# Patient Record
Sex: FEMALE | Race: WHITE | Employment: OTHER
[De-identification: names, ages, dates, MRNs, and addresses within clinical notes are randomized per-mention and may not be internally consistent; named-entity substitution may affect disease eponyms.]

---

## 2017-01-11 ENCOUNTER — CARE COORDINATION (OUTPATIENT)
Dept: CARE COORDINATION | Facility: CLINIC | Age: 77
End: 2017-01-11

## 2017-01-13 ENCOUNTER — CARE COORDINATION (OUTPATIENT)
Dept: CARE COORDINATION | Facility: CLINIC | Age: 77
End: 2017-01-13

## 2017-01-16 ENCOUNTER — CARE COORDINATION (OUTPATIENT)
Dept: CARE COORDINATION | Facility: CLINIC | Age: 77
End: 2017-01-16

## 2017-01-18 ENCOUNTER — CARE COORDINATION (OUTPATIENT)
Dept: CARE COORDINATION | Facility: CLINIC | Age: 77
End: 2017-01-18

## 2017-01-30 ENCOUNTER — CARE COORDINATION (OUTPATIENT)
Dept: CARE COORDINATION | Facility: CLINIC | Age: 77
End: 2017-01-30

## 2017-02-14 ENCOUNTER — CARE COORDINATION (OUTPATIENT)
Dept: CARE COORDINATION | Facility: CLINIC | Age: 77
End: 2017-02-14

## 2017-03-13 ENCOUNTER — APPOINTMENT (OUTPATIENT)
Dept: CT IMAGING | Age: 77
End: 2017-03-13
Payer: MEDICARE

## 2017-03-13 ENCOUNTER — HOSPITAL ENCOUNTER (EMERGENCY)
Age: 77
Discharge: HOME | End: 2017-03-13
Attending: EMERGENCY MEDICINE
Payer: MEDICARE

## 2017-03-13 VITALS
OXYGEN SATURATION: 98 % | HEART RATE: 73 BPM | SYSTOLIC BLOOD PRESSURE: 146 MMHG | BODY MASS INDEX: 34.66 KG/M2 | HEIGHT: 65 IN | RESPIRATION RATE: 17 BRPM | TEMPERATURE: 97.4 F | DIASTOLIC BLOOD PRESSURE: 80 MMHG | WEIGHT: 208 LBS

## 2017-03-13 DIAGNOSIS — H81.90 EPISODIC RECURRENT VERTIGO, UNSPECIFIED LATERALITY: ICD-10-CM

## 2017-03-13 DIAGNOSIS — H81.09 MENIERE DISORDER, UNSPECIFIED LATERALITY: Primary | ICD-10-CM

## 2017-03-13 LAB
% CKMB: 3.6 % (ref 0–3)
ABSOLUTE EOS #: 0.1 K/UL (ref 0–0.4)
ABSOLUTE LYMPH #: 1.5 K/UL (ref 1–4.8)
ABSOLUTE MONO #: 0.4 K/UL (ref 0.2–0.8)
ANION GAP SERPL CALCULATED.3IONS-SCNC: 14 MMOL/L (ref 9–17)
BASOPHILS # BLD: 1 % (ref 0–2)
BASOPHILS ABSOLUTE: 0.1 K/UL (ref 0–0.2)
BUN BLDV-MCNC: 15 MG/DL (ref 8–23)
BUN/CREAT BLD: 22 (ref 9–20)
CALCIUM SERPL-MCNC: 9.7 MG/DL (ref 8.6–10.4)
CHLORIDE BLD-SCNC: 102 MMOL/L (ref 98–107)
CK MB: 3.4 NG/ML
CKMB INTERPRETATION: ABNORMAL
CO2: 25 MMOL/L (ref 20–31)
CREAT SERPL-MCNC: 0.68 MG/DL (ref 0.5–0.9)
DIFFERENTIAL TYPE: ABNORMAL
EOSINOPHILS RELATIVE PERCENT: 1 % (ref 1–4)
GFR AFRICAN AMERICAN: >60 ML/MIN
GFR NON-AFRICAN AMERICAN: >60 ML/MIN
GFR SERPL CREATININE-BSD FRML MDRD: ABNORMAL ML/MIN/{1.73_M2}
GFR SERPL CREATININE-BSD FRML MDRD: ABNORMAL ML/MIN/{1.73_M2}
GLUCOSE BLD-MCNC: 126 MG/DL (ref 70–99)
HCT VFR BLD CALC: 41.9 % (ref 36–46)
HEMOGLOBIN: 13.8 G/DL (ref 12–16)
LYMPHOCYTES # BLD: 17 % (ref 24–44)
MAGNESIUM: 2 MG/DL (ref 1.6–2.6)
MCH RBC QN AUTO: 31.6 PG (ref 26–34)
MCHC RBC AUTO-ENTMCNC: 32.9 G/DL (ref 31–37)
MCV RBC AUTO: 96 FL (ref 80–100)
MONOCYTES # BLD: 4 % (ref 1–7)
MYOGLOBIN: 35 NG/ML (ref 25–58)
PDW BLD-RTO: 13.5 % (ref 11.5–14.5)
PLATELET # BLD: 254 K/UL (ref 130–400)
PLATELET ESTIMATE: ABNORMAL
PMV BLD AUTO: 8.4 FL (ref 6–12)
POTASSIUM SERPL-SCNC: 4 MMOL/L (ref 3.7–5.3)
RBC # BLD: 4.36 M/UL (ref 4–5.2)
RBC # BLD: ABNORMAL 10*6/UL
SEG NEUTROPHILS: 77 % (ref 36–66)
SEGMENTED NEUTROPHILS ABSOLUTE COUNT: 6.6 K/UL (ref 1.8–7.7)
SODIUM BLD-SCNC: 141 MMOL/L (ref 135–144)
TOTAL CK: 95 U/L (ref 26–192)
TROPONIN INTERP: NORMAL
TROPONIN T: <0.03 NG/ML
WBC # BLD: 8.6 K/UL (ref 3.5–11)
WBC # BLD: ABNORMAL 10*3/UL

## 2017-03-13 PROCEDURE — 2580000003 HC RX 258: Performed by: EMERGENCY MEDICINE

## 2017-03-13 PROCEDURE — 83874 ASSAY OF MYOGLOBIN: CPT

## 2017-03-13 PROCEDURE — 84484 ASSAY OF TROPONIN QUANT: CPT

## 2017-03-13 PROCEDURE — 82550 ASSAY OF CK (CPK): CPT

## 2017-03-13 PROCEDURE — 6370000000 HC RX 637 (ALT 250 FOR IP): Performed by: EMERGENCY MEDICINE

## 2017-03-13 PROCEDURE — 70450 CT HEAD/BRAIN W/O DYE: CPT

## 2017-03-13 PROCEDURE — 36415 COLL VENOUS BLD VENIPUNCTURE: CPT

## 2017-03-13 PROCEDURE — 82553 CREATINE MB FRACTION: CPT

## 2017-03-13 PROCEDURE — 6360000002 HC RX W HCPCS: Performed by: EMERGENCY MEDICINE

## 2017-03-13 PROCEDURE — 99285 EMERGENCY DEPT VISIT HI MDM: CPT

## 2017-03-13 PROCEDURE — 80048 BASIC METABOLIC PNL TOTAL CA: CPT

## 2017-03-13 PROCEDURE — 93005 ELECTROCARDIOGRAM TRACING: CPT

## 2017-03-13 PROCEDURE — 96361 HYDRATE IV INFUSION ADD-ON: CPT

## 2017-03-13 PROCEDURE — 85025 COMPLETE CBC W/AUTO DIFF WBC: CPT

## 2017-03-13 PROCEDURE — 83735 ASSAY OF MAGNESIUM: CPT

## 2017-03-13 PROCEDURE — 96375 TX/PRO/DX INJ NEW DRUG ADDON: CPT

## 2017-03-13 PROCEDURE — 96374 THER/PROPH/DIAG INJ IV PUSH: CPT

## 2017-03-13 RX ORDER — MECLIZINE HYDROCHLORIDE 25 MG/1
25 TABLET ORAL 3 TIMES DAILY PRN
Qty: 20 TABLET | Refills: 0 | Status: SHIPPED | OUTPATIENT
Start: 2017-03-13 | End: 2017-07-17 | Stop reason: ALTCHOICE

## 2017-03-13 RX ORDER — PROMETHAZINE HYDROCHLORIDE 25 MG/ML
12.5 INJECTION, SOLUTION INTRAMUSCULAR; INTRAVENOUS ONCE
Status: DISCONTINUED | OUTPATIENT
Start: 2017-03-13 | End: 2017-03-14 | Stop reason: HOSPADM

## 2017-03-13 RX ORDER — MECLIZINE HCL 12.5 MG/1
25 TABLET ORAL ONCE
Status: COMPLETED | OUTPATIENT
Start: 2017-03-13 | End: 2017-03-13

## 2017-03-13 RX ORDER — ONDANSETRON 4 MG/1
4 TABLET, ORALLY DISINTEGRATING ORAL EVERY 8 HOURS PRN
Qty: 20 TABLET | Refills: 0 | Status: SHIPPED | OUTPATIENT
Start: 2017-03-13 | End: 2017-05-11 | Stop reason: SDUPTHER

## 2017-03-13 RX ORDER — 0.9 % SODIUM CHLORIDE 0.9 %
1000 INTRAVENOUS SOLUTION INTRAVENOUS ONCE
Status: COMPLETED | OUTPATIENT
Start: 2017-03-13 | End: 2017-03-13

## 2017-03-13 RX ORDER — ONDANSETRON 2 MG/ML
8 INJECTION INTRAMUSCULAR; INTRAVENOUS ONCE
Status: COMPLETED | OUTPATIENT
Start: 2017-03-13 | End: 2017-03-13

## 2017-03-13 RX ORDER — LORAZEPAM 2 MG/ML
1 INJECTION INTRAMUSCULAR ONCE
Status: COMPLETED | OUTPATIENT
Start: 2017-03-13 | End: 2017-03-13

## 2017-03-13 RX ORDER — PROMETHAZINE HYDROCHLORIDE 25 MG/1
25 SUPPOSITORY RECTAL EVERY 8 HOURS PRN
Qty: 20 SUPPOSITORY | Refills: 0 | Status: SHIPPED | OUTPATIENT
Start: 2017-03-13 | End: 2017-03-20

## 2017-03-13 RX ORDER — LORAZEPAM 1 MG/1
1 TABLET ORAL EVERY 6 HOURS PRN
Qty: 10 TABLET | Refills: 0 | Status: SHIPPED | OUTPATIENT
Start: 2017-03-13 | End: 2017-11-21 | Stop reason: SDUPTHER

## 2017-03-13 RX ADMIN — SODIUM CHLORIDE 1000 ML: 9 INJECTION, SOLUTION INTRAVENOUS at 20:31

## 2017-03-13 RX ADMIN — ONDANSETRON 8 MG: 2 INJECTION INTRAMUSCULAR; INTRAVENOUS at 20:31

## 2017-03-13 RX ADMIN — LORAZEPAM 1 MG: 2 INJECTION INTRAMUSCULAR; INTRAVENOUS at 20:31

## 2017-03-13 RX ADMIN — MECLIZINE HCL 25 MG: 12.5 TABLET ORAL at 22:19

## 2017-03-13 ASSESSMENT — ENCOUNTER SYMPTOMS
VOMITING: 1
NAUSEA: 1
ABDOMINAL PAIN: 0
RESPIRATORY NEGATIVE: 1

## 2017-03-15 LAB
EKG ATRIAL RATE: 72 BPM
EKG P AXIS: 57 DEGREES
EKG P-R INTERVAL: 178 MS
EKG Q-T INTERVAL: 410 MS
EKG QRS DURATION: 84 MS
EKG QTC CALCULATION (BAZETT): 448 MS
EKG R AXIS: -21 DEGREES
EKG T AXIS: 41 DEGREES
EKG VENTRICULAR RATE: 72 BPM

## 2017-03-27 ENCOUNTER — OFFICE VISIT (OUTPATIENT)
Dept: PULMONOLOGY | Age: 77
End: 2017-03-27
Payer: MEDICARE

## 2017-03-27 ENCOUNTER — TELEPHONE (OUTPATIENT)
Dept: PULMONOLOGY | Age: 77
End: 2017-03-27

## 2017-03-27 VITALS
BODY MASS INDEX: 35.99 KG/M2 | TEMPERATURE: 97.2 F | RESPIRATION RATE: 16 BRPM | HEART RATE: 81 BPM | HEIGHT: 65 IN | DIASTOLIC BLOOD PRESSURE: 89 MMHG | OXYGEN SATURATION: 95 % | SYSTOLIC BLOOD PRESSURE: 137 MMHG | WEIGHT: 216 LBS

## 2017-03-27 VITALS — WEIGHT: 216 LBS | BODY MASS INDEX: 35.99 KG/M2 | HEART RATE: 81 BPM | HEIGHT: 65 IN | OXYGEN SATURATION: 98 %

## 2017-03-27 DIAGNOSIS — J30.0 VASOMOTOR RHINITIS: ICD-10-CM

## 2017-03-27 DIAGNOSIS — J01.00 SUBACUTE MAXILLARY SINUSITIS: Primary | ICD-10-CM

## 2017-03-27 DIAGNOSIS — R09.82 POST-NASAL DRIP: ICD-10-CM

## 2017-03-27 DIAGNOSIS — J34.89 SINUS DRAINAGE: Primary | ICD-10-CM

## 2017-03-27 DIAGNOSIS — J98.11 PATCHY ATELECTASIS: ICD-10-CM

## 2017-03-27 PROCEDURE — 4040F PNEUMOC VAC/ADMIN/RCVD: CPT | Performed by: INTERNAL MEDICINE

## 2017-03-27 PROCEDURE — 1036F TOBACCO NON-USER: CPT | Performed by: INTERNAL MEDICINE

## 2017-03-27 PROCEDURE — G8417 CALC BMI ABV UP PARAM F/U: HCPCS | Performed by: INTERNAL MEDICINE

## 2017-03-27 PROCEDURE — 1090F PRES/ABSN URINE INCON ASSESS: CPT | Performed by: INTERNAL MEDICINE

## 2017-03-27 PROCEDURE — 3017F COLORECTAL CA SCREEN DOC REV: CPT | Performed by: INTERNAL MEDICINE

## 2017-03-27 PROCEDURE — 94375 RESPIRATORY FLOW VOLUME LOOP: CPT | Performed by: INTERNAL MEDICINE

## 2017-03-27 PROCEDURE — 94729 DIFFUSING CAPACITY: CPT | Performed by: INTERNAL MEDICINE

## 2017-03-27 PROCEDURE — G8427 DOCREV CUR MEDS BY ELIG CLIN: HCPCS | Performed by: INTERNAL MEDICINE

## 2017-03-27 PROCEDURE — G8484 FLU IMMUNIZE NO ADMIN: HCPCS | Performed by: INTERNAL MEDICINE

## 2017-03-27 PROCEDURE — 99204 OFFICE O/P NEW MOD 45 MIN: CPT | Performed by: INTERNAL MEDICINE

## 2017-03-27 PROCEDURE — 94726 PLETHYSMOGRAPHY LUNG VOLUMES: CPT | Performed by: INTERNAL MEDICINE

## 2017-03-27 PROCEDURE — 3014F SCREEN MAMMO DOC REV: CPT | Performed by: INTERNAL MEDICINE

## 2017-03-27 PROCEDURE — 1123F ACP DISCUSS/DSCN MKR DOCD: CPT | Performed by: INTERNAL MEDICINE

## 2017-03-27 PROCEDURE — G8399 PT W/DXA RESULTS DOCUMENT: HCPCS | Performed by: INTERNAL MEDICINE

## 2017-03-27 RX ORDER — FLUTICASONE PROPIONATE 50 MCG
1 SPRAY, SUSPENSION (ML) NASAL DAILY
Qty: 1 BOTTLE | Refills: 1 | Status: SHIPPED | OUTPATIENT
Start: 2017-03-27 | End: 2017-09-18 | Stop reason: SDUPTHER

## 2017-04-04 ENCOUNTER — OFFICE VISIT (OUTPATIENT)
Dept: NEUROLOGY | Age: 77
End: 2017-04-04
Payer: MEDICARE

## 2017-04-04 VITALS
HEIGHT: 65 IN | HEART RATE: 89 BPM | BODY MASS INDEX: 36.15 KG/M2 | DIASTOLIC BLOOD PRESSURE: 81 MMHG | WEIGHT: 217 LBS | SYSTOLIC BLOOD PRESSURE: 139 MMHG

## 2017-04-04 DIAGNOSIS — R25.1 TREMOR OF RIGHT HAND: Primary | ICD-10-CM

## 2017-04-04 DIAGNOSIS — Z82.0 FAMILY HISTORY OF BENIGN ESSENTIAL TREMOR: ICD-10-CM

## 2017-04-04 PROCEDURE — 4040F PNEUMOC VAC/ADMIN/RCVD: CPT | Performed by: PSYCHIATRY & NEUROLOGY

## 2017-04-04 PROCEDURE — 1090F PRES/ABSN URINE INCON ASSESS: CPT | Performed by: PSYCHIATRY & NEUROLOGY

## 2017-04-04 PROCEDURE — G8417 CALC BMI ABV UP PARAM F/U: HCPCS | Performed by: PSYCHIATRY & NEUROLOGY

## 2017-04-04 PROCEDURE — 1123F ACP DISCUSS/DSCN MKR DOCD: CPT | Performed by: PSYCHIATRY & NEUROLOGY

## 2017-04-04 PROCEDURE — G8427 DOCREV CUR MEDS BY ELIG CLIN: HCPCS | Performed by: PSYCHIATRY & NEUROLOGY

## 2017-04-04 PROCEDURE — 3014F SCREEN MAMMO DOC REV: CPT | Performed by: PSYCHIATRY & NEUROLOGY

## 2017-04-04 PROCEDURE — 99204 OFFICE O/P NEW MOD 45 MIN: CPT | Performed by: PSYCHIATRY & NEUROLOGY

## 2017-04-04 PROCEDURE — 1036F TOBACCO NON-USER: CPT | Performed by: PSYCHIATRY & NEUROLOGY

## 2017-04-04 PROCEDURE — G8399 PT W/DXA RESULTS DOCUMENT: HCPCS | Performed by: PSYCHIATRY & NEUROLOGY

## 2017-04-04 PROCEDURE — 3017F COLORECTAL CA SCREEN DOC REV: CPT | Performed by: PSYCHIATRY & NEUROLOGY

## 2017-04-04 RX ORDER — PRIMIDONE 50 MG/1
TABLET ORAL
Qty: 60 TABLET | Refills: 2 | Status: SHIPPED | OUTPATIENT
Start: 2017-04-04 | End: 2017-07-17 | Stop reason: ALTCHOICE

## 2017-04-06 PROBLEM — R25.1 TREMOR OF RIGHT HAND: Status: ACTIVE | Noted: 2017-04-06

## 2017-04-06 PROBLEM — Z82.0 FAMILY HISTORY OF BENIGN ESSENTIAL TREMOR: Status: ACTIVE | Noted: 2017-04-06

## 2017-06-07 ENCOUNTER — HOSPITAL ENCOUNTER (OUTPATIENT)
Dept: CT IMAGING | Facility: CLINIC | Age: 77
Discharge: HOME | End: 2017-06-07
Payer: MEDICARE

## 2017-06-07 DIAGNOSIS — J34.2 DEVIATED NASAL SEPTUM: ICD-10-CM

## 2017-06-07 DIAGNOSIS — J31.2 CHRONIC SORE THROAT: ICD-10-CM

## 2017-06-07 DIAGNOSIS — J32.0 CHRONIC MAXILLARY SINUSITIS: ICD-10-CM

## 2017-06-07 DIAGNOSIS — J30.9 ALLERGIC RHINITIS, UNSPECIFIED ALLERGIC RHINITIS TRIGGER, UNSPECIFIED RHINITIS SEASONALITY: ICD-10-CM

## 2017-06-07 DIAGNOSIS — R09.81 NASAL CONGESTION: ICD-10-CM

## 2017-06-07 DIAGNOSIS — R09.82 POSTNASAL DRIP: ICD-10-CM

## 2017-06-07 DIAGNOSIS — R49.0 HOARSENESS: ICD-10-CM

## 2017-06-07 PROCEDURE — 70486 CT MAXILLOFACIAL W/O DYE: CPT

## 2017-07-06 ENCOUNTER — HOSPITAL ENCOUNTER (OUTPATIENT)
Dept: MAMMOGRAPHY | Age: 77
Discharge: HOME | End: 2017-07-06
Payer: MEDICARE

## 2017-07-06 DIAGNOSIS — Z12.31 VISIT FOR SCREENING MAMMOGRAM: ICD-10-CM

## 2017-07-06 PROCEDURE — 77063 BREAST TOMOSYNTHESIS BI: CPT

## 2017-07-06 PROCEDURE — G0202 SCR MAMMO BI INCL CAD: HCPCS

## 2017-07-12 PROBLEM — R73.09 ABNORMAL GLUCOSE: Status: ACTIVE | Noted: 2017-07-12

## 2017-07-17 ENCOUNTER — OFFICE VISIT (OUTPATIENT)
Dept: PULMONOLOGY | Age: 77
End: 2017-07-17
Payer: MEDICARE

## 2017-07-17 VITALS
DIASTOLIC BLOOD PRESSURE: 75 MMHG | HEIGHT: 65 IN | BODY MASS INDEX: 33.79 KG/M2 | WEIGHT: 202.8 LBS | HEART RATE: 75 BPM | OXYGEN SATURATION: 95 % | SYSTOLIC BLOOD PRESSURE: 121 MMHG

## 2017-07-17 DIAGNOSIS — R09.82 POST-NASAL DRIP: ICD-10-CM

## 2017-07-17 DIAGNOSIS — J98.11 PATCHY ATELECTASIS: ICD-10-CM

## 2017-07-17 DIAGNOSIS — J34.89 SINUS DRAINAGE: Primary | ICD-10-CM

## 2017-07-17 PROCEDURE — 1090F PRES/ABSN URINE INCON ASSESS: CPT | Performed by: INTERNAL MEDICINE

## 2017-07-17 PROCEDURE — 3017F COLORECTAL CA SCREEN DOC REV: CPT | Performed by: INTERNAL MEDICINE

## 2017-07-17 PROCEDURE — 1123F ACP DISCUSS/DSCN MKR DOCD: CPT | Performed by: INTERNAL MEDICINE

## 2017-07-17 PROCEDURE — G8427 DOCREV CUR MEDS BY ELIG CLIN: HCPCS | Performed by: INTERNAL MEDICINE

## 2017-07-17 PROCEDURE — 3014F SCREEN MAMMO DOC REV: CPT | Performed by: INTERNAL MEDICINE

## 2017-07-17 PROCEDURE — G8417 CALC BMI ABV UP PARAM F/U: HCPCS | Performed by: INTERNAL MEDICINE

## 2017-07-17 PROCEDURE — G8399 PT W/DXA RESULTS DOCUMENT: HCPCS | Performed by: INTERNAL MEDICINE

## 2017-07-17 PROCEDURE — 1036F TOBACCO NON-USER: CPT | Performed by: INTERNAL MEDICINE

## 2017-07-17 PROCEDURE — 99213 OFFICE O/P EST LOW 20 MIN: CPT | Performed by: INTERNAL MEDICINE

## 2017-07-17 PROCEDURE — 4040F PNEUMOC VAC/ADMIN/RCVD: CPT | Performed by: INTERNAL MEDICINE

## 2017-07-17 RX ORDER — EZETIMIBE 10 MG/1
10 TABLET ORAL DAILY
COMMUNITY

## 2017-08-10 ENCOUNTER — HOSPITAL ENCOUNTER (OUTPATIENT)
Dept: GENERAL RADIOLOGY | Facility: CLINIC | Age: 77
Discharge: HOME | End: 2017-08-10
Payer: MEDICARE

## 2017-08-10 DIAGNOSIS — M54.50 MIDLINE LOW BACK PAIN WITHOUT SCIATICA, UNSPECIFIED CHRONICITY: ICD-10-CM

## 2017-08-10 DIAGNOSIS — M54.2 NECK PAIN: ICD-10-CM

## 2017-08-10 DIAGNOSIS — M53.3 COCCYDYNIA: ICD-10-CM

## 2017-08-10 PROCEDURE — 72114 X-RAY EXAM L-S SPINE BENDING: CPT

## 2017-08-10 PROCEDURE — 72052 X-RAY EXAM NECK SPINE 6/>VWS: CPT

## 2017-08-10 PROCEDURE — 72220 X-RAY EXAM SACRUM TAILBONE: CPT

## 2017-09-11 ENCOUNTER — OFFICE VISIT (OUTPATIENT)
Dept: NEUROLOGY | Age: 77
End: 2017-09-11
Payer: MEDICARE

## 2017-09-11 ENCOUNTER — TELEPHONE (OUTPATIENT)
Dept: NEUROLOGY | Age: 77
End: 2017-09-11

## 2017-09-11 VITALS
HEIGHT: 65 IN | WEIGHT: 194 LBS | BODY MASS INDEX: 32.32 KG/M2 | DIASTOLIC BLOOD PRESSURE: 79 MMHG | HEART RATE: 74 BPM | SYSTOLIC BLOOD PRESSURE: 128 MMHG

## 2017-09-11 DIAGNOSIS — Z82.0 FAMILY HISTORY OF BENIGN ESSENTIAL TREMOR: ICD-10-CM

## 2017-09-11 DIAGNOSIS — R25.1 TREMOR OF RIGHT HAND: Primary | ICD-10-CM

## 2017-09-11 PROCEDURE — 4040F PNEUMOC VAC/ADMIN/RCVD: CPT | Performed by: PSYCHIATRY & NEUROLOGY

## 2017-09-11 PROCEDURE — G8427 DOCREV CUR MEDS BY ELIG CLIN: HCPCS | Performed by: PSYCHIATRY & NEUROLOGY

## 2017-09-11 PROCEDURE — G8399 PT W/DXA RESULTS DOCUMENT: HCPCS | Performed by: PSYCHIATRY & NEUROLOGY

## 2017-09-11 PROCEDURE — 1090F PRES/ABSN URINE INCON ASSESS: CPT | Performed by: PSYCHIATRY & NEUROLOGY

## 2017-09-11 PROCEDURE — 3014F SCREEN MAMMO DOC REV: CPT | Performed by: PSYCHIATRY & NEUROLOGY

## 2017-09-11 PROCEDURE — 1123F ACP DISCUSS/DSCN MKR DOCD: CPT | Performed by: PSYCHIATRY & NEUROLOGY

## 2017-09-11 PROCEDURE — 99213 OFFICE O/P EST LOW 20 MIN: CPT | Performed by: PSYCHIATRY & NEUROLOGY

## 2017-09-11 PROCEDURE — 3017F COLORECTAL CA SCREEN DOC REV: CPT | Performed by: PSYCHIATRY & NEUROLOGY

## 2017-09-11 PROCEDURE — 1036F TOBACCO NON-USER: CPT | Performed by: PSYCHIATRY & NEUROLOGY

## 2017-09-11 PROCEDURE — G8417 CALC BMI ABV UP PARAM F/U: HCPCS | Performed by: PSYCHIATRY & NEUROLOGY

## 2017-09-13 PROBLEM — M47.817 SPONDYLOSIS OF LUMBOSACRAL REGION WITHOUT MYELOPATHY OR RADICULOPATHY: Status: ACTIVE | Noted: 2017-09-13

## 2017-09-13 PROBLEM — L21.9 SEBORRHEIC DERMATITIS OF SCALP: Status: ACTIVE | Noted: 2017-09-13

## 2017-09-13 PROBLEM — M47.812 SPONDYLOSIS OF CERVICAL REGION WITHOUT MYELOPATHY OR RADICULOPATHY: Status: ACTIVE | Noted: 2017-09-13

## 2017-09-18 DIAGNOSIS — J30.0 VASOMOTOR RHINITIS: ICD-10-CM

## 2017-09-18 DIAGNOSIS — R09.82 POST-NASAL DRIP: ICD-10-CM

## 2017-09-18 DIAGNOSIS — J01.00 SUBACUTE MAXILLARY SINUSITIS: ICD-10-CM

## 2017-09-18 RX ORDER — FLUTICASONE PROPIONATE 50 MCG
SPRAY, SUSPENSION (ML) NASAL
Qty: 1 BOTTLE | Refills: 2 | Status: SHIPPED | OUTPATIENT
Start: 2017-09-18

## 2017-10-31 ENCOUNTER — HOSPITAL ENCOUNTER (OUTPATIENT)
Dept: PREADMISSION TESTING | Facility: CLINIC | Age: 77
Discharge: HOME | End: 2017-10-31
Payer: MEDICARE

## 2017-10-31 VITALS
HEIGHT: 65 IN | SYSTOLIC BLOOD PRESSURE: 141 MMHG | BODY MASS INDEX: 31.65 KG/M2 | OXYGEN SATURATION: 97 % | WEIGHT: 190 LBS | HEART RATE: 75 BPM | DIASTOLIC BLOOD PRESSURE: 76 MMHG | TEMPERATURE: 97.7 F | RESPIRATION RATE: 14 BRPM

## 2017-10-31 NOTE — PROGRESS NOTES
DAY OF SURGERY/PROCEDURE  GUIDELINES    As a patient at the [facility] you can expect quality medical and nursing care that is centered on your individual needs. It is our goal to make your surgical experience as comfortable and excellent as possible.  ________________________________________________________________________    The following instructions are general guidelines, if any information on this sheet is different from what your doctor has instructed you to do, please follow your doctor's instructions. · Please arrive on time or your procedure may be rescheduled  · Enter through front entrance of the building. Surgery Center will be located to your right. · Upon arrival you will be taken to the pre-operative area to get ready for surgery, your family will stay in the waiting room and visit with you once you are ready for surgery. Due to special limitations please limit visitation to 1-2 members of your family at a time. When it is time for surgery your family will return to the waiting room. · You will be given a specific time when you will NOT be able to eat, drink, smoke, suck or chew ANYTHING (no water, gum, mints, cigarettes, cigars, pipes, snuff, chewing tobacco, etc.) or your surgery may be canceled. This will occur during your PAT appointment or by phone 1-2 days before surgery  · Take a shower or bath on the morning of your surgery/procedure (Hibiclens if directed)  · Brush your teeth, but do not swallow any water  · IN CASE OF ILLNESS - If you have a cold or flu symptoms (high fever, runny nose, sore throat, cough, etc.) rash, nausea, vomiting, loose stools, and/or recent contact with someone who has a contagious disease (chick pox, measles, etc.) please call your doctor before coming to the surgery center  · Take a small sip of water with heart, blood pressure, and/or seizure medication the morning of surgery.  (DO NOT take blood pressure medications that contain a diuretic)  · If applicable bring your:  · Inhaler (s)  · Hearing aid(s)  · Eyeglasses and Case (If you wear contacts they have to be removed before surgery, bring case and solution)  · Any paperwork given to you by your doctor  · Any X-rays you were told to bring  · A copy of your Living Will or Durable Power of   · DO NOT take anticoagulants (blood thinners, aspiring or aspirin-containing products) two weeks prior to your surgery. You may start taking again 2 days post-operatively, unless otherwise directed by your doctor. · DO NOT take any diabetic pills or insulin. Please bring your sliding scale instructions and sick day plan with you. If you have a low blood sugar reaction after midnight, drink 4 ounces of apple juice or regular pop. · Wear loose, comfortable clothing that is easy to put on and take off. A locker will be provided to store your clothing during the procedure. The key can be given to a family member/friend or it will remain in post-op with the nurse. · You will be returning home the same day as your surgery, you will need to have a responsible adult (25years of age or older) present to drive you home. You will need someone stay with you at home for the first 24 hours following your surgery. This is due to the anesthesia and the medication given to you during surgery and recovery. · Your doctor may talk with your family immediately following your procedure. Depending on your needs, you will stay in the recovery room between 30 minutes to 2 hours. · While you are recovering, the surgery family waiting room  can answer many of your family's questions. All medically related questions will be answered by a medical professional. If you had outpatient surgery, you will meet your family for discharge instructions before leaving.

## 2017-11-08 ENCOUNTER — ANESTHESIA EVENT (OUTPATIENT)
Dept: OPERATING ROOM | Facility: CLINIC | Age: 77
End: 2017-11-08
Payer: MEDICARE

## 2017-11-08 ENCOUNTER — HOSPITAL ENCOUNTER (OUTPATIENT)
Facility: CLINIC | Age: 77
Setting detail: OUTPATIENT SURGERY
Discharge: HOME | End: 2017-11-08
Attending: PLASTIC SURGERY | Admitting: PLASTIC SURGERY
Payer: MEDICARE

## 2017-11-08 ENCOUNTER — ANESTHESIA (OUTPATIENT)
Dept: OPERATING ROOM | Facility: CLINIC | Age: 77
End: 2017-11-08
Payer: MEDICARE

## 2017-11-08 ENCOUNTER — HOSPITAL ENCOUNTER (OUTPATIENT)
Age: 77
Setting detail: SPECIMEN
Discharge: HOME | End: 2017-11-08
Payer: MEDICARE

## 2017-11-08 VITALS
BODY MASS INDEX: 31.65 KG/M2 | HEART RATE: 61 BPM | OXYGEN SATURATION: 95 % | DIASTOLIC BLOOD PRESSURE: 82 MMHG | RESPIRATION RATE: 26 BRPM | HEIGHT: 65 IN | WEIGHT: 190 LBS | TEMPERATURE: 97.9 F | SYSTOLIC BLOOD PRESSURE: 132 MMHG

## 2017-11-08 VITALS — DIASTOLIC BLOOD PRESSURE: 67 MMHG | OXYGEN SATURATION: 97 % | SYSTOLIC BLOOD PRESSURE: 129 MMHG

## 2017-11-08 PROCEDURE — 7100000000 HC PACU RECOVERY - FIRST 15 MIN: Performed by: PLASTIC SURGERY

## 2017-11-08 PROCEDURE — 3600000002 HC SURGERY LEVEL 2 BASE: Performed by: PLASTIC SURGERY

## 2017-11-08 PROCEDURE — 7100000010 HC PHASE II RECOVERY - FIRST 15 MIN: Performed by: PLASTIC SURGERY

## 2017-11-08 PROCEDURE — 2580000003 HC RX 258: Performed by: NURSE ANESTHETIST, CERTIFIED REGISTERED

## 2017-11-08 PROCEDURE — 3700000000 HC ANESTHESIA ATTENDED CARE: Performed by: PLASTIC SURGERY

## 2017-11-08 PROCEDURE — 2500000003 HC RX 250 WO HCPCS: Performed by: PLASTIC SURGERY

## 2017-11-08 PROCEDURE — 2580000003 HC RX 258: Performed by: ANESTHESIOLOGY

## 2017-11-08 PROCEDURE — 6360000002 HC RX W HCPCS

## 2017-11-08 PROCEDURE — 2500000003 HC RX 250 WO HCPCS: Performed by: NURSE ANESTHETIST, CERTIFIED REGISTERED

## 2017-11-08 PROCEDURE — 3700000001 HC ADD 15 MINUTES (ANESTHESIA): Performed by: PLASTIC SURGERY

## 2017-11-08 PROCEDURE — 3600000012 HC SURGERY LEVEL 2 ADDTL 15MIN: Performed by: PLASTIC SURGERY

## 2017-11-08 PROCEDURE — 7100000011 HC PHASE II RECOVERY - ADDTL 15 MIN: Performed by: PLASTIC SURGERY

## 2017-11-08 PROCEDURE — 6360000002 HC RX W HCPCS: Performed by: NURSE ANESTHETIST, CERTIFIED REGISTERED

## 2017-11-08 PROCEDURE — 7100000001 HC PACU RECOVERY - ADDTL 15 MIN: Performed by: PLASTIC SURGERY

## 2017-11-08 RX ORDER — BUPIVACAINE HYDROCHLORIDE AND EPINEPHRINE 5; 5 MG/ML; UG/ML
INJECTION, SOLUTION EPIDURAL; INTRACAUDAL; PERINEURAL PRN
Status: DISCONTINUED | OUTPATIENT
Start: 2017-11-08 | End: 2017-11-08 | Stop reason: HOSPADM

## 2017-11-08 RX ORDER — SODIUM CHLORIDE, SODIUM LACTATE, POTASSIUM CHLORIDE, CALCIUM CHLORIDE 600; 310; 30; 20 MG/100ML; MG/100ML; MG/100ML; MG/100ML
INJECTION, SOLUTION INTRAVENOUS CONTINUOUS
Status: DISCONTINUED | OUTPATIENT
Start: 2017-11-08 | End: 2017-11-08 | Stop reason: HOSPADM

## 2017-11-08 RX ORDER — FENTANYL CITRATE 50 UG/ML
25 INJECTION, SOLUTION INTRAMUSCULAR; INTRAVENOUS EVERY 5 MIN PRN
Status: DISCONTINUED | OUTPATIENT
Start: 2017-11-08 | End: 2017-11-08 | Stop reason: HOSPADM

## 2017-11-08 RX ORDER — CEPHALEXIN 500 MG/1
500 CAPSULE ORAL 3 TIMES DAILY
Qty: 15 CAPSULE | Refills: 0 | Status: SHIPPED | OUTPATIENT
Start: 2017-11-08 | End: 2017-11-13

## 2017-11-08 RX ORDER — LIDOCAINE HYDROCHLORIDE 10 MG/ML
INJECTION, SOLUTION EPIDURAL; INFILTRATION; INTRACAUDAL; PERINEURAL PRN
Status: DISCONTINUED | OUTPATIENT
Start: 2017-11-08 | End: 2017-11-08 | Stop reason: SDUPTHER

## 2017-11-08 RX ORDER — ONDANSETRON 2 MG/ML
INJECTION INTRAMUSCULAR; INTRAVENOUS PRN
Status: DISCONTINUED | OUTPATIENT
Start: 2017-11-08 | End: 2017-11-08 | Stop reason: SDUPTHER

## 2017-11-08 RX ORDER — SODIUM CHLORIDE, SODIUM LACTATE, POTASSIUM CHLORIDE, CALCIUM CHLORIDE 600; 310; 30; 20 MG/100ML; MG/100ML; MG/100ML; MG/100ML
INJECTION, SOLUTION INTRAVENOUS CONTINUOUS PRN
Status: DISCONTINUED | OUTPATIENT
Start: 2017-11-08 | End: 2017-11-08 | Stop reason: SDUPTHER

## 2017-11-08 RX ORDER — PROPOFOL 10 MG/ML
INJECTION, EMULSION INTRAVENOUS PRN
Status: DISCONTINUED | OUTPATIENT
Start: 2017-11-08 | End: 2017-11-08 | Stop reason: SDUPTHER

## 2017-11-08 RX ADMIN — PROPOFOL 50 MG: 10 INJECTION, EMULSION INTRAVENOUS at 07:22

## 2017-11-08 RX ADMIN — PROPOFOL 10 MG: 10 INJECTION, EMULSION INTRAVENOUS at 07:28

## 2017-11-08 RX ADMIN — SODIUM CHLORIDE, POTASSIUM CHLORIDE, SODIUM LACTATE AND CALCIUM CHLORIDE: 600; 310; 30; 20 INJECTION, SOLUTION INTRAVENOUS at 07:15

## 2017-11-08 RX ADMIN — PROPOFOL 20 MG: 10 INJECTION, EMULSION INTRAVENOUS at 07:35

## 2017-11-08 RX ADMIN — LIDOCAINE HYDROCHLORIDE 50 MG: 10 INJECTION, SOLUTION EPIDURAL; INFILTRATION; INTRACAUDAL; PERINEURAL at 07:22

## 2017-11-08 RX ADMIN — PROPOFOL 20 MG: 10 INJECTION, EMULSION INTRAVENOUS at 07:31

## 2017-11-08 RX ADMIN — PROPOFOL 20 MG: 10 INJECTION, EMULSION INTRAVENOUS at 07:29

## 2017-11-08 RX ADMIN — PROPOFOL 10 MG: 10 INJECTION, EMULSION INTRAVENOUS at 07:24

## 2017-11-08 RX ADMIN — PROPOFOL 10 MG: 10 INJECTION, EMULSION INTRAVENOUS at 07:23

## 2017-11-08 RX ADMIN — SODIUM CHLORIDE, POTASSIUM CHLORIDE, SODIUM LACTATE AND CALCIUM CHLORIDE: 600; 310; 30; 20 INJECTION, SOLUTION INTRAVENOUS at 07:20

## 2017-11-08 RX ADMIN — PROPOFOL 20 MG: 10 INJECTION, EMULSION INTRAVENOUS at 07:26

## 2017-11-08 RX ADMIN — ONDANSETRON 4 MG: 2 INJECTION INTRAMUSCULAR; INTRAVENOUS at 07:37

## 2017-11-08 ASSESSMENT — PAIN - FUNCTIONAL ASSESSMENT: PAIN_FUNCTIONAL_ASSESSMENT: 0-10

## 2017-11-08 ASSESSMENT — LIFESTYLE VARIABLES: SMOKING_STATUS: 0

## 2017-11-08 ASSESSMENT — PAIN SCALES - GENERAL: PAINLEVEL_OUTOF10: 0

## 2017-11-08 ASSESSMENT — ENCOUNTER SYMPTOMS: SHORTNESS OF BREATH: 0

## 2017-11-08 NOTE — H&P
Patient's Name/Date of Birth: Keyonna Ortiz / 1940 (15 y.o.)     Date: September 19, 2017      HPI: Pt is a 78 y.o. female who presents to the office today for a mass on her back. She reports it has been infected and treated with both I&D and antibiotics twice previously. It is currently not inflamed and there is no evidence of infection. She denies a personal or family history of skin cancer. She also has a mass on her left arm that she noticed a year ago as well.      Past Medical History        Past Medical History:   Diagnosis Date    Acid reflux      Arthritis       lower back    Diabetes mellitus ([facility] [address]       denies diabetes    Diverticular disease 6/30/2014    GERD (gastroesophageal reflux disease)      Hearing loss      Hypercholesteremia 6/30/2014    Hypothyroid 6/30/2014    Lung nodule      Neuropathy (HCC)      Osteopenia 6/30/2014    RAD (reactive airway disease) 6/30/2014    Sinusitis      Thyroid disease       underactive    Tremor       r hand    Vitamin D deficiency 6/30/2014            Past Surgical History         Past Surgical History:   Procedure Laterality Date    CHOLECYSTECTOMY        COLONOSCOPY        ENDOSCOPY, COLON, DIAGNOSTIC        ERCP   03/21/14    HYSTERECTOMY   01/03/2007            Current Facility-Administered Medications          Current Outpatient Prescriptions   Medication Sig Dispense Refill    fluticasone (FLONASE) 50 MCG/ACT nasal spray PLACE 1 SPRAY IN EACH NOSTRIL DAILY FOR 15 DAYS 1 Bottle 2    metFORMIN (GLUCOPHAGE) 500 MG tablet TAKE ONE TABLET BY MOUTH TWICE A  tablet 0    clobetasol (TEMOVATE) 0.05 % external solution Apply topically 2 times daily. prn to scalp 1 Bottle 0    ketoconazole (NIZORAL) 2 % shampoo APPLY  TO AFFECTED AREAS TOPICALLY DAILY THEN RINSE OFF AFTER 5 MINUTES 120 mL 2    levothyroxine (SYNTHROID) 75 MCG tablet TAKE ONE TABLET BY MOUTH DAILY 90 tablet 1    ezetimibe (ZETIA) 10 MG tablet Take 10 mg by mouth lower arm. non pulsatile         Plan:  1. At this time we discussed watchful waiting vs surgical excision. The patient has elected for surgical excision. 2. Self skin checks encouraged, patient advised to watch all existing lesions for changes and observe for any new lesions. Return to the office for evaluation of any concerning, changing or new skin lesions. 3.  Consistent use of sunscreen containing SPF 30 or higher and reapplication every 90 - 283 minutes while outside. 4.  The patient's was evaluated and after discussion surgical and non surgical options, the patient has decided to undergo surgical removal of the mass. All questions were answered to the patient's satisfaction. We will get her scheduled for excision under General anesthesia. The patient will call Xi to schedule the procedure. If there are any further questions or concerns, the patient was encouraged to call and follow up with one of the providers.

## 2017-11-08 NOTE — ANESTHESIA PRE PROCEDURE
Department of Anesthesiology  Preprocedure Note       Name:  Ariana Ron   Age:  78 y.o.  :  1940                                          MRN:  [id number]         Date:  2017      Surgeon: Laurie Pierson):  Veronica Leo MD    Procedure: Procedure(s):  EXCISION MASS LEFT POSTERIOR SHOULDER, LEFT ANTERIOR ARM    Medications prior to admission:   Prior to Admission medications    Medication Sig Start Date End Date Taking? Authorizing Provider   clobetasol (TEMOVATE) 0.05 % external solution APPLY TO AFFECTED AREA(S) ON SCALPP TWO TIMES A DAY AS NEEDED 10/22/17  Yes Priya Delgado MD   fluticasone Chandan Purdue) 50 MCG/ACT nasal spray PLACE 1 SPRAY IN EACH NOSTRIL DAILY FOR 15 DAYS 17  Yes Babita Augustine MD   metFORMIN (GLUCOPHAGE) 500 MG tablet TAKE ONE TABLET BY MOUTH TWICE A DAY 17  Yes Priya Delgado MD   ketoconazole (NIZORAL) 2 % shampoo APPLY  TO AFFECTED AREAS TOPICALLY DAILY THEN RINSE OFF AFTER 5 MINUTES 17  Yes Priya Delgado MD   levothyroxine (SYNTHROID) 75 MCG tablet TAKE ONE TABLET BY MOUTH DAILY 17  Yes Priya Delgado MD   ezetimibe (ZETIA) 10 MG tablet Take 10 mg by mouth daily   Yes Historical Provider, MD   MENTAX 1 % CREA APPLY TO AFFECTED AREA(S) TWO TIMES A DAY 17  Yes Priya Delgado MD   amitriptyline (ELAVIL) 10 MG tablet TAKE 1 TABLET BY MOUTH AS NEEDED FOR SLEEP FOR UP TO 30 DAYS FOR LEG PAIN 17  Yes Priya Delgado MD   ondansetron (ZOFRAN ODT) 4 MG disintegrating tablet Take 1 tablet by mouth every 8 hours as needed for Nausea 17  Yes Priya Delgado MD   LORazepam (ATIVAN) 1 MG tablet Take 1 tablet by mouth every 6 hours as needed (as needed for vertigo, nausea/vomiting) 3/13/17  Yes Jeannett Holstein, MD   esomeprazole Magnesium (NEXIUM) 40 MG PACK Take 40 mg by mouth daily.    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    Tremor of right hand R25.1    Family history of benign essential tremor Z82.0    Abnormal glucose R73.09    Spondylosis of cervical region without myelopathy or radiculopathy M47.812    Spondylosis of lumbosacral region without myelopathy or radiculopathy M47.817    Seborrheic dermatitis of scalp L21.9       Past Medical History:        Diagnosis Date    Acid reflux     Arthritis     lower back    Diabetes mellitus ([facility] [address]     denies diabetes    Diverticular disease 6/30/2014    GERD (gastroesophageal reflux disease)     Hearing loss     Hypercholesteremia 6/30/2014    Hypothyroid 6/30/2014    Lung nodule     Meniere disease     right ear    Neuropathy ([facility] [address]     Osteopenia 6/30/2014    RAD (reactive airway disease) 6/30/2014    Sinusitis     Thyroid disease     underactive    Tremor     r hand    Vertigo     Vitamin D deficiency 6/30/2014       Past Surgical History:        Procedure Laterality Date    CHOLECYSTECTOMY      COLONOSCOPY      ENDOSCOPY, COLON, DIAGNOSTIC      ERCP  03/21/14    HYSTERECTOMY  01/03/2007       Social History:    Social History   Substance Use Topics    Smoking status: Never Smoker    Smokeless tobacco: Never Used    Alcohol use No                                Counseling given: Not Answered      Vital Signs (Current):   Vitals:    11/08/17 0648   BP: 131/81   Pulse: 70   Resp: 16   Temp: 98.1 °F (36.7 °C)   TempSrc: Temporal   SpO2: 95%   Weight: 190 lb (86.2 kg)   Height: 5' 5\" (1.651 m)                                              BP Readings from Last 3 Encounters:   11/08/17 131/81   10/31/17 (!) 141/76   10/18/17 136/84       NPO Status: Time of last liquid consumption: 1802                        Time of last solid consumption: 1802                        Date of last liquid consumption: 11/07/17                        Date of last solid food consumption: 11/07/17    BMI:   Wt Readings from Last 3 Encounters:   11/08/17 190 controlled,      (-) hiatal hernia, PUD, hepatitis, liver disease and bowel prep       Endo/Other:    (+) Type II DM, , hypothyroidism::. (-) hyperthyroidism, blood dyscrasia, arthritis, no Type I DM               Abdominal:         (-) obese     Vascular:                                      Anesthesia Plan      MAC and general     ASA 2       Induction: intravenous. Anesthetic plan and risks discussed with patient. Plan discussed with CRNA.                   Melissa Rodriguez MD   11/8/2017

## 2017-11-08 NOTE — BRIEF OP NOTE
Brief Postoperative Note    Mak Rocha  YOB: 1940  [id number]    Pre-operative Diagnosis: left shoulder and left forearm mass    Post-operative Diagnosis: Same    Procedure: excision X 2    Anesthesia: MAC    Surgeons/Assistants: dennis    Estimated Blood Loss: less than 50     Complications: None    Specimens: Was Obtained: X 2    Findings:     Electronically signed by Baron Ramirez MD on 11/8/2017 at 7:42 AM

## 2017-11-08 NOTE — OP NOTE
[facility]                                 OPERATIVE REPORT    PATIENT NAME: Matty Diaz                  :        1940  MED REC NO:   [id number]                             ROOM:  ACCOUNT NO:   [de-identified]                           ADMIT DATE: 2017  PROVIDER:     Tatiana Dillard    DATE OF PROCEDURE:  2017    PREOPERATIVE DIAGNOSES:  1.  A 22-mm left posterior shoulder mass. 2.  An 18-mm left dorsal left forearm mass. POSTOPERATIVE DIAGNOSES:  1.  A 22-mm left posterior shoulder mass. 2.  An 18-mm left dorsal left forearm mass. OPERATION PERFORMED:  Excision of lesions of posterior shoulder and the  left dorsal forearm. ANESTHESIA:  MAC along with 0.5% Marcaine, 1:200,000 epinephrine local.    INDICATION:  This is a 72-year-old female with enlarging lesions as noted  above. The left shoulder mass had a history of draining in the past.  She  was scheduled for excision. FINDINDS:  There was a dysplastic-appearing nevus on the skin overlying the  mass on the left back, which was excised with this lesion. OPERATIVE PROCEDURE:  With the patient in the right lateral decubitus  position, intravenous sedation was administered. The area was anesthetized  with local anesthetic on both the shoulder and the forearm. After  appropriate prep and drape, elliptical excision on this lesion was carried  out, which was a subcutaneous cystic mass. Circumferential dissection,  wide undermining, and multilayer closure using 2-0 Monocryl. Steri-Strips  were applied. Incision over the dorsal forearm lesion was carried out with  circumferential dissection, removal, and multi-layer closure using 2-0  Monocryl. Steri-Strips were applied. Tolerated this well. Follow up in the office in 2 weeks' time. No complications.         Maxine Lynch    D: 2017 7:57:00       T: 2017 16:28:58     [email or web address]  Job#: [id number]     Doc#: [id number]

## 2017-11-10 LAB — DERMATOLOGY PATHOLOGY REPORT: NORMAL

## 2017-11-27 PROBLEM — D17.9: Status: ACTIVE | Noted: 2017-11-27

## 2017-12-30 ENCOUNTER — HOSPITAL ENCOUNTER (EMERGENCY)
Facility: CLINIC | Age: 77
Discharge: HOME | End: 2017-12-30
Attending: EMERGENCY MEDICINE
Payer: MEDICARE

## 2017-12-30 VITALS
HEIGHT: 65 IN | WEIGHT: 195 LBS | TEMPERATURE: 97.4 F | OXYGEN SATURATION: 97 % | HEART RATE: 82 BPM | DIASTOLIC BLOOD PRESSURE: 73 MMHG | RESPIRATION RATE: 14 BRPM | BODY MASS INDEX: 32.49 KG/M2 | SYSTOLIC BLOOD PRESSURE: 138 MMHG

## 2017-12-30 DIAGNOSIS — R30.0 DYSURIA: Primary | ICD-10-CM

## 2017-12-30 LAB
BILIRUBIN URINE: NEGATIVE
COLOR: YELLOW
COMMENT UA: NORMAL
GLUCOSE URINE: NEGATIVE
KETONES, URINE: NEGATIVE
LEUKOCYTE ESTERASE, URINE: NEGATIVE
NITRITE, URINE: NEGATIVE
PH UA: 5.5 (ref 5–8)
PROTEIN UA: NEGATIVE
SPECIFIC GRAVITY UA: 1.02 (ref 1–1.03)
TURBIDITY: CLEAR
URINE HGB: NEGATIVE
UROBILINOGEN, URINE: NORMAL

## 2017-12-30 PROCEDURE — 81003 URINALYSIS AUTO W/O SCOPE: CPT

## 2017-12-30 PROCEDURE — 87086 URINE CULTURE/COLONY COUNT: CPT

## 2017-12-30 PROCEDURE — 99283 EMERGENCY DEPT VISIT LOW MDM: CPT

## 2017-12-30 RX ORDER — CEPHALEXIN 500 MG/1
500 CAPSULE ORAL 3 TIMES DAILY
Qty: 21 CAPSULE | Refills: 0 | Status: SHIPPED | OUTPATIENT
Start: 2017-12-30 | End: 2018-01-06

## 2017-12-30 ASSESSMENT — ENCOUNTER SYMPTOMS
NAUSEA: 0
COUGH: 1
RHINORRHEA: 1
ABDOMINAL PAIN: 0
SHORTNESS OF BREATH: 0
CONSTIPATION: 0
BLOOD IN STOOL: 0
DIARRHEA: 0
VOMITING: 0
EYE PAIN: 0
BACK PAIN: 1

## 2017-12-30 NOTE — ED PROVIDER NOTES
[facility]  [address] [address]  Phone: [phone]        Pt Name: Geri Conklin  MRN: [id number]  [place] 1940  Date of evaluation: 12/30/17      CHIEF COMPLAINT       Chief Complaint   Patient presents with    Urinary Tract Infection     12/22 went to urgent care and was given Macrobid, still having some \"pinching\" w urination. now currently has a cold as well. HISTORY OF PRESENT ILLNESS    Geri Conklin is a 78 y.o. female who presents Chief complaint of dysuria, patient had urinary symptoms on the 22nd she went to an urgent care was given Macrobid she just completed Macrobid yesterday but still having some urinary symptoms no fevers chills or nausea or vomiting she's also developed cold symptoms with a slight cough runny nose and congestion. No chest pain or shortness of breath no nausea vomiting or diarrhea as stated      REVIEW OF SYSTEMS         Review of Systems   Constitutional: Negative for chills and fever. HENT: Positive for congestion and rhinorrhea. Negative for ear pain. Eyes: Negative for pain and visual disturbance. Respiratory: Positive for cough. Negative for shortness of breath. Cardiovascular: Negative for chest pain, palpitations and leg swelling. Gastrointestinal: Negative for abdominal pain, blood in stool, constipation, diarrhea, nausea and vomiting. Endocrine: Negative for polydipsia and polyuria. Genitourinary: Positive for dysuria and frequency. Negative for difficulty urinating, vaginal bleeding and vaginal discharge. Musculoskeletal: Positive for back pain. Negative for joint swelling, myalgias, neck pain and neck stiffness. Patient states she has chronic low back pain and this is unchanged   Skin: Negative for rash. Neurological: Negative for dizziness, weakness and headaches. Hematological: Negative for adenopathy. Does not bruise/bleed easily.    Psychiatric/Behavioral: Negative for confusion, self-injury tsp, daily    NONFORMULARY    Super b complex daily    ONDANSETRON (ZOFRAN ODT) 4 MG DISINTEGRATING TABLET    Take 1 tablet by mouth every 8 hours as needed for Nausea       ALLERGIES     is allergic to aspirin; atorvastatin; crestor [rosuvastatin]; livalo [pitavastatin]; seasonal; simvastatin; and welchol [colesevelam hcl]. FAMILY HISTORY     indicated that her mother is . She indicated that her father is . She indicated that one of her four brothers is . She indicated that the status of her maternal grandmother is unknown. She indicated that her paternal grandfather is . She indicated that the status of her maternal aunt is unknown. She indicated that the status of her maternal uncle is unknown. She indicated that the status of her paternal uncle is unknown.      family history includes Cancer in her brother and maternal uncle; Cancer (age of onset: 48) in her mother; Cancer (age of onset: 64) in her father; Diabetes in her brother; Heart Disease in her paternal grandfather; Heart Disease (age of onset: 58) in her brother; Heart Disease (age of onset: 79) in her brother; Heart Disease (age of onset: 67) in her paternal uncle; High Blood Pressure in her brother and brother; Other in her brother; Stroke in her maternal aunt and maternal grandmother. SOCIAL HISTORY      reports that she has never smoked. She has never used smokeless tobacco. She reports that she does not drink alcohol or use drugs. PHYSICAL EXAM     INITIAL VITALS:  height is 5' 5\" (1.651 m) and weight is 88.5 kg (195 lb). Her oral temperature is 97.4 °F (36.3 °C). Her blood pressure is 161/85 (abnormal) and her pulse is 82. Her respiration is 14 and oxygen saturation is 97%. Physical Exam   Constitutional: She is oriented to person, place, and time. She appears well-developed and well-nourished. No distress. HENT:   Head: Normocephalic and atraumatic.    Right Ear: External ear normal.   Left Ear: External ear normal.   Mouth/Throat: Oropharynx is clear and moist.   Eyes: Conjunctivae and EOM are normal. Pupils are equal, round, and reactive to light. Neck: Normal range of motion. Cardiovascular: Normal rate and regular rhythm. Pulmonary/Chest: Effort normal and breath sounds normal.   Abdominal: Soft. Bowel sounds are normal.   Musculoskeletal: She exhibits no edema or tenderness. Neurological: She is alert and oriented to person, place, and time. Skin: Skin is warm and dry. She is not diaphoretic. Psychiatric: She has a normal mood and affect.  Her behavior is normal.       DIFFERENTIAL DIAGNOSIS/ MDM:     Josephine Payne persistent tract infection will recheck a urine will send a swab for culture also patient has a URI    DIAGNOSTIC RESULTS     EKG: All EKG's are interpreted by the Emergency Department Physician who either signs or Co-signs this chart in the absence of a cardiologist.        RADIOLOGY:   Non-plain film images such as CT, Ultrasound and MRI are read by the radiologist. [facility] radiographic images are visualized and the radiologist interpretations are reviewed as follows:         LABS:  Results for orders placed or performed during the hospital encounter of 12/30/17   Urinalysis   Result Value Ref Range    Color, UA YELLOW YEL    Turbidity UA CLEAR CLEAR    Glucose, Ur NEGATIVE NEG    Bilirubin Urine NEGATIVE NEG    Ketones, Urine NEGATIVE NEG    Specific Gravity, UA 1.025 1.005 - 1.030    Urine Hgb NEGATIVE NEG    pH, UA 5.5 5.0 - 8.0    Protein, UA NEGATIVE NEG    Urobilinogen, Urine Normal NORM    Nitrite, Urine NEGATIVE NEG    Leukocyte Esterase, Urine NEGATIVE NEG    Urinalysis Comments       Microscopic exam not performed based on chemical results unless requested in       Urine rather unremarkable culture sent    EMERGENCY DEPARTMENT COURSE:   Vitals:    Vitals:    12/30/17 1251   BP: (!) 161/85   Pulse: 82   Resp: 14   Temp: 97.4 °F (36.3 °C)   TempSrc: Oral   SpO2: 97%   Weight: 88.5 kg (195 lb)   Height: 5' 5\" (1.651 m)     -------------------------  BP: (!) 161/85, Temp: 97.4 °F (36.3 °C), Pulse: 82, Resp: 14      With her symptoms of frequency urgency and dysuria I will place her on some Keflex have her follow-up with her primary care doctor and we did get a urine culture    CONSULTS:      PROCEDURES:  None    FINAL IMPRESSION      1. Dysuria          DISPOSITION/PLAN   Discharged in stable condition    PATIENT REFERRED TO:  Zoltan Gay MD  [address] [address]  [phone]    Schedule an appointment as soon as possible for a visit in 3 days        DISCHARGE MEDICATIONS:  New Prescriptions    CEPHALEXIN (KEFLEX) 500 MG CAPSULE    Take 1 capsule by mouth 3 times daily for 7 days       (Please note that portions of this note were completed with a voice recognition program.  Efforts were made to edit the dictations but occasionally words are mis-transcribed.)    Hdez MD, F.A.A.E.M.   Attending Emergency Medicine Physician        Warden Raad MD  12/30/17 1423

## 2017-12-31 LAB
CULTURE: NORMAL
CULTURE: NORMAL
Lab: NORMAL
SPECIMEN DESCRIPTION: NORMAL
SPECIMEN DESCRIPTION: NORMAL
STATUS: NORMAL

## 2018-01-03 ENCOUNTER — CARE COORDINATION (OUTPATIENT)
Dept: CARE COORDINATION | Age: 78
End: 2018-01-03

## 2018-01-03 NOTE — CARE COORDINATION
Ambulatory Care Coordination ED Follow up Call       Reason for ED Visit:  dysuria  Care Management Risk Score: CMRS 6  How are you feeling? :     improved  Patient Reports the following:  Patient is doing better and still taking cephalexin. She does not have any concerns and is aware of appt listed below             Contact [facility] regarding any worsening symptoms from above. Did you call your PCP prior to going to the ED? No          Post Discharge Status:  What health concerns since you left the Emergency Room?  none    Do you have wounds that you are caring for at home? No    Do you have a follow up appt scheduled? yes    Review of Instructions:                                 Do you have any questions regarding your discharge instructions?:  No  Medications:    What questions do you have about your medications? No  Are you taking your medications as directed? If not - why? Yes   Can you afford your medications? yes  ADLS:  Do you need assistance of any kind at home? N/A   What assistance is needed?  none      FU appts/Provider:    Future Appointments  Date Time Provider Tez Gonzalez   1/9/2018 2:15 PM MD Anand Turpin FP None   4/18/2018 12:40 PM Susan Clifton MD [facility] [address] Maintenance Due   Topic Date Due    DTaP/Tdap/Td vaccine (1 - Tdap) 06/02/2008     Patient advised to contact PCP office to have HM items/records faxed to PCP Office directly? N/A      As a reminder, writer explained the importance of first calling their PCP to get an appointment instead going the ER especially Monday- Friday during office hours for non-emergency problems. Writer advised the patient to speak to a nurse or MOA to assist them with the issue and maybe get them in as a same day/sick call before they decide to go to the ER.  Writer also stressed that they can call me for any help regarding appointments,medications and questions/concerns regarding ER visits, patient understood

## 2018-01-10 ENCOUNTER — HOSPITAL ENCOUNTER (OUTPATIENT)
Age: 78
Setting detail: SPECIMEN
Discharge: HOME | End: 2018-01-10
Payer: MEDICARE

## 2018-01-11 LAB
CULTURE: NORMAL
CULTURE: NORMAL
Lab: NORMAL
SPECIMEN DESCRIPTION: NORMAL
STATUS: NORMAL

## 2018-02-14 ENCOUNTER — HOSPITAL ENCOUNTER (OUTPATIENT)
Dept: GENERAL RADIOLOGY | Facility: CLINIC | Age: 78
Discharge: HOME | End: 2018-02-16
Payer: MEDICARE

## 2018-02-14 DIAGNOSIS — J40 BRONCHITIS: ICD-10-CM

## 2018-02-14 PROCEDURE — 71046 X-RAY EXAM CHEST 2 VIEWS: CPT

## 2018-03-14 ENCOUNTER — APPOINTMENT (OUTPATIENT)
Dept: CT IMAGING | Age: 78
End: 2018-03-14
Payer: MEDICARE

## 2018-03-14 ENCOUNTER — APPOINTMENT (OUTPATIENT)
Dept: GENERAL RADIOLOGY | Age: 78
End: 2018-03-14
Payer: MEDICARE

## 2018-03-14 ENCOUNTER — HOSPITAL ENCOUNTER (EMERGENCY)
Age: 78
Discharge: HOME | End: 2018-03-14
Attending: EMERGENCY MEDICINE
Payer: MEDICARE

## 2018-03-14 VITALS
SYSTOLIC BLOOD PRESSURE: 154 MMHG | DIASTOLIC BLOOD PRESSURE: 73 MMHG | BODY MASS INDEX: 32.99 KG/M2 | HEART RATE: 65 BPM | WEIGHT: 198 LBS | OXYGEN SATURATION: 97 % | HEIGHT: 65 IN | TEMPERATURE: 97.3 F

## 2018-03-14 DIAGNOSIS — R42 DIZZINESS: Primary | ICD-10-CM

## 2018-03-14 DIAGNOSIS — T50.905A ADVERSE EFFECT DUE TO CORRECT MEDICINAL SUBSTANCE, PROPERLY GIVEN, INITIAL ENCOUNTER: ICD-10-CM

## 2018-03-14 LAB
% CKMB: 4 % (ref 0–3)
ABSOLUTE EOS #: 0.1 K/UL (ref 0–0.4)
ABSOLUTE IMMATURE GRANULOCYTE: ABNORMAL K/UL (ref 0–0.3)
ABSOLUTE LYMPH #: 2.6 K/UL (ref 1–4.8)
ABSOLUTE MONO #: 0.6 K/UL (ref 0.2–0.8)
ANION GAP SERPL CALCULATED.3IONS-SCNC: 14 MMOL/L (ref 9–17)
BASOPHILS # BLD: 1 % (ref 0–2)
BASOPHILS ABSOLUTE: 0.1 K/UL (ref 0–0.2)
BUN BLDV-MCNC: 23 MG/DL (ref 8–23)
BUN/CREAT BLD: 32 (ref 9–20)
CALCIUM SERPL-MCNC: 9.6 MG/DL (ref 8.6–10.4)
CHLORIDE BLD-SCNC: 101 MMOL/L (ref 98–107)
CK MB: 2.4 NG/ML
CKMB INTERPRETATION: ABNORMAL
CO2: 25 MMOL/L (ref 20–31)
CREAT SERPL-MCNC: 0.71 MG/DL (ref 0.5–0.9)
DIFFERENTIAL TYPE: ABNORMAL
EKG ATRIAL RATE: 64 BPM
EKG P AXIS: 58 DEGREES
EKG P-R INTERVAL: 156 MS
EKG Q-T INTERVAL: 426 MS
EKG QRS DURATION: 82 MS
EKG QTC CALCULATION (BAZETT): 439 MS
EKG R AXIS: -25 DEGREES
EKG T AXIS: 29 DEGREES
EKG VENTRICULAR RATE: 64 BPM
EOSINOPHILS RELATIVE PERCENT: 2 % (ref 1–4)
GFR AFRICAN AMERICAN: >60 ML/MIN
GFR NON-AFRICAN AMERICAN: >60 ML/MIN
GFR SERPL CREATININE-BSD FRML MDRD: ABNORMAL ML/MIN/{1.73_M2}
GFR SERPL CREATININE-BSD FRML MDRD: ABNORMAL ML/MIN/{1.73_M2}
GLUCOSE BLD-MCNC: 113 MG/DL (ref 70–99)
HCT VFR BLD CALC: 41.6 % (ref 36–46)
HEMOGLOBIN: 13.6 G/DL (ref 12–16)
IMMATURE GRANULOCYTES: ABNORMAL %
LYMPHOCYTES # BLD: 36 % (ref 24–44)
MAGNESIUM: 2.1 MG/DL (ref 1.6–2.6)
MCH RBC QN AUTO: 31.3 PG (ref 26–34)
MCHC RBC AUTO-ENTMCNC: 32.6 G/DL (ref 31–37)
MCV RBC AUTO: 96 FL (ref 80–100)
MONOCYTES # BLD: 8 % (ref 1–7)
MYOGLOBIN: 26 NG/ML (ref 25–58)
NRBC AUTOMATED: ABNORMAL PER 100 WBC
PDW BLD-RTO: 13.2 % (ref 11.5–14.5)
PLATELET # BLD: 303 K/UL (ref 130–400)
PLATELET ESTIMATE: ABNORMAL
PMV BLD AUTO: 8 FL (ref 6–12)
POC TROPONIN I: 0 NG/ML (ref 0–0.1)
POC TROPONIN INTERP: NORMAL
POTASSIUM SERPL-SCNC: 4.1 MMOL/L (ref 3.7–5.3)
RBC # BLD: 4.34 M/UL (ref 4–5.2)
RBC # BLD: ABNORMAL 10*6/UL
SEG NEUTROPHILS: 53 % (ref 36–66)
SEGMENTED NEUTROPHILS ABSOLUTE COUNT: 3.7 K/UL (ref 1.8–7.7)
SODIUM BLD-SCNC: 140 MMOL/L (ref 135–144)
TOTAL CK: 60 U/L (ref 26–192)
TROPONIN INTERP: NORMAL
TROPONIN T: <0.03 NG/ML
WBC # BLD: 7.2 K/UL (ref 3.5–11)
WBC # BLD: ABNORMAL 10*3/UL

## 2018-03-14 PROCEDURE — 83735 ASSAY OF MAGNESIUM: CPT

## 2018-03-14 PROCEDURE — 96374 THER/PROPH/DIAG INJ IV PUSH: CPT

## 2018-03-14 PROCEDURE — 36415 COLL VENOUS BLD VENIPUNCTURE: CPT

## 2018-03-14 PROCEDURE — 82553 CREATINE MB FRACTION: CPT

## 2018-03-14 PROCEDURE — 93005 ELECTROCARDIOGRAM TRACING: CPT

## 2018-03-14 PROCEDURE — 96375 TX/PRO/DX INJ NEW DRUG ADDON: CPT

## 2018-03-14 PROCEDURE — 80048 BASIC METABOLIC PNL TOTAL CA: CPT

## 2018-03-14 PROCEDURE — 71045 X-RAY EXAM CHEST 1 VIEW: CPT

## 2018-03-14 PROCEDURE — 83874 ASSAY OF MYOGLOBIN: CPT

## 2018-03-14 PROCEDURE — 85025 COMPLETE CBC W/AUTO DIFF WBC: CPT

## 2018-03-14 PROCEDURE — 82550 ASSAY OF CK (CPK): CPT

## 2018-03-14 PROCEDURE — 84484 ASSAY OF TROPONIN QUANT: CPT

## 2018-03-14 PROCEDURE — 2580000003 HC RX 258: Performed by: EMERGENCY MEDICINE

## 2018-03-14 PROCEDURE — 99284 EMERGENCY DEPT VISIT MOD MDM: CPT

## 2018-03-14 PROCEDURE — 70450 CT HEAD/BRAIN W/O DYE: CPT

## 2018-03-14 PROCEDURE — 96361 HYDRATE IV INFUSION ADD-ON: CPT

## 2018-03-14 PROCEDURE — 6360000002 HC RX W HCPCS: Performed by: EMERGENCY MEDICINE

## 2018-03-14 RX ORDER — LORAZEPAM 2 MG/ML
1 INJECTION INTRAMUSCULAR ONCE
Status: COMPLETED | OUTPATIENT
Start: 2018-03-14 | End: 2018-03-14

## 2018-03-14 RX ORDER — ONDANSETRON 2 MG/ML
4 INJECTION INTRAMUSCULAR; INTRAVENOUS ONCE
Status: COMPLETED | OUTPATIENT
Start: 2018-03-14 | End: 2018-03-14

## 2018-03-14 RX ORDER — ONDANSETRON 4 MG/1
4 TABLET, ORALLY DISINTEGRATING ORAL EVERY 8 HOURS PRN
Qty: 20 TABLET | Refills: 0 | Status: SHIPPED | OUTPATIENT
Start: 2018-03-14 | End: 2018-04-05 | Stop reason: SDUPTHER

## 2018-03-14 RX ORDER — MECLIZINE HCL 12.5 MG/1
25 TABLET ORAL ONCE
Status: DISCONTINUED | OUTPATIENT
Start: 2018-03-14 | End: 2018-03-14

## 2018-03-14 RX ORDER — 0.9 % SODIUM CHLORIDE 0.9 %
1000 INTRAVENOUS SOLUTION INTRAVENOUS ONCE
Status: COMPLETED | OUTPATIENT
Start: 2018-03-14 | End: 2018-03-14

## 2018-03-14 RX ORDER — LORAZEPAM 1 MG/1
1 TABLET ORAL EVERY 6 HOURS PRN
Qty: 10 TABLET | Refills: 0 | Status: SHIPPED | OUTPATIENT
Start: 2018-03-14 | End: 2018-03-19

## 2018-03-14 RX ADMIN — ONDANSETRON 4 MG: 2 INJECTION INTRAMUSCULAR; INTRAVENOUS at 00:59

## 2018-03-14 RX ADMIN — SODIUM CHLORIDE 1000 ML: 9 INJECTION, SOLUTION INTRAVENOUS at 00:59

## 2018-03-14 RX ADMIN — LORAZEPAM 1 MG: 2 INJECTION INTRAMUSCULAR; INTRAVENOUS at 00:59

## 2018-03-14 ASSESSMENT — PAIN DESCRIPTION - LOCATION: LOCATION: HEAD

## 2018-03-14 ASSESSMENT — PAIN SCALES - GENERAL: PAINLEVEL_OUTOF10: 2

## 2018-03-14 ASSESSMENT — ENCOUNTER SYMPTOMS
GASTROINTESTINAL NEGATIVE: 1
RESPIRATORY NEGATIVE: 1

## 2018-03-14 ASSESSMENT — PAIN DESCRIPTION - PAIN TYPE: TYPE: ACUTE PAIN

## 2018-03-15 ENCOUNTER — CARE COORDINATION (OUTPATIENT)
Dept: CARE COORDINATION | Age: 78
End: 2018-03-15

## 2018-04-05 PROBLEM — I49.1 PAC (PREMATURE ATRIAL CONTRACTION): Status: ACTIVE | Noted: 2018-04-05

## 2018-04-17 ENCOUNTER — HOSPITAL ENCOUNTER (OUTPATIENT)
Age: 78
Setting detail: SPECIMEN
Discharge: HOME | End: 2018-04-17
Payer: MEDICARE

## 2018-04-20 LAB — SURGICAL PATHOLOGY REPORT: NORMAL

## 2018-07-24 ENCOUNTER — HOSPITAL ENCOUNTER (OUTPATIENT)
Dept: CT IMAGING | Facility: CLINIC | Age: 78
Discharge: HOME | End: 2018-07-26
Payer: MEDICARE

## 2018-07-24 DIAGNOSIS — J34.2 DEVIATED NASAL SEPTUM: ICD-10-CM

## 2018-07-24 DIAGNOSIS — R13.12 DYSPHAGIA, OROPHARYNGEAL PHASE: ICD-10-CM

## 2018-07-24 DIAGNOSIS — J34.3 HYPERTROPHY OF NASAL TURBINATES: ICD-10-CM

## 2018-07-24 DIAGNOSIS — E04.1 NONTOXIC SINGLE THYROID NODULE: ICD-10-CM

## 2018-07-24 DIAGNOSIS — J38.7: ICD-10-CM

## 2018-07-24 DIAGNOSIS — K21.9 GASTROESOPHAGEAL REFLUX DISEASE WITHOUT ESOPHAGITIS: ICD-10-CM

## 2018-07-24 PROCEDURE — 6360000004 HC RX CONTRAST MEDICATION: Performed by: OTOLARYNGOLOGY

## 2018-07-24 PROCEDURE — 2580000003 HC RX 258: Performed by: OTOLARYNGOLOGY

## 2018-07-24 PROCEDURE — 70491 CT SOFT TISSUE NECK W/DYE: CPT

## 2018-07-24 RX ORDER — 0.9 % SODIUM CHLORIDE 0.9 %
70 INTRAVENOUS SOLUTION INTRAVENOUS ONCE
Status: COMPLETED | OUTPATIENT
Start: 2018-07-24 | End: 2018-07-24

## 2018-07-24 RX ORDER — SODIUM CHLORIDE 0.9 % (FLUSH) 0.9 %
10 SYRINGE (ML) INJECTION PRN
Status: DISCONTINUED | OUTPATIENT
Start: 2018-07-24 | End: 2018-07-27 | Stop reason: HOSPADM

## 2018-07-24 RX ADMIN — IOPAMIDOL 75 ML: 755 INJECTION, SOLUTION INTRAVENOUS at 13:45

## 2018-07-24 RX ADMIN — SODIUM CHLORIDE 70 ML: 9 INJECTION, SOLUTION INTRAVENOUS at 13:46

## 2018-07-24 RX ADMIN — SODIUM CHLORIDE, PRESERVATIVE FREE 10 ML: 5 INJECTION INTRAVENOUS at 13:46

## 2018-07-31 ENCOUNTER — APPOINTMENT (OUTPATIENT)
Dept: MAMMOGRAPHY | Age: 78
End: 2018-07-31
Payer: MEDICARE

## 2018-08-20 ENCOUNTER — HOSPITAL ENCOUNTER (OUTPATIENT)
Dept: MAMMOGRAPHY | Age: 78
Discharge: HOME | End: 2018-08-22
Payer: MEDICARE

## 2018-08-20 DIAGNOSIS — Z12.39 SCREENING FOR BREAST CANCER: ICD-10-CM

## 2018-08-20 PROCEDURE — 77063 BREAST TOMOSYNTHESIS BI: CPT

## 2018-09-26 ENCOUNTER — HOSPITAL ENCOUNTER (OUTPATIENT)
Age: 78
Setting detail: SPECIMEN
Discharge: HOME | End: 2018-09-26
Payer: MEDICARE

## 2018-09-26 DIAGNOSIS — R30.0 DYSURIA: ICD-10-CM

## 2018-09-27 LAB
CULTURE: NORMAL
Lab: NORMAL
SPECIMEN DESCRIPTION: NORMAL
STATUS: NORMAL

## 2018-12-04 DIAGNOSIS — M25.551 ACUTE RIGHT HIP PAIN: ICD-10-CM

## 2018-12-05 ENCOUNTER — HOSPITAL ENCOUNTER (OUTPATIENT)
Dept: GENERAL RADIOLOGY | Facility: CLINIC | Age: 78
Discharge: HOME | End: 2018-12-07
Payer: MEDICARE

## 2018-12-05 ENCOUNTER — OFFICE VISIT (OUTPATIENT)
Dept: ORTHOPEDIC SURGERY | Age: 78
End: 2018-12-05
Payer: MEDICARE

## 2018-12-05 VITALS — BODY MASS INDEX: 34.82 KG/M2 | WEIGHT: 209 LBS | HEIGHT: 65 IN

## 2018-12-05 DIAGNOSIS — M25.552 LEFT HIP PAIN: ICD-10-CM

## 2018-12-05 DIAGNOSIS — M25.551 ACUTE RIGHT HIP PAIN: ICD-10-CM

## 2018-12-05 DIAGNOSIS — M70.61 GREATER TROCHANTERIC BURSITIS OF BOTH HIPS: Primary | ICD-10-CM

## 2018-12-05 DIAGNOSIS — M25.551 BILATERAL HIP PAIN: ICD-10-CM

## 2018-12-05 DIAGNOSIS — M25.552 BILATERAL HIP PAIN: ICD-10-CM

## 2018-12-05 DIAGNOSIS — M70.62 GREATER TROCHANTERIC BURSITIS OF BOTH HIPS: Primary | ICD-10-CM

## 2018-12-05 PROCEDURE — 99213 OFFICE O/P EST LOW 20 MIN: CPT | Performed by: PHYSICIAN ASSISTANT

## 2018-12-05 PROCEDURE — 20611 DRAIN/INJ JOINT/BURSA W/US: CPT | Performed by: PHYSICIAN ASSISTANT

## 2018-12-05 PROCEDURE — 73502 X-RAY EXAM HIP UNI 2-3 VIEWS: CPT

## 2018-12-05 RX ORDER — TRIAMCINOLONE ACETONIDE 40 MG/ML
80 INJECTION, SUSPENSION INTRA-ARTICULAR; INTRAMUSCULAR ONCE
Status: DISCONTINUED | OUTPATIENT
Start: 2018-12-05 | End: 2020-09-23

## 2018-12-05 RX ORDER — LIDOCAINE HYDROCHLORIDE 10 MG/ML
8 INJECTION, SOLUTION INFILTRATION; PERINEURAL ONCE
Status: DISCONTINUED | OUTPATIENT
Start: 2018-12-05 | End: 2020-09-23

## 2018-12-05 ASSESSMENT — ENCOUNTER SYMPTOMS
NAUSEA: 0
SHORTNESS OF BREATH: 0
APNEA: 0
RESPIRATORY NEGATIVE: 1
CHEST TIGHTNESS: 0
DIARRHEA: 0
COLOR CHANGE: 0
CONSTIPATION: 0
ABDOMINAL DISTENTION: 0
COUGH: 0
ABDOMINAL PAIN: 0
VOMITING: 0

## 2018-12-05 NOTE — PROGRESS NOTES
refill. ROM: Left: 90 degrees flexion, 30 degrees abduction, 20 degrees adduction, 20 degrees of internal rotation, 45 degrees of external rotation, (-) Straight leg raise  Right: 90 degrees flexion, 40 degrees abduction, 20 degrees adduction, 20 internal rotation, 40 external rotation. MUSC:  Strength is 5/5 flexion, abduction, internal rotation, external rotation. PALP: The patient is  tender to palpation over the greater trochanter. TEST:  Log roll is (-), No apparent leg length discrepancy, Negative Trendelenburg test, Negative Dalton's test, No labral clunks. No pain on compression. Assessment:       Diagnosis Orders   1. Greater trochanteric bursitis of both hips  lidocaine 1 % injection 8 mL    triamcinolone acetonide (KENALOG-40) injection 80 mg    IR ARTHR/ASP/INJ MAJOR JT/BURSA W US    IR ARTHR/ASP/INJ MAJOR JT/BURSA W US   2. Left hip pain  XR HIP LEFT (2-3 VIEWS)   3. Bilateral hip pain       PROCEDURE:     Patient has opted for a cortisone injection into the bilateral greater trochanteric bursa of the bilateral hip to help reduce inflammation and pain. The injection site should never get red, hot, or swollen and if it does the patient will contact our office right away. The patient may experience a increase in soreness the first 24-48 hours due to a cortisone flair and can take anti-inflammatories for a short period of time to reduce that soreness. The patient should not submerge the injection site in water for a minimum of 24 hours to avoid infection. This means no lakes, pools, ponds, or hot tubs for 24 hours. If the patient is diabetic the injection may increase their blood sugar for up to one week. The patient can do this cortisone injection once every 3 months as needed. If the injections stop working and do not give the patient relief the patient should consider surgical interventions to produce long term relief.        Plan:   Treatment: We discussed the natural etiologies and histories of Greater trochanteric Bursitis of the bilateral hip. We also discussed the various alternatives of medical treatment including physical therapy, injections, anti-inflammatory drugs and as a last resort surgery. During today's visit, I reviewed the x-rays today and I was able to deduce that the patient has some curvature of her spine and she has some arthritis that can be seen in the hip. She also has some spurring that can be seen on the films as well. I also am able to see that she has some tilt going on with her pelvis. The patient then informed me that she has a lift in her shoe and that may be the reason why she has a tilt in her pelvis on x-ray. From there, I examined the patient's hips and I noticed that she has some pain over the greater trochanter in both hips. I then informed her that I can inject the bursa in both hips because I feel she has bursitis in her hips that is irritable causing her to have pain. The patient also informed me that she would like to f/u with Dr. Katelyn Mueller because she normally sees him for all of her other issues she has going on and since we are in the same office, she would like to see him from now on. She also stated that all the other sisters see him as well and she wants to see him since they all see him. She then stated that me and Dr. Grace Bundy have been great to her but she does not want to see two guys who do the same thing and since she has been seeing him for years, she will just follow up with him instead. Patient should return in 3 months for a cortisone injection if needed. Dara Painter V, am scribing for and in the presence of Jessica Bashir PA-C.  12/5/2018  4:06 PM    I, Jessica Bashir PA-C, ATC,  have personally seen this patient, reviewed the chart including history, and imaging if done. I personally  performed the physical exam and obtained any needed additional history. I placed orders, performed or supervised procedures and developed the treatment plan.

## 2019-02-11 LAB
ALT SERPL-CCNC: 20 U/L (ref 5–40)
AST SERPL-CCNC: 17 U/L (ref 9–40)
CHOLESTEROL/HDL RATIO: 4.7
CHOLESTEROL: 227 MG/DL
HDLC SERPL-MCNC: 48.1 MG/DL
LDL CHOLESTEROL CALCULATED: 130 MG/DL
LDL/HDL RATIO: 2.7
TRIGL SERPL-MCNC: 244 MG/DL
VLDLC SERPL CALC-MCNC: 49 MG/DL

## 2019-02-15 ENCOUNTER — HOSPITAL ENCOUNTER (EMERGENCY)
Facility: CLINIC | Age: 79
Discharge: HOME | End: 2019-02-15
Attending: EMERGENCY MEDICINE
Payer: MEDICARE

## 2019-02-15 ENCOUNTER — APPOINTMENT (OUTPATIENT)
Dept: ULTRASOUND IMAGING | Facility: CLINIC | Age: 79
End: 2019-02-15
Payer: MEDICARE

## 2019-02-15 ENCOUNTER — APPOINTMENT (OUTPATIENT)
Dept: GENERAL RADIOLOGY | Facility: CLINIC | Age: 79
End: 2019-02-15
Payer: MEDICARE

## 2019-02-15 VITALS
TEMPERATURE: 97.9 F | DIASTOLIC BLOOD PRESSURE: 57 MMHG | RESPIRATION RATE: 15 BRPM | HEART RATE: 60 BPM | BODY MASS INDEX: 35.32 KG/M2 | OXYGEN SATURATION: 95 % | WEIGHT: 212 LBS | HEIGHT: 65 IN | SYSTOLIC BLOOD PRESSURE: 116 MMHG

## 2019-02-15 DIAGNOSIS — R06.00 DYSPNEA, UNSPECIFIED TYPE: ICD-10-CM

## 2019-02-15 DIAGNOSIS — M79.605 LEFT LEG PAIN: Primary | ICD-10-CM

## 2019-02-15 LAB
ABSOLUTE EOS #: 0.1 K/UL (ref 0–0.4)
ABSOLUTE IMMATURE GRANULOCYTE: NORMAL K/UL (ref 0–0.3)
ABSOLUTE LYMPH #: 2.4 K/UL (ref 1–4.8)
ABSOLUTE MONO #: 0.4 K/UL (ref 0.1–1.2)
ANION GAP SERPL CALCULATED.3IONS-SCNC: 11 MMOL/L (ref 9–17)
BASOPHILS # BLD: 1 % (ref 0–2)
BASOPHILS ABSOLUTE: 0.1 K/UL (ref 0–0.2)
BNP INTERPRETATION: NORMAL
BUN BLDV-MCNC: 18 MG/DL (ref 8–23)
BUN/CREAT BLD: ABNORMAL (ref 9–20)
CALCIUM SERPL-MCNC: 9.7 MG/DL (ref 8.6–10.4)
CHLORIDE BLD-SCNC: 108 MMOL/L (ref 98–107)
CO2: 24 MMOL/L (ref 20–31)
CREAT SERPL-MCNC: 0.8 MG/DL (ref 0.5–0.9)
DIFFERENTIAL TYPE: NORMAL
EOSINOPHILS RELATIVE PERCENT: 2 % (ref 1–4)
GFR AFRICAN AMERICAN: >60 ML/MIN
GFR NON-AFRICAN AMERICAN: >60 ML/MIN
GFR SERPL CREATININE-BSD FRML MDRD: ABNORMAL ML/MIN/{1.73_M2}
GFR SERPL CREATININE-BSD FRML MDRD: ABNORMAL ML/MIN/{1.73_M2}
GLUCOSE BLD-MCNC: 121 MG/DL (ref 70–99)
HCT VFR BLD CALC: 39.1 % (ref 36–46)
HEMOGLOBIN: 12.9 G/DL (ref 12–16)
IMMATURE GRANULOCYTES: NORMAL %
INR BLD: 1
LYMPHOCYTES # BLD: 38 % (ref 24–44)
MCH RBC QN AUTO: 32.2 PG (ref 26–34)
MCHC RBC AUTO-ENTMCNC: 33.1 G/DL (ref 31–37)
MCV RBC AUTO: 97.4 FL (ref 80–100)
MONOCYTES # BLD: 7 % (ref 2–11)
NRBC AUTOMATED: NORMAL PER 100 WBC
PARTIAL THROMBOPLASTIN TIME: 24.7 SEC (ref 21.3–31.3)
PDW BLD-RTO: 12.7 % (ref 12.5–15.4)
PLATELET # BLD: 256 K/UL (ref 140–450)
PLATELET ESTIMATE: NORMAL
PMV BLD AUTO: 8.6 FL (ref 6–12)
POTASSIUM SERPL-SCNC: 4.3 MMOL/L (ref 3.7–5.3)
PRO-BNP: 174 PG/ML
PROTHROMBIN TIME: 10.4 SEC (ref 9.4–12.6)
RBC # BLD: 4.01 M/UL (ref 4–5.2)
RBC # BLD: NORMAL 10*6/UL
SEG NEUTROPHILS: 52 % (ref 36–66)
SEGMENTED NEUTROPHILS ABSOLUTE COUNT: 3.3 K/UL (ref 1.8–7.7)
SODIUM BLD-SCNC: 143 MMOL/L (ref 135–144)
TROPONIN INTERP: NORMAL
TROPONIN T: NORMAL NG/ML
TROPONIN, HIGH SENSITIVITY: 9 NG/L (ref 0–14)
WBC # BLD: 6.4 K/UL (ref 3.5–11)
WBC # BLD: NORMAL 10*3/UL

## 2019-02-15 PROCEDURE — 85610 PROTHROMBIN TIME: CPT

## 2019-02-15 PROCEDURE — 93005 ELECTROCARDIOGRAM TRACING: CPT

## 2019-02-15 PROCEDURE — 93971 EXTREMITY STUDY: CPT

## 2019-02-15 PROCEDURE — 80048 BASIC METABOLIC PNL TOTAL CA: CPT

## 2019-02-15 PROCEDURE — 83880 ASSAY OF NATRIURETIC PEPTIDE: CPT

## 2019-02-15 PROCEDURE — 85730 THROMBOPLASTIN TIME PARTIAL: CPT

## 2019-02-15 PROCEDURE — 71045 X-RAY EXAM CHEST 1 VIEW: CPT

## 2019-02-15 PROCEDURE — 84484 ASSAY OF TROPONIN QUANT: CPT

## 2019-02-15 PROCEDURE — 99285 EMERGENCY DEPT VISIT HI MDM: CPT

## 2019-02-15 PROCEDURE — 85025 COMPLETE CBC W/AUTO DIFF WBC: CPT

## 2019-02-15 PROCEDURE — 36415 COLL VENOUS BLD VENIPUNCTURE: CPT

## 2019-02-15 ASSESSMENT — PAIN DESCRIPTION - ORIENTATION
ORIENTATION: LEFT
ORIENTATION: LEFT

## 2019-02-15 ASSESSMENT — PAIN SCALES - GENERAL
PAINLEVEL_OUTOF10: 6
PAINLEVEL_OUTOF10: 7

## 2019-02-15 ASSESSMENT — PAIN DESCRIPTION - LOCATION
LOCATION: LEG
LOCATION: LEG

## 2019-02-15 ASSESSMENT — PAIN DESCRIPTION - DESCRIPTORS: DESCRIPTORS: THROBBING

## 2019-02-15 ASSESSMENT — PAIN DESCRIPTION - PAIN TYPE
TYPE: ACUTE PAIN
TYPE: ACUTE PAIN

## 2019-02-15 ASSESSMENT — PAIN DESCRIPTION - FREQUENCY: FREQUENCY: CONTINUOUS

## 2019-02-16 LAB
EKG ATRIAL RATE: 64 BPM
EKG P AXIS: 46 DEGREES
EKG P-R INTERVAL: 144 MS
EKG Q-T INTERVAL: 394 MS
EKG QRS DURATION: 78 MS
EKG QTC CALCULATION (BAZETT): 406 MS
EKG R AXIS: -25 DEGREES
EKG T AXIS: 27 DEGREES
EKG VENTRICULAR RATE: 64 BPM

## 2019-04-03 ENCOUNTER — OFFICE VISIT (OUTPATIENT)
Dept: ORTHOPEDIC SURGERY | Age: 79
End: 2019-04-03
Payer: MEDICARE

## 2019-04-03 VITALS
SYSTOLIC BLOOD PRESSURE: 136 MMHG | HEIGHT: 65 IN | RESPIRATION RATE: 20 BRPM | WEIGHT: 215 LBS | HEART RATE: 85 BPM | DIASTOLIC BLOOD PRESSURE: 80 MMHG | BODY MASS INDEX: 35.82 KG/M2

## 2019-04-03 DIAGNOSIS — M70.62 GREATER TROCHANTERIC BURSITIS OF BOTH HIPS: Primary | ICD-10-CM

## 2019-04-03 DIAGNOSIS — M70.61 GREATER TROCHANTERIC BURSITIS OF BOTH HIPS: Primary | ICD-10-CM

## 2019-04-03 PROCEDURE — 99213 OFFICE O/P EST LOW 20 MIN: CPT | Performed by: PHYSICIAN ASSISTANT

## 2019-04-03 ASSESSMENT — ENCOUNTER SYMPTOMS
DIARRHEA: 0
ABDOMINAL PAIN: 0
COUGH: 0
NAUSEA: 0
APNEA: 0
CHEST TIGHTNESS: 0
COLOR CHANGE: 0
ABDOMINAL DISTENTION: 0
VOMITING: 0
CONSTIPATION: 0
SHORTNESS OF BREATH: 0

## 2019-04-03 NOTE — PROGRESS NOTES
[facility]  [facility]  [place] [address]  Dept: [phone]  Dept Fax: [phone]        Right Hip Follow Up      Subjective:     Chief Complaint   Patient presents with    Hip Pain     Right Hip Injection      HPI:    Patient presents today with c/o right hip pain. On the Saturday (11/24/18) after Thansgiving Day, the patient states that she woke up and she was unable to walk. She states the Wednesday after she f/u with a Chiropractor to get her back checked and she was informed it was sciatica. She also mentioned that she saw Dr. Molly Palacios and she was told that it may be some bursitis that is causing her pain in the hip also. The patient states that she is here to get a cortisone injection in the hip since Milvia Gonzales has done her injections in the past and she has gotten good pain relief. However, the patient states that she is not in much pain today and she has had good pain relief since her last cortisone injection into the greater trochanter on 12/5/2018. Review of Systems   Constitutional: Positive for activity change. Negative for appetite change, fatigue and fever. Respiratory: Negative for apnea, cough, chest tightness and shortness of breath. Cardiovascular: Negative for chest pain, palpitations and leg swelling. Gastrointestinal: Negative for abdominal distention, abdominal pain, constipation, diarrhea, nausea and vomiting. Genitourinary: Negative for difficulty urinating, dysuria and hematuria. Musculoskeletal: Positive for arthralgias. Negative for gait problem, joint swelling and myalgias. Skin: Negative for color change and rash. Neurological: Negative for dizziness, weakness, numbness and headaches. Psychiatric/Behavioral: Negative for sleep disturbance.      Past Medical History:    Past Medical History:   Diagnosis Date    Acid reflux     Arthritis     lower back    Diabetes mellitus ([facility] [address] denies diabetes    Diverticular disease 6/30/2014    GERD (gastroesophageal reflux disease)     Hearing loss     Hypercholesteremia 6/30/2014    Hypothyroid 6/30/2014    Lung nodule     Meniere disease     right ear    Neuropathy     Osteopenia 6/30/2014    RAD (reactive airway disease) 6/30/2014    Sinusitis     Thyroid disease     underactive    Tremor     r hand    Vertigo     Vitamin D deficiency 6/30/2014     Past Surgical History:    Past Surgical History:   Procedure Laterality Date    CHOLECYSTECTOMY      COLONOSCOPY      ENDOSCOPY, COLON, DIAGNOSTIC      ERCP  03/21/14    EXCISION / BIOPSY SKIN LESION OF TRUNK Left 11/8/2017    EXCISION MASS LEFT POSTERIOR SHOULDER, LEFT ANTERIOR ARM performed by Ambika Saleh MD at [address] [place] [address]  01/03/2007    SKIN BIOPSY Left 11/08/2017    Excision Mass Left Posterior Shoulder, Left Anterior Arm     Current Medications:   Current Outpatient Medications   Medication Sig Dispense Refill    metFORMIN (GLUCOPHAGE) 500 MG tablet TAKE ONE TABLET BY MOUTH TWICE A  tablet 0    NONFORMULARY Indications: Dietary fiber 1 tsp a day       ondansetron (ZOFRAN-ODT) 4 MG disintegrating tablet DISSOLVE ONE TABLET BY MOUTH EVERY 8 HOURS AS NEEDED FOR NAUSEA 20 tablet 0    levothyroxine (SYNTHROID) 75 MCG tablet TAKE ONE TABLET BY MOUTH DAILY 90 tablet 0    ECHINACEA EXTRACT PO Take 450 mg by mouth Indications: Take in winter       alclomethasone (ACLOVATE) 0.05 % cream Apply topically 2 times daily. (Patient taking differently: as needed Apply topically 2 times daily. ) 15 g 2    Cholecalciferol (VITAMIN D3) 2000 units CAPS Take 1 capsule by mouth 2 times daily 30 capsule 0    diltiazem (CARDIZEM CD) 120 MG extended release capsule Take 1 capsule by mouth every evening 30 capsule 3    ranitidine (ZANTAC) 150 MG tablet Take 2 tablets by mouth nightly (Patient taking differently: Take 300 mg by mouth nightly as needed ) 60 tablet 3    budesonide-formoterol (SYMBICORT) 160-4.5 MCG/ACT AERO Inhale 2 puffs into the lungs 2 times daily (Patient taking differently: Inhale 2 puffs into the lungs 2 times daily as needed ) 1 Inhaler 3    valACYclovir (VALTREX) 1 g tablet Take 2 tablets by mouth 2 times daily 4 tablet 2    albuterol sulfate HFA (PROAIR HFA) 108 (90 Base) MCG/ACT inhaler Inhale 2 puffs into the lungs every 6 hours as needed for Wheezing 1 Inhaler 3    metoprolol tartrate (LOPRESSOR) 25 MG tablet Take 0.5 tablets by mouth daily (Patient taking differently: Take 25 mg by mouth 2 times daily ) 60 tablet 1    fluticasone (FLONASE) 50 MCG/ACT nasal spray PLACE 1 SPRAY IN EACH NOSTRIL DAILY FOR 15 DAYS 1 Bottle 2    ketoconazole (NIZORAL) 2 % shampoo APPLY  TO AFFECTED AREAS TOPICALLY DAILY THEN RINSE OFF AFTER 5 MINUTES (Patient taking differently: APPLY  TO AFFECTED AREAS TOPICALLY DAILY THEN RINSE OFF AFTER 5 MINUTES WHEN NEEDED) 120 mL 2    ezetimibe (ZETIA) 10 MG tablet Take 10 mg by mouth daily      MENTAX 1 % CREA APPLY TO AFFECTED AREA(S) TWO TIMES A DAY 15 g 0    amitriptyline (ELAVIL) 10 MG tablet TAKE 1 TABLET BY MOUTH AS NEEDED FOR SLEEP FOR UP TO 30 DAYS FOR LEG PAIN 60 tablet 1    esomeprazole Magnesium (NEXIUM) 40 MG PACK Take 40 mg by mouth daily.  Multiple Vitamins-Minerals (THERAPEUTIC MULTIVITAMIN-MINERALS) tablet Take 1 tablet by mouth daily.  NONFORMULARY Super b complex daily      ascorbic acid (VITAMIN C) 500 MG tablet Take 500 mg by mouth daily.       chlorpheniramine (CHLOR-TRIMETON) 4 MG tablet Take 12 mg by mouth daily        Current Facility-Administered Medications   Medication Dose Route Frequency Provider Last Rate Last Dose    lidocaine 1 % injection 8 mL  8 mL Intra-articular Once Derludy Damian PA-C        triamcinolone acetonide [facility]) injection 80 mg  80 mg Intra-articular Once Derludy Damian PA-C         Facility-Administered Medications Ordered in Other Visits   Medication Dose Route Frequency Provider Last Rate Last Dose    0.9 % sodium chloride infusion   Intravenous Continuous Alfredo Horton  mL/hr at 02/19/14 0891       Allergies:    Aspirin; Atorvastatin; Crestor [rosuvastatin]; Livalo [pitavastatin];  Seasonal; Simvastatin; Statins; and Welchol [colesevelam hcl]    Social History:   Social History     Socioeconomic History    Marital status: Single     Spouse name: None    Number of children: None    Years of education: None    Highest education level: None   Occupational History    None   Social Needs    Financial resource strain: None    Food insecurity:     Worry: None     Inability: None    Transportation needs:     Medical: None     Non-medical: None   Tobacco Use    Smoking status: Never Smoker    Smokeless tobacco: Never Used   Substance and Sexual Activity    Alcohol use: Yes     Comment: rare    Drug use: No    Sexual activity: None   Lifestyle    Physical activity:     Days per week: None     Minutes per session: None    Stress: None   Relationships    Social connections:     Talks on phone: None     Gets together: None     Attends Taoist service: None     Active member of club or organization: None     Attends meetings of clubs or organizations: None     Relationship status: None    Intimate partner violence:     Fear of current or ex partner: None     Emotionally abused: None     Physically abused: None     Forced sexual activity: None   Other Topics Concern    None   Social History Narrative    None     Family History:  Family History   Problem Relation Age of Onset    Cancer Mother 48        pineal gland/throat    Cancer Father 64        colon    Cancer Brother         lung    Other Brother         pulmonary embolus    Heart Disease Paternal Grandfather         myocarditis    Stroke Maternal Aunt     Cancer Maternal Uncle     Stroke Maternal Grandmother     Diabetes Brother     Heart Disease Paternal Uncle Procedure: no    Radiology:   X-ray of the left hip from 12/05/2018 was reviewed. Plan:   Treatment : We discussed the etiologies and natural histories of Greater Trochanteric Bursitis of the right hip. We discussed the various treatment alternatives including anti-inflammatory medications, physical therapy, injections, further imaging studies and as a last result surgery. During today's visit, the patient stated that her hip feels great and she is not in any pain at this time. From there, I explained to the patient that since she is not in much pain today we do not have to do the injection because we normally do the injections only if the patient is in pain. So at this time, the patient has opted to give the office a call when her hip is hurting. Patient should return to the clinic as needed. The patient will call the office immediately with any problems. No orders of the defined types were placed in this encounter. No orders of the defined types were placed in this encounter. Verónica Painter V, am scribing for and in the presence of  Syd Bhat ATC. 4/7/2019  11:06 PM    IPerfecto PA-C, KAROL ,  have personally seen this patient, reviewed the chart including history, and imaging if done. I personally  performed the physical exam and obtained any needed additional history. I placed orders, performed or supervised procedures and developed the treatment plan.     Electronically signed by Jacob Mckeon PA-C, on 4/7/2019 at 11:06 PM

## 2019-04-08 ENCOUNTER — OFFICE VISIT (OUTPATIENT)
Dept: NEUROLOGY | Age: 79
End: 2019-04-08
Payer: MEDICARE

## 2019-04-08 VITALS
BODY MASS INDEX: 36.02 KG/M2 | WEIGHT: 216.2 LBS | HEART RATE: 63 BPM | HEIGHT: 65 IN | SYSTOLIC BLOOD PRESSURE: 113 MMHG | DIASTOLIC BLOOD PRESSURE: 71 MMHG

## 2019-04-08 DIAGNOSIS — R25.3 TWITCHING: ICD-10-CM

## 2019-04-08 DIAGNOSIS — Z82.0 FAMILY HISTORY OF BENIGN ESSENTIAL TREMOR: ICD-10-CM

## 2019-04-08 DIAGNOSIS — R25.1 TREMOR OF RIGHT HAND: Primary | ICD-10-CM

## 2019-04-08 LAB — TOTAL CK: 58 U/L (ref 24–170)

## 2019-04-08 PROCEDURE — G8427 DOCREV CUR MEDS BY ELIG CLIN: HCPCS | Performed by: PSYCHIATRY & NEUROLOGY

## 2019-04-08 PROCEDURE — 1090F PRES/ABSN URINE INCON ASSESS: CPT | Performed by: PSYCHIATRY & NEUROLOGY

## 2019-04-08 PROCEDURE — G8399 PT W/DXA RESULTS DOCUMENT: HCPCS | Performed by: PSYCHIATRY & NEUROLOGY

## 2019-04-08 PROCEDURE — 1123F ACP DISCUSS/DSCN MKR DOCD: CPT | Performed by: PSYCHIATRY & NEUROLOGY

## 2019-04-08 PROCEDURE — 1036F TOBACCO NON-USER: CPT | Performed by: PSYCHIATRY & NEUROLOGY

## 2019-04-08 PROCEDURE — 4040F PNEUMOC VAC/ADMIN/RCVD: CPT | Performed by: PSYCHIATRY & NEUROLOGY

## 2019-04-08 PROCEDURE — G8417 CALC BMI ABV UP PARAM F/U: HCPCS | Performed by: PSYCHIATRY & NEUROLOGY

## 2019-04-08 PROCEDURE — 99214 OFFICE O/P EST MOD 30 MIN: CPT | Performed by: PSYCHIATRY & NEUROLOGY

## 2019-04-08 NOTE — PROGRESS NOTES
NEUROLOGY FOLLOW-UP  Patient Name:  Johana Be  :   1940  Clinic Visit Date: 2019        REVIEW OF SYSTEMS  Constitutional Weight changes: absent, Fevers : absent, Fatigue: present; Any recent hospitalizations:  absent.    HEENT Ears: pain, ringing, diminished,  Eyes: glasses, Visual disturbance: absent   Reespiratory Shortness of breath: present, Cough: absent   Cardivascular Chest pain: absent, Leg swelling :absent   GI Constipation: absent, Diarrhea: absent, Swallowing change: absent    Urinary frequency: absent, Urinary urgency: absent, Urinary incontinence: absent   Musculoskeletal Neck pain: present, Back pain: present, Stiffness: present, Muscle pain: present, Joint pain: absent   Dermatological Hair loss: abesent, Skin changes: present   Neurological Memory loss: absent, Confusion: absent, Seizures: absent; Trouble walking or imbalance: absent, Dizziness: present, Weakness: present, Numbness absent, Tremor: present, Spasm: absent, Speech difficulty: absent, Headache: absent, Light sensitivity: absent   Psychiatric Anxiety: present, Depression  absent, Suicidal ideations absent   Hematologic Abnormal bleeding: absent, Anemia: absent, Clotting disorder: absent, Lymph gland changes: absent

## 2019-04-08 NOTE — PROGRESS NOTES
NEUROLOGY FOLLOW-UP  Patient Name:  Peggy Sanchez  :   1940  Clinic Visit Date: 2019    I saw Ms. Peggy Sanchez in follow-up in the office today in continuation of neurologic care. She is a 70 y.o.  right handed  female initially seen in neurologic consultation in 2017 for tremors of six year duration with slow progression. She has never returned to clinic since 2017. Today she comes to clinic after prolonged absence of 1.5 years. She comes to the clinic stating that she has not been taking primidone even though she filled the script. She never took Primidone for fear of possible adverse effects. But she also stated that she has been having new onset tremulousness and twitching all over the body from head to toe occurring intermittently and it is present predominantly when she is at rest and also has been having jerking activity upon awakening. Also c/o shakiness in her head in am.     She has had tremors for about 6 years since  with worsening severity for about 9 months. She stated that her younger brother has tremors and he does not want to be on medications. Patient wants to be evaluated as these are noticed more by her friends and family. These tremors occur intermittently with exertion. Denies resting tremors. She has tremors predominantly in right upper extremity and was noticed by others in left upper extremity on occasion. She also felt tremors in right lower extremity on a rare occasion. These tremors are much worse in the morning and they do not get worse with stress. But also stated that she has increasing trouble with handwriting as activity worsens her tremors. She denied balance difficulties and falls. She has not tried any medications. Denied history of head injury and stroke. Denied memory loss. Denied anxiety. She has not had any testing done for tremors. Also never tried any medications.     She had history of Ménière's disease and she had CT head. She has been going through vestibular rehab with a significant improvement. 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 Review of systems done by staff reviewed and pertinent positives include dizziness due to history of Ménière's disease; also generalized weakness, tremors, neck pain and back pain and muscle pain and gait difficulties. Current Outpatient Medications on File Prior to Visit   Medication Sig Dispense Refill    metFORMIN (GLUCOPHAGE) 500 MG tablet TAKE ONE TABLET BY MOUTH TWICE A  tablet 0    NONFORMULARY Indications: Dietary fiber 1 tsp a day       ondansetron (ZOFRAN-ODT) 4 MG disintegrating tablet DISSOLVE ONE TABLET BY MOUTH EVERY 8 HOURS AS NEEDED FOR NAUSEA 20 tablet 0    levothyroxine (SYNTHROID) 75 MCG tablet TAKE ONE TABLET BY MOUTH DAILY 90 tablet 0    ECHINACEA EXTRACT PO Take 450 mg by mouth Indications: Take in winter       alclomethasone (ACLOVATE) 0.05 % cream Apply topically 2 times daily. (Patient taking differently: as needed Apply topically 2 times daily. ) 15 g 2    Cholecalciferol (VITAMIN D3) 2000 units CAPS Take 1 capsule by mouth 2 times daily 30 capsule 0    diltiazem (CARDIZEM CD) 120 MG extended release capsule Take 1 capsule by mouth every evening 30 capsule 3    ranitidine (ZANTAC) 150 MG tablet Take 2 tablets by mouth nightly (Patient taking differently: Take 300 mg by mouth nightly as needed ) 60 tablet 3    budesonide-formoterol (SYMBICORT) 160-4.5 MCG/ACT AERO Inhale 2 puffs into the lungs 2 times daily (Patient taking differently: Inhale 2 puffs into the lungs 2 times daily as needed ) 1 Inhaler 3    valACYclovir (VALTREX) 1 g tablet Take 2 tablets by mouth 2 times daily 4 tablet 2    albuterol sulfate HFA (PROAIR HFA) 108 (90 Base) MCG/ACT inhaler Inhale 2 puffs into the lungs every 6 hours as needed for Wheezing 1 Inhaler 3    metoprolol tartrate (LOPRESSOR) 25 MG tablet Take 0.5 tablets by mouth daily (Patient taking differently: Take 25 mg by mouth 2 times daily ) 60 tablet 1    fluticasone (FLONASE) 50 MCG/ACT nasal spray PLACE 1 SPRAY IN EACH NOSTRIL DAILY FOR 15 DAYS 1 Bottle 2    ketoconazole (NIZORAL) 2 % shampoo APPLY  TO AFFECTED AREAS TOPICALLY DAILY THEN RINSE OFF AFTER 5 MINUTES (Patient taking differently: APPLY  TO AFFECTED AREAS TOPICALLY DAILY THEN RINSE OFF AFTER 5 MINUTES WHEN NEEDED) 120 mL 2    ezetimibe (ZETIA) 10 MG tablet Take 10 mg by mouth daily      MENTAX 1 % CREA APPLY TO AFFECTED AREA(S) TWO TIMES A DAY 15 g 0    amitriptyline (ELAVIL) 10 MG tablet TAKE 1 TABLET BY MOUTH AS NEEDED FOR SLEEP FOR UP TO 30 DAYS FOR LEG PAIN 60 tablet 1    esomeprazole Magnesium (NEXIUM) 40 MG PACK Take 40 mg by mouth daily.  Multiple Vitamins-Minerals (THERAPEUTIC MULTIVITAMIN-MINERALS) tablet Take 1 tablet by mouth daily.  NONFORMULARY Super b complex daily      ascorbic acid (VITAMIN C) 500 MG tablet Take 500 mg by mouth daily.  chlorpheniramine (CHLOR-TRIMETON) 4 MG tablet Take 12 mg by mouth daily        Current Facility-Administered Medications on File Prior to Visit   Medication Dose Route Frequency Provider Last Rate Last Dose    lidocaine 1 % injection 8 mL  8 mL Intra-articular Once Juan Carlos Muñoz PA-C        triamcinolone acetonide [facility]) injection 80 mg  80 mg Intra-articular Once Juan Carlos Muñoz PA-C        0.9 % sodium chloride infusion   Intravenous Continuous Luis Claudio  mL/hr at 02/19/14 0827       Allergies: Sehlley Penny is allergic to aspirin; atorvastatin; crestor [rosuvastatin]; livalo [pitavastatin]; seasonal; simvastatin; statins; and welchol [colesevelam hcl].     Past Medical History:   Diagnosis Date    Acid reflux     Arthritis     lower back    Diabetes mellitus ([facility] [address]     denies diabetes    Diverticular disease 6/30/2014    GERD (gastroesophageal reflux disease)  Hearing loss     Hypercholesteremia 6/30/2014    Hypothyroid 6/30/2014    Lung nodule     Meniere disease     right ear    Neuropathy     Osteopenia 6/30/2014    RAD (reactive airway disease) 6/30/2014    Sinusitis     Thyroid disease     underactive    Tremor     r hand    Vertigo     Vitamin D deficiency 6/30/2014       Past Surgical History:   Procedure Laterality Date    CHOLECYSTECTOMY      COLONOSCOPY      ENDOSCOPY, COLON, DIAGNOSTIC      ERCP  03/21/14    EXCISION / BIOPSY SKIN LESION OF TRUNK Left 11/8/2017    EXCISION MASS LEFT POSTERIOR SHOULDER, LEFT ANTERIOR ARM performed by Gunjan Gibson MD at [address] [place] [address]  01/03/2007    SKIN BIOPSY Left 11/08/2017    Excision Mass Left Posterior Shoulder, Left Anterior Arm     Social History: Ryan Perla  reports that she has never smoked. She has never used smokeless tobacco. She reports that she drinks alcohol. She reports that she does not use drugs. Family History   Problem Relation Age of Onset   [facility] Cancer Mother 48        pineal gland/throat    Cancer Father 64        colon   [facility] Cancer Brother         lung    Other Brother         pulmonary embolus    Heart Disease Paternal Grandfather         myocarditis    Stroke Maternal Aunt     Cancer Maternal Uncle     Stroke Maternal Grandmother     Diabetes Brother     Heart Disease Paternal Uncle 67        mi    Heart Disease Brother 58        mi    High Blood Pressure Brother     Heart Disease Brother 79        stents for cad    High Blood Pressure Brother        On exam: Blood pressure 113/71, pulse 63, height 5' 5\" (1.651 m), weight 216 lb 3.2 oz (98.1 kg).     GENERAL  Appears comfortable and in no distress   HEENT  NC/ AT   NECK  Supple and no bruits heard   MENTAL STATUS:  Alert, oriented, intact memory, no confusion, normal speech, normal language, no hallucination or delusion   CRANIAL NERVES: II     -      PERRLA, Fundi reveal intact venous pulsations; Visual fields intact to confrontation  III,IV,VI -  EOMs full, no afferent defect, no                      GREGORY, no ptosis  V     -     Normal facial sensation  VII    -     Normal facial symmetry  VIII   -     Intact hearing  IX,X -     Symmetrical palate  XI    -     Symmetrical shoulder shrug  XII   -     Midline tongue, no atrophy    MOTOR FUNCTION:  significant for good strength of grade 5/5 in bilateral proximal and distal muscle groups of both upper and lower extremities with normal bulk, normal tone   SENSORY FUNCTION:  Normal touch, normal pin, normal vibration, normal proprioception   CEREBELLAR FUNCTION:  reveals increased tremor on finger-nose-finger testing upon reaching the target. He also has mild tremulousness of outstretched upper extremities she has minimal cogwheeling, but no bradykinesia noted. Also has very subtle head tremor. Tremor is immediately apparent in the arms when they are held outstretched; it typically increases at the very end of goal-directed movements like finger-to-nose testing. ntact fine motor control over upper limbs   REFLEX FUNCTION:  Symmetric, no perverted reflex, no Babinski sign   STATION and GAIT  Normal station, normal gait          Diagnostic data reviewed with the patient:   CT Head (3/13/2017): No acute intracranial abnormalities. Stable calcified pineal lesion/cyst; unchanged from 2014. CT head 3/14/18: No acute intracranial abnormality. 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 Impression and Plan: Ms. Taiwo Meyers is a 70 y.o. female with   New onset generalized tremulousness at rest x 3 months  New onset jerking of the extremities without associated impaired consciousness x 3 months.    Intermittent fasciculations with the neurologic examination negative for any specific distribution of weakness x 3 months    Slowly progressive exertional tremor; features suggestive of familial tremor; family history of tremor (younger brother). ,  extensive and detailed discussion about various tremors and options of pharmacologic therapy for exertional tremors discussed. Will get CPK, Aldolase, EEG in further eval. Will also start her on Sinemet at smaller doses for resting tremor; medication counseling done. Ménière's disease/vertigo; improved with vestibular rehab  Meralgia paresthetica: Stable on amitriptyline. Medication Counseling: risks and benefits including possible adverse effects discussed with the patient and she verbalized understanding those instructions. Follow-up in 6 weeks. Please note that portions of this note were completed with a voice recognition program.  Although every effort was made to ensure the accuracy of this  automated transcription, some errors in transcription may have occurred, occasionally words are mis-transcribed.

## 2019-04-11 LAB — ALDOLASE: 5.6 U/L (ref 1.5–7.2)

## 2019-05-07 ENCOUNTER — HOSPITAL ENCOUNTER (OUTPATIENT)
Dept: NEUROLOGY | Age: 79
Discharge: HOME | End: 2019-05-07
Payer: MEDICARE

## 2019-05-07 DIAGNOSIS — R25.3 TWITCHING: ICD-10-CM

## 2019-05-07 PROCEDURE — 95816 EEG AWAKE AND DROWSY: CPT

## 2019-05-07 PROCEDURE — 95816 EEG AWAKE AND DROWSY: CPT | Performed by: PSYCHIATRY & NEUROLOGY

## 2019-05-08 NOTE — PROCEDURES
EEG REPORT    Patient Name: Mary Palumbo  Patient MRN: [id number]    Referring Physician: Alber Monge MD  Dictating Physician: Clarence Jean DO  Date: 5/7/19      CLINICAL HISTORY: This EEG was performed on a 70 y.o. female with The encounter diagnosis was Twitching. . It is being performed to evaluate for seizure activity. CURRENT ANTI-EPILEPTIC MEDICATIONS: ---    DESCRIPTION: Wakefulness and drowsiness were obtained. During wakefulness there was a posterior background of regular, reactive, symmetrical, low voltage 9 Hz activity with an anterior background of low voltage mixed frequencies. During drowsiness there was symmetrical slowing of the waking background. Photic stimulation evoked no significant posterior driving response. Hyperventilation did not elicit any abnormalities     EEG DIAGNOSIS: Normal    CLINICAL INTERPRETATION: No epileptiform activity or evidence of focal cerebral dysfunction was present. No electrographic seizures were recorded.      Clarence Jean, [address] [facility]

## 2019-05-09 ENCOUNTER — TELEPHONE (OUTPATIENT)
Dept: ORTHOPEDIC SURGERY | Age: 79
End: 2019-05-09

## 2019-05-15 ENCOUNTER — OFFICE VISIT (OUTPATIENT)
Dept: ORTHOPEDIC SURGERY | Age: 79
End: 2019-05-15
Payer: MEDICARE

## 2019-05-15 VITALS
DIASTOLIC BLOOD PRESSURE: 66 MMHG | SYSTOLIC BLOOD PRESSURE: 101 MMHG | HEIGHT: 65 IN | BODY MASS INDEX: 35.82 KG/M2 | HEART RATE: 55 BPM | WEIGHT: 215 LBS

## 2019-05-15 DIAGNOSIS — M70.61 GREATER TROCHANTERIC BURSITIS OF RIGHT HIP: Primary | ICD-10-CM

## 2019-05-15 PROCEDURE — 20611 DRAIN/INJ JOINT/BURSA W/US: CPT | Performed by: PHYSICIAN ASSISTANT

## 2019-05-15 PROCEDURE — 99024 POSTOP FOLLOW-UP VISIT: CPT | Performed by: PHYSICIAN ASSISTANT

## 2019-05-15 RX ORDER — METHYLPREDNISOLONE ACETATE 40 MG/ML
40 INJECTION, SUSPENSION INTRA-ARTICULAR; INTRALESIONAL; INTRAMUSCULAR; SOFT TISSUE ONCE
Status: COMPLETED | OUTPATIENT
Start: 2019-05-15 | End: 2019-05-15

## 2019-05-15 RX ORDER — LIDOCAINE HYDROCHLORIDE 10 MG/ML
4 INJECTION, SOLUTION INFILTRATION; PERINEURAL ONCE
Status: COMPLETED | OUTPATIENT
Start: 2019-05-15 | End: 2019-05-15

## 2019-05-15 RX ADMIN — METHYLPREDNISOLONE ACETATE 40 MG: 40 INJECTION, SUSPENSION INTRA-ARTICULAR; INTRALESIONAL; INTRAMUSCULAR; SOFT TISSUE at 15:20

## 2019-05-15 RX ADMIN — LIDOCAINE HYDROCHLORIDE 4 ML: 10 INJECTION, SOLUTION INFILTRATION; PERINEURAL at 15:20

## 2019-05-15 ASSESSMENT — ENCOUNTER SYMPTOMS
COLOR CHANGE: 0
VOMITING: 0
ABDOMINAL PAIN: 0
CONSTIPATION: 0
ABDOMINAL DISTENTION: 0
COUGH: 0
SHORTNESS OF BREATH: 0
NAUSEA: 0
CHEST TIGHTNESS: 0
DIARRHEA: 0
APNEA: 0

## 2019-05-15 NOTE — PROGRESS NOTES
[facility]  [facility] [address]  [facility] [address]  Dept: [phone]  Dept Fax: [phone]        Right Hip Follow Up    Subjective:     Chief Complaint   Patient presents with    Hip Pain     rt hip pain, requesting injection     HPI:    Johana Be presents with a long history of pain in the right hip. On the [address] Day, the patient states that she woke up and she was unable to walk due to the hip but the hip has been better since then but it still aches from time to time. The pain does not radiate into the thigh nor into the groin but the pain is over the lateral aspect of the hip. The pain is most troublesome during ambulatory activity and now limits the patient`s walking distance to shorter distances. Night pain, which disturbs the patient`s sleep is somewhat a problem. The patient has had to give up some activities such as long walks, which has produced a consequent deterioration in quality of life. She has tried rest and reduction of activity and reports little improvement. The patient has had a cortisone injection on 12/05/18 into the greater trochanteric bursa of both hips with good pain relief. The patient has not tried physical therapy. The patient has not had surgery. Knee pain is not noted. Review of Systems   Constitutional: Positive for activity change. Negative for appetite change, fatigue and fever. Respiratory: Negative for apnea, cough, chest tightness and shortness of breath. Cardiovascular: Negative for chest pain, palpitations and leg swelling. Gastrointestinal: Negative for abdominal distention, abdominal pain, constipation, diarrhea, nausea and vomiting. Genitourinary: Negative for difficulty urinating, dysuria and hematuria. Musculoskeletal: Positive for arthralgias. Negative for gait problem, joint swelling and myalgias.    Skin: Negative for color change and rash.   Neurological: Negative for dizziness, weakness, numbness and headaches. Psychiatric/Behavioral: Negative for sleep disturbance. Past Medical History:    Past Medical History:   Diagnosis Date    Acid reflux     Arthritis     lower back    Diabetes mellitus ([address]     denies diabetes    Diverticular disease 6/30/2014    GERD (gastroesophageal reflux disease)     Hearing loss     Hypercholesteremia 6/30/2014    Hypothyroid 6/30/2014    Lung nodule     Meniere disease     right ear    Neuropathy     Osteopenia 6/30/2014    RAD (reactive airway disease) 6/30/2014    Sinusitis     Thyroid disease     underactive    Tremor     r hand    Vertigo     Vitamin D deficiency 6/30/2014     Past Surgical History:    Past Surgical History:   Procedure Laterality Date    CHOLECYSTECTOMY      COLONOSCOPY      ENDOSCOPY, COLON, DIAGNOSTIC      ERCP  03/21/14    EXCISION / BIOPSY SKIN LESION OF TRUNK Left 11/8/2017    EXCISION MASS LEFT POSTERIOR SHOULDER, LEFT ANTERIOR ARM performed by Eliceo Jade MD at [address] [place] [address]  01/03/2007    SKIN BIOPSY Left 11/08/2017    Excision Mass Left Posterior Shoulder, Left Anterior Arm     Current Medications:   Current Outpatient Medications   Medication Sig Dispense Refill    levothyroxine (SYNTHROID) 75 MCG tablet TAKE ONE TABLET BY MOUTH DAILY 90 tablet 0    amitriptyline (ELAVIL) 10 MG tablet TAKE ONE TABLET BY MOUTH DAILY AS NEEDED FOR SLEEP AND LEG PAIN FOR UP TO 30 DAYS 30 tablet 0    carbidopa-levodopa (SINEMET)  MG per tablet TAKE HALF TAB TWICE DAILY 60 tablet 3    metFORMIN (GLUCOPHAGE) 500 MG tablet TAKE ONE TABLET BY MOUTH TWICE A  tablet 0    NONFORMULARY Indications: Dietary fiber 1 tsp a day       ondansetron (ZOFRAN-ODT) 4 MG disintegrating tablet DISSOLVE ONE TABLET BY MOUTH EVERY 8 HOURS AS NEEDED FOR NAUSEA 20 tablet 0    ECHINACEA EXTRACT PO Take 450 mg by mouth Indications:  Take in winter daily        Current Facility-Administered Medications   Medication Dose Route Frequency Provider Last Rate Last Dose    lidocaine 1 % injection 8 mL  8 mL Intra-articular Once Tj Woodall PA-C        triamcinolone acetonide [facility]) injection 80 mg  80 mg Intra-articular Once Luckyra Woodall PA-C         Facility-Administered Medications Ordered in Other Visits   Medication Dose Route Frequency Provider Last Rate Last Dose    0.9 % sodium chloride infusion   Intravenous Continuous Casandra Romero  mL/hr at 02/19/14 0827       Allergies:    Aspirin; Atorvastatin; Crestor [rosuvastatin]; Livalo [pitavastatin];  Seasonal; Simvastatin; Statins; and Welchol [colesevelam hcl]    Social History:   Social History     Socioeconomic History    Marital status: Single     Spouse name: None    Number of children: None    Years of education: None    Highest education level: None   Occupational History    None   Social Needs    Financial resource strain: None    Food insecurity:     Worry: None     Inability: None    Transportation needs:     Medical: None     Non-medical: None   Tobacco Use    Smoking status: Never Smoker    Smokeless tobacco: Never Used   Substance and Sexual Activity    Alcohol use: Yes     Comment: rare    Drug use: No    Sexual activity: None   Lifestyle    Physical activity:     Days per week: None     Minutes per session: None    Stress: None   Relationships    Social connections:     Talks on phone: None     Gets together: None     Attends [employer] service: None     Active member of club or organization: None     Attends meetings of clubs or organizations: None     Relationship status: None    Intimate partner violence:     Fear of current or ex partner: None     Emotionally abused: None     Physically abused: None     Forced sexual activity: None   Other Topics Concern    None   Social History Narrative    None     Family History:  Family History   Problem rotation. [facility]C: Strength is 5/5 flexion, abduction, internal rotation, external rotation. PALP: The patient is  tender to palpation over the greater trochanter. TEST: Log roll is (-), No apparent leg length discrepancy. Negative Trendelenburg test. Negative Dalton's test. No labral clunks. No pain on compression. Assessment:     1. Greater trochanteric bursitis of right hip        Procedures:    Procedure: yes    Greater Trochanteric Bursa Injection     Location: Right Hip  Procedure: Crispin Lozano agreed today for a Corticosteroid injection into the bilateral greater trochanteric bursa. The patient was placed in the lateral position with the affected side up. The point of the maximum tenderness was marked with the closed end of a click type pen. The skin was prepped with betadine in a sterile fashion. Utilizing sterile technique a 5 cc solution containing 4cc of 1% Lidocaine and 1 cc containing 40mg of Depo medrol was injected into the greater trochanteric bursa with a 20 gu. spinal needle. The wound was cleansed and a Band-Aid was placed. The patient tolerated the procedure without difficulty. Adverse reactions of the injection was discussed with the patient including signs of infection (increasing pain, redness, swelling) and the patient was instructed to call immediately with any of these symptoms    Radiology:   X-ray reviewed from 12/5/2018    Plan:   Treatment : We discussed the etiologies and natural histories of Greater Trochanteric Bursitis of the right hip. We discussed the various treatment alternatives including anti-inflammatory medications, physical therapy, injections, further imaging studies and as a last result surgery. During today's visit, the patient has opted for a cortisone injection into the greater trochanteric bursa of the right hip to help reduce inflammation and pain.  The injection site should never get red, hot, or swollen and if it does the patient will contact our office right away. The patient may experience a increase in soreness the first 24-48 hours due to a cortisone flair and can take anti-inflammatories for a short period of time to reduce that soreness. The patient should not submerge the injection site in water for a minimum of 24 hours to avoid infection. This means no lakes, pools, ponds, or hot tubs for 24 hours. If the patient is diabetic the injection may increase their blood sugar for up to one week. The patient can do this cortisone injection once every 3 months as needed. If the injections stop working and do not give the patient relief the patient should consider surgical interventions to produce long term relief. Patient should return to the clinic in 3 months to follow up with Nadja Posada PA-C, ATC for another cortisone injection if needed. The patient will call the office immediately with any problems. Orders Placed This Encounter   Medications    lidocaine 1 % injection 4 mL    methylPREDNISolone acetate (DEPO-MEDROL) injection 40 mg     Orders Placed This Encounter   Procedures    SC ARTHROCENTESIS ASPIR&/INJ MAJOR JT/BURSA W/US     ILonnie Day V, am scribing for and in the presence of Juan Kate ATC  5/19/2019  9:59 PM      I, Nadja Posada PA-C, ATC,  have personally seen this patient, reviewed the chart including history, and imaging if done. I personally  performed the physical exam and obtained any needed additional history. I placed orders, performed or supervised procedures and developed the treatment plan.     Electronically signed by Sukumar Alexis PA-C, on 5/19/2019 at 10:00 PM        Electronically signed by Sukumar Alexis PA-C, on 5/19/2019 at 9:59 PM

## 2019-05-29 ENCOUNTER — OFFICE VISIT (OUTPATIENT)
Dept: NEUROLOGY | Age: 79
End: 2019-05-29
Payer: MEDICARE

## 2019-05-29 VITALS
HEART RATE: 65 BPM | BODY MASS INDEX: 35.9 KG/M2 | HEIGHT: 65 IN | WEIGHT: 215.5 LBS | DIASTOLIC BLOOD PRESSURE: 72 MMHG | SYSTOLIC BLOOD PRESSURE: 112 MMHG

## 2019-05-29 DIAGNOSIS — Z82.0 FAMILY HISTORY OF BENIGN ESSENTIAL TREMOR: ICD-10-CM

## 2019-05-29 DIAGNOSIS — R25.1 TREMOR OF RIGHT HAND: Primary | ICD-10-CM

## 2019-05-29 DIAGNOSIS — R25.3 TWITCHING: ICD-10-CM

## 2019-05-29 PROCEDURE — G8427 DOCREV CUR MEDS BY ELIG CLIN: HCPCS | Performed by: PSYCHIATRY & NEUROLOGY

## 2019-05-29 PROCEDURE — 1123F ACP DISCUSS/DSCN MKR DOCD: CPT | Performed by: PSYCHIATRY & NEUROLOGY

## 2019-05-29 PROCEDURE — 99214 OFFICE O/P EST MOD 30 MIN: CPT | Performed by: PSYCHIATRY & NEUROLOGY

## 2019-05-29 PROCEDURE — 1090F PRES/ABSN URINE INCON ASSESS: CPT | Performed by: PSYCHIATRY & NEUROLOGY

## 2019-05-29 PROCEDURE — 4040F PNEUMOC VAC/ADMIN/RCVD: CPT | Performed by: PSYCHIATRY & NEUROLOGY

## 2019-05-29 PROCEDURE — G8399 PT W/DXA RESULTS DOCUMENT: HCPCS | Performed by: PSYCHIATRY & NEUROLOGY

## 2019-05-29 PROCEDURE — 1036F TOBACCO NON-USER: CPT | Performed by: PSYCHIATRY & NEUROLOGY

## 2019-05-29 PROCEDURE — G8417 CALC BMI ABV UP PARAM F/U: HCPCS | Performed by: PSYCHIATRY & NEUROLOGY

## 2019-05-29 NOTE — PROGRESS NOTES
NEUROLOGY FOLLOW-UP  Patient Name:  Peyton Landau  :   1940  Clinic Visit Date: 2019        REVIEW OF SYSTEMS  Constitutional Weight changes: absent, Fevers : absent, Fatigue: absent; Any recent hospitalizations:  absent.    HEENT Ears: normal, Eyes: no correction, Visual disturbance: absent   Reespiratory Shortness of breath: absent, Cough: absent   Cardivascular Chest pain: absent, Leg swelling :absent   GI Constipation: absent, Diarrhea: absent, Swallowing change: present    Urinary frequency: absent, Urinary urgency: absent, Urinary incontinence: absent   Musculoskeletal Neck pain: absent, Back pain: absent, Stiffness: absent, Muscle pain: absent, Joint pain: absent   Dermatological Hair loss: absent, Skin changes: absent   Neurological Memory loss: absent, Confusion: absent, Seizures: absent; Trouble walking or imbalance: absent, Dizziness: absent, Weakness: absent, Numbness absent, Tremor: absent, Spasm: absent, Speech difficulty: absent, Headache: absent, Light sensitivity: absent   Psychiatric Anxiety: absent, Depression  absent, Suicidal ideations absent   Hematologic Abnormal bleeding: absent, Anemia: absent, Clotting disorder: absent, Lymph gland changes: absent

## 2019-05-29 NOTE — PROGRESS NOTES
Inhaler 3    valACYclovir (VALTREX) 1 g tablet Take 2 tablets by mouth 2 times daily 4 tablet 2    albuterol sulfate HFA (PROAIR HFA) 108 (90 Base) MCG/ACT inhaler Inhale 2 puffs into the lungs every 6 hours as needed for Wheezing 1 Inhaler 3    metoprolol tartrate (LOPRESSOR) 25 MG tablet Take 0.5 tablets by mouth daily (Patient taking differently: Take 25 mg by mouth 2 times daily ) 60 tablet 1    fluticasone (FLONASE) 50 MCG/ACT nasal spray PLACE 1 SPRAY IN EACH NOSTRIL DAILY FOR 15 DAYS 1 Bottle 2    ketoconazole (NIZORAL) 2 % shampoo APPLY  TO AFFECTED AREAS TOPICALLY DAILY THEN RINSE OFF AFTER 5 MINUTES (Patient taking differently: APPLY  TO AFFECTED AREAS TOPICALLY DAILY THEN RINSE OFF AFTER 5 MINUTES WHEN NEEDED) 120 mL 2    ezetimibe (ZETIA) 10 MG tablet Take 10 mg by mouth daily      MENTAX 1 % CREA APPLY TO AFFECTED AREA(S) TWO TIMES A DAY 15 g 0    esomeprazole Magnesium (NEXIUM) 40 MG PACK Take 40 mg by mouth daily.  Multiple Vitamins-Minerals (THERAPEUTIC MULTIVITAMIN-MINERALS) tablet Take 1 tablet by mouth daily.  NONFORMULARY Super b complex daily      ascorbic acid (VITAMIN C) 500 MG tablet Take 500 mg by mouth daily.  chlorpheniramine (CHLOR-TRIMETON) 4 MG tablet Take 12 mg by mouth daily        Current Facility-Administered Medications on File Prior to Visit   Medication Dose Route Frequency Provider Last Rate Last Dose    lidocaine 1 % injection 8 mL  8 mL Intra-articular Once Amparo Neumann PA-C        triamcinolone acetonide [facility]) injection 80 mg  80 mg Intra-articular Once Amparo Neumann PA-C        0.9 % sodium chloride infusion   Intravenous Continuous Adrianne Schwarz  mL/hr at 02/19/14 0800       Allergies: Shima Zaragoza is allergic to aspirin; atorvastatin; crestor [rosuvastatin]; livalo [pitavastatin]; seasonal; simvastatin; statins; and welchol [colesevelam hcl].     Past Medical History:   Diagnosis Date    Acid reflux     Arthritis lower back    Diabetes mellitus ([facility] [address]     denies diabetes    Diverticular disease 6/30/2014    GERD (gastroesophageal reflux disease)     Hearing loss     Hypercholesteremia 6/30/2014    Hypothyroid 6/30/2014    Lung nodule     Meniere disease     right ear    Neuropathy     Osteopenia 6/30/2014    RAD (reactive airway disease) 6/30/2014    Sinusitis     Thyroid disease     underactive    Tremor     r hand    Vertigo     Vitamin D deficiency 6/30/2014       Past Surgical History:   Procedure Laterality Date    CHOLECYSTECTOMY      COLONOSCOPY      ENDOSCOPY, COLON, DIAGNOSTIC      ERCP  03/21/14    EXCISION / BIOPSY SKIN LESION OF TRUNK Left 11/8/2017    EXCISION MASS LEFT POSTERIOR SHOULDER, LEFT ANTERIOR ARM performed by Bernard Brooks MD at [address] [place] [address]  01/03/2007    SKIN BIOPSY Left 11/08/2017    Excision Mass Left Posterior Shoulder, Left Anterior Arm     Social History: Rudolph Vargas  reports that she has never smoked. She has never used smokeless tobacco. She reports that she drinks alcohol. She reports that she does not use drugs. Family History   Problem Relation Age of Onset   Eli Moors Cancer Mother 48        pineal gland/throat    Cancer Father 64        colon   Eli Moors Cancer Brother         lung    Other Brother         pulmonary embolus    Heart Disease Paternal Grandfather         myocarditis    Stroke Maternal Aunt     Cancer Maternal Uncle     Stroke Maternal Grandmother     Diabetes Brother     Heart Disease Paternal Uncle 67        mi    Heart Disease Brother 58        mi    High Blood Pressure Brother     Heart Disease Brother 79        stents for cad    High Blood Pressure Brother        On exam: Blood pressure 112/72, pulse 65, height 5' 5\" (1.651 m), weight 215 lb 8 oz (97.8 kg).     GENERAL  Appears comfortable and in no distress   HEENT  NC/ AT   NECK  Supple and no bruits heard   MENTAL STATUS:  Alert, oriented, intact memory, no confusion, normal speech, normal language, no hallucination or delusion; appropriate affect   CRANIAL NERVES: II     -      PERRLA, Fundi reveal intact venous pulsations; Visual fields intact to confrontation  III,IV,VI -  EOMs full, no afferent defect, no                      GREGORY, no ptosis  V     -     Normal facial sensation  VII    -     Normal facial symmetry  VIII   -     Intact hearing  IX,X -     Symmetrical palate  XI    -     Symmetrical shoulder shrug  XII   -     Midline tongue, no atrophy    MOTOR FUNCTION:  significant for good strength of grade 5/5 in bilateral proximal and distal muscle groups of both upper and lower extremities with normal bulk, normal tone   SENSORY FUNCTION:  Normal touch, normal pin, normal vibration, normal proprioception   CEREBELLAR FUNCTION:  reveals increased tremor on finger-nose-finger testing upon reaching the target. He also has mild tremulousness of outstretched upper extremities she has minimal cogwheeling, but no bradykinesia noted. Also has very subtle head tremor. Tremor is immediately apparent in the arms when they are held outstretched; it typically increases at the very end of goal-directed movements like finger-to-nose testing. intact fine motor control over upper limbs   REFLEX FUNCTION:  Symmetric, no perverted reflex, no Babinski sign   STATION and GAIT  Normal station, normal gait          Diagnostic data reviewed with the patient:   CT Head (3/13/2017): No acute intracranial abnormalities. Stable calcified pineal lesion/cyst; unchanged from 2014. CT head 3/14/18: No acute intracranial abnormality. 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 EEG (5/7/19): unremarkable.      CPK 4/8/19: 58 (24-1 70)  Aldolase 4/8/19 : 5.6 (1.5-7.2)            Impression and Plan: Ms. Maris Rayo is a 70 y.o. female with   New onset generalized tremulousness at rest x 4 months  New onset jerking of the extremities without associated impaired consciousness x 4 months. Intermittent fasciculations with neuro exam -ve for any specific distribution of weakness x 4 months  Mixed tremors; with features with predominance of resting tremors > exertional tremors  Family hx of tremor in younger brother    CPK, Aldolase, EEGs are unremarkable. Patient noticed \"tiny bit improvement in tremor with Sinemet half tab at 830 am + 5 pm\". Will continue dose escalation. She will take full tab tid at 8 am, 2 pm and 8 pm to help for for resting tremor; she will take Sinemet with cracker preceded by her meals; medication counseling including risks and benefits and possible adverse effects discussed. Of note; she was given script for Primidone in 2017; she did not want to try it because of possible adv efx. Ménière's disease/vertigo; improved with vestibular rehab  Meralgia paresthetica: Stable on amitriptyline. Medication Counseling: risks and benefits including possible adverse effects discussed with the patient and she verbalized understanding those instructions. Follow-up in 3 months. Please note that portions of this note were completed with a voice recognition program.  Although every effort was made to ensure the accuracy of this  automated transcription, some errors in transcription may have occurred, occasionally words are mis-transcribed.

## 2019-06-03 PROBLEM — L21.9 SEBORRHEIC DERMATITIS OF SCALP: Status: RESOLVED | Noted: 2017-09-13 | Resolved: 2019-06-03

## 2019-06-03 PROBLEM — R25.1 TREMOR OF RIGHT HAND: Status: RESOLVED | Noted: 2017-04-06 | Resolved: 2019-06-03

## 2019-06-03 PROBLEM — M47.812 SPONDYLOSIS OF CERVICAL REGION WITHOUT MYELOPATHY OR RADICULOPATHY: Status: RESOLVED | Noted: 2017-09-13 | Resolved: 2019-06-03

## 2019-06-03 PROBLEM — I49.1 PAC (PREMATURE ATRIAL CONTRACTION): Status: RESOLVED | Noted: 2018-04-05 | Resolved: 2019-06-03

## 2019-06-03 PROBLEM — D17.9: Status: RESOLVED | Noted: 2017-11-27 | Resolved: 2019-06-03

## 2019-06-03 PROBLEM — Z82.0 FAMILY HISTORY OF BENIGN ESSENTIAL TREMOR: Status: RESOLVED | Noted: 2017-04-06 | Resolved: 2019-06-03

## 2019-06-03 PROBLEM — M47.817 SPONDYLOSIS OF LUMBOSACRAL REGION WITHOUT MYELOPATHY OR RADICULOPATHY: Status: RESOLVED | Noted: 2017-09-13 | Resolved: 2019-06-03

## 2019-06-07 ENCOUNTER — TELEPHONE (OUTPATIENT)
Dept: NEUROLOGY | Age: 79
End: 2019-06-07

## 2019-06-07 NOTE — TELEPHONE ENCOUNTER
Pt increased dose of Carbidopa/levo. She is taking it now 1 tab tid. She feels very sleepy. I advised her she can go ahead and cut back to 1/2 tab tid and then we can see how she does and check with Dr. Cortney Morris about what he thinks. At the full tablet she was taking long naps twice a day.   She will see how she does at 1/2 tid and we will discuss with Dr. Cortney Morris

## 2019-06-10 NOTE — TELEPHONE ENCOUNTER
I called Laith Guthrie and gave her the message. She thinks she might have felt a bit better yesterday. She will cut back to 1/2 bid. she said she only takes the amitriptyline prn so she doesn't feel the Amitriptyline is causing her sleepiness. She will call us in 1 week or so to let us know how she is doing.

## 2019-08-27 ENCOUNTER — OFFICE VISIT (OUTPATIENT)
Dept: ORTHOPEDIC SURGERY | Age: 79
End: 2019-08-27
Payer: MEDICARE

## 2019-08-27 VITALS
BODY MASS INDEX: 35.82 KG/M2 | WEIGHT: 215 LBS | SYSTOLIC BLOOD PRESSURE: 116 MMHG | HEIGHT: 65 IN | HEART RATE: 56 BPM | DIASTOLIC BLOOD PRESSURE: 77 MMHG

## 2019-08-27 DIAGNOSIS — M70.61 GREATER TROCHANTERIC BURSITIS OF RIGHT HIP: Primary | ICD-10-CM

## 2019-08-27 PROCEDURE — 20610 DRAIN/INJ JOINT/BURSA W/O US: CPT | Performed by: PHYSICIAN ASSISTANT

## 2019-08-27 RX ORDER — METHYLPREDNISOLONE ACETATE 40 MG/ML
40 INJECTION, SUSPENSION INTRA-ARTICULAR; INTRALESIONAL; INTRAMUSCULAR; SOFT TISSUE ONCE
Status: COMPLETED | OUTPATIENT
Start: 2019-08-27 | End: 2019-08-27

## 2019-08-27 RX ORDER — LIDOCAINE HYDROCHLORIDE 10 MG/ML
4 INJECTION, SOLUTION INFILTRATION; PERINEURAL ONCE
Status: COMPLETED | OUTPATIENT
Start: 2019-08-27 | End: 2019-08-27

## 2019-08-27 RX ADMIN — METHYLPREDNISOLONE ACETATE 40 MG: 40 INJECTION, SUSPENSION INTRA-ARTICULAR; INTRALESIONAL; INTRAMUSCULAR; SOFT TISSUE at 12:15

## 2019-08-27 RX ADMIN — LIDOCAINE HYDROCHLORIDE 4 ML: 10 INJECTION, SOLUTION INFILTRATION; PERINEURAL at 12:14

## 2019-08-27 ASSESSMENT — ENCOUNTER SYMPTOMS
ABDOMINAL PAIN: 0
CHEST TIGHTNESS: 0
DIARRHEA: 0
NAUSEA: 0
COLOR CHANGE: 0
APNEA: 0
COUGH: 0
ABDOMINAL DISTENTION: 0
SHORTNESS OF BREATH: 0
VOMITING: 0
CONSTIPATION: 0

## 2019-08-27 NOTE — PROGRESS NOTES
[facility]  [address] [place] [id number]  Dept: [phone]  Dept Fax: [phone]        Right Hip Office Visit      Subjective:     Chief Complaint   Patient presents with    Follow-up     Rt hip pain; requesting cortisone injection. Last cortisone injection 5/15/19. HPI:    Ministerio Ross presents with a long history of pain in the right hip but she states that she had severe pain in the hip the day after thanksgiving day last year and she has been getting the pain treated since. The pain does not radiate into the thigh nor into the groin. The pain is most troublesome during ambulatory activity and now limits the patient`s walking distance to shorter distances. Night pain, which disturbs the patient`s sleep is somewhat a problem. The patient has had to give up some activities such as walking long walks, exercising and sleeping comfortably, which has produced a consequent deterioration in quality of life. She has tried rest and reduction of activity and reports no improvement. Back pain is not present and does not interfere with the patient`s life as does the hip. The patient has had a cortisone injection into the greater trochanteric bursa on 05/15/2019 with good pain relief for three months. The patient has not tried physical therapy. The patient has not had surgery. The opposite hip is okay. Knee pain is not noted. Patient also states that she has been seeing a Neurologist, Dr. Sarbjit Charles, for her frequent fasciculations/tremors that have been occurring in her body. Review of Systems   Constitutional: Positive for activity change. Negative for appetite change, fatigue and fever. Respiratory: Negative for apnea, cough, chest tightness and shortness of breath. Cardiovascular: Negative for chest pain, palpitations and leg swelling.    Gastrointestinal: Negative for abdominal distention, abdominal pain, SLEEP AND LEG PAIN FOR UP TO 30 DAYS 30 tablet 0    carbidopa-levodopa (SINEMET)  MG per tablet TAKE HALF TAB TWICE DAILY 60 tablet 3    NONFORMULARY Indications: Dietary fiber 1 tsp a day       ondansetron (ZOFRAN-ODT) 4 MG disintegrating tablet DISSOLVE ONE TABLET BY MOUTH EVERY 8 HOURS AS NEEDED FOR NAUSEA 20 tablet 0    ECHINACEA EXTRACT PO Take 450 mg by mouth Indications: Take in winter       Cholecalciferol (VITAMIN D3) 2000 units CAPS Take 1 capsule by mouth 2 times daily 30 capsule 0    diltiazem (CARDIZEM CD) 120 MG extended release capsule Take 1 capsule by mouth every evening 30 capsule 3    ranitidine (ZANTAC) 150 MG tablet Take 2 tablets by mouth nightly (Patient taking differently: Take 300 mg by mouth nightly as needed ) 60 tablet 3    budesonide-formoterol (SYMBICORT) 160-4.5 MCG/ACT AERO Inhale 2 puffs into the lungs 2 times daily (Patient taking differently: Inhale 2 puffs into the lungs 2 times daily as needed ) 1 Inhaler 3    valACYclovir (VALTREX) 1 g tablet Take 2 tablets by mouth 2 times daily 4 tablet 2    albuterol sulfate HFA (PROAIR HFA) 108 (90 Base) MCG/ACT inhaler Inhale 2 puffs into the lungs every 6 hours as needed for Wheezing 1 Inhaler 3    metoprolol tartrate (LOPRESSOR) 25 MG tablet Take 0.5 tablets by mouth daily (Patient taking differently: Take 25 mg by mouth 2 times daily ) 60 tablet 1    fluticasone (FLONASE) 50 MCG/ACT nasal spray PLACE 1 SPRAY IN EACH NOSTRIL DAILY FOR 15 DAYS 1 Bottle 2    ketoconazole (NIZORAL) 2 % shampoo APPLY  TO AFFECTED AREAS TOPICALLY DAILY THEN RINSE OFF AFTER 5 MINUTES (Patient taking differently: APPLY  TO AFFECTED AREAS TOPICALLY DAILY THEN RINSE OFF AFTER 5 MINUTES WHEN NEEDED) 120 mL 2    ezetimibe (ZETIA) 10 MG tablet Take 10 mg by mouth daily      MENTAX 1 % CREA APPLY TO AFFECTED AREA(S) TWO TIMES A DAY 15 g 0    esomeprazole Magnesium (NEXIUM) 40 MG PACK Take 40 mg by mouth daily.       Multiple Vitamins-Minerals Gets together: None     Attends Zoroastrian service: None     Active member of club or organization: None     Attends meetings of clubs or organizations: None     Relationship status: None    Intimate partner violence:     Fear of current or ex partner: None     Emotionally abused: None     Physically abused: None     Forced sexual activity: None   Other Topics Concern    None   Social History Narrative    None     Family History:  Family History   Problem Relation Age of Onset    Cancer Mother 48        pineal gland/throat    Cancer Father 64        colon    Cancer Brother         lung    Other Brother         pulmonary embolus    Heart Disease Paternal Grandfather         myocarditis    Stroke Maternal Aunt     Cancer Maternal Uncle     Stroke Maternal Grandmother     Diabetes Brother     Heart Disease Paternal Uncle 67        mi    Heart Disease Brother 58        mi    High Blood Pressure Brother     Heart Disease Brother 79        stents for cad    High Blood Pressure Brother      Vitals:   /77   Pulse 56   Ht 5' 5\" (1.651 m)   Wt 215 lb (97.5 kg)   BMI 35.78 kg/m²  Body mass index is 35.78 kg/m². Physical Examination:     Orthopedics:    GENERAL: Alert and oriented X3 in no acute distress. SKIN: Intact without lesions or ulcerations. NEURO: Musculoskeletal and axillary nerves intact to sensory and motor testing. VASC: Capillary refill is less than 3 seconds. Right Hip     GEN: Alert and oriented X 3, in no acute distress. GAIT: The patient's gait was observed while entering the exam room and was noted to be non antalgic. The extremity is in anatomic alignment. SKIN: Intact without rashes, lesions, or ulcerations. No obvious deformity or swelling. NEURO: The patient responds to light touch throughout right LE. Patellar and Achilles reflexes are 2/4. VASC: The right LE is neurovascularly intact with  2/4 DP and 2/4 PT pulses. Brisk capillary refill.   ROM: 90 degrees flexion, 30 degrees abduction, 20 degrees adduction, 20 degrees internal rotation, 45 degrees external rotation. No pain with FADIR or JG. MUSC: Strength is 4/5 flexion, abduction, internal rotation, external rotation. PALP: The patient is  tender to palpation over the greater trochanter. TEST: Log roll is (-), No apparent leg length discrepancy. Negative Trendelenburg test. Negative Dalton's test. No labral clunks. No pain on compression. Assessment:     1. Greater trochanteric bursitis of right hip      Procedures:    Procedure: yes    Greater Trochanteric Bursa Injection     Location: Right Hip  Procedure: Lisa Hawk agreed today for a Corticosteroid injection into the bilateral greater trochanteric bursa. The patient was placed in the lateral position with the affected side up. The point of the maximum tenderness was marked with the closed end of a click type pen. The skin was prepped with betadine in a sterile fashion. Utilizing sterile technique a 5 cc solution containing 4cc of 1% Lidocaine and 1 cc containing 40 mg of Depo medrol was injected into the greater trochanteric bursa with a 20 gu. spinal needle. The wound was cleansed and a Band-Aid was placed. The patient tolerated the procedure without difficulty. Adverse reactions of the injection was discussed with the patient including signs of infection (increasing pain, redness, swelling) and the patient was instructed to call immediately with any of these symptoms    Radiology:   X-ray reviewed from 12/5/2018. Plan:   Treatment : I reviewed the X-ray with the patient. We discussed the etiologies and natural histories of Greater Trochanteric Bursitis of the right hip. We discussed the various treatment alternatives including anti-inflammatory medications, physical therapy, injections, further imaging studies and as a last result surgery.  During today's visit, the patient has opted for a cortisone injection into the greater trochanteric bursa

## 2019-09-04 ENCOUNTER — HOSPITAL ENCOUNTER (OUTPATIENT)
Dept: MAMMOGRAPHY | Facility: CLINIC | Age: 79
Discharge: HOME | End: 2019-09-06
Payer: MEDICARE

## 2019-09-04 ENCOUNTER — HOSPITAL ENCOUNTER (OUTPATIENT)
Dept: ULTRASOUND IMAGING | Facility: CLINIC | Age: 79
Discharge: HOME | End: 2019-09-06
Payer: MEDICARE

## 2019-09-04 DIAGNOSIS — Z12.31 SCREENING MAMMOGRAM, ENCOUNTER FOR: ICD-10-CM

## 2019-09-04 DIAGNOSIS — E04.1 NONTOXIC SINGLE THYROID NODULE: ICD-10-CM

## 2019-09-04 DIAGNOSIS — M85.80 OSTEOPENIA, UNSPECIFIED LOCATION: ICD-10-CM

## 2019-09-04 DIAGNOSIS — Z78.0 POST-MENOPAUSAL: ICD-10-CM

## 2019-09-04 PROCEDURE — 77080 DXA BONE DENSITY AXIAL: CPT

## 2019-09-04 PROCEDURE — 76536 US EXAM OF HEAD AND NECK: CPT

## 2019-09-04 PROCEDURE — 77067 SCR MAMMO BI INCL CAD: CPT

## 2019-09-05 ENCOUNTER — OFFICE VISIT (OUTPATIENT)
Dept: NEUROLOGY | Age: 79
End: 2019-09-05
Payer: MEDICARE

## 2019-09-05 VITALS
WEIGHT: 213 LBS | DIASTOLIC BLOOD PRESSURE: 78 MMHG | SYSTOLIC BLOOD PRESSURE: 118 MMHG | HEART RATE: 56 BPM | HEIGHT: 65 IN | BODY MASS INDEX: 35.49 KG/M2

## 2019-09-05 DIAGNOSIS — G57.12 NEUROPATHY OF LEFT LATERAL FEMORAL CUTANEOUS NERVE: ICD-10-CM

## 2019-09-05 DIAGNOSIS — G20 PARKINSON DISEASE (HCC): ICD-10-CM

## 2019-09-05 DIAGNOSIS — R25.1 TREMOR OF RIGHT HAND: Primary | ICD-10-CM

## 2019-09-05 DIAGNOSIS — Z82.0 FAMILY HISTORY OF BENIGN ESSENTIAL TREMOR: ICD-10-CM

## 2019-09-05 PROCEDURE — 1090F PRES/ABSN URINE INCON ASSESS: CPT | Performed by: PSYCHIATRY & NEUROLOGY

## 2019-09-05 PROCEDURE — 99214 OFFICE O/P EST MOD 30 MIN: CPT | Performed by: PSYCHIATRY & NEUROLOGY

## 2019-09-05 PROCEDURE — 4040F PNEUMOC VAC/ADMIN/RCVD: CPT | Performed by: PSYCHIATRY & NEUROLOGY

## 2019-09-05 PROCEDURE — G8417 CALC BMI ABV UP PARAM F/U: HCPCS | Performed by: PSYCHIATRY & NEUROLOGY

## 2019-09-05 PROCEDURE — G8399 PT W/DXA RESULTS DOCUMENT: HCPCS | Performed by: PSYCHIATRY & NEUROLOGY

## 2019-09-05 PROCEDURE — 1123F ACP DISCUSS/DSCN MKR DOCD: CPT | Performed by: PSYCHIATRY & NEUROLOGY

## 2019-09-05 PROCEDURE — 1036F TOBACCO NON-USER: CPT | Performed by: PSYCHIATRY & NEUROLOGY

## 2019-09-05 PROCEDURE — G8427 DOCREV CUR MEDS BY ELIG CLIN: HCPCS | Performed by: PSYCHIATRY & NEUROLOGY

## 2019-09-05 NOTE — PROGRESS NOTES
NEUROLOGY FOLLOW-UP  Patient Name:  Elsy Villanueva  :   1940  Clinic Visit Date: 2019    I saw Ms. Elsy Villanueva in follow-up in the office today in continuation of neurologic care. She is a 70 y.o.  right handed  female initially seen in neurologic consultation in 2017 for tremors of six year duration with slow progression. She has prolonged absence of not returning to clinic until May 2019. Today is the second follow-up visit afterwards. She states \" shaking hasn't improved much with Sinemet\". Of note, she never started Primidone due to possible adverse effects. She also had new onset tremulousness and intermittent twitching/ myoclonic jerking all over the body from head to toe occurring intermittently and it is present predominantly when she is at rest and also has been having jerking activity upon awakening. Also c/o shakiness in her head in am.     She has had tremors for about 6 years since  with worsening severity for about 9 months. She stated that her younger brother has tremors and he does not want to be on medications. Patient wants to be evaluated as these are noticed more by her friends and family. These tremors occur intermittently with exertion. Denies resting tremors. She has tremors predominantly in right upper extremity and was noticed by others in left upper extremity on occasion. She also felt tremors in right lower extremity on a rare occasion. These tremors are much worse in the morning and they do not get worse with stress. But also stated that she has increasing trouble with handwriting as activity worsens her tremors. She denied balance difficulties and falls. She has not tried any medications. Denied history of head injury and stroke. Denied memory loss. Denied anxiety. She has not had any testing done for tremors. Also never tried any medications. She had history of Ménière's disease and she had CT head.   She has of tremor in younger brother    CPK, Aldolase, EEGs are unremarkable. Patient noticed some improvement with sinemet; but unable to tolerate faster upward titration; at her request; will continue slow upward titration. She is presently taking half tab sinemet at 8 am + 8 pm.   Will increase it to half tab sinemet at 8 am + 2 pm + 8 pm for next one week; then half tab at 8 am + half tab at 2 pm + full tab at 8 pm. She will call us in next 10 days to update us. She stated \"I am ok and I do not need any new scripts\". she will take Sinemet with cracker;  medication counseling including risks and benefits and possible adverse effects discussed. She will get Kirsten scan testing and afterwards; will refer her to [facility] program for PT for parkinson dz patients. Of note; she was given script for Primidone in 2017; she didn't want to try it because of possible adv efx. Ménière's disease/vertigo; improved with vestibular rehab  Meralgia paresthetica: Stable on amitriptyline. Medication Counseling: risks and benefits including possible adverse effects discussed with the patient and she verbalized understanding those instructions. Follow-up in 3 months. Please note that portions of this note were completed with a voice recognition program.  Although every effort was made to ensure the accuracy of this  automated transcription, some errors in transcription may have occurred, occasionally words are mis-transcribed.

## 2019-09-24 ENCOUNTER — HOSPITAL ENCOUNTER (OUTPATIENT)
Dept: NUCLEAR MEDICINE | Age: 79
Discharge: HOME | End: 2019-09-26
Payer: MEDICARE

## 2019-09-24 DIAGNOSIS — G20 PARKINSON DISEASE (HCC): ICD-10-CM

## 2019-09-24 PROCEDURE — A9584 IODINE I-123 IOFLUPANE: HCPCS | Performed by: FAMILY MEDICINE

## 2019-09-24 PROCEDURE — 78607 NM BRAIN SPECT: CPT

## 2019-09-24 PROCEDURE — 3430000000 HC RX DIAGNOSTIC RADIOPHARMACEUTICAL: Performed by: FAMILY MEDICINE

## 2019-09-24 RX ADMIN — IOFLUPANE I-123 5 MILLICURIE: 2 INJECTION, SOLUTION INTRAVENOUS at 09:13

## 2019-09-25 ENCOUNTER — TELEPHONE (OUTPATIENT)
Dept: NEUROLOGY | Age: 79
End: 2019-09-25

## 2019-09-26 ENCOUNTER — HOSPITAL ENCOUNTER (OUTPATIENT)
Age: 79
Setting detail: SPECIMEN
Discharge: HOME | End: 2019-09-26
Payer: MEDICARE

## 2019-09-26 LAB
ALT SERPL-CCNC: 25 U/L (ref 5–33)
AST SERPL-CCNC: 19 U/L
CHOLESTEROL/HDL RATIO: 4.4
CHOLESTEROL: 231 MG/DL
HDLC SERPL-MCNC: 53 MG/DL
LDL CHOLESTEROL: 144 MG/DL (ref 0–130)
TRIGL SERPL-MCNC: 170 MG/DL
VLDLC SERPL CALC-MCNC: ABNORMAL MG/DL (ref 1–30)

## 2019-10-21 ENCOUNTER — OFFICE VISIT (OUTPATIENT)
Dept: NEUROLOGY | Age: 79
End: 2019-10-21
Payer: MEDICARE

## 2019-10-21 VITALS
DIASTOLIC BLOOD PRESSURE: 73 MMHG | SYSTOLIC BLOOD PRESSURE: 135 MMHG | HEIGHT: 65 IN | HEART RATE: 63 BPM | WEIGHT: 219 LBS | BODY MASS INDEX: 36.49 KG/M2

## 2019-10-21 DIAGNOSIS — R25.1 TREMOR OF BOTH HANDS: Primary | ICD-10-CM

## 2019-10-21 DIAGNOSIS — G57.12 NEUROPATHY OF LEFT LATERAL FEMORAL CUTANEOUS NERVE: ICD-10-CM

## 2019-10-21 DIAGNOSIS — Z82.0 FAMILY HISTORY OF BENIGN ESSENTIAL TREMOR: ICD-10-CM

## 2019-10-21 DIAGNOSIS — R25.3 TWITCHING: ICD-10-CM

## 2019-10-21 PROCEDURE — 1036F TOBACCO NON-USER: CPT | Performed by: PSYCHIATRY & NEUROLOGY

## 2019-10-21 PROCEDURE — 1123F ACP DISCUSS/DSCN MKR DOCD: CPT | Performed by: PSYCHIATRY & NEUROLOGY

## 2019-10-21 PROCEDURE — 1090F PRES/ABSN URINE INCON ASSESS: CPT | Performed by: PSYCHIATRY & NEUROLOGY

## 2019-10-21 PROCEDURE — G8399 PT W/DXA RESULTS DOCUMENT: HCPCS | Performed by: PSYCHIATRY & NEUROLOGY

## 2019-10-21 PROCEDURE — G8427 DOCREV CUR MEDS BY ELIG CLIN: HCPCS | Performed by: PSYCHIATRY & NEUROLOGY

## 2019-10-21 PROCEDURE — 99214 OFFICE O/P EST MOD 30 MIN: CPT | Performed by: PSYCHIATRY & NEUROLOGY

## 2019-10-21 PROCEDURE — G8484 FLU IMMUNIZE NO ADMIN: HCPCS | Performed by: PSYCHIATRY & NEUROLOGY

## 2019-10-21 PROCEDURE — 4040F PNEUMOC VAC/ADMIN/RCVD: CPT | Performed by: PSYCHIATRY & NEUROLOGY

## 2019-10-21 PROCEDURE — G8417 CALC BMI ABV UP PARAM F/U: HCPCS | Performed by: PSYCHIATRY & NEUROLOGY

## 2019-10-21 RX ORDER — PRIMIDONE 50 MG/1
TABLET ORAL
Qty: 60 TABLET | Refills: 2 | Status: SHIPPED | OUTPATIENT
Start: 2019-10-21 | End: 2019-12-11 | Stop reason: ALTCHOICE

## 2019-11-20 ENCOUNTER — TELEPHONE (OUTPATIENT)
Dept: NEUROLOGY | Age: 79
End: 2019-11-20

## 2019-12-08 ENCOUNTER — APPOINTMENT (OUTPATIENT)
Dept: GENERAL RADIOLOGY | Facility: CLINIC | Age: 79
End: 2019-12-08
Payer: MEDICARE

## 2019-12-08 ENCOUNTER — HOSPITAL ENCOUNTER (EMERGENCY)
Facility: CLINIC | Age: 79
Discharge: HOME | End: 2019-12-08
Attending: EMERGENCY MEDICINE
Payer: MEDICARE

## 2019-12-08 VITALS
OXYGEN SATURATION: 96 % | RESPIRATION RATE: 16 BRPM | WEIGHT: 218 LBS | HEART RATE: 67 BPM | DIASTOLIC BLOOD PRESSURE: 93 MMHG | TEMPERATURE: 98.1 F | HEIGHT: 65 IN | BODY MASS INDEX: 36.32 KG/M2 | SYSTOLIC BLOOD PRESSURE: 148 MMHG

## 2019-12-08 DIAGNOSIS — J18.9 PNEUMONIA DUE TO ORGANISM: Primary | ICD-10-CM

## 2019-12-08 PROCEDURE — 99284 EMERGENCY DEPT VISIT MOD MDM: CPT

## 2019-12-08 PROCEDURE — 71046 X-RAY EXAM CHEST 2 VIEWS: CPT

## 2019-12-08 RX ORDER — ALBUTEROL SULFATE 90 UG/1
2 AEROSOL, METERED RESPIRATORY (INHALATION) EVERY 4 HOURS PRN
Qty: 1 INHALER | Refills: 0 | Status: SHIPPED | OUTPATIENT
Start: 2019-12-08 | End: 2020-02-27 | Stop reason: SDUPTHER

## 2019-12-08 RX ORDER — GUAIFENESIN 600 MG/1
600 TABLET, EXTENDED RELEASE ORAL 2 TIMES DAILY
Qty: 20 TABLET | Refills: 0 | Status: SHIPPED | OUTPATIENT
Start: 2019-12-08

## 2019-12-08 RX ORDER — BENZONATATE 100 MG/1
100 CAPSULE ORAL 3 TIMES DAILY PRN
Qty: 30 CAPSULE | Refills: 0 | Status: SHIPPED | OUTPATIENT
Start: 2019-12-08 | End: 2019-12-15

## 2019-12-08 ASSESSMENT — PAIN DESCRIPTION - PAIN TYPE: TYPE: ACUTE PAIN

## 2019-12-08 ASSESSMENT — PAIN SCALES - GENERAL: PAINLEVEL_OUTOF10: 5

## 2019-12-08 ASSESSMENT — PAIN DESCRIPTION - LOCATION: LOCATION: BACK

## 2019-12-08 ASSESSMENT — PAIN DESCRIPTION - FREQUENCY: FREQUENCY: INTERMITTENT

## 2019-12-08 ASSESSMENT — PAIN DESCRIPTION - ORIENTATION: ORIENTATION: RIGHT

## 2019-12-10 PROBLEM — K21.9 LARYNGOPHARYNGEAL REFLUX: Status: ACTIVE | Noted: 2019-12-10

## 2019-12-10 PROBLEM — H81.01 MENIERE'S DISEASE, RIGHT EAR: Status: ACTIVE | Noted: 2019-12-10

## 2019-12-10 PROBLEM — Z78.0 POSTMENOPAUSAL STATE: Status: ACTIVE | Noted: 2019-12-10

## 2019-12-10 PROBLEM — H93.13 BILATERAL TINNITUS: Status: ACTIVE | Noted: 2019-12-10

## 2019-12-10 PROBLEM — M76.899 ENTHESOPATHY OF HIP REGION: Status: ACTIVE | Noted: 2019-12-10

## 2019-12-27 ENCOUNTER — OFFICE VISIT (OUTPATIENT)
Dept: ORTHOPEDIC SURGERY | Age: 79
End: 2019-12-27
Payer: MEDICARE

## 2019-12-27 VITALS
HEIGHT: 65 IN | DIASTOLIC BLOOD PRESSURE: 82 MMHG | BODY MASS INDEX: 35.99 KG/M2 | HEART RATE: 71 BPM | SYSTOLIC BLOOD PRESSURE: 131 MMHG | WEIGHT: 216 LBS

## 2019-12-27 DIAGNOSIS — M70.61 GREATER TROCHANTERIC BURSITIS OF RIGHT HIP: Primary | ICD-10-CM

## 2019-12-27 PROCEDURE — 1090F PRES/ABSN URINE INCON ASSESS: CPT | Performed by: PHYSICIAN ASSISTANT

## 2019-12-27 PROCEDURE — G8417 CALC BMI ABV UP PARAM F/U: HCPCS | Performed by: PHYSICIAN ASSISTANT

## 2019-12-27 PROCEDURE — G8482 FLU IMMUNIZE ORDER/ADMIN: HCPCS | Performed by: PHYSICIAN ASSISTANT

## 2019-12-27 PROCEDURE — 99213 OFFICE O/P EST LOW 20 MIN: CPT | Performed by: PHYSICIAN ASSISTANT

## 2019-12-27 PROCEDURE — G8399 PT W/DXA RESULTS DOCUMENT: HCPCS | Performed by: PHYSICIAN ASSISTANT

## 2019-12-27 PROCEDURE — 4040F PNEUMOC VAC/ADMIN/RCVD: CPT | Performed by: PHYSICIAN ASSISTANT

## 2019-12-27 PROCEDURE — G8428 CUR MEDS NOT DOCUMENT: HCPCS | Performed by: PHYSICIAN ASSISTANT

## 2019-12-27 PROCEDURE — 1036F TOBACCO NON-USER: CPT | Performed by: PHYSICIAN ASSISTANT

## 2019-12-27 PROCEDURE — 1123F ACP DISCUSS/DSCN MKR DOCD: CPT | Performed by: PHYSICIAN ASSISTANT

## 2019-12-27 ASSESSMENT — ENCOUNTER SYMPTOMS
CHEST TIGHTNESS: 0
DIARRHEA: 0
SHORTNESS OF BREATH: 0
ABDOMINAL PAIN: 0
APNEA: 0
CONSTIPATION: 0
ABDOMINAL DISTENTION: 0
VOMITING: 0
COLOR CHANGE: 0
NAUSEA: 0
COUGH: 0

## 2020-01-02 ENCOUNTER — OFFICE VISIT (OUTPATIENT)
Dept: NEUROLOGY | Age: 80
End: 2020-01-02
Payer: MEDICARE

## 2020-01-02 VITALS
HEART RATE: 62 BPM | WEIGHT: 216.6 LBS | HEIGHT: 65 IN | BODY MASS INDEX: 36.09 KG/M2 | DIASTOLIC BLOOD PRESSURE: 77 MMHG | SYSTOLIC BLOOD PRESSURE: 134 MMHG

## 2020-01-02 PROCEDURE — G8427 DOCREV CUR MEDS BY ELIG CLIN: HCPCS | Performed by: PSYCHIATRY & NEUROLOGY

## 2020-01-02 PROCEDURE — G8399 PT W/DXA RESULTS DOCUMENT: HCPCS | Performed by: PSYCHIATRY & NEUROLOGY

## 2020-01-02 PROCEDURE — 1090F PRES/ABSN URINE INCON ASSESS: CPT | Performed by: PSYCHIATRY & NEUROLOGY

## 2020-01-02 PROCEDURE — G8417 CALC BMI ABV UP PARAM F/U: HCPCS | Performed by: PSYCHIATRY & NEUROLOGY

## 2020-01-02 PROCEDURE — G8482 FLU IMMUNIZE ORDER/ADMIN: HCPCS | Performed by: PSYCHIATRY & NEUROLOGY

## 2020-01-02 PROCEDURE — 1123F ACP DISCUSS/DSCN MKR DOCD: CPT | Performed by: PSYCHIATRY & NEUROLOGY

## 2020-01-02 PROCEDURE — 1036F TOBACCO NON-USER: CPT | Performed by: PSYCHIATRY & NEUROLOGY

## 2020-01-02 PROCEDURE — 99214 OFFICE O/P EST MOD 30 MIN: CPT | Performed by: PSYCHIATRY & NEUROLOGY

## 2020-01-02 PROCEDURE — 4040F PNEUMOC VAC/ADMIN/RCVD: CPT | Performed by: PSYCHIATRY & NEUROLOGY

## 2020-01-02 RX ORDER — PROPRANOLOL HYDROCHLORIDE 80 MG/1
80 CAPSULE, EXTENDED RELEASE ORAL DAILY
Qty: 30 CAPSULE | Refills: 3 | Status: SHIPPED | OUTPATIENT
Start: 2020-01-02 | End: 2020-03-02

## 2020-01-02 NOTE — PROGRESS NOTES
[facility]  Offices: [place] [address], [place], [address] [facility]  [address], [address], [place], [address] [facility]  [address] [place] [address] Vignesh Thomas, [address]  Phone: [phone]  Fax: [phone]    MD Lake Gunter, MD Isac Parry MD Quenten Barren, MD Knox Prow, CNP    1/2/2020      HISTORY OF PRESENT ILLNESS:       I had the pleasure of seeing Juan Antonio Gaytan, who returns for continuing neurologic care for tremor. This patient was previously following with Dr. Robert Mensah, I reviewed his prior notes in detail. Patient reports she started having a tremor in her right hand approximately 10 years ago. She notices a tremor most when she is holding something or writing, she is right-handed. She was started on a trial of primidone, but patient did not take it until recently because she was concerned about side effects. When she did take primidone, she reports it caused significant nausea and dizziness even with just half a pill, so she stopped taking it after a few days. Approximately a year ago, she started having episodes of tremoring and shaking affecting all 4 extremities that would only happen if she is laying down in bed, usually first thing in the morning. She had an EEG as well as a DaTscan earlier this year, both of which came back unremarkable. She was started on a therapeutic trial of Sinemet 1.5 tablets daily, which the patient reports did not help much with her tremor. Of note, patient reports she is not really that concerned about her right hand tremor, she is most concerned about the shaking movements she is having on her extremities in the morning. Her tremor is most aggravated by anxiety, patient reports she also has a brother who has a tremor, of unknown etiology. She takes Synthroid for hypothyroidism, recent TSH earlier this year within normal limits.   She is not on any medications palate                                                                  XI - symmetrical shoulder shrug                                                       XII - tongue midline without atrophy or fasciculation      Motor function  Normal muscle bulk and tone; strength 5/5 on all 4 extremities, no pronator drift      Sensory function Intact to light touch, pinprick, vibration, proprioception on all 4 extremities  + Low amplitude, high-frequency postural and kinetic tremor in both arms, slightly worse on the right  No vocal, chin or head tremor  No significant rigidity or bradykinesia    Cerebellar Intact fine motor movement. No ataxia or dysmetria on finger to nose or heel to shin testing      Reflex function DTR 2+ on bilateral UE and LE, symmetric. Negative Babinski      Gait                   normal base, decreased arm swing bilaterally              ASSESSMENT AND PLAN:       This is a 70 y.o. female who comes in for follow up for a 10-year history of right hand tremor that has recently also involve the left hand. She also reports episodes of tremoring/shaking affecting all 4 extremities that usually only occurs when she is laying down first thing in the morning. Patient laid down in the bed today and had an episode that I witnessed, which is more consistent with a functional complaint. Recent EEG and DaTscan were both negative, patient had a therapeutic trial of Sinemet with no improvement. On exam today, she has a mostly postural and kinetic tremor, with no significant parkinsonian findings. She was unable to tolerate primidone even at low doses, experience nausea and dizziness. After discussing treatment options, patient agreeable to switching metoprolol which she currently takes, to propranolol LA 80 mg daily. Side effects, risks and benefits discussed in detail with the patient. Her exam is more consistent with a benign essential tremor.   Also reassured patient that her tremoring movements

## 2020-01-03 ENCOUNTER — TELEPHONE (OUTPATIENT)
Dept: NEUROLOGY | Age: 80
End: 2020-01-03

## 2020-01-03 NOTE — TELEPHONE ENCOUNTER
Karthikeyan Quintanilla would like to know if you have any recommendations for a PCP in the [facility] area or by our [place] office?

## 2020-01-06 ENCOUNTER — HOSPITAL ENCOUNTER (OUTPATIENT)
Age: 80
Setting detail: SPECIMEN
Discharge: HOME | End: 2020-01-06
Payer: MEDICARE

## 2020-01-06 LAB
ALT SERPL-CCNC: 22 U/L (ref 5–33)
AST SERPL-CCNC: 18 U/L
CHOLESTEROL/HDL RATIO: 2.1
CHOLESTEROL: 108 MG/DL
HDLC SERPL-MCNC: 51 MG/DL
LDL CHOLESTEROL: 28 MG/DL (ref 0–130)
TRIGL SERPL-MCNC: 147 MG/DL
VLDLC SERPL CALC-MCNC: NORMAL MG/DL (ref 1–30)

## 2020-02-14 ENCOUNTER — OFFICE VISIT (OUTPATIENT)
Dept: PULMONOLOGY | Age: 80
End: 2020-02-14
Payer: MEDICARE

## 2020-02-14 ENCOUNTER — TELEPHONE (OUTPATIENT)
Dept: PULMONOLOGY | Age: 80
End: 2020-02-14

## 2020-02-14 VITALS
HEART RATE: 70 BPM | HEIGHT: 65 IN | DIASTOLIC BLOOD PRESSURE: 78 MMHG | SYSTOLIC BLOOD PRESSURE: 122 MMHG | WEIGHT: 214 LBS | BODY MASS INDEX: 35.65 KG/M2 | OXYGEN SATURATION: 97 %

## 2020-02-14 PROCEDURE — 94729 DIFFUSING CAPACITY: CPT | Performed by: INTERNAL MEDICINE

## 2020-02-14 PROCEDURE — G8399 PT W/DXA RESULTS DOCUMENT: HCPCS | Performed by: INTERNAL MEDICINE

## 2020-02-14 PROCEDURE — 1123F ACP DISCUSS/DSCN MKR DOCD: CPT | Performed by: INTERNAL MEDICINE

## 2020-02-14 PROCEDURE — 1090F PRES/ABSN URINE INCON ASSESS: CPT | Performed by: INTERNAL MEDICINE

## 2020-02-14 PROCEDURE — G8417 CALC BMI ABV UP PARAM F/U: HCPCS | Performed by: INTERNAL MEDICINE

## 2020-02-14 PROCEDURE — 94726 PLETHYSMOGRAPHY LUNG VOLUMES: CPT | Performed by: INTERNAL MEDICINE

## 2020-02-14 PROCEDURE — 94060 EVALUATION OF WHEEZING: CPT | Performed by: INTERNAL MEDICINE

## 2020-02-14 PROCEDURE — 1036F TOBACCO NON-USER: CPT | Performed by: INTERNAL MEDICINE

## 2020-02-14 PROCEDURE — G8428 CUR MEDS NOT DOCUMENT: HCPCS | Performed by: INTERNAL MEDICINE

## 2020-02-14 PROCEDURE — 99215 OFFICE O/P EST HI 40 MIN: CPT | Performed by: INTERNAL MEDICINE

## 2020-02-14 PROCEDURE — G8482 FLU IMMUNIZE ORDER/ADMIN: HCPCS | Performed by: INTERNAL MEDICINE

## 2020-02-14 PROCEDURE — 4040F PNEUMOC VAC/ADMIN/RCVD: CPT | Performed by: INTERNAL MEDICINE

## 2020-02-14 RX ORDER — BUDESONIDE AND FORMOTEROL FUMARATE DIHYDRATE 160; 4.5 UG/1; UG/1
2 AEROSOL RESPIRATORY (INHALATION) 2 TIMES DAILY
Qty: 1 INHALER | Refills: 3 | Status: SHIPPED | OUTPATIENT
Start: 2020-02-14 | End: 2020-03-06 | Stop reason: SDUPTHER

## 2020-02-14 NOTE — PATIENT INSTRUCTIONS
Call [phone] [facility] scheduling [phone] to schedule your CT scan. LS       Pt didn't have her schedule, she asked if she could call herself. - asked her to call office if she has any problems.

## 2020-02-15 ASSESSMENT — ENCOUNTER SYMPTOMS
GASTROINTESTINAL NEGATIVE: 1
SHORTNESS OF BREATH: 1
CHEST TIGHTNESS: 0
WHEEZING: 1
EYES NEGATIVE: 1
APNEA: 0

## 2020-02-15 NOTE — PROGRESS NOTES
REASON FOR FOLLOW-UP  Postnasal drip, sinusitis  HISTORY OF PRESENT ILLNESS:    Juliane Alegre is a 70y.o. year old female . Patient was last seen in 2017 and was advised to follow-up on as-needed basis  Patient returns because she has been feeling short of breath with exertion occasional wheezing and fatigue    Her present history dates back to Thanksgiving when she was around family and her niece was sick and then patient started having viral illness she went to the urgent care center where she was prescribed doxycycline and prednisone. She then went to the ER December 8 to confirm that she does not have a pneumonia. I reviewed the chest x-ray from December 8 and compared it to previous x-rays which showed there was no acute infiltrate in the right lung left lung had atelectasis which has been seen on previous x-ray and CT. Patient started to improve slowly then in January 2020 she was seen by neurology for tremors and was started on propanolol and her metoprolol 25 twice a day for PVC was stopped. Since patient started propanolol she started noticing that she would get fatigued easily, would have wheeze, and shortness of breath she denied any cough or hemoptysis she has been using her Symbicort 2 puffs once or twice a day with marginal improvement.     She denies any fever chills or weight loss    Her pulmonary function test in the office today reviewed and compared to 2017 shows an FEV1 of 93% predicted, FVC 96% predicted there was a 10% bronchodilator change in FVC and FEV1 at 5% bronchodilator change    FEV1 FVC ratio 77    Diffusion capacity is 102% predicted    Lung volume by body box are normal with total lung capacity 119% predicted and residual volume 132% predicted    Her pulmonary function test from 2017 March to February 2020 are stable              Previous visit  Patient's history dates back to January 3, when she started out with an upper respiratory infection, nasal congestion, sinus Atelectasis, left  CT CHEST WO CONTRAST   3. Side effect of drug propanolol          :                PLAN:      I have reviewed patient's pulmonary function test and chest x-ray. Pulmonary function test are stable chest x-ray does not show pneumonia shows left lower lobe atelectasis which I will follow-up with the repeat CT chest ,her last CT chest was in 2017. I am concerned that patient symptom are related to propanolol there may also be a component of post viral bronchiolitis/asthma. I have asked her to follow-up with her neurologist and her cardiologist so as to discontinue propanolol which is a nonselective beta-blocker and if needed to start a selective beta-blocker like metoprolol as this may improve her symptoms of wheezing cough and fatigue . in the meantime I will start her on Spiriva which works at muscarinic receptors m3 as an anticholinergic and will help with bronchial smooth muscle relaxation and bronchodilation. At this point hold off on Symbicort will consider restarting Symbicort at her next visit based on her response to Spiriva and discontinuation of propanolol. I have provided her a sample of Spiriva Respimat and showed her the correct technique to use it  Follow-up in clinic in 4 weeks  I have sent a message via epic to her neurologist Dr. Vita Hernadez and also to her cardiologist Dr. Jacques Charles   Requested Prescriptions     Signed Prescriptions Disp Refills    budesonide-formoterol (SYMBICORT) 160-4.5 MCG/ACT AERO 1 Inhaler 3     Sig: Inhale 2 puffs into the lungs 2 times daily       Medications Discontinued During This Encounter   Medication Reason    budesonide-formoterol (SYMBICORT) 160-4.5 MCG/ACT AERO REORDER       Alicia received counseling on the following healthy behaviors: nutrition, exercise and medication adherence    Patient given educational materials : see patient instruction           Discussed use, benefit, and side effects of prescribed medications.

## 2020-02-15 NOTE — PROGRESS NOTES
Pulmonary function test     2/14/2020    Spirometry  Flow volume loop is normal  FEV1 88% predicted with 5% bronchodilator change to 93% predicted  FVC 88% predicted with 10% bronchodilator change to 96% predicted    FEV1 FVC ratio 77    FEF 2575-82% predicted    Lung volumes by body box  % predicted  Total lung capacity 119% predicted    Airway resistance mildly increased    Diffusion capacity 102% predicted uncorrected    Corrected for alveolar volume hemoglobin 108% predicted    Transcutaneous hemoglobin 13.4    Impression  Normal pulmonary function test without a bronchodilator response that meet ATS criteria    Clinical correlation advised  Electronically signed by Cesar Fagan MD on 2/15/2020 at 12:53 PM

## 2020-02-17 ENCOUNTER — TELEPHONE (OUTPATIENT)
Dept: NEUROLOGY | Age: 80
End: 2020-02-17

## 2020-02-17 NOTE — TELEPHONE ENCOUNTER
Holden Chester called me back. She said she does want to discuss this with her cardiologist because she had previously been on metoprolol and she can't just come off of the Propranolol without something else to replace it. She said she has to call Dr Leonardo Hopkins and see what he wants her to try or if he wants her to go baack to metoprolol. The propranolol did not really seem to be helpful with the tremor yet.

## 2020-02-19 ENCOUNTER — HOSPITAL ENCOUNTER (OUTPATIENT)
Dept: CT IMAGING | Facility: CLINIC | Age: 80
Discharge: HOME | End: 2020-02-21
Payer: MEDICARE

## 2020-02-19 PROCEDURE — 71250 CT THORAX DX C-: CPT

## 2020-02-20 NOTE — TELEPHONE ENCOUNTER
I spoke with Mrs. Dillon Diallo and she is agreeable. She said Dr. Cooper Burkett started her back on Metoprolol. She has an appt here next week.

## 2020-02-27 ENCOUNTER — OFFICE VISIT (OUTPATIENT)
Dept: NEUROLOGY | Age: 80
End: 2020-02-27
Payer: MEDICARE

## 2020-02-27 VITALS
DIASTOLIC BLOOD PRESSURE: 70 MMHG | HEIGHT: 65 IN | WEIGHT: 216 LBS | HEART RATE: 59 BPM | BODY MASS INDEX: 35.99 KG/M2 | SYSTOLIC BLOOD PRESSURE: 124 MMHG

## 2020-02-27 PROCEDURE — 1036F TOBACCO NON-USER: CPT | Performed by: PSYCHIATRY & NEUROLOGY

## 2020-02-27 PROCEDURE — 99214 OFFICE O/P EST MOD 30 MIN: CPT | Performed by: PSYCHIATRY & NEUROLOGY

## 2020-02-27 PROCEDURE — 4040F PNEUMOC VAC/ADMIN/RCVD: CPT | Performed by: PSYCHIATRY & NEUROLOGY

## 2020-02-27 PROCEDURE — G8427 DOCREV CUR MEDS BY ELIG CLIN: HCPCS | Performed by: PSYCHIATRY & NEUROLOGY

## 2020-02-27 PROCEDURE — 1090F PRES/ABSN URINE INCON ASSESS: CPT | Performed by: PSYCHIATRY & NEUROLOGY

## 2020-02-27 PROCEDURE — G8482 FLU IMMUNIZE ORDER/ADMIN: HCPCS | Performed by: PSYCHIATRY & NEUROLOGY

## 2020-02-27 PROCEDURE — G8399 PT W/DXA RESULTS DOCUMENT: HCPCS | Performed by: PSYCHIATRY & NEUROLOGY

## 2020-02-27 PROCEDURE — G8417 CALC BMI ABV UP PARAM F/U: HCPCS | Performed by: PSYCHIATRY & NEUROLOGY

## 2020-02-27 PROCEDURE — 1123F ACP DISCUSS/DSCN MKR DOCD: CPT | Performed by: PSYCHIATRY & NEUROLOGY

## 2020-02-27 RX ORDER — GABAPENTIN 300 MG/1
300 CAPSULE ORAL NIGHTLY
Qty: 30 CAPSULE | Refills: 3 | Status: SHIPPED | OUTPATIENT
Start: 2020-02-27 | End: 2020-02-28 | Stop reason: SDUPTHER

## 2020-02-27 NOTE — PROGRESS NOTES
[facility]  Offices: [address], [place], [address] [facility]  [address] [facility], [address], [place], [address] [facility]  [address] [place] [address] Vignesh Thomas, [address]  Phone: [phone]  Fax: [phone]    E. Joylene Roulette, MD Audrea Ores, MD Ahmed B. Claudeen Hoh, MD Carlyon Nunnery, MD Rhetta Maya, MD Eladio Rain, CNP    2/27/2020      HISTORY OF PRESENT ILLNESS:       I had the pleasure of seeing Fiordaliza Castillo, who returns for continuing neurologic care for tremor and episodes of shaking, onset in 2010. On her last visit, we proceeded with a therapeutic trial of propranolol. However, patient shortly developed some shortness of breath. She reports she has always had issues with bronchitis, and is taking inhalers. She saw Dr. Quintero, who recommended stopping propranolol since this was most likely exacerbating her pulmonary symptoms. She is now back to taking metoprolol, reports mild improvement of her tremor while she was on propranolol briefly. Today, patient reports her most bothersome symptom are her episodes of jerking and shaking when she is laying down. On her initial visit, she had a significant functional component to his tremor, which occurred when I laid her down on the exam table. Today, patient reports her movements usually started as rapid jerking movements of an arm or leg, which makes her anxious which may then trigger shaking. Her tremor is aggravated by anxiety, denies any alleviating factors. Patient also reports a history of meralgia paresthetica and bursitis. She denies any new medications, no new symptoms.     Prior medication trials: Propranolol (shortness of breath), primidone (nausea and dizziness at low doses)    Prior testing reviewed:  ARISTEO scan 9/24/2019: Negative  EEG 5/7/2019: Negative        Lab Results   Component Value Date     TSH 1.11 08/29/2019           PAST MEDICAL HISTORY:         Diagnosis Date    Acid reflux  Diverticular disease 6/30/2014    GERD (gastroesophageal reflux disease)     Hypercholesteremia 6/30/2014    Hypothyroid 6/30/2014    Lung nodule     Meniere disease     right ear    Neuropathy     Osteopenia 6/30/2014    Prediabetes     RAD (reactive airway disease) 6/30/2014    Tremor     r hand    Vitamin D deficiency 6/30/2014        PAST SURGICAL HISTORY:         Procedure Laterality Date    CHOLECYSTECTOMY      COLONOSCOPY      ENDOSCOPY, COLON, DIAGNOSTIC      ERCP  03/21/14    EXCISION / BIOPSY SKIN LESION OF TRUNK Left 11/8/2017    EXCISION MASS LEFT POSTERIOR SHOULDER, LEFT ANTERIOR ARM performed by Karla Juarez MD at [address] [place] [address]  01/03/2007    SKIN BIOPSY Left 11/08/2017    Excision Mass Left Posterior Shoulder, Left Anterior Arm        SOCIAL HISTORY:     Social History     Socioeconomic History    Marital status: Single     Spouse name: Not on file    Number of children: Not on file    Years of education: Not on file    Highest education level: Not on file   Occupational History    Not on file   Social Needs    Financial resource strain: Not on file    Food insecurity:     Worry: Not on file     Inability: Not on file    Transportation needs:     Medical: Not on file     Non-medical: Not on file   Tobacco Use    Smoking status: Never Smoker    Smokeless tobacco: Never Used   Substance and Sexual Activity    Alcohol use: Yes     Comment: rare    Drug use: No    Sexual activity: Not on file   Lifestyle    Physical activity:     Days per week: Not on file     Minutes per session: Not on file    Stress: Not on file   Relationships    Social connections:     Talks on phone: Not on file     Gets together: Not on file     Attends Holiness service: Not on file     Active member of club or organization: Not on file     Attends meetings of clubs or organizations: Not on file     Relationship status: Not on file    Intimate partner violence: Fear of current or ex partner: Not on file     Emotionally abused: Not on file     Physically abused: Not on file     Forced sexual activity: Not on file   Other Topics Concern    Not on file   Social History Narrative    Not on file       CURRENT MEDICATIONS:     Current Outpatient Medications   Medication Sig Dispense Refill    metoprolol tartrate (LOPRESSOR) 25 MG tablet Take 25 mg by mouth 2 times daily      gabapentin (NEURONTIN) 300 MG capsule Take 1 capsule by mouth nightly for 180 days. Intended supply: 90 days 30 capsule 3    tiotropium (SPIRIVA RESPIMAT) 1.25 MCG/ACT AERS inhaler Inhale 2 puffs into the lungs daily [id number] April 2022 1 Inhaler 0    budesonide-formoterol (SYMBICORT) 160-4.5 MCG/ACT AERO Inhale 2 puffs into the lungs 2 times daily 1 Inhaler 3    levothyroxine (SYNTHROID) 75 MCG tablet TAKE ONE TABLET BY MOUTH DAILY 90 tablet 0    ondansetron (ZOFRAN-ODT) 4 MG disintegrating tablet DISSOLVE ONE TABLET BY MOUTH EVERY 8 HOURS AS NEEDED FOR NAUSEA 20 tablet 0    guaiFENesin (MUCINEX) 600 MG extended release tablet Take 1 tablet by mouth 2 times daily (Patient taking differently: Take 600 mg by mouth 2 times daily as needed ) 20 tablet 0    valACYclovir (VALTREX) 1 g tablet Take 2 tablets by mouth 2 times daily (Patient taking differently: Take 2,000 mg by mouth 2 times daily as needed ) 4 tablet 2    butenafine (MENTAX) 1 % CREA Apply 1 Tube topically 2 times daily (Patient taking differently: Apply 1 Tube topically 2 times daily as needed ) 15 g 0    metFORMIN (GLUCOPHAGE) 500 MG tablet Take 1 tablet by mouth daily (with breakfast) 90 tablet 1    alclomethasone (ACLOVATE) 0.05 % cream Apply topically 2 times daily Apply topically 2 times daily.  (Patient taking differently: Apply topically 2 times daily as needed Apply topically 2 times daily.) 45 g 1    amitriptyline (ELAVIL) 10 MG tablet TAKE ONE TABLET BY MOUTH DAILY AS NEEDED FOR SLEEP AND LEG PAIN FOR UP TO 30 DAYS 30

## 2020-02-28 ENCOUNTER — TELEPHONE (OUTPATIENT)
Dept: NEUROLOGY | Age: 80
End: 2020-02-28

## 2020-02-28 RX ORDER — GABAPENTIN 100 MG/1
100 CAPSULE ORAL NIGHTLY
Qty: 30 CAPSULE | Refills: 3 | Status: SHIPPED | OUTPATIENT
Start: 2020-02-28 | End: 2020-03-02

## 2020-02-28 NOTE — TELEPHONE ENCOUNTER
I spoke with Deonte Rosario. She said she is leary because this is the third medication Dr. Sae Gage has given her. She said even with it being a lower dose she is still leary to try it. She will go ahead and get testing done but will stay off of medication right now. She said she is quite sensitive to medications so she prefers to wait. She is grateful that tests have been ordered.

## 2020-03-02 ENCOUNTER — OFFICE VISIT (OUTPATIENT)
Dept: ORTHOPEDIC SURGERY | Age: 80
End: 2020-03-02
Payer: MEDICARE

## 2020-03-02 VITALS
SYSTOLIC BLOOD PRESSURE: 123 MMHG | WEIGHT: 216.05 LBS | HEIGHT: 65 IN | BODY MASS INDEX: 36 KG/M2 | HEART RATE: 69 BPM | DIASTOLIC BLOOD PRESSURE: 75 MMHG

## 2020-03-02 PROCEDURE — 4040F PNEUMOC VAC/ADMIN/RCVD: CPT | Performed by: FAMILY MEDICINE

## 2020-03-02 PROCEDURE — 20610 DRAIN/INJ JOINT/BURSA W/O US: CPT | Performed by: FAMILY MEDICINE

## 2020-03-02 PROCEDURE — 99203 OFFICE O/P NEW LOW 30 MIN: CPT | Performed by: FAMILY MEDICINE

## 2020-03-02 PROCEDURE — G8482 FLU IMMUNIZE ORDER/ADMIN: HCPCS | Performed by: FAMILY MEDICINE

## 2020-03-02 PROCEDURE — 1123F ACP DISCUSS/DSCN MKR DOCD: CPT | Performed by: FAMILY MEDICINE

## 2020-03-02 PROCEDURE — G8427 DOCREV CUR MEDS BY ELIG CLIN: HCPCS | Performed by: FAMILY MEDICINE

## 2020-03-02 PROCEDURE — G8417 CALC BMI ABV UP PARAM F/U: HCPCS | Performed by: FAMILY MEDICINE

## 2020-03-02 PROCEDURE — G8399 PT W/DXA RESULTS DOCUMENT: HCPCS | Performed by: FAMILY MEDICINE

## 2020-03-02 PROCEDURE — 1090F PRES/ABSN URINE INCON ASSESS: CPT | Performed by: FAMILY MEDICINE

## 2020-03-02 PROCEDURE — 1036F TOBACCO NON-USER: CPT | Performed by: FAMILY MEDICINE

## 2020-03-02 RX ORDER — BUPIVACAINE HYDROCHLORIDE 5 MG/ML
4 INJECTION, SOLUTION PERINEURAL ONCE
Status: COMPLETED | OUTPATIENT
Start: 2020-03-02 | End: 2020-03-03

## 2020-03-02 RX ORDER — TRIAMCINOLONE ACETONIDE 40 MG/ML
40 INJECTION, SUSPENSION INTRA-ARTICULAR; INTRAMUSCULAR ONCE
Status: COMPLETED | OUTPATIENT
Start: 2020-03-02 | End: 2020-03-03

## 2020-03-02 NOTE — PROGRESS NOTES
Sports Medicine Consultation     CHIEF COMPLAINT:  Hip Pain (Rt hip pain. previous greater trochanter injection 8/27/2019. increased pain in last 2-3 weeks)      HPI:  Andre Santos is a 70y.o. year old female who is a new patient being seen for regarding new problem right hip pain. The pain has been present for 2-3 week(s). The patient recalls a recent onset of increased tremulous activity injury. The patient has tried prev troch bursal injection with improvement. The pain is described as sharp to achy at times. There occ pain on weightbearing. There is is not painful popping and clicking. The hip does not catch or lock. It has not given out. It is  stiff upon arising from sitting. It would be  painful lying on the affected side. she has a past medical history of Acid reflux, Diverticular disease, GERD (gastroesophageal reflux disease), Hypercholesteremia, Hypothyroid, Lung nodule, Meniere disease, Neuropathy, Osteopenia, Prediabetes, RAD (reactive airway disease), Tremor, and Vitamin D deficiency. she has a past surgical history that includes Cholecystectomy; Endoscopy, colon, diagnostic; Colonoscopy; ERCP (03/21/14); Hysterectomy (01/03/2007); skin biopsy (Left, 11/08/2017); and EXCISION / BIOPSY SKIN LESION OF TRUNK (Left, 11/8/2017). family history includes Cancer in her brother and maternal uncle; Cancer (age of onset: 48) in her mother; Cancer (age of onset: 64) in her father; Diabetes in her brother; Heart Disease in her paternal grandfather; Heart Disease (age of onset: 58) in her brother; Heart Disease (age of onset: 79) in her brother; Heart Disease (age of onset: 67) in her paternal uncle; High Blood Pressure in her brother and brother; Other in her brother; Stroke in her maternal aunt and maternal grandmother.     Social History     Socioeconomic History    Marital status: Single     Spouse name: Not on file    Number of children: Not on file    Years of education: Not on file    Highest education level: Not on file   Occupational History    Not on file   Social Needs    Financial resource strain: Not on file    Food insecurity:     Worry: Not on file     Inability: Not on file    Transportation needs:     Medical: Not on file     Non-medical: Not on file   Tobacco Use    Smoking status: Never Smoker    Smokeless tobacco: Never Used   Substance and Sexual Activity    Alcohol use: Yes     Comment: rare    Drug use: No    Sexual activity: Not on file   Lifestyle    Physical activity:     Days per week: Not on file     Minutes per session: Not on file    Stress: Not on file   Relationships    Social connections:     Talks on phone: Not on file     Gets together: Not on file     Attends Caodaism service: Not on file     Active member of club or organization: Not on file     Attends meetings of clubs or organizations: Not on file     Relationship status: Not on file    Intimate partner violence:     Fear of current or ex partner: Not on file     Emotionally abused: Not on file     Physically abused: Not on file     Forced sexual activity: Not on file   Other Topics Concern    Not on file   Social History Narrative    Not on file       Current Outpatient Medications   Medication Sig Dispense Refill    gabapentin (NEURONTIN) 100 MG capsule Take 100 mg by mouth daily.       valACYclovir (VALTREX) 1 g tablet Take 2 tablets by mouth 2 times daily for 1 day 4 tablet 0    metoprolol tartrate (LOPRESSOR) 25 MG tablet Take 25 mg by mouth 2 times daily      tiotropium (SPIRIVA RESPIMAT) 1.25 MCG/ACT AERS inhaler Inhale 2 puffs into the lungs daily [id number]  April 2022 1 Inhaler 0    budesonide-formoterol (SYMBICORT) 160-4.5 MCG/ACT AERO Inhale 2 puffs into the lungs 2 times daily 1 Inhaler 3    levothyroxine (SYNTHROID) 75 MCG tablet TAKE ONE TABLET BY MOUTH DAILY 90 tablet 0    ondansetron (ZOFRAN-ODT) 4 MG disintegrating tablet DISSOLVE ONE TABLET BY MOUTH EVERY 8 HOURS AS NEEDED FOR NAUSEA 20 tablet 0    guaiFENesin (MUCINEX) 600 MG extended release tablet Take 1 tablet by mouth 2 times daily (Patient taking differently: Take 600 mg by mouth 2 times daily as needed ) 20 tablet 0    butenafine (MENTAX) 1 % CREA Apply 1 Tube topically 2 times daily (Patient taking differently: Apply 1 Tube topically 2 times daily as needed ) 15 g 0    metFORMIN (GLUCOPHAGE) 500 MG tablet Take 1 tablet by mouth daily (with breakfast) 90 tablet 1    alclomethasone (ACLOVATE) 0.05 % cream Apply topically 2 times daily Apply topically 2 times daily. (Patient taking differently: Apply topically 2 times daily as needed Apply topically 2 times daily.) 45 g 1    amitriptyline (ELAVIL) 10 MG tablet TAKE ONE TABLET BY MOUTH DAILY AS NEEDED FOR SLEEP AND LEG PAIN FOR UP TO 30 DAYS 30 tablet 0    NONFORMULARY Indications: Dietary fiber 1 tsp a day       ECHINACEA EXTRACT PO Take 450 mg by mouth Indications:  Take in winter       Cholecalciferol (VITAMIN D3) 2000 units CAPS Take 1 capsule by mouth 2 times daily 30 capsule 0    diltiazem (CARDIZEM CD) 120 MG extended release capsule Take 1 capsule by mouth every evening 30 capsule 3    ranitidine (ZANTAC) 150 MG tablet Take 2 tablets by mouth nightly (Patient taking differently: Take 300 mg by mouth 2 times daily 1 tab in am and 1 tab in pm) 60 tablet 3    albuterol sulfate HFA (PROAIR HFA) 108 (90 Base) MCG/ACT inhaler Inhale 2 puffs into the lungs every 6 hours as needed for Wheezing 1 Inhaler 3    fluticasone (FLONASE) 50 MCG/ACT nasal spray PLACE 1 SPRAY IN EACH NOSTRIL DAILY FOR 15 DAYS 1 Bottle 2    ketoconazole (NIZORAL) 2 % shampoo APPLY  TO AFFECTED AREAS TOPICALLY DAILY THEN RINSE OFF AFTER 5 MINUTES (Patient taking differently: APPLY  TO AFFECTED AREAS TOPICALLY DAILY THEN RINSE OFF AFTER 5 MINUTES WHEN NEEDED) 120 mL 2    ezetimibe (ZETIA) 10 MG tablet Take 10 mg by mouth daily      esomeprazole Magnesium (NEXIUM) 40 MG PACK Take 40 mg by mouth daily.  Multiple Vitamins-Minerals (THERAPEUTIC MULTIVITAMIN-MINERALS) tablet Take 1 tablet by mouth daily.  NONFORMULARY Super b complex 3 times per week      ascorbic acid (VITAMIN C) 500 MG tablet Take 500 mg by mouth daily.  chlorpheniramine (CHLOR-TRIMETON) 4 MG tablet Take 12 mg by mouth daily        Current Facility-Administered Medications   Medication Dose Route Frequency Provider Last Rate Last Dose    bupivacaine (MARCAINE) 0.5 % injection 20 mg  4 mL Intra-articular Once Verlena Ego, DO        triamcinolone acetonide (KENALOG-40) injection 40 mg  40 mg Intra-articular Once Verlena Ego, DO        lidocaine 1 % injection 8 mL  8 mL Intra-articular Once Abbey Harris PA-C        triamcinolone acetonide (KENALOG-40) injection 80 mg  80 mg Intra-articular Once Abbey Harris PA-C         Facility-Administered Medications Ordered in Other Visits   Medication Dose Route Frequency Provider Last Rate Last Dose    0.9 % sodium chloride infusion   Intravenous Continuous Anabel Pinto  mL/hr at 02/19/14 0873         Allergies:  sheis allergic to aspirin; atorvastatin; crestor [rosuvastatin]; livalo [pitavastatin]; seasonal; simvastatin; statins; and welchol [colesevelam hcl]. ROS:  CV:  Denies chest pain; palpitations; shortness of breath; swelling of feet, ankles; and loss of consciousness. CON: Denies fever and dizziness. ENT:  Denies hearing loss / ringing, ear infections hoarseness, and swallowing problems. RESP:  Denies chronic cough, spitting up blood, and asthma/wheezing. GI: Denies abdominal pain, change in bowel habits, nausea or vomiting, and blood in stools. :  Denies frequent urination, burning or painful urination, blood in the urine, and bladder incontinence. NEURO:  Denies headache, memory loss, sleep disturbance, and tremor or movement disorder.     PHYSICAL EXAM:   /75 (Site: Left Upper Arm)   Pulse 69   Ht 5' 5\" (1.651 m)   Wt 216 lb 0.8 oz (98 kg)   BMI 35.95 kg/m²   GENERAL: Nerissa Whyte is a 70 y.o. female who is alert and oriented and sitting comfortably in our office. SKIN:  Intact without rashes, lesions or ulcerations. NEURO: Sensation to the extremity is intact. VASC:  Capillary refill is less than 3 seconds. Distal pulses are palpable. There is no lymphadenopathy. Hip Exam  Musculoskeletal/Neurologic:  Palpation-Tenderness:lat troch  ROM-Left hip IR 45 degrees, ER 55 degrees  There is not hip rotational pain  Strength-WNL  Sensation-normal to light touch  JG: negative  FADIR:negative  Luan: positive  Bicycle testing positive  Hip labral stress testing negative  Sensation-normal to light touch    Gait: antalgic    PSYCH:  Good fund of knowledge and displays understanding of exam.    RADIOLOGY: No results found. IMPRESSION:     1. Greater trochanteric bursitis of right hip    2. Functional gait abnormality          PLAN:   We discussed some of the etiologies and natural histories of     ICD-10-CM    1. Greater trochanteric bursitis of right hip M70.61 [facility] [place]     HI ARTHROCENTESIS ASPIR&/INJ MAJOR JT/BURSA W/O US     bupivacaine (MARCAINE) 0.5 % injection 20 mg     triamcinolone acetonide (KENALOG-40) injection 40 mg   2. Functional gait abnormality R26.89 [facility]   . We discussed the various treatment alternatives including anti-inflammatory medications, physical therapy, injections, further imaging studies and as a last resort surgery.   At this point I discussed with the patient how the causative issue for her trochanteric bursitis is a likelihood her gait issues those gait issues seem to stem from her intention tremor which is led to inactivity and deconditioning I do think that though we can treat the pain with an injection we need to treat the functional gait abnormalities with physical therapy for which she voiced understanding and

## 2020-03-03 RX ADMIN — TRIAMCINOLONE ACETONIDE 40 MG: 40 INJECTION, SUSPENSION INTRA-ARTICULAR; INTRAMUSCULAR at 12:29

## 2020-03-03 RX ADMIN — BUPIVACAINE HYDROCHLORIDE 20 MG: 5 INJECTION, SOLUTION PERINEURAL at 12:28

## 2020-03-06 ENCOUNTER — OFFICE VISIT (OUTPATIENT)
Dept: PULMONOLOGY | Age: 80
End: 2020-03-06
Payer: MEDICARE

## 2020-03-06 VITALS
OXYGEN SATURATION: 96 % | SYSTOLIC BLOOD PRESSURE: 115 MMHG | HEIGHT: 65 IN | DIASTOLIC BLOOD PRESSURE: 83 MMHG | BODY MASS INDEX: 35.99 KG/M2 | RESPIRATION RATE: 16 BRPM | WEIGHT: 216 LBS | HEART RATE: 64 BPM

## 2020-03-06 PROCEDURE — 1036F TOBACCO NON-USER: CPT | Performed by: INTERNAL MEDICINE

## 2020-03-06 PROCEDURE — G8417 CALC BMI ABV UP PARAM F/U: HCPCS | Performed by: INTERNAL MEDICINE

## 2020-03-06 PROCEDURE — 99214 OFFICE O/P EST MOD 30 MIN: CPT | Performed by: INTERNAL MEDICINE

## 2020-03-06 PROCEDURE — G8482 FLU IMMUNIZE ORDER/ADMIN: HCPCS | Performed by: INTERNAL MEDICINE

## 2020-03-06 PROCEDURE — 4040F PNEUMOC VAC/ADMIN/RCVD: CPT | Performed by: INTERNAL MEDICINE

## 2020-03-06 PROCEDURE — 1123F ACP DISCUSS/DSCN MKR DOCD: CPT | Performed by: INTERNAL MEDICINE

## 2020-03-06 PROCEDURE — G8427 DOCREV CUR MEDS BY ELIG CLIN: HCPCS | Performed by: INTERNAL MEDICINE

## 2020-03-06 PROCEDURE — 1090F PRES/ABSN URINE INCON ASSESS: CPT | Performed by: INTERNAL MEDICINE

## 2020-03-06 PROCEDURE — G8399 PT W/DXA RESULTS DOCUMENT: HCPCS | Performed by: INTERNAL MEDICINE

## 2020-03-06 RX ORDER — BUDESONIDE AND FORMOTEROL FUMARATE DIHYDRATE 80; 4.5 UG/1; UG/1
2 AEROSOL RESPIRATORY (INHALATION) 2 TIMES DAILY
Qty: 1 INHALER | Refills: 6 | Status: SHIPPED | OUTPATIENT
Start: 2020-03-06 | End: 2022-03-25

## 2020-03-09 ENCOUNTER — HOSPITAL ENCOUNTER (OUTPATIENT)
Dept: MRI IMAGING | Facility: CLINIC | Age: 80
Discharge: HOME | End: 2020-03-11
Payer: MEDICARE

## 2020-03-09 PROCEDURE — 70551 MRI BRAIN STEM W/O DYE: CPT

## 2020-03-09 PROCEDURE — 72141 MRI NECK SPINE W/O DYE: CPT

## 2020-03-10 ENCOUNTER — HOSPITAL ENCOUNTER (OUTPATIENT)
Dept: PHYSICAL THERAPY | Facility: CLINIC | Age: 80
Setting detail: THERAPIES SERIES
Discharge: HOME | End: 2020-03-10
Payer: MEDICARE

## 2020-03-10 RX ORDER — BUDESONIDE AND FORMOTEROL FUMARATE DIHYDRATE 80; 4.5 UG/1; UG/1
2 AEROSOL RESPIRATORY (INHALATION) 2 TIMES DAILY
Qty: 1 INHALER | Refills: 0 | COMMUNITY
Start: 2020-03-10 | End: 2020-07-09 | Stop reason: SDUPTHER

## 2020-03-10 ASSESSMENT — ENCOUNTER SYMPTOMS
APNEA: 0
COUGH: 1
SHORTNESS OF BREATH: 0
GASTROINTESTINAL NEGATIVE: 1
CHEST TIGHTNESS: 0
WHEEZING: 0
EYES NEGATIVE: 1

## 2020-03-10 NOTE — PROGRESS NOTES
for cough. Negative for apnea, chest tightness, shortness of breath and wheezing. Cardiovascular: Negative. Gastrointestinal: Negative. Endocrine: Negative. Musculoskeletal: Negative. Vitals:  /83 (Site: Left Upper Arm, Position: Sitting, Cuff Size: Medium Adult)   Pulse 64   Resp 16   Ht 5' 5\" (1.651 m)   Wt 216 lb (98 kg)   SpO2 96%   BMI 35.94 kg/m²     PHYSICAL EXAM:  Head and neck atraumatic, normocephalic    Lymph nodes-no cervical, supraclavicular lymphadenopathy    Neck-no JVP elevation  Oral postnasal drip since stopping propanolol patient is feeling better  Lungs - Ventilating all lobes without any rales, rhonchi, wheezes or dullness. No bronchial breath sounds. Chest expansion equal bilaterally    CVS- S1, S2 regular. No S3 no S4, no murmurs    Abdomen-nontender, nondistended. Bowel sounds are present. No organomegaly    Lower extremity-no edema    Upper extremity-no edema    Neurological-grossly normal cranial nerves. No overt motor deficit   Lung function study shows FEV1 105% predicted, % predicted. Ratio of FEV1 to FVC is 73, FEF 25 7579% predicted. % predicted, mildly increased % predicted. Airway resistance mildly increased. Diffusion capacity normal.  I would essentially called as a normal pulmonary function test with mild small airway dysfunction        IMPRESSION:     Diagnosis Orders   1. Post viral asthma  budesonide-formoterol (SYMBICORT) 80-4.5 MCG/ACT AERO   2. Atelectasis, left     3. Postnasal drip     4. Chronic cough          :                PLAN:      Her wheezing resolved since she stopped propanolol. She noticed that Spiriva did not improve her cough and her breathing. She still feels fatigued now she is on metoprolol. She is also complaining of postnasal drip. I believe her cough is due to postnasal drip.   Have advised her to use nasal saline spray followed by Flonase    I will start her on Symbicort with spacer sample provided  Discontinue Spiriva  CT of the chest shows unchanged scarring in the lingula stable since 2017  Follow-up in 4 months      Requested Prescriptions     Pending Prescriptions Disp Refills    budesonide-formoterol (SYMBICORT) 80-4.5 MCG/ACT AERO 1 Inhaler 0     Sig: Inhale 2 puffs into the lungs 2 times daily [id number] exp 1/2021     Signed Prescriptions Disp Refills    budesonide-formoterol (SYMBICORT) 80-4.5 MCG/ACT AERO 1 Inhaler 6     Sig: Inhale 2 puffs into the lungs 2 times daily       Medications Discontinued During This Encounter   Medication Reason    albuterol sulfate HFA (PROAIR HFA) 108 (90 Base) MCG/ACT inhaler Therapy completed    tiotropium (SPIRIVA RESPIMAT) 1.25 MCG/ACT AERS inhaler Therapy completed    budesonide-formoterol (SYMBICORT) 160-4.5 MCG/ACT AERO REORDER       Alicia received counseling on the following healthy behaviors: nutrition, exercise and medication adherence    Patient given educational materials : see patient instruction           Discussed use, benefit, and side effects of prescribed medications. Barriers to medication compliance addressed. All patient questions answered. Pt voiced understanding. I hope this updates you on my evaluation and clinical thinking. Thank you for allowing me to participate in his care. Sincerely,      Electronically signed by Ruthie Baez MD on 3/10/2020 at 4:22 PM       Please note that this chart was generated using voice recognition Dragon dictation software. Although every effort was made to ensure the accuracy of this automated transcription, some errors in transcription may have occurred.

## 2020-03-10 NOTE — FLOWSHEET NOTE
[] [address]  [address]    [phone]  F: [phone]   [] [address]  [address] [facility] [address]   [address]  P: [phone]  F: [phone]  [] [address]  [address] [facility]  P: [phone]  F: [phone]  [x] [address]  [address] [address] [facility]   [address]   [place]: [phone]  F: [phone]   [] [place] [id number] [facility] [address]  [place]: [phone]   F: [phone]     Physical Therapy Cancel/No Show note    Date: 3/10/2020  Patient: Darleen Gamble  : 1940  MRN: [id number]    Cancels/No Shows to date: 1    For today's appointment patient:    [x]  Cancelled    [] Rescheduled appointment    [] No-show     Reason given by patient:    [x]  Patient ill    []  Conflicting appointment    [] No transportation      [] Conflict with work    [] No reason given    [] Weather related    [] Other:      Comments:        [] Next appointment was confirmed    Electronically signed by: Damien Vásquez

## 2020-06-15 ENCOUNTER — HOSPITAL ENCOUNTER (OUTPATIENT)
Age: 80
Setting detail: SPECIMEN
Discharge: HOME | End: 2020-06-15
Payer: MEDICARE

## 2020-06-15 LAB
ALBUMIN SERPL-MCNC: 4 G/DL (ref 3.5–5.2)
ALBUMIN/GLOBULIN RATIO: 1.4 (ref 1–2.5)
ALP BLD-CCNC: 75 U/L (ref 35–104)
ALT SERPL-CCNC: 21 U/L (ref 5–33)
ANION GAP SERPL CALCULATED.3IONS-SCNC: 12 MMOL/L (ref 9–17)
AST SERPL-CCNC: 19 U/L
BILIRUB SERPL-MCNC: 0.35 MG/DL (ref 0.3–1.2)
BUN BLDV-MCNC: 13 MG/DL (ref 8–23)
BUN/CREAT BLD: ABNORMAL (ref 9–20)
CALCIUM SERPL-MCNC: 9.7 MG/DL (ref 8.6–10.4)
CHLORIDE BLD-SCNC: 106 MMOL/L (ref 98–107)
CO2: 25 MMOL/L (ref 20–31)
CREAT SERPL-MCNC: 0.74 MG/DL (ref 0.5–0.9)
ESTIMATED AVERAGE GLUCOSE: 117 MG/DL
GFR AFRICAN AMERICAN: >60 ML/MIN
GFR NON-AFRICAN AMERICAN: >60 ML/MIN
GFR SERPL CREATININE-BSD FRML MDRD: ABNORMAL ML/MIN/{1.73_M2}
GFR SERPL CREATININE-BSD FRML MDRD: ABNORMAL ML/MIN/{1.73_M2}
GLUCOSE FASTING: 115 MG/DL (ref 70–99)
HBA1C MFR BLD: 5.7 % (ref 4–6)
POTASSIUM SERPL-SCNC: 5.1 MMOL/L (ref 3.7–5.3)
SODIUM BLD-SCNC: 143 MMOL/L (ref 135–144)
TOTAL PROTEIN: 6.9 G/DL (ref 6.4–8.3)
TSH SERPL DL<=0.05 MIU/L-ACNC: 2.35 MIU/L (ref 0.3–5)

## 2020-06-16 ENCOUNTER — OFFICE VISIT (OUTPATIENT)
Dept: ORTHOPEDIC SURGERY | Age: 80
End: 2020-06-16
Payer: MEDICARE

## 2020-06-16 VITALS — BODY MASS INDEX: 36 KG/M2 | HEIGHT: 65 IN | TEMPERATURE: 97.2 F | WEIGHT: 216.05 LBS

## 2020-06-16 PROCEDURE — 20611 DRAIN/INJ JOINT/BURSA W/US: CPT | Performed by: PHYSICIAN ASSISTANT

## 2020-06-16 RX ORDER — FAMOTIDINE 20 MG/1
TABLET, FILM COATED ORAL PRN
COMMUNITY
Start: 2020-05-15

## 2020-06-16 RX ORDER — EZETIMIBE 10 MG/1
TABLET ORAL
COMMUNITY
Start: 2020-03-31 | End: 2020-07-09 | Stop reason: SDUPTHER

## 2020-06-16 RX ADMIN — LIDOCAINE HYDROCHLORIDE 4 ML: 10 INJECTION, SOLUTION INFILTRATION; PERINEURAL at 14:10

## 2020-06-16 RX ADMIN — METHYLPREDNISOLONE ACETATE 40 MG: 40 INJECTION, SUSPENSION INTRA-ARTICULAR; INTRALESIONAL; INTRAMUSCULAR; SOFT TISSUE at 14:11

## 2020-06-17 RX ORDER — METHYLPREDNISOLONE ACETATE 40 MG/ML
40 INJECTION, SUSPENSION INTRA-ARTICULAR; INTRALESIONAL; INTRAMUSCULAR; SOFT TISSUE ONCE
Status: COMPLETED | OUTPATIENT
Start: 2020-06-17 | End: 2020-06-16

## 2020-06-17 RX ORDER — LIDOCAINE HYDROCHLORIDE 10 MG/ML
4 INJECTION, SOLUTION INFILTRATION; PERINEURAL ONCE
Status: COMPLETED | OUTPATIENT
Start: 2020-06-17 | End: 2020-06-16

## 2020-07-04 ENCOUNTER — APPOINTMENT (OUTPATIENT)
Dept: GENERAL RADIOLOGY | Age: 80
End: 2020-07-04
Payer: MEDICARE

## 2020-07-04 ENCOUNTER — APPOINTMENT (OUTPATIENT)
Dept: CT IMAGING | Age: 80
End: 2020-07-04
Payer: MEDICARE

## 2020-07-04 ENCOUNTER — HOSPITAL ENCOUNTER (EMERGENCY)
Age: 80
Discharge: HOME | End: 2020-07-04
Attending: EMERGENCY MEDICINE
Payer: MEDICARE

## 2020-07-04 VITALS
SYSTOLIC BLOOD PRESSURE: 155 MMHG | HEART RATE: 96 BPM | WEIGHT: 224.9 LBS | OXYGEN SATURATION: 95 % | TEMPERATURE: 97.5 F | HEIGHT: 65 IN | BODY MASS INDEX: 37.47 KG/M2 | DIASTOLIC BLOOD PRESSURE: 77 MMHG | RESPIRATION RATE: 16 BRPM

## 2020-07-04 PROCEDURE — 99284 EMERGENCY DEPT VISIT MOD MDM: CPT

## 2020-07-04 PROCEDURE — 90471 IMMUNIZATION ADMIN: CPT | Performed by: NURSE PRACTITIONER

## 2020-07-04 PROCEDURE — 90715 TDAP VACCINE 7 YRS/> IM: CPT | Performed by: NURSE PRACTITIONER

## 2020-07-04 PROCEDURE — 6360000002 HC RX W HCPCS: Performed by: NURSE PRACTITIONER

## 2020-07-04 PROCEDURE — 70486 CT MAXILLOFACIAL W/O DYE: CPT

## 2020-07-04 PROCEDURE — 73130 X-RAY EXAM OF HAND: CPT

## 2020-07-04 RX ORDER — ACETAMINOPHEN AND CODEINE PHOSPHATE 300; 30 MG/1; MG/1
1 TABLET ORAL EVERY 8 HOURS PRN
Qty: 12 TABLET | Refills: 0 | Status: SHIPPED | OUTPATIENT
Start: 2020-07-04 | End: 2020-07-08

## 2020-07-04 RX ADMIN — TETANUS TOXOID, REDUCED DIPHTHERIA TOXOID AND ACELLULAR PERTUSSIS VACCINE, ADSORBED 0.5 ML: 5; 2.5; 8; 8; 2.5 SUSPENSION INTRAMUSCULAR at 19:52

## 2020-07-04 ASSESSMENT — ENCOUNTER SYMPTOMS
COLOR CHANGE: 1
FACIAL SWELLING: 1
SHORTNESS OF BREATH: 0
BACK PAIN: 0

## 2020-07-04 ASSESSMENT — PAIN DESCRIPTION - DESCRIPTORS: DESCRIPTORS: BURNING;CONSTANT

## 2020-07-04 ASSESSMENT — VISUAL ACUITY: OU: 1

## 2020-07-04 ASSESSMENT — PAIN DESCRIPTION - FREQUENCY: FREQUENCY: CONTINUOUS

## 2020-07-04 ASSESSMENT — PAIN DESCRIPTION - ORIENTATION: ORIENTATION: LEFT

## 2020-07-04 ASSESSMENT — PAIN DESCRIPTION - LOCATION: LOCATION: FACE

## 2020-07-04 ASSESSMENT — PAIN SCALES - GENERAL: PAINLEVEL_OUTOF10: 5

## 2020-07-04 NOTE — ED PROVIDER NOTES
[facility] ED  EMERGENCY DEPARTMENT ENCOUNTER      Pt Name: Hasmukh Harvey  MRN: [id number]  [place] 1940  Date of evaluation: 7/4/2020  Provider: Ofelia Curling, APRN - CNP    CHIEF COMPLAINT       Chief Complaint   Patient presents with    Facial Injury     left fell into end table this evening         HISTORY OFPRESENT ILLNESS  (Location/Symptom, Timing/Onset, Context/Setting, Quality, Duration, Modifying Factors, Severity.)   Hasmukh Harvey is a 70 y.o. female who presents to the emergency department for evaluation of nasal injury and left periorbital injury after she slipped when she got off a barstool today falling forward striking her face on the edge of an end table. Denies loss of consciousness. No visual disturbance, neck pain or headache. Complains of pain to her right palm that occurred when she put her hand down to brace her fall. She rates her facial pain a 5 out of 10. Nursing Notes were reviewed.     PASTMEDICAL HISTORY     Past Medical History:   Diagnosis Date    Acid reflux     Diverticular disease 6/30/2014    GERD (gastroesophageal reflux disease)     Hypercholesteremia 6/30/2014    Hypothyroid 6/30/2014    Lung nodule     Meniere disease     right ear    Neuropathy     Osteopenia 6/30/2014    Prediabetes     RAD (reactive airway disease) 6/30/2014    Tremor     r hand    Vitamin D deficiency 6/30/2014         SURGICAL HISTORY       Past Surgical History:   Procedure Laterality Date    CHOLECYSTECTOMY      COLONOSCOPY      ENDOSCOPY, COLON, DIAGNOSTIC      ERCP  03/21/14    EXCISION / BIOPSY SKIN LESION OF TRUNK Left 11/8/2017    EXCISION MASS LEFT POSTERIOR SHOULDER, LEFT ANTERIOR ARM performed by Rosa Maria Summers MD at [address] [place] [address]  01/03/2007    SKIN BIOPSY Left 11/08/2017    Excision Mass Left Posterior Shoulder, Left Anterior Arm         CURRENT MEDICATIONS     Previous Medications    ALBUTEROL SULFATE HFA (VENTOLIN HFA) 108 MULTIVITAMIN-MINERALS) TABLET    Take 1 tablet by mouth daily. NONFORMULARY    Super b complex 3 times per week    NONFORMULARY    Indications: Dietary fiber 1 tsp a day     ONDANSETRON (ZOFRAN-ODT) 4 MG DISINTEGRATING TABLET    DISSOLVE ONE TABLET BY MOUTH EVERY 8 HOURS AS NEEDED FOR NAUSEA       ALLERGIES     Aspirin; Atorvastatin; Crestor [rosuvastatin]; Livalo [pitavastatin];  Seasonal; Simvastatin; Statins; and Welchol [colesevelam hcl]    FAMILY HISTORY       Family History   Problem Relation Age of Onset   [facility] Cancer Mother 48        pineal gland/throat    Cancer Father 64        colon   [facility] Cancer Brother         lung    Other Brother         pulmonary embolus    Heart Disease Paternal Grandfather         myocarditis    Stroke Maternal Aunt     Cancer Maternal Uncle     Stroke Maternal Grandmother     Diabetes Brother     Heart Disease Paternal Uncle 67        mi    Heart Disease Brother 58        mi    High Blood Pressure Brother     Heart Disease Brother 79        stents for cad    High Blood Pressure Brother           SOCIAL HISTORY       Social History     Socioeconomic History    Marital status: Single     Spouse name: None    Number of children: None    Years of education: None    Highest education level: None   Occupational History    None   Social Needs    Financial resource strain: None    Food insecurity     Worry: None     Inability: None    Transportation needs     Medical: None     Non-medical: None   Tobacco Use    Smoking status: Never Smoker    Smokeless tobacco: Never Used   Substance and Sexual Activity    Alcohol use: Yes     Comment: rare    Drug use: No    Sexual activity: None   Lifestyle    Physical activity     Days per week: None     Minutes per session: None    Stress: None   Relationships    Social connections     Talks on phone: None     Gets together: None     Attends Jewish service: None     Active member of club or organization: None     Attends meetings of clubs or organizations: None     Relationship status: None    Intimate partner violence     Fear of current or ex partner: None     Emotionally abused: None     Physically abused: None     Forced sexual activity: None   Other Topics Concern    None   Social History Narrative    None         REVIEW OF SYSTEMS    (2-9 systems for level 4, 10 or more for level 5)     Review of Systems   HENT: Positive for facial swelling. Eyes: Negative for visual disturbance. Respiratory: Negative for shortness of breath. Cardiovascular: Negative for chest pain. Musculoskeletal: Negative for back pain and neck pain. Skin: Positive for color change and wound. All other systems reviewed and are negative. Except as noted above the remainder of the review of systems was reviewed and negative. PHYSICAL EXAM    (up to 7 for level 4, 8 or more for level 5)     ED Triage Vitals [07/04/20 1917]   BP Temp Temp Source Pulse Resp SpO2 Height Weight   (!) 155/77 97.5 °F (36.4 °C) Oral 96 16 95 % 5' 5\" (1.651 m) 224 lb 14.4 oz (102 kg)       Physical Exam  Constitutional:       Appearance: Normal appearance. She is well-developed, well-groomed and normal weight. HENT:      Head: Normocephalic. Abrasion and contusion present. Comments: There are abrasions noted to the nose, left periorbital area, and left cheek. Mild swelling and ecchymosis noted to the left periorbital area. Right Ear: Hearing and external ear normal.      Left Ear: Hearing and external ear normal.      Nose: Nasal tenderness present. Right Nostril: No epistaxis. Left Nostril: No epistaxis. Mouth/Throat:      Mouth: Mucous membranes are moist.      Pharynx: Oropharynx is clear. Eyes:      General: Lids are normal. Vision grossly intact. Extraocular Movements: Extraocular movements intact. Conjunctiva/sclera: Conjunctivae normal.      Pupils: Pupils are equal, round, and reactive to light.    Neck: fracture or dislocation is demonstrated. The visualized joint spaces and articular surfaces are within normal limits. There is normal bone mineralization. The soft tissues are unremarkable. No acute osseous abnormality of the right hand, with attention to the thumb. Ct Facial Bones Wo Contrast    Result Date: 7/4/2020  EXAMINATION: CT OF THE FACE WITHOUT CONTRAST  7/4/2020 8:00 pm TECHNIQUE: CT of the face was performed without the administration of intravenous contrast. Multiplanar reformatted images are provided for review. Dose modulation, iterative reconstruction, and/or weight based adjustment of the mA/kV was utilized to reduce the radiation dose to as low as reasonably achievable. COMPARISON: 03/14/2018 HISTORY: ORDERING SYSTEM PROVIDED HISTORY: Left periorbital and nasal injury after fall today. Hit face on and table. TECHNOLOGIST PROVIDED HISTORY: Left periorbital and nasal injury after fall today. Hit face on and table. Reason for Exam: fall Acuity: Acute Type of Exam: Initial Mechanism of Injury: hit face on end table Relevant Medical/Surgical History: laceration to nose and cheek FINDINGS: FACIAL BONES:  The maxilla, pterygoid plates and zygomatic arches are intact. The mandible is intact. The mandibular condyles are normally situated. There are nondisplaced distal nasal bone fractures. ORBITS:  The globes appear intact. The extraocular muscles, optic nerve sheath complexes and lacrimal glands appear unremarkable. No retrobulbar hematoma or mass is seen. The orbital walls and rims are intact. SINUSES/MASTOIDS:  The paranasal sinuses and mastoid air cells are well aerated. No acute fracture is seen. Stable partially calcified pineal lesion is noted. SOFT TISSUES:  There is swelling and air of the nose secondary to the nasal bone fractures. Nondisplaced bilateral nasal bone fractures.      Interpretation per the Radiologist below, if available at the time of this note:    CT FACIAL BONES WO CONTRAST   Final Result   Nondisplaced bilateral nasal bone fractures. XR HAND RIGHT (MIN 3 VIEWS)   Final Result   No acute osseous abnormality of the right hand, with attention to the thumb. EDBEDSIDE ULTRASOUND:   Performed by Guru Garcia - none    LABS:  Labs Reviewed - No data to display    All other labs were within normal range or not returned as of this dictation. EMERGENCY DEPARTMENT COURSE andDIFFERENTIAL DIAGNOSIS/MDM:   The patient was evaluated in conjunction with attending physician. CT facial bones per radiology shows nondisplaced bilateral nasal bone fractures. Steri-Strip was placed over the abrasion of the nose by the nurse. 3 of her hand per radiologist shows no acute osseous abnormality. Tetanus updated. Prescriptions written for Tylenol with codeine for pain. Patient instructed to follow-up with her ENT physician, Dr. Mei Singh. Vitals:    Vitals:    07/04/20 1917   BP: (!) 155/77   Pulse: 96   Resp: 16   Temp: 97.5 °F (36.4 °C)   TempSrc: Oral   SpO2: 95%   Weight: 224 lb 14.4 oz (102 kg)   Height: 5' 5\" (1.651 m)         CONSULTS:  None    RES:  Procedures    FINAL IMPRESSION      1. Closed nondisplaced fracture of nasal bone, initial encounter    2. Contusion of right hand, initial encounter    3. Abrasion of face, initial encounter          DISPOSITION/PLAN   DISPOSITION Decision To Discharge 07/04/2020 08:55:42 PM      PATIENT REFERRED TO:   Zafar Medrano MD  [address]  [phone]    Schedule an appointment as soon as possible for a visit       [facility] ED  [address] [facility]  [phone]    As needed      DISCHARGE MEDICATIONS:     New Prescriptions    ACETAMINOPHEN-CODEINE (TYLENOL/CODEINE #3) 300-30 MG PER TABLET    Take 1 tablet by mouth every 8 hours as needed for Pain for up to 4 days.      Electronically signed by Fransisco Schirmer, APRN - CNPon 7/4/2020 at 9:06 PM            Fransisco Schirmer, VINNIE - CNP  07/04/20 2106

## 2020-07-05 ENCOUNTER — CARE COORDINATION (OUTPATIENT)
Dept: CARE COORDINATION | Age: 80
End: 2020-07-05

## 2020-07-05 NOTE — CARE COORDINATION
who verbalized understanding. Discussed exposure protocols and quarantine with CDC Guidelines What to do if you are sick with coronavirus disease 2019.  Patient was given an opportunity for questions and concerns. The patient agrees to contact the [facility] exposure line [phone], [facility] department [place] Department [facility] Health: ([phone]) and PCP office for questions related to their healthcare. CTN/ACM provided contact information for future needs. Reviewed and educated patient on any new and changed medications related to discharge diagnosis     Patient/family/caregiver given information for [employer] [facility] and agrees to enroll Yancyshannon Mercado declined  Patient's preferred e-mail: N/A   Patient's preferred phone number: N/A  Based on Loop alert triggers, patient will be contacted by nurse care manager for worsening symptoms. Plan for follow-up call in 5-7 days based on severity of symptoms and risk factors.

## 2020-07-05 NOTE — ED NOTES
Patient presents to ED for evaluation s/p fall this evening. Patient slipped off of a barstool today and hit her face on the edge of end table. Patient with small abrasion to bridge of her nose. Bleeding controlled. Patient reports c/o right hand/palm pain from attempting to catch her fall. Patient is AOx4 and in no acute distress. Respirations even and non-labored. Mild bruising to left check and around left eye.       Nicholas Rice RN  07/04/20 2012

## 2020-07-05 NOTE — ED PROVIDER NOTES
The patient was seen and examined by me in conjunction with the mid-level provider. I agree with his/her assessment and treatment plan. Nasal fracture identified and patient informed. She has an ear nose and throat doctor that she can follow-up with. Tetanus status is updated. Treatment diagnosis and follow-up were discussed with the patient.      Chepe Hernandez MD  07/04/20 2094

## 2020-07-06 ENCOUNTER — TELEPHONE (OUTPATIENT)
Dept: FAMILY MEDICINE CLINIC | Age: 80
End: 2020-07-06

## 2020-07-08 ENCOUNTER — HOSPITAL ENCOUNTER (OUTPATIENT)
Age: 80
Setting detail: SPECIMEN
Discharge: HOME | End: 2020-07-08
Payer: MEDICARE

## 2020-07-08 ENCOUNTER — TELEPHONE (OUTPATIENT)
Dept: FAMILY MEDICINE CLINIC | Age: 80
End: 2020-07-08

## 2020-07-08 LAB
ALT SERPL-CCNC: 22 U/L (ref 5–33)
AST SERPL-CCNC: 21 U/L
CHOLESTEROL/HDL RATIO: 4.2
CHOLESTEROL: 207 MG/DL
HDLC SERPL-MCNC: 49 MG/DL
LDL CHOLESTEROL: 128 MG/DL (ref 0–130)
TRIGL SERPL-MCNC: 150 MG/DL
VLDLC SERPL CALC-MCNC: ABNORMAL MG/DL (ref 1–30)

## 2020-07-09 ENCOUNTER — OFFICE VISIT (OUTPATIENT)
Dept: PULMONOLOGY | Age: 80
End: 2020-07-09
Payer: MEDICARE

## 2020-07-09 VITALS
DIASTOLIC BLOOD PRESSURE: 75 MMHG | RESPIRATION RATE: 12 BRPM | SYSTOLIC BLOOD PRESSURE: 116 MMHG | OXYGEN SATURATION: 95 % | WEIGHT: 220 LBS | HEART RATE: 68 BPM | HEIGHT: 65 IN | BODY MASS INDEX: 36.65 KG/M2 | TEMPERATURE: 97.9 F

## 2020-07-09 PROCEDURE — G8399 PT W/DXA RESULTS DOCUMENT: HCPCS | Performed by: INTERNAL MEDICINE

## 2020-07-09 PROCEDURE — 1123F ACP DISCUSS/DSCN MKR DOCD: CPT | Performed by: INTERNAL MEDICINE

## 2020-07-09 PROCEDURE — 99214 OFFICE O/P EST MOD 30 MIN: CPT | Performed by: INTERNAL MEDICINE

## 2020-07-09 PROCEDURE — 1090F PRES/ABSN URINE INCON ASSESS: CPT | Performed by: INTERNAL MEDICINE

## 2020-07-09 PROCEDURE — G8427 DOCREV CUR MEDS BY ELIG CLIN: HCPCS | Performed by: INTERNAL MEDICINE

## 2020-07-09 PROCEDURE — 1036F TOBACCO NON-USER: CPT | Performed by: INTERNAL MEDICINE

## 2020-07-09 PROCEDURE — G8417 CALC BMI ABV UP PARAM F/U: HCPCS | Performed by: INTERNAL MEDICINE

## 2020-07-09 PROCEDURE — 4040F PNEUMOC VAC/ADMIN/RCVD: CPT | Performed by: INTERNAL MEDICINE

## 2020-07-09 NOTE — PROGRESS NOTES
REASON FOR FOLLOW-UP  Postnasal drip, sinusitis  HISTORY OF PRESENT ILLNESS:    Jonatan Ferreira is a 70y.o. year old female . Doing well   Using symbicort only prn   pnd better   Afebrile   No wheeze     Last visit   Propranolol has been discontinued and she has been started on metoprolol   no more wheezing, breathing is better but fatigue still persist.  Spiriva helped her symptoms. She does have postnasal drainage and that is making her cough worse    Last visit  Patient was last seen in 2017 and was advised to follow-up on as-needed basis  Patient returns because she has been feeling short of breath with exertion occasional wheezing and fatigue    Her present history dates back to Thanksgiving when she was around family and her niece was sick and then patient started having viral illness she went to the urgent care center where she was prescribed doxycycline and prednisone. She then went to the ER December 8 to confirm that she does not have a pneumonia. I reviewed the chest x-ray from December 8 and compared it to previous x-rays which showed there was no acute infiltrate in the right lung left lung had atelectasis which has been seen on previous x-ray and CT. Patient started to improve slowly then in January 2020 she was seen by neurology for tremors and was started on propanolol and her metoprolol 25 twice a day for PVC was stopped. Since patient started propanolol she started noticing that she would get fatigued easily, would have wheeze, and shortness of breath she denied any cough or hemoptysis she has been using her Symbicort 2 puffs once or twice a day with marginal improvement.     She denies any fever chills or weight loss    Her pulmonary function test in the office today reviewed and compared to 2017 shows an FEV1 of 93% predicted, FVC 96% predicted there was a 10% bronchodilator change in FVC and FEV1 at 5% bronchodilator change    FEV1 FVC ratio 77    Diffusion capacity is 102% predicted    Lung volume by body box are normal with total lung capacity 119% predicted and residual volume 132% predicted    Her pulmonary function test from 2017 March to February 2020 are stable              Previous visit  Patient's history dates back to January 3, when she started out with an upper respiratory infection, nasal congestion, sinus pressure, eventually started wheezing and have postnasal drip. She was given multiple antibiotics, 3 courses and then ×2. Prednisone in the urgent care. She went to the ER. Also, because of family history of PE. She had a CT angiogram done which did not show a PE. The patient was also prescribed Flovent ProAir and Flonase. Symptoms improved after 4 weeks, but after 1 week of symptom improvement. She again had chills and received antibiotics and sinus pressure and congestion, was still there. This has happened consecutively over the last 4 years every winter she would get upper respiratory infection and then bronchitis. Now she has morning cough with sputum which is usually after she eats and drinks coffee. Otherwise, she feels better. There is postnasal drip. There is still nasal congestion and feels sinus fullness and maxillary pressure clears her throat frequently. There is no history of asthma as a child. She never smoked. She had no wheezing now.   She has no shortness of breath and she is not using any inhalers  He has history of vertigo or Ménière's disease and is undergoing physical therapy with [facility] for her vertigo        PAST MEDICAL HISTORY:       Diagnosis Date    Acid reflux     Diverticular disease 6/30/2014    GERD (gastroesophageal reflux disease)     Hypercholesteremia 6/30/2014    Hypothyroid 6/30/2014    Lung nodule     Meniere disease     right ear    Neuropathy     Osteopenia 6/30/2014    Prediabetes     RAD (reactive airway disease) 6/30/2014    Tremor     r hand    Vitamin D deficiency 6/30/2014 Family History:       Problem Relation Age of Onset   [facility] Cancer Mother 48        pineal gland/throat    Cancer Father 64        colon   Rosado Cancer Brother         lung    Other Brother         pulmonary embolus    Heart Disease Paternal Grandfather         myocarditis    Stroke Maternal Aunt     Cancer Maternal Uncle     Stroke Maternal Grandmother     Diabetes Brother     Heart Disease Paternal Uncle 67        mi    Heart Disease Brother 58        mi    High Blood Pressure Brother     Heart Disease Brother 79        stents for cad    High Blood Pressure Brother        SURGICAL HISTORY:   Past Surgical History:   Procedure Laterality Date    CHOLECYSTECTOMY      COLONOSCOPY      ENDOSCOPY, COLON, DIAGNOSTIC      ERCP  03/21/14    EXCISION / BIOPSY SKIN LESION OF TRUNK Left 11/8/2017    EXCISION MASS LEFT POSTERIOR SHOULDER, LEFT ANTERIOR ARM performed by Ash Izaguirre MD at [address]  01/03/2007    SKIN BIOPSY Left 11/08/2017    Excision Mass Left Posterior Shoulder, Left Anterior Arm           SOCIAL AND OCCUPATIONAL HEALTH:      There  no history of TB or TB exposure. There  no asbestos or silica dust exposure. The patient reports  no coal, foundry, quarry or [employer] exposure. Travel history reveals no. There  no history of recreational or IV drug use. There  no hot tube exposure. Pets  no  Allergic to cats and dogs   Occupational history theology and pastrol care . TOBACCO:   reports that she has never smoked. She has never used smokeless tobacco.  ETOH:   reports current alcohol use.   Immunization History   Administered Date(s) Administered    Influenza Virus Vaccine 10/31/2011, 11/05/2013    Influenza, High Dose (Fluzone 65 yrs and older) 11/03/2014, 08/25/2015, 08/11/2016, 10/18/2017, 11/10/2018, 11/03/2019    Pneumococcal Conjugate 13-valent ([id number]) 11/03/2014    Pneumococcal Polysaccharide ([id number]) 10/01/2008    Td, unspecified formulation 06/01/2008    Tdap (Boostrix, Adacel) 10/23/2018, 07/04/2020    Zoster Live (Zostavax) 03/01/2016    Zoster Recombinant (Shingrix) 01/14/2019, 05/25/2019        ALLERGIES:      Allergies   Allergen Reactions    Aspirin Swelling     Hives. anaphylaxis    Atorvastatin Other (See Comments)     myalgia    Crestor [Rosuvastatin]      Myalgia      Livalo [Pitavastatin] Other (See Comments)     Muscle pain    Seasonal     Simvastatin      Myalgia      Statins      Other reaction(s): Unknown    Welchol [Colesevelam Hcl] Other (See Comments)     Leg cramp         Home Meds:   Prior to Admission medications    Medication Sig Start Date End Date Taking?  Authorizing Provider   metFORMIN (GLUCOPHAGE) 500 MG tablet Take 1 tablet by mouth 2 times daily (with meals) 6/17/20  Yes Vale Christianson, DO   famotidine (PEPCID) 20 MG tablet as needed  5/15/20  Yes Historical Provider, MD   levothyroxine (SYNTHROID) 75 MCG tablet TAKE ONE TABLET BY MOUTH DAILY 5/6/20  Yes Marisol Tom, DO   albuterol sulfate HFA (VENTOLIN HFA) 108 (90 Base) MCG/ACT inhaler Inhale 2 puffs into the lungs 4 times daily as needed for Wheezing 3/28/20  Yes Margo Guerra MD   budesonide-formoterol [facility]) 80-4.5 MCG/ACT AERO Inhale 2 puffs into the lungs 2 times daily  Patient taking differently: Inhale 2 puffs into the lungs as needed  3/6/20 7/9/20 Yes Taras Page MD   metoprolol tartrate (LOPRESSOR) 25 MG tablet Take 25 mg by mouth 2 times daily   Yes Historical Provider, MD   ondansetron (ZOFRAN-ODT) 4 MG disintegrating tablet DISSOLVE ONE TABLET BY MOUTH EVERY 8 HOURS AS NEEDED FOR NAUSEA 12/13/19  Yes Marisol Tom, DO   guaiFENesin (MUCINEX) 600 MG extended release tablet Take 1 tablet by mouth 2 times daily  Patient taking differently: Take 600 mg by mouth 2 times daily as needed  12/8/19  Yes Nanda Rosales MD   butenafine (MENTAX) 1 % CREA Apply 1 Tube topically 2 times daily  Patient taking differently: Apply 1 Tube topically 2 times daily as needed  11/6/19  Yes Marisol Tom DO   alclomethasone (ACLOVATE) 0.05 % cream Apply topically 2 times daily Apply topically 2 times daily. Patient taking differently: Apply topically 2 times daily as needed Apply topically 2 times daily. 6/3/19  Yes Marisol Tom DO   amitriptyline (ELAVIL) 10 MG tablet TAKE ONE TABLET BY MOUTH DAILY AS NEEDED FOR SLEEP AND LEG PAIN FOR UP TO 30 DAYS 4/23/19  Yes Rafael Queen MD   NONFORMULARY Indications: Dietary fiber 1 tsp a day    Yes Historical Provider, MD   ECHINACEA EXTRACT PO Take 450 mg by mouth Indications: Take in winter [address] [place] [address]   Yes Historical Provider, MD   Cholecalciferol (VITAMIN D3) 2000 units CAPS Take 1 capsule by mouth 2 times daily 8/15/18  Yes Rafael Queen MD   diltiazem (CARDIZEM CD) 120 MG extended release capsule Take 1 capsule by mouth every evening 6/19/18  Yes Rafael Queen MD   fluticasone (FLONASE) 50 MCG/ACT nasal spray PLACE 1 SPRAY IN EACH NOSTRIL DAILY FOR 15 DAYS 9/18/17  Yes Alexander Allred MD   ketoconazole (NIZORAL) 2 % shampoo APPLY  TO AFFECTED AREAS TOPICALLY DAILY THEN RINSE OFF AFTER 5 MINUTES  Patient taking differently: APPLY  TO AFFECTED AREAS TOPICALLY DAILY THEN RINSE OFF AFTER 5 MINUTES WHEN NEEDED 9/5/17  Yes Rafael Queen MD   esomeprazole Magnesium (NEXIUM) 40 MG PACK Take 40 mg by mouth daily. Yes Historical Provider, MD   NONFORMULARY Super b complex 3 times per week   Yes Historical Provider, MD   ascorbic acid (VITAMIN C) 500 MG tablet Take 500 mg by mouth daily.    Yes Historical Provider, MD   chlorpheniramine (CHLOR-TRIMETON) 4 MG tablet Take 12 mg by mouth daily    Yes Historical Provider, MD   ezetimibe (ZETIA) 10 MG tablet Take 10 mg by mouth daily    Historical Provider, MD          Review of Systems -  General ROS: negative for - chills, fatigue, fever or weight loss  ENT ROS: negative for - headaches, oral lesions or sore throat  Cardiovascular ROS: no chest pain , orthopnea or pnd   Gastrointestinal ROS: no abdominal pain, change in bowel habits, or black or bloody stools  Skin - no rash   Neuro - no blurry vision , no loc . No focal weakness   msk - no jt tenderness or swelling    Vascular - no claudication , rest completed and negative   Lymphatic - complete and negative   Hematology - oncology - complete and negative   Allergy immunology - complete and negative    no burning or hematuria        Vitals:  /75 (Site: Left Upper Arm, Position: Sitting, Cuff Size: Large Adult)   Pulse 68   Temp 97.9 °F (36.6 °C) (Temporal)   Resp 12   Ht 5' 5\" (1.651 m)   Wt 220 lb (99.8 kg)   SpO2 95% Comment: ROOM AIR  BMI 36.61 kg/m²     PHYSICAL EXAM:  Head and neck atraumatic, normocephalic    Lymph nodes-no cervical, supraclavicular lymphadenopathy    Neck-no JVP elevation    Lungs - Ventilating all lobes without any rales, rhonchi, wheezes or dullness. No bronchial breath sounds. Chest expansion equal bilaterally    CVS- S1, S2 regular. No S3 no S4, no murmurs    Abdomen-nontender, nondistended. Bowel sounds are present. No organomegaly    Lower extremity-no edema    Upper extremity-no edema    Neurological-grossly normal cranial nerves. No overt motor deficit   Lung function study shows FEV1 105% predicted, % predicted. Ratio of FEV1 to FVC is 73, FEF 25 7579% predicted. % predicted, mildly increased % predicted. Airway resistance mildly increased. Diffusion capacity normal.  I would essentially called as a normal pulmonary function test with mild small airway dysfunction        IMPRESSION:     Diagnosis Orders   1. Seasonal asthma     2.  Postnasal drip          :                PLAN:      Seasonal asthma - spring and fall   Start symbicort - in September   Follow up October         Requested Prescriptions      No prescriptions requested or ordered in this encounter       Medications Discontinued During This Encounter Medication Reason    budesonide-formoterol (SYMBICORT) 80-4.5 MCG/ACT AERO DUPLICATE    ezetimibe (ZETIA) 10 MG tablet DUPLICATE    gabapentin (NEURONTIN) 100 MG capsule Therapy completed    Multiple Vitamins-Minerals (THERAPEUTIC MULTIVITAMIN-MINERALS) tablet Therapy completed       Fay Knight received counseling on the following healthy behaviors: nutrition, exercise and medication adherence    Patient given educational materials : see patient instruction           Discussed use, benefit, and side effects of prescribed medications. Barriers to medication compliance addressed. All patient questions answered. Pt voiced understanding. I hope this updates you on my evaluation and clinical thinking. Thank you for allowing me to participate in his care. Sincerely,      Electronically signed by Ludivina Stein MD on 7/11/2020 at 3:31 PM       Please note that this chart was generated using voice recognition Dragon dictation software. Although every effort was made to ensure the accuracy of this automated transcription, some errors in transcription may have occurred.

## 2020-07-10 ENCOUNTER — CARE COORDINATION (OUTPATIENT)
Dept: CARE COORDINATION | Age: 80
End: 2020-07-10

## 2020-07-10 NOTE — CARE COORDINATION
Raquel Edmonds returned call. She has seen ENT. She stated she is on her to healing. Declined any further calls. Patient contacted regarding COVID-19 risk and screening. Discussed COVID-19 related testing which was not done at this time. Test results were not done. Patient informed of results, if available? N/A. Care Transition Nurse/ Ambulatory Care Manager contacted the patient by telephone to perform follow-up assessment. Verified name and  with patient as identifiers. Patient has following risk factors of: no known risk factors. Symptoms reviewed with patient who verbalized the following symptoms: no new symptoms and no worsening symptoms. Due to no new or worsening symptoms encounter was not routed to provider for escalation. Education provided regarding infection prevention, and signs and symptoms of COVID-19 and when to seek medical attention with patient who verbalized understanding. Discussed exposure protocols and quarantine from [address] you at higher risk for severe illness  and given an opportunity for questions and concerns. The patient agrees to contact the COVID-19 hotline [phone] or PCP office for questions related to their healthcare. CTN/ACM provided contact information for future reference. From CDC: Are you at higher risk for severe illness?  Wash your hands often.  Avoid close contact (6 feet, which is about two arm lengths) with people who are sick.  Put distance between yourself and other people if COVID-19 is spreading in your community.  Clean and disinfect frequently touched surfaces.  Avoid all cruise travel and non-essential air travel.  Call your healthcare professional if you have concerns about COVID-19 and your underlying condition or if you are sick.     For more information on steps you can take to protect yourself, see CDC's How to Protect Yourself      declined further calls based on severity of symptoms and risk

## 2020-07-11 PROBLEM — J45.998 SEASONAL ASTHMA: Status: ACTIVE | Noted: 2020-07-11

## 2020-07-11 PROBLEM — R09.82 POSTNASAL DRIP: Status: ACTIVE | Noted: 2020-07-11

## 2020-07-23 ENCOUNTER — OFFICE VISIT (OUTPATIENT)
Dept: FAMILY MEDICINE CLINIC | Age: 80
End: 2020-07-23
Payer: MEDICARE

## 2020-07-23 VITALS
DIASTOLIC BLOOD PRESSURE: 70 MMHG | OXYGEN SATURATION: 96 % | HEART RATE: 94 BPM | WEIGHT: 224 LBS | BODY MASS INDEX: 37.28 KG/M2 | SYSTOLIC BLOOD PRESSURE: 121 MMHG | TEMPERATURE: 96.6 F

## 2020-07-23 PROBLEM — J41.1 BRONCHITIS, MUCOPURULENT RECURRENT (HCC): Status: ACTIVE | Noted: 2020-07-23

## 2020-07-23 PROBLEM — G20 PARKINSON DISEASE (HCC): Status: ACTIVE | Noted: 2020-07-23

## 2020-07-23 PROBLEM — G20.A1 PARKINSON DISEASE: Status: ACTIVE | Noted: 2020-07-23

## 2020-07-23 PROBLEM — E66.01 MORBIDLY OBESE (HCC): Status: ACTIVE | Noted: 2020-07-23

## 2020-07-23 PROCEDURE — 1123F ACP DISCUSS/DSCN MKR DOCD: CPT | Performed by: FAMILY MEDICINE

## 2020-07-23 PROCEDURE — 4040F PNEUMOC VAC/ADMIN/RCVD: CPT | Performed by: FAMILY MEDICINE

## 2020-07-23 PROCEDURE — 1036F TOBACCO NON-USER: CPT | Performed by: FAMILY MEDICINE

## 2020-07-23 PROCEDURE — 1090F PRES/ABSN URINE INCON ASSESS: CPT | Performed by: FAMILY MEDICINE

## 2020-07-23 PROCEDURE — G8399 PT W/DXA RESULTS DOCUMENT: HCPCS | Performed by: FAMILY MEDICINE

## 2020-07-23 PROCEDURE — 99204 OFFICE O/P NEW MOD 45 MIN: CPT | Performed by: FAMILY MEDICINE

## 2020-07-23 PROCEDURE — G8926 SPIRO NO PERF OR DOC: HCPCS | Performed by: FAMILY MEDICINE

## 2020-07-23 PROCEDURE — G8417 CALC BMI ABV UP PARAM F/U: HCPCS | Performed by: FAMILY MEDICINE

## 2020-07-23 PROCEDURE — 3023F SPIROM DOC REV: CPT | Performed by: FAMILY MEDICINE

## 2020-07-23 PROCEDURE — G8427 DOCREV CUR MEDS BY ELIG CLIN: HCPCS | Performed by: FAMILY MEDICINE

## 2020-07-23 ASSESSMENT — PATIENT HEALTH QUESTIONNAIRE - PHQ9
2. FEELING DOWN, DEPRESSED OR HOPELESS: 0
SUM OF ALL RESPONSES TO PHQ QUESTIONS 1-9: 0
1. LITTLE INTEREST OR PLEASURE IN DOING THINGS: 0
SUM OF ALL RESPONSES TO PHQ9 QUESTIONS 1 & 2: 0
SUM OF ALL RESPONSES TO PHQ QUESTIONS 1-9: 0

## 2020-07-23 NOTE — PROGRESS NOTES
2020     Carmen Ceja (:  1940) is a 70 y.o. female, here for evaluation of the following medical concerns:    HPI    69 yo female here to establish care. Patient has couple of issues. Balance issue - fell on  broke her nose -- lost her footing when getting off a bar stool at her niece's house. Then after wards, pt feels in the morning until noon she feels like she is loosing her balance. Then she tells me she feels like somewhat fainting. last week she went down to check her car and she got dizzy or fainting again and so she fell almost in her car seat. On Cardizem and metoprolol -- for cardia arrhythmia. Cardiologist Dr. Erika Newton.  hyperlipidemia -- Repatha -- flue demi sx. patient is back on the medication she has been taking the thing every 4 weeks now    Complains about the bruising especially at her inability to the right skin area just above above the right wrist.  She tells me that she does not bump into anything but that is always does risk area. Has been feeling fatigued. Fatigue is getting worse and she is not able to exercise because she has this bursitis at her right hip so she is not able to walk a lot. Anxiety on and off as getting older - has a script for Ativan 0.5 mg if needed. Patient feels she is most anxious now when getting older than before. Patient is also concerned about the thyroid nodule which was followed some years ago. She did have a follow-up from her ENT physician and she actually wanted to point out to a endocrine endocrinologist but she has not heard from them yet. She would like to know what to do. Mom 50th - cancer - . Dad 50th - colon cancer -    3 brothers - 1  of lung cancer, one brother 81 yo - very healthy. Brother 68 yo stents, 72 yo - [place] vet. - alcohol and drugs. Review of Systems   Constitutional: Negative for fever and unexpected weight change. Positive for fainting feeling.   Positive for gait instability. Positive for feeling somewhat dizzy. HENT: Negative for ear pain, congestion, sore throat and rhinorrhea. Eyes: Negative for itching and visual disturbance. Respiratory: Negative for cough and shortness of breath. Cardiovascular: Negative for chest pain and leg swelling. Gastrointestinal: Negative for diarrhea, constipation and blood in stool. Endocrine: Negative for polydipsia and polyuria. Genitourinary: Negative for dysuria and hematuria. Musculoskeletal: Negative for back pain and gait problem. Positive for right hip bursitis. Skin: Negative for color change and rash. Positive for skin bruise at skin above right wrist posterior. Neurological: Negative for dizziness and headaches. Psychiatric/Behavioral: Negative for confusion and agitation. Positive for anxiety. Prior to Visit Medications    Medication Sig Taking?  Authorizing Provider   Evolocumab 140 MG/ML SOSY Inject into the skin Yes Historical Provider, MD   metFORMIN (GLUCOPHAGE) 500 MG tablet Take 1 tablet by mouth 2 times daily (with meals)  Xochitl Vázquez DO   famotidine (PEPCID) 20 MG tablet as needed   Historical Provider, MD   levothyroxine (SYNTHROID) 75 MCG tablet TAKE ONE TABLET BY MOUTH DAILY  Marisol Tom DO   albuterol sulfate HFA (VENTOLIN HFA) 108 (90 Base) MCG/ACT inhaler Inhale 2 puffs into the lungs 4 times daily as needed for Wheezing  Pino Arango MD   budesonide-formoterol (SYMBICORT) 80-4.5 MCG/ACT AERO Inhale 2 puffs into the lungs 2 times daily  Patient taking differently: Inhale 2 puffs into the lungs as needed   Rochelle Aguilar MD   metoprolol tartrate (LOPRESSOR) 25 MG tablet Take 25 mg by mouth 2 times daily  Historical Provider, MD   ondansetron (ZOFRAN-ODT) 4 MG disintegrating tablet DISSOLVE ONE TABLET BY MOUTH EVERY 8 HOURS AS NEEDED FOR NAUSEA  Marisol Tom DO   guaiFENesin (MUCINEX) 600 MG extended release tablet Take 1 tablet by mouth 2 times daily  Patient taking differently: Take 600 mg by mouth 2 times daily as needed   Alexander Baez MD   butenafine (MENTAX) 1 % CREA Apply 1 Tube topically 2 times daily  Patient taking differently: Apply 1 Tube topically 2 times daily as needed   Marisol Tom DO   alclomethasone (ACLOVATE) 0.05 % cream Apply topically 2 times daily Apply topically 2 times daily. Patient taking differently: Apply topically 2 times daily as needed Apply topically 2 times daily. Marisol Tom DO   amitriptyline (ELAVIL) 10 MG tablet TAKE ONE TABLET BY MOUTH DAILY AS NEEDED FOR SLEEP AND LEG PAIN FOR UP TO 30 DAYS  Gil Jones MD   NONFORMULARY Indications: Dietary fiber 1 tsp a day   Historical Provider, MD   ECHINACEA EXTRACT PO Take 450 mg by mouth Indications: Take in winter [facility] [facility] MD Adam   Cholecalciferol (VITAMIN D3) 2000 units CAPS Take 1 capsule by mouth 2 times daily  Gil Jones MD   diltiazem (CARDIZEM CD) 120 MG extended release capsule Take 1 capsule by mouth every evening  Gil Jones MD   fluticasone (FLONASE) 50 MCG/ACT nasal spray PLACE 1 SPRAY IN EACH NOSTRIL DAILY FOR 15 DAYS  Randal Zuniga MD   ketoconazole (NIZORAL) 2 % shampoo APPLY  TO AFFECTED AREAS TOPICALLY DAILY THEN RINSE OFF AFTER 5 MINUTES  Patient taking differently: APPLY  TO AFFECTED AREAS TOPICALLY DAILY THEN RINSE OFF AFTER 5 MINUTES WHEN NEEDED  Gil Jones MD   ezetimibe (ZETIA) 10 MG tablet Take 10 mg by mouth daily  Historical Provider, MD   esomeprazole Magnesium (NEXIUM) 40 MG PACK Take 40 mg by mouth daily. Historical Provider, MD   NONFORMULARY Super b complex 3 times per week  Historical Provider, MD   ascorbic acid (VITAMIN C) 500 MG tablet Take 500 mg by mouth daily.   Historical Provider, MD   chlorpheniramine (CHLOR-TRIMETON) 4 MG tablet Take 12 mg by mouth daily   Historical Provider, MD        Social History     Tobacco Use    Smoking status: Never Smoker    Smokeless tobacco: Never Used   Substance Use Topics  Alcohol use: Yes     Comment: rare        Vitals:    07/23/20 1424   BP: 121/70   Pulse: 94   Temp: 96.6 °F (35.9 °C)   TempSrc: Temporal   SpO2: 96%   Weight: 224 lb (101.6 kg)     Estimated body mass index is 37.28 kg/m² as calculated from the following:    Height as of 7/9/20: 5' 5\" (1.651 m). Weight as of this encounter: 224 lb (101.6 kg). Physical Exam   HENT:   /70   Pulse 94   Temp 96.6 °F (35.9 °C) (Temporal)   Wt 224 lb (101.6 kg)   SpO2 96%   BMI 37.28 kg/m²   Constitutional: Alert and oriented. Well-nourished. Head: Normocephalic and atraumatic. Right Ear: External ear normal. TM: no bulging, erythema or fluid seen. Left Ear: External ear normal. TM: no bulging, erythema or fluid seen. Nose: Nose normal.   Mouth/Throat: Oropharynx is clear and moist. Teeth in wonderful repair. Eyes: Pupils are equal, round, and reactive to light. Right eye exhibits no discharge. Left eye exhibits no discharge. No scleral icterus. Glasses in place. Neck: Normal range of motion. Neck supple. No JVD present. No tracheal deviation present. No thyromegaly present. Lump palpable at her right anterior neck area/thyroid. Cardiovascular: Normal rate, regular rhythm, normal heart sounds. Pulmonary/Chest: Effort normal and breath sounds normal. No respiratory distress. She has no wheezes. She has no rales. Abdominal: Soft. Bowel sounds are normal.  She exhibits no distension and no mass. There is no tenderness. There is no rebound and no guarding. Musculoskeletal: Normal range of motion. She exhibits no edema or tenderness. Lymphadenopathy:    She has no cervical adenopathy. Neurological:  She is alert and oriented to person, place, and time. Cranial nerves grossly intact. No sensation problem noted. Muscle strength 5/5 throughout. Skin: Skin is warm and dry. No rash noted. No erythema. I did not appreciate any bruising. Psychiatric:  She has a normal mood and affect.  Behavior is normal.    Ivory Ramirez was seen today for establish care. Diagnoses and all orders for this visit:    Encounter to establish care with new doctor    Gait instability  -     [facility] [place]    Syncope, unspecified syncope type    Bruise  -     CBC Auto Differential; Future    Parkinson disease (HCC)    Bronchitis, mucopurulent recurrent (HCC)    Morbidly obese (HCC)    Chronic fatigue  -     Vitamin D 25 Hydroxy; Future  -     Vitamin B12; Future    Thyroid nodule  -     US HEAD NECK SOFT TISSUE THYROID; Future  -     Norris Mathis MD, Endocrinology, [place]    I discussed with patient she needs to follow-up with a good urologist to see if the dizziness fainting feeling his vision the morning has to do something with her medications. See physical therapy for gait instability. Multiple blood work ordered also ultrasound of her neck. Hopefully she can follow-up with the endocrinologist.  See the specialist as indicated. Call or return to clinic prn if these symptoms worsen or fail to improve as anticipated. I have reviewed the instructions with the patient, answering all questions to her satisfaction. Return in about 6 weeks (around 9/3/2020), or if symptoms worsen or fail to improve, for medicare visit. An electronic signature was used to authenticate this note.     --Dee Dior MD on 7/23/2020 at 6:41 PM     (Please note that portions of this note were completed with a voice recognition program. Efforts were made to edit the dictations but occasionally words are mis-transcribed.)

## 2020-07-30 ENCOUNTER — TELEPHONE (OUTPATIENT)
Dept: FAMILY MEDICINE CLINIC | Age: 80
End: 2020-07-30

## 2020-07-31 ENCOUNTER — HOSPITAL ENCOUNTER (OUTPATIENT)
Dept: PHYSICAL THERAPY | Facility: CLINIC | Age: 80
Setting detail: THERAPIES SERIES
Discharge: HOME | End: 2020-07-31
Payer: MEDICARE

## 2020-07-31 ENCOUNTER — APPOINTMENT (OUTPATIENT)
Dept: GENERAL RADIOLOGY | Facility: CLINIC | Age: 80
End: 2020-07-31
Payer: MEDICARE

## 2020-07-31 ENCOUNTER — TELEPHONE (OUTPATIENT)
Dept: FAMILY MEDICINE CLINIC | Age: 80
End: 2020-07-31

## 2020-07-31 ENCOUNTER — HOSPITAL ENCOUNTER (EMERGENCY)
Facility: CLINIC | Age: 80
Discharge: HOME | End: 2020-07-31
Attending: EMERGENCY MEDICINE
Payer: MEDICARE

## 2020-07-31 ENCOUNTER — APPOINTMENT (OUTPATIENT)
Dept: CT IMAGING | Facility: CLINIC | Age: 80
End: 2020-07-31
Payer: MEDICARE

## 2020-07-31 VITALS
BODY MASS INDEX: 36.32 KG/M2 | DIASTOLIC BLOOD PRESSURE: 87 MMHG | WEIGHT: 218 LBS | RESPIRATION RATE: 18 BRPM | OXYGEN SATURATION: 95 % | TEMPERATURE: 98 F | HEART RATE: 74 BPM | HEIGHT: 65 IN | SYSTOLIC BLOOD PRESSURE: 144 MMHG

## 2020-07-31 LAB
-: ABNORMAL
ABSOLUTE EOS #: 0.1 K/UL (ref 0–0.4)
ABSOLUTE IMMATURE GRANULOCYTE: ABNORMAL K/UL (ref 0–0.3)
ABSOLUTE LYMPH #: 1.7 K/UL (ref 1–4.8)
ABSOLUTE MONO #: 0.4 K/UL (ref 0.1–1.2)
ALBUMIN SERPL-MCNC: 4.3 G/DL (ref 3.5–5.2)
ALBUMIN/GLOBULIN RATIO: 1.5 (ref 1–2.5)
ALP BLD-CCNC: 70 U/L (ref 35–104)
ALT SERPL-CCNC: 24 U/L (ref 5–33)
AMORPHOUS: ABNORMAL
ANION GAP SERPL CALCULATED.3IONS-SCNC: 9 MMOL/L (ref 9–17)
AST SERPL-CCNC: 22 U/L
BACTERIA: ABNORMAL
BASOPHILS # BLD: 1 % (ref 0–2)
BASOPHILS ABSOLUTE: 0.1 K/UL (ref 0–0.2)
BILIRUB SERPL-MCNC: 0.3 MG/DL (ref 0.3–1.2)
BILIRUBIN URINE: ABNORMAL
BILIRUBIN URINE: NEGATIVE
BUN BLDV-MCNC: 17 MG/DL (ref 8–23)
BUN/CREAT BLD: ABNORMAL (ref 9–20)
CALCIUM SERPL-MCNC: 10.2 MG/DL (ref 8.6–10.4)
CASTS UA: ABNORMAL /LPF (ref 0–2)
CHLORIDE BLD-SCNC: 105 MMOL/L (ref 98–107)
CO2: 26 MMOL/L (ref 20–31)
COLOR: ABNORMAL
COLOR: YELLOW
COMMENT UA: ABNORMAL
COMMENT UA: NORMAL
CREAT SERPL-MCNC: 0.8 MG/DL (ref 0.5–0.9)
CRYSTALS, UA: ABNORMAL /HPF
DIFFERENTIAL TYPE: ABNORMAL
EOSINOPHILS RELATIVE PERCENT: 1 % (ref 1–4)
EPITHELIAL CELLS UA: ABNORMAL /HPF
GFR AFRICAN AMERICAN: >60 ML/MIN
GFR NON-AFRICAN AMERICAN: >60 ML/MIN
GFR SERPL CREATININE-BSD FRML MDRD: ABNORMAL ML/MIN/{1.73_M2}
GFR SERPL CREATININE-BSD FRML MDRD: ABNORMAL ML/MIN/{1.73_M2}
GLUCOSE BLD-MCNC: 113 MG/DL (ref 70–99)
GLUCOSE URINE: ABNORMAL
GLUCOSE URINE: NEGATIVE
HCT VFR BLD CALC: 45 % (ref 36–46)
HEMOGLOBIN: 14.6 G/DL (ref 12–16)
IMMATURE GRANULOCYTES: ABNORMAL %
INR BLD: 1
KETONES, URINE: ABNORMAL
KETONES, URINE: NEGATIVE
LEUKOCYTE ESTERASE, URINE: ABNORMAL
LEUKOCYTE ESTERASE, URINE: NEGATIVE
LYMPHOCYTES # BLD: 25 % (ref 24–44)
MCH RBC QN AUTO: 31.6 PG (ref 26–34)
MCHC RBC AUTO-ENTMCNC: 32.5 G/DL (ref 31–37)
MCV RBC AUTO: 97.2 FL (ref 80–100)
MONOCYTES # BLD: 6 % (ref 2–11)
MUCUS: ABNORMAL
NITRITE, URINE: ABNORMAL
NITRITE, URINE: NEGATIVE
NRBC AUTOMATED: ABNORMAL PER 100 WBC
OTHER OBSERVATIONS UA: ABNORMAL
OTHER OBSERVATIONS UA: ABNORMAL
PDW BLD-RTO: 12.3 % (ref 12.5–15.4)
PH UA: 7 (ref 5–8)
PH UA: ABNORMAL (ref 5–8)
PLATELET # BLD: 256 K/UL (ref 140–450)
PLATELET ESTIMATE: ABNORMAL
PMV BLD AUTO: 8.8 FL (ref 6–12)
POTASSIUM SERPL-SCNC: 4.4 MMOL/L (ref 3.7–5.3)
PROTEIN UA: ABNORMAL
PROTEIN UA: NEGATIVE
PROTHROMBIN TIME: 10.6 SEC (ref 9.4–12.6)
RBC # BLD: 4.63 M/UL (ref 4–5.2)
RBC # BLD: ABNORMAL 10*6/UL
RBC UA: ABNORMAL /HPF (ref 0–2)
RENAL EPITHELIAL, UA: ABNORMAL /HPF
SEG NEUTROPHILS: 67 % (ref 36–66)
SEGMENTED NEUTROPHILS ABSOLUTE COUNT: 4.7 K/UL (ref 1.8–7.7)
SODIUM BLD-SCNC: 140 MMOL/L (ref 135–144)
SPECIFIC GRAVITY UA: 1.01 (ref 1–1.03)
SPECIFIC GRAVITY UA: ABNORMAL (ref 1–1.03)
TOTAL PROTEIN: 7.2 G/DL (ref 6.4–8.3)
TRICHOMONAS: ABNORMAL
TROPONIN INTERP: NORMAL
TROPONIN T: NORMAL NG/ML
TROPONIN, HIGH SENSITIVITY: 8 NG/L (ref 0–14)
TURBIDITY: ABNORMAL
TURBIDITY: CLEAR
URINE HGB: ABNORMAL
URINE HGB: NEGATIVE
UROBILINOGEN, URINE: ABNORMAL
UROBILINOGEN, URINE: NORMAL
WBC # BLD: 7 K/UL (ref 3.5–11)
WBC # BLD: ABNORMAL 10*3/UL
WBC UA: ABNORMAL /HPF (ref 0–5)
YEAST: ABNORMAL

## 2020-07-31 PROCEDURE — 36415 COLL VENOUS BLD VENIPUNCTURE: CPT

## 2020-07-31 PROCEDURE — 99284 EMERGENCY DEPT VISIT MOD MDM: CPT

## 2020-07-31 PROCEDURE — 80053 COMPREHEN METABOLIC PANEL: CPT

## 2020-07-31 PROCEDURE — 2580000003 HC RX 258: Performed by: EMERGENCY MEDICINE

## 2020-07-31 PROCEDURE — 81001 URINALYSIS AUTO W/SCOPE: CPT

## 2020-07-31 PROCEDURE — 81003 URINALYSIS AUTO W/O SCOPE: CPT

## 2020-07-31 PROCEDURE — 84484 ASSAY OF TROPONIN QUANT: CPT

## 2020-07-31 PROCEDURE — 71045 X-RAY EXAM CHEST 1 VIEW: CPT

## 2020-07-31 PROCEDURE — 96374 THER/PROPH/DIAG INJ IV PUSH: CPT

## 2020-07-31 PROCEDURE — 70450 CT HEAD/BRAIN W/O DYE: CPT

## 2020-07-31 PROCEDURE — 85025 COMPLETE CBC W/AUTO DIFF WBC: CPT

## 2020-07-31 PROCEDURE — 93005 ELECTROCARDIOGRAM TRACING: CPT | Performed by: EMERGENCY MEDICINE

## 2020-07-31 PROCEDURE — 85610 PROTHROMBIN TIME: CPT

## 2020-07-31 PROCEDURE — 6360000002 HC RX W HCPCS: Performed by: EMERGENCY MEDICINE

## 2020-07-31 RX ORDER — VALACYCLOVIR HYDROCHLORIDE 1 G/1
1000 TABLET, FILM COATED ORAL 3 TIMES DAILY
Qty: 21 TABLET | Refills: 0 | Status: SHIPPED | OUTPATIENT
Start: 2020-07-31 | End: 2020-08-07

## 2020-07-31 RX ORDER — ONDANSETRON 2 MG/ML
4 INJECTION INTRAMUSCULAR; INTRAVENOUS ONCE
Status: COMPLETED | OUTPATIENT
Start: 2020-07-31 | End: 2020-07-31

## 2020-07-31 RX ORDER — LORAZEPAM 0.5 MG/1
0.5 TABLET ORAL EVERY 6 HOURS PRN
Qty: 10 TABLET | Refills: 0 | Status: SHIPPED | OUTPATIENT
Start: 2020-07-31 | End: 2020-08-30

## 2020-07-31 RX ORDER — 0.9 % SODIUM CHLORIDE 0.9 %
1000 INTRAVENOUS SOLUTION INTRAVENOUS ONCE
Status: COMPLETED | OUTPATIENT
Start: 2020-07-31 | End: 2020-07-31

## 2020-07-31 RX ADMIN — ONDANSETRON 4 MG: 2 INJECTION INTRAMUSCULAR; INTRAVENOUS at 11:32

## 2020-07-31 RX ADMIN — SODIUM CHLORIDE 1000 ML: 9 INJECTION, SOLUTION INTRAVENOUS at 11:32

## 2020-07-31 ASSESSMENT — ENCOUNTER SYMPTOMS
COUGH: 1
EYE PAIN: 0
VOMITING: 0
DIARRHEA: 0
BACK PAIN: 0
ABDOMINAL PAIN: 0
BLOOD IN STOOL: 0
SHORTNESS OF BREATH: 0
NAUSEA: 1
CONSTIPATION: 0

## 2020-07-31 NOTE — FLOWSHEET NOTE
[] [address]  [address]    [phone]  F: [phone]   [] [address] [facility] [address]   [address]  P: [phone]  F: [phone]  [] [address]  [address] [facility]  P: [phone]  F: [phone]  [x] [address]  [address] [facility]   [address]   [place]: [phone]  F: [phone]   [] [address]  [place]: [phone]   F: [phone]     Physical Therapy Cancel/No Show note    Date: 2020  Patient: Calin German  : 1940  MRN: [id number]    Cancels/No Shows to date: 1    For today's appointment patient:    [x]  Cancelled    [] Rescheduled appointment    [] No-show     Reason given by patient:    [x]  Patient ill    []  Conflicting appointment    [] No transportation      [] Conflict with work    [] No reason given    [] Weather related    [] [id number]    [] Other:      Comments:        [] Next appointment was confirmed    Electronically signed by: Jessi Bhakta, PT

## 2020-07-31 NOTE — ED PROVIDER NOTES
[facility]  [address] [facility]  Phone: [phone]        Pt Name: Jeane Trevino  MRN: [id number]  [place] 1940  Date of evaluation: 7/31/20      CHIEF COMPLAINT       Chief Complaint   Patient presents with    Dizziness         HISTORY OF PRESENT ILLNESS    Jeane Trevino is a 70 y.o. female who presents with a chief complaint of dizziness that she says is been going on for several weeks been getting somewhat worse is both lightheaded and a spinning sensation is worse when she changes position or rolls over in bed or tries to get up and is associate with nausea. She did fall July 4 and broke her nose she says her nose feels better but she been having some left-sided forehead pain and count of a pressure sensation. There is been no visual changes she does have a history of Ménière's and vertigo and she says part of it feels like that. There is been no ear pain or hearing change and no current tinnitus  There is been no chest pain she has a chronic cough which is unchanged no abdominal pain no extremity swelling no focal weakness  No fevers chills,  vomiting or diarrhea      REVIEW OF SYSTEMS         Review of Systems   Constitutional: Negative for chills and fever. HENT: Negative for congestion and ear pain. She does have a cold sore on the left side of her mouth   Eyes: Negative for pain and visual disturbance. Respiratory: Positive for cough. Negative for shortness of breath. Cardiovascular: Negative for chest pain, palpitations and leg swelling. Gastrointestinal: Positive for nausea. Negative for abdominal pain, blood in stool, constipation, diarrhea and vomiting. Endocrine: Negative for polydipsia and polyuria. Genitourinary: Negative for difficulty urinating, dysuria and frequency. Musculoskeletal: Negative for back pain, joint swelling, myalgias, neck pain and neck stiffness. Skin: Negative for rash.    Neurological: Positive for dizziness, light-headedness and headaches. Negative for weakness. Hematological: Negative for adenopathy. Does not bruise/bleed easily. Psychiatric/Behavioral: Negative for confusion, self-injury and suicidal ideas. PAST MEDICAL HISTORY    has a past medical history of Acid reflux, Diverticular disease, GERD (gastroesophageal reflux disease), Hypercholesteremia, Hypothyroid, Lung nodule, Meniere disease, Neuropathy, Osteopenia, Prediabetes, RAD (reactive airway disease), Tremor, and Vitamin D deficiency. SURGICAL HISTORY      has a past surgical history that includes Cholecystectomy; Endoscopy, colon, diagnostic; Colonoscopy; ERCP (03/21/14); Hysterectomy (01/03/2007); skin biopsy (Left, 11/08/2017); and EXCISION / BIOPSY SKIN LESION OF TRUNK (Left, 11/8/2017). CURRENT MEDICATIONS       Previous Medications    ALBUTEROL SULFATE HFA (VENTOLIN HFA) 108 (90 BASE) MCG/ACT INHALER    Inhale 2 puffs into the lungs 4 times daily as needed for Wheezing    ALCLOMETHASONE (ACLOVATE) 0.05 % CREAM    Apply topically 2 times daily Apply topically 2 times daily. AMITRIPTYLINE (ELAVIL) 10 MG TABLET    TAKE ONE TABLET BY MOUTH DAILY AS NEEDED FOR SLEEP AND LEG PAIN FOR UP TO 30 DAYS    ASCORBIC ACID (VITAMIN C) 500 MG TABLET    Take 500 mg by mouth daily. BUDESONIDE-FORMOTEROL (SYMBICORT) 80-4.5 MCG/ACT AERO    Inhale 2 puffs into the lungs 2 times daily    BUTENAFINE (MENTAX) 1 % CREA    Apply 1 Tube topically 2 times daily    CHLORPHENIRAMINE (CHLOR-TRIMETON) 4 MG TABLET    Take 12 mg by mouth daily     CHOLECALCIFEROL (VITAMIN D3) 2000 UNITS CAPS    Take 1 capsule by mouth 2 times daily    DILTIAZEM (CARDIZEM CD) 120 MG EXTENDED RELEASE CAPSULE    Take 1 capsule by mouth every evening    ECHINACEA EXTRACT PO    Take 450 mg by mouth Indications: Take in winter WINTER ONLY    ESOMEPRAZOLE MAGNESIUM (NEXIUM) 40 MG PACK    Take 40 mg by mouth daily.     EVOLOCUMAB 140 MG/ML SOSY    Inject into the skin    EZETIMIBE (ZETIA) 10 MG TABLET    Take 10 mg by mouth daily    FAMOTIDINE (PEPCID) 20 MG TABLET    as needed     FLUTICASONE (FLONASE) 50 MCG/ACT NASAL SPRAY    PLACE 1 SPRAY IN EACH NOSTRIL DAILY FOR 15 DAYS    GUAIFENESIN (MUCINEX) 600 MG EXTENDED RELEASE TABLET    Take 1 tablet by mouth 2 times daily    KETOCONAZOLE (NIZORAL) 2 % SHAMPOO    APPLY  TO AFFECTED AREAS TOPICALLY DAILY THEN RINSE OFF AFTER 5 MINUTES    LEVOTHYROXINE (SYNTHROID) 75 MCG TABLET    TAKE ONE TABLET BY MOUTH DAILY    METFORMIN (GLUCOPHAGE) 500 MG TABLET    Take 1 tablet by mouth 2 times daily (with meals)    METOPROLOL TARTRATE (LOPRESSOR) 25 MG TABLET    Take 25 mg by mouth 2 times daily    NONFORMULARY    Super b complex 3 times per week    NONFORMULARY    Indications: Dietary fiber 1 tsp a day     ONDANSETRON (ZOFRAN-ODT) 4 MG DISINTEGRATING TABLET    DISSOLVE ONE TABLET BY MOUTH EVERY 8 HOURS AS NEEDED FOR NAUSEA       ALLERGIES     is allergic to aspirin; atorvastatin; crestor [rosuvastatin]; livalo [pitavastatin]; seasonal; simvastatin; statins; and welchol [colesevelam hcl]. FAMILY HISTORY     She indicated that her mother is . She indicated that her father is . She indicated that one of her four brothers is . She indicated that the status of her maternal grandmother is unknown. She indicated that her paternal grandfather is . She indicated that the status of her maternal aunt is unknown. She indicated that the status of her maternal uncle is unknown. She indicated that the status of her paternal uncle is unknown.     family history includes Cancer in her brother and maternal uncle;  Cancer (age of onset: 48) in her mother; Cancer (age of onset: 64) in her father; Diabetes in her brother; Heart Disease in her paternal grandfather; Heart Disease (age of onset: 58) in her brother; Heart Disease (age of onset: 79) in her brother; Heart Disease (age of onset: 67) in her paternal uncle; High Blood Pressure in her brother and brother; Other in her brother; Stroke in her maternal aunt and maternal grandmother. SOCIAL HISTORY      reports that she has never smoked. She has never used smokeless tobacco. She reports current alcohol use. She reports that she does not use drugs. PHYSICAL EXAM     INITIAL VITALS:  height is 5' 5\" (1.651 m) and weight is 98.9 kg (218 lb). Her oral temperature is 98 °F (36.7 °C). Her blood pressure is 144/87 (abnormal) and her pulse is 74. Her respiration is 18 and oxygen saturation is 95%. Physical Exam  Constitutional:       General: She is not in acute distress. Appearance: Normal appearance. She is well-developed. She is not ill-appearing, toxic-appearing or diaphoretic. HENT:      Head: Normocephalic and atraumatic. Right Ear: Tympanic membrane and external ear normal.      Left Ear: Tympanic membrane and external ear normal.   Eyes:      Conjunctiva/sclera: Conjunctivae normal.      Pupils: Pupils are equal, round, and reactive to light. Neck:      Musculoskeletal: Normal range of motion. Cardiovascular:      Rate and Rhythm: Normal rate and regular rhythm. Pulmonary:      Effort: Pulmonary effort is normal.      Breath sounds: Normal breath sounds. Abdominal:      General: Bowel sounds are normal.      Palpations: Abdomen is soft. Musculoskeletal: Normal range of motion. General: No tenderness. Skin:     General: Skin is warm and dry. Neurological:      General: No focal deficit present. Mental Status: She is alert and oriented to person, place, and time.       Comments: Gait is steady finger-to-nose is smoothly performed bilaterally there is no obvious deficits   Psychiatric:         Behavior: Behavior normal.           DIFFERENTIAL DIAGNOSIS/ MDM:     Dizziness will do a work-up including cardiac and neuro especially with the history of trauma a month ago    DIAGNOSTIC RESULTS     EKG: All EKG's are interpreted by the Emergency Department Physician who either signs or Co-signs this chart in the absence of a cardiologist.    Normal sinus rhythm at 64 bpm, MI interval is 156 ms QRS duration 78 ms, QT corrected 400 ms axis is -24 there is no acute ST or T wave changes seen    RADIOLOGY:   Non-plain film images such as CT, Ultrasound and MRI are read by the radiologist. Plain radiographic images are visualized and the radiologist interpretations are reviewed as follows:        EXAMINATION:    CT OF THE HEAD WITHOUT CONTRAST  7/31/2020 11:48 am         TECHNIQUE:    CT of the head was performed without the administration of intravenous    contrast. Dose modulation, iterative reconstruction, and/or weight based    adjustment of the mA/kV was utilized to reduce the radiation dose to as low    as reasonably achievable.         COMPARISON:    03/14/2018         HISTORY:    ORDERING SYSTEM PROVIDED HISTORY: Dizziness    TECHNOLOGIST PROVIDED HISTORY:         Dizziness    Reason for Exam: Dizziness    Acuity: Acute    Type of Exam: Initial         FINDINGS:    BRAIN/VENTRICLES: There is prominence of the ventricles and cortical sulci    consistent with cortical volume loss. No midline shift. Basal cisterns are    normally outlined. 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  There    is no evidence for acute infarct.  No acute intra or extra-axial hemorrhage.         ORBITS: The visualized portion of the orbits demonstrate no acute abnormality.         SINUSES: The visualized paranasal sinuses and mastoid air cells demonstrate    no acute abnormality.         SOFT TISSUES/SKULL:  No acute abnormality of the visualized skull or soft    tissues.              Impression    No acute intracranial abnormality.              EXAMINATION:    ONE XRAY VIEW OF THE CHEST         7/31/2020 11:49 am         COMPARISON:    12/08/2019, 02/15/2019         HISTORY:    ORDERING SYSTEM PROVIDED HISTORY: dizzy    TECHNOLOGIST PROVIDED HISTORY:    dizzy    Reason for Exam: Dizziness, no chest complaints    Acuity: Acute    Type of Exam: Initial         FINDINGS:    The cardiomediastinal silhouette is unchanged in appearance.  Chronic linear    opacities in the lung bases.  There is no consolidation, pneumothorax, or    evidence of edema. No effusion is appreciated.  The osseous structures are    unchanged in appearance.              Impression    Unchanged appearance of the chest without acute airspace disease identified.               LABS:  Results for orders placed or performed during the hospital encounter of 07/31/20   CBC Auto Differential   Result Value Ref Range    WBC 7.0 3.5 - 11.0 k/uL    RBC 4.63 4.0 - 5.2 m/uL    Hemoglobin 14.6 12.0 - 16.0 g/dL    Hematocrit 45.0 36 - 46 %    MCV 97.2 80 - 100 fL    MCH 31.6 26 - 34 pg    MCHC 32.5 31 - 37 g/dL    RDW 12.3 (L) 12.5 - 15.4 %    Platelets 895 299 - 918 k/uL    MPV 8.8 6.0 - 12.0 fL    NRBC Automated NOT REPORTED per 100 WBC    Differential Type NOT REPORTED     Seg Neutrophils 67 (H) 36 - 66 %    Lymphocytes 25 24 - 44 %    Monocytes 6 2 - 11 %    Eosinophils % 1 1 - 4 %    Basophils 1 0 - 2 %    Immature Granulocytes NOT REPORTED 0 %    Segs Absolute 4.70 1.8 - 7.7 k/uL    Absolute Lymph # 1.70 1.0 - 4.8 k/uL    Absolute Mono # 0.40 0.1 - 1.2 k/uL    Absolute Eos # 0.10 0.0 - 0.4 k/uL    Basophils Absolute 0.10 0.0 - 0.2 k/uL    Absolute Immature Granulocyte NOT REPORTED 0.00 - 0.30 k/uL    WBC Morphology NOT REPORTED     RBC Morphology NOT REPORTED     Platelet Estimate NOT REPORTED    Comprehensive Metabolic Panel   Result Value Ref Range    Glucose 113 (H) 70 - 99 mg/dL    BUN 17 8 - 23 mg/dL    CREATININE 0.80 0.50 - 0.90 mg/dL    Bun/Cre Ratio NOT REPORTED 9 - 20    Calcium 10.2 8.6 - 10.4 mg/dL    Sodium 140 135 - 144 mmol/L    Potassium 4.4 3.7 - 5.3 mmol/L    Chloride 105 98 - 107 mmol/L    CO2 26 20 - 31 mmol/L    Anion Gap 9 9 - 17 mmol/L    Alkaline Phosphatase 70 35 - 104 U/L    ALT 24 5 - 33 U/L    AST 22 <32 U/L    Total Bilirubin 0.30 0.3 - 1.2 mg/dL    Total Protein 7.2 6.4 - 8.3 g/dL    Alb 4.3 3.5 - 5.2 g/dL    Albumin/Globulin Ratio 1.5 1.0 - 2.5    GFR Non-African American >60 >60 mL/min    GFR African American >60 >60 mL/min    GFR Comment          GFR Staging NOT REPORTED    Urinalysis   Result Value Ref Range    Color, UA DISREGARD RESULTS. CLERICAL ERROR. (A) YELLOW    Turbidity UA DISREGARD RESULTS. CLERICAL ERROR. (A) CLEAR    Glucose, Ur DISREGARD RESULTS. CLERICAL ERROR. (A) NEGATIVE    Bilirubin Urine DISREGARD RESULTS. CLERICAL ERROR. (A) NEGATIVE    Ketones, Urine DISREGARD RESULTS. CLERICAL ERROR. (A) NEGATIVE    Specific [place], UA DISREGARD RESULTS. CLERICAL ERROR. 1.005 - 1.030    Urine Hgb DISREGARD RESULTS. CLERICAL ERROR. (A) NEGATIVE    pH, UA DISREGARD RESULTS. CLERICAL ERROR. 5.0 - 8.0    Protein, UA DISREGARD RESULTS. CLERICAL ERROR. (A) NEGATIVE    Urobilinogen, Urine DISREGARD RESULTS. CLERICAL ERROR. (A) Normal    Nitrite, Urine DISREGARD RESULTS. CLERICAL ERROR. (A) NEGATIVE    Leukocyte Esterase, Urine DISREGARD RESULTS. CLERICAL ERROR. (A) NEGATIVE    Urinalysis Comments DISREGARD RESULTS. CLERICAL ERROR. Protime-INR   Result Value Ref Range    Protime 10.6 9.4 - 12.6 sec    INR 1.0    Troponin   Result Value Ref Range    Troponin, High Sensitivity 8 0 - 14 ng/L    Troponin T NOT REPORTED <0.03 ng/mL    Troponin Interp NOT REPORTED    Microscopic Urinalysis   Result Value Ref Range    - DISREGARD RESULTS. CLERICAL ERROR. WBC, UA DISREGARD RESULTS. CLERICAL ERROR. 0 - 5 /HPF    RBC, UA DISREGARD RESULTS. CLERICAL ERROR. 0 - 2 /HPF    Casts UA DISREGARD RESULTS. CLERICAL ERROR. 0 - 2 /LPF    Crystals, UA DISREGARD RESULTS. CLERICAL ERROR. (A) None /HPF    Epithelial Cells UA DISREGARD RESULTS. CLERICAL ERROR. /HPF    Renal Epithelial, UA DISREGARD RESULTS. CLERICAL ERROR. 0 /HPF    Bacteria, UA DISREGARD RESULTS.   CLERICAL ERROR. (A) None    Mucus, UA DISREGARD PATIENT REFERRED TO:  Tom Kurtz MD  [address] [place] [address], [facility] [address] [place] [address] Rd  [phone]    In 3 days        DISCHARGE MEDICATIONS:  New Prescriptions    LORAZEPAM (ATIVAN) 0.5 MG TABLET    Take 1 tablet by mouth every 6 hours as needed for Anxiety for up to 30 days. VALACYCLOVIR (VALTREX) 1 G TABLET    Take 1 tablet by mouth 3 times daily for 7 days       (Please note that portions of this note were completed with a voice recognition program.  Efforts were made to edit the dictations but occasionally words are mis-transcribed.)    Fernando MD, F.A.A.E.M.   Attending Emergency Medicine Physician      Susannah Arrington MD  07/31/20 [id number]

## 2020-08-01 LAB
EKG ATRIAL RATE: 64 BPM
EKG P AXIS: 92 DEGREES
EKG P-R INTERVAL: 156 MS
EKG Q-T INTERVAL: 388 MS
EKG QRS DURATION: 78 MS
EKG QTC CALCULATION (BAZETT): 400 MS
EKG R AXIS: -24 DEGREES
EKG T AXIS: 23 DEGREES
EKG VENTRICULAR RATE: 64 BPM

## 2020-08-07 ENCOUNTER — HOSPITAL ENCOUNTER (OUTPATIENT)
Age: 80
Setting detail: SPECIMEN
Discharge: HOME | End: 2020-08-07
Payer: MEDICARE

## 2020-08-07 LAB
VITAMIN B-12: 826 PG/ML (ref 232–1245)
VITAMIN D 25-HYDROXY: 73.5 NG/ML (ref 30–100)

## 2020-08-07 NOTE — TELEPHONE ENCOUNTER
[facility] is calling to request a refill on the following medication(s):    Last Visit Date (If Applicable):  0/69/2262    Next Visit Date:    9/21/2020    Medication Request:  Requested Prescriptions     Pending Prescriptions Disp Refills    levothyroxine (SYNTHROID) 75 MCG tablet 90 tablet 0

## 2020-08-10 RX ORDER — LEVOTHYROXINE SODIUM 0.07 MG/1
75 TABLET ORAL DAILY
Qty: 90 TABLET | Refills: 0 | Status: SHIPPED | OUTPATIENT
Start: 2020-08-10 | End: 2021-01-20 | Stop reason: SDUPTHER

## 2020-08-13 RX ORDER — ONDANSETRON 4 MG/1
TABLET, ORALLY DISINTEGRATING ORAL
Qty: 20 TABLET | Refills: 0 | Status: SHIPPED | OUTPATIENT
Start: 2020-08-13 | End: 2020-08-28

## 2020-08-17 ENCOUNTER — HOSPITAL ENCOUNTER (OUTPATIENT)
Dept: ULTRASOUND IMAGING | Facility: CLINIC | Age: 80
Discharge: HOME | End: 2020-08-19
Payer: MEDICARE

## 2020-08-17 PROCEDURE — 76536 US EXAM OF HEAD AND NECK: CPT

## 2020-08-18 NOTE — RESULT ENCOUNTER NOTE
Per radiologist no changes from the previous size of the thyroid nodule at the  center of thyroid. I am trying to find a previous biopsy report. Please ask the patient where she had it done and then request the paperwork so we will make sure we do not have to follow that up. Per radiologist to follow-up yearly recommended. Thank you.

## 2020-08-21 ENCOUNTER — TELEPHONE (OUTPATIENT)
Dept: FAMILY MEDICINE CLINIC | Age: 80
End: 2020-08-21

## 2020-08-21 NOTE — TELEPHONE ENCOUNTER
Pt needs a renewed script for handicap placard for the duration of 5 years for right hip bursitis and neuropathy.  Please complete and notify pt she will pick it up

## 2020-08-24 ENCOUNTER — PATIENT MESSAGE (OUTPATIENT)
Dept: FAMILY MEDICINE CLINIC | Age: 80
End: 2020-08-24

## 2020-08-24 NOTE — TELEPHONE ENCOUNTER
From: Prudence Gone  To: Dior Curran MD  Sent: 8/24/2020 11:00 AM EDT  Subject: Prescription Question    This is to let you know that I received a Pneumonia shot Radha Session) on Friday August 21, 2020 at the [facility] [facility]. [address] The pharmacist told me I was now due and eligible for this shot and a Hep C shot. Does Dr. Janine Bustillos concur that I'm due for a Hep C shot as well? If so I'll receive that in about a month. Is it okay to be receiving regular shots during this Covid-19 time? This morning I noticed that my phlegm is a little yellow. I'm assuming that's from the Pneumonia shot. Please advise.   Many thanks,  Michelle Coley ([phone])

## 2020-08-25 ENCOUNTER — OFFICE VISIT (OUTPATIENT)
Dept: NEUROLOGY | Age: 80
End: 2020-08-25
Payer: MEDICARE

## 2020-08-25 VITALS
HEIGHT: 65 IN | DIASTOLIC BLOOD PRESSURE: 76 MMHG | SYSTOLIC BLOOD PRESSURE: 125 MMHG | TEMPERATURE: 97.2 F | HEART RATE: 58 BPM | BODY MASS INDEX: 37.15 KG/M2 | WEIGHT: 223 LBS

## 2020-08-25 PROCEDURE — 1123F ACP DISCUSS/DSCN MKR DOCD: CPT | Performed by: PSYCHIATRY & NEUROLOGY

## 2020-08-25 PROCEDURE — 99214 OFFICE O/P EST MOD 30 MIN: CPT | Performed by: PSYCHIATRY & NEUROLOGY

## 2020-08-25 PROCEDURE — G8399 PT W/DXA RESULTS DOCUMENT: HCPCS | Performed by: PSYCHIATRY & NEUROLOGY

## 2020-08-25 PROCEDURE — G8417 CALC BMI ABV UP PARAM F/U: HCPCS | Performed by: PSYCHIATRY & NEUROLOGY

## 2020-08-25 PROCEDURE — 4040F PNEUMOC VAC/ADMIN/RCVD: CPT | Performed by: PSYCHIATRY & NEUROLOGY

## 2020-08-25 PROCEDURE — G8427 DOCREV CUR MEDS BY ELIG CLIN: HCPCS | Performed by: PSYCHIATRY & NEUROLOGY

## 2020-08-25 PROCEDURE — 1090F PRES/ABSN URINE INCON ASSESS: CPT | Performed by: PSYCHIATRY & NEUROLOGY

## 2020-08-25 PROCEDURE — 1036F TOBACCO NON-USER: CPT | Performed by: PSYCHIATRY & NEUROLOGY

## 2020-08-25 NOTE — TELEPHONE ENCOUNTER
From: Edna Taylor  To: Shagufta Pope MD  Sent: 8/24/2020 4:38 PM EDT  Subject: Prescription Question    Dr. Salma Mahoney and sherita. I meant to say that the pharmacist said I was eligible (from an insurance point of view) to get a Hepatitis B shot. Do you recommend that? I recall getting pneumonia shots, but I don't recall getting Hepatitis shots. Possibly I did, but I don't recall if I did or didn't. Many thanks,  Ambika Crystal      ----- Message -----   Krish Cheema MD   Sent:8/24/2020 12:29 PM EDT   To:Alicia Keller   Subject:RE: Prescription Question    Thank you for letting me know. I updated the vaccinations. What do you mean with hep C shot? Thank you.      ----- Message -----   From:Alicia Cordero   Sent:8/24/2020 11:00 AM EDT   Shonda Gonzalez MD   Subject:Prescription Question    This is to let you know that I received a Pneumonia shot Floyd Day) on Friday August 21, 2020 at the [facility] [employer] [facility]. [address] The pharmacist told me I was now due and eligible for this shot and a Hep C shot. Does Dr. Christal Huog concur that I'm due for a Hep C shot as well? If so I'll receive that in about a month. Is it okay to be receiving regular shots during this Covid-19 time? This morning I noticed that my phlegm is a little yellow. I'm assuming that's from the Pneumonia shot. Please advise.   Many thanks,  Ambika Crystal ([phone])

## 2020-08-25 NOTE — PROGRESS NOTES
[facility]  Offices: [address], [address], [place], [address] [facility]  [address] [facility], [address], [place], [address] [facility]  [address] [place] [address] [place], [facility], [facility] [address]  Phone: [phone]  Fax: [phone]    OKSANA Flores, MD Alfredo Vance, MD Adriana Villafuerte, MD Atiya Nur, MD Kerri Mendoza, CNP    8/25/2020      HISTORY OF PRESENT ILLNESS:       I had the pleasure of seeing Beena Murdock, who returns for continuing neurologic care for essential tremor and episodes of shaking (functional in etiology), onset in 2010. On her last visit, I proceeded with an MRI of the brain and cervical spine since her jerking and shaking movements were suspicious of possible myoclonus, based on her description. Imaging did not show any significant abnormalities, except for a known right-sided thyroid nodule. Images and findings discussed in detail with the patient. I also started her on gabapentin on her last visit for her tremor. However, patient reports she did not take the medication because she is already taking too much, and the tremor is mild and does not really affect her function on a daily basis. She notices it mostly in the morning, and when she is weightbearing. She will notice that her hand will shake if she is cooking or eating, but denies spilling any food or drink. Her tremor remains mostly in the right hand, she is right-handed. It is worsened by anxiety and fatigue with no alleviating factors. With regards to her jerking/shaking movements, patient reports they have gotten somewhat better. They still typically occur in the morning when she first wakes up and can involve her trunk or all 4 extremities with no clear triggers. On her initial exam, she had a significant functional component, her tremor change in amplitude and frequency when I laid her down on the exam table.   Patient denies any balance problems, no new symptoms. Prior medication trials: Propranolol (shortness of breath), primidone (nausea and dizziness at low doses)     Prior testing reviewed:  MRI brain without contrast 3/9/2020:  No acute intracranial abnormality.         Minimal parenchymal volume loss.         Minimal chronic microvascular disease. MRI cervical spine without contrast 3/9/2020:  Degenerative disc disease as described above, exacerbating congenitally    narrow cervical spinal canal, grossly stable.         Spinal canal narrowing, mild at C5-6 and C6-7.         Foraminal narrowing, moderate at bilateral C5-6, mild to moderate at    bilateral C6-7.         2.3 cm right thyroid nodule.  This was previously evaluated by thyroid    ultrasound September 4, 2019, which recommended tissue sampling.     ARISTEO scan 9/24/2019: Negative  EEG 5/7/2019: Negative        PAST MEDICAL HISTORY:         Diagnosis Date    Acid reflux     Diverticular disease 6/30/2014    GERD (gastroesophageal reflux disease)     Hypercholesteremia 6/30/2014    Hypothyroid 6/30/2014    Lung nodule     Meniere disease     right ear    Neuropathy     Osteopenia 6/30/2014    Prediabetes     RAD (reactive airway disease) 6/30/2014    Tremor     r hand    Vitamin D deficiency 6/30/2014        PAST SURGICAL HISTORY:         Procedure Laterality Date    CHOLECYSTECTOMY      COLONOSCOPY      ENDOSCOPY, COLON, DIAGNOSTIC      ERCP  03/21/14    EXCISION / BIOPSY SKIN LESION OF TRUNK Left 11/8/2017    EXCISION MASS LEFT POSTERIOR SHOULDER, LEFT ANTERIOR ARM performed by Micha Jade MD at [address] [place] [address]  01/03/2007    SKIN BIOPSY Left 11/08/2017    Excision Mass Left Posterior Shoulder, Left Anterior Arm        SOCIAL HISTORY:     Social History     Socioeconomic History    Marital status: Single     Spouse name: Not on file    Number of children: Not on file    Years of education: Not on file    Highest education level: Not on file into the lungs 4 times daily as needed for Wheezing 3 Inhaler 1    budesonide-formoterol (SYMBICORT) 80-4.5 MCG/ACT AERO Inhale 2 puffs into the lungs 2 times daily (Patient taking differently: Inhale 2 puffs into the lungs as needed ) 1 Inhaler 6    metoprolol tartrate (LOPRESSOR) 25 MG tablet Take 25 mg by mouth 2 times daily      guaiFENesin (MUCINEX) 600 MG extended release tablet Take 1 tablet by mouth 2 times daily (Patient taking differently: Take 600 mg by mouth 2 times daily as needed ) 20 tablet 0    butenafine (MENTAX) 1 % CREA Apply 1 Tube topically 2 times daily (Patient taking differently: Apply 1 Tube topically 2 times daily as needed ) 15 g 0    alclomethasone (ACLOVATE) 0.05 % cream Apply topically 2 times daily Apply topically 2 times daily. (Patient taking differently: Apply topically 2 times daily as needed Apply topically 2 times daily.) 45 g 1    amitriptyline (ELAVIL) 10 MG tablet TAKE ONE TABLET BY MOUTH DAILY AS NEEDED FOR SLEEP AND LEG PAIN FOR UP TO 30 DAYS 30 tablet 0    NONFORMULARY Indications: Dietary fiber 1 tsp a day       ECHINACEA EXTRACT PO Take 450 mg by mouth Indications: Take in winter WINTER ONLY      Cholecalciferol (VITAMIN D3) 2000 units CAPS Take 1 capsule by mouth 2 times daily 30 capsule 0    diltiazem (CARDIZEM CD) 120 MG extended release capsule Take 1 capsule by mouth every evening 30 capsule 3    fluticasone (FLONASE) 50 MCG/ACT nasal spray PLACE 1 SPRAY IN EACH NOSTRIL DAILY FOR 15 DAYS 1 Bottle 2    ketoconazole (NIZORAL) 2 % shampoo APPLY  TO AFFECTED AREAS TOPICALLY DAILY THEN RINSE OFF AFTER 5 MINUTES (Patient taking differently: APPLY  TO AFFECTED AREAS TOPICALLY DAILY THEN RINSE OFF AFTER 5 MINUTES WHEN NEEDED) 120 mL 2    ezetimibe (ZETIA) 10 MG tablet Take 10 mg by mouth daily      esomeprazole Magnesium (NEXIUM) 40 MG PACK Take 40 mg by mouth daily.       NONFORMULARY Super b complex 3 times per week      ascorbic acid (VITAMIN C) 500 MG tablet Take 500 mg by mouth daily.  chlorpheniramine (CHLOR-TRIMETON) 4 MG tablet Take 12 mg by mouth daily        Current Facility-Administered Medications   Medication Dose Route Frequency Provider Last Rate Last Dose    lidocaine 1 % injection 8 mL  8 mL Intra-articular Once Gay Snell PA-C        triamcinolone acetonide [facility]IA [facility]) injection 80 mg  80 mg Intra-articular Once Gay Snell PA-C         Facility-Administered Medications Ordered in Other Visits   Medication Dose Route Frequency Provider Last Rate Last Dose    0.9 % sodium chloride infusion   Intravenous Continuous Soo Cyr  mL/hr at 02/19/14 0825          ALLERGIES:     Allergies   Allergen Reactions    Aspirin Swelling     Hives. anaphylaxis    Atorvastatin Other (See Comments)     myalgia    Crestor [Rosuvastatin]      Myalgia      Livalo [Pitavastatin] Other (See Comments)     Muscle pain    Seasonal     Simvastatin      Myalgia      Statins      Other reaction(s): Unknown    Welchol [Colesevelam Hcl] Other (See Comments)     Leg cramp                             REVIEW OF SYSTEMS     All items selected indicate a positive finding. Those items not selected are negative.   Constitutional [] Weight loss/gain   [] Fatigue  [] Fever/Chills   HEENT [] Hearing Loss  [] Visual Disturbance  [] Tinnitus  [] Eye pain   Respiratory [] Shortness of Breath  [] Cough  [] Snoring   Cardiovascular [] Chest Pain  [] Palpitations  [] Lightheaded   GI [] Constipation  [] Diarrhea  [] Swallowing change    [] Urinary Frequency  [] Urinary Urgency   Musculoskeletal [] Neck pain  [] Back pain  [] Muscle pain  [] Restless legs   Dermatologic [] Skin changes   Neurologic [] Memory loss/confusion  [] Seizures  [] Trouble walking or imbalance  [] Dizziness  [] Weakness  [] Numbness  [] Tremors  [] Speech Difficulty  [] Headaches  [] Light Sensitivity  [] Sound Sensitivity   Endocrinology []Excessive thirst  []Excessive hunger   Psychiatric [] Anxiety/Depression  [] Hallucination   Allergy/immunology []Hives/environmental allergies   Hematologic/lymph [] Abnormal bleeding  [] Abnormal bruising         PHYSICAL EXAMINATION:       Vitals:    08/25/20 1108   BP: 125/76   Pulse: 58   Temp: 97.2 °F (36.2 °C)                                              .                                                                                                     General Appearance:  Alert, cooperative, no signs of distress, appears stated age   Head:  Normocephalic, no signs of trauma   Eyes:  Conjunctiva/corneas clear;  eyelids intact   Ears:  Normal external ear and canals   Nose: Nares normal, mucosa normal, no drainage    Throat: Lips and tongue normal; teeth normal;  gums normal   Neck: Supple, intact flexion, extension and rotation;   trachea midline;  no adenopathy;   thyroid: not enlarged;   no carotid pulse abnormality   Back:   Symmetric, no curvature, ROM adequate   Lungs:   Respirations unlabored   Heart:  Regular rate and rhythm           Extremities: Extremities normal, no cyanosis, no edema   Pulses: Symmetric over head and neck   Skin: Skin color, texture normal, no rashes, no lesions                                     NEUROLOGIC EXAMINATION      Mental status    Alert and oriented x 3; intact memory with no confusion, speech or language problems; no hallucinations or delusions  Fund of information appropriate for level of education    Cranial nerves    II - visual fields intact to confrontation , fundoscopy showed no  papiledema                                                III, IV, VI - extra-ocular muscles full: no pupillary defect; no GREGORY, no nystagmus, no ptosis   V - normal facial sensation                                                               VII - normal facial symmetry                                                             VIII - intact hearing software. Although every effort was made to ensure the accuracy of this automated transcription, some errors in transcription may have occurred.

## 2020-08-26 ENCOUNTER — PATIENT MESSAGE (OUTPATIENT)
Dept: FAMILY MEDICINE CLINIC | Age: 80
End: 2020-08-26

## 2020-08-26 NOTE — TELEPHONE ENCOUNTER
From: Saad Jose  To: Fahad Peralta MD  Sent: 8/26/2020 1:25 PM EDT  Subject: Prescription Question    Dr. Simran Rios,  Many thanks for your e-mail reply. I will wait to discuss this with you in person, when I come for my Wellness Check in mid-September. Loretta Fernandez      ----- Message -----   Jordan English MD   Sent:8/25/2020 1:35 PM EDT   To:Alicia Delgadillo    Subject:RE: Prescription Question    I can order titers to see if you are still immune to hep B before getting the 3 step series. You probably had this vacation when you were younger. Thank you.      ----- Message -----   From:Alicia Munoz   Sent:8/24/2020 4:38 PM EDT   Mandie Blackburn MD   Subject:Prescription Question    Dr. Vincenzo Honeycutt. I meant to say that the pharmacist said I was eligible (from an insurance point of view) to get a Hepatitis B shot. Do you recommend that? I recall getting pneumonia shots, but I don't recall getting Hepatitis shots. Possibly I did, but I don't recall if I did or didn't. Many thanks,  Lela Galo      ----- Message -----   Jordan English MD   Sent:8/24/2020 12:29 PM EDT   To:Alicia Delgadillo   Subject:RE: Prescription Question    Thank you for letting me know. I updated the vaccinations. What do you mean with hep C shot? Thank you.      ----- Message -----   From:Alicia Munoz   Sent:8/24/2020 11:00 AM EDT   Mandie Blackburn MD   Subject:Prescription Question    This is to let you know that I received a Pneumonia shot Joseline Kessler) on Friday August 21, 2020 at the [address] [place] [address] at [facility]. [address] The pharmacist told me I was now due and eligible for this shot and a Hep C shot. Does Dr. Simran Rios concur that I'm due for a Hep C shot as well? If so I'll receive that in about a month. Is it okay to be receiving regular shots during this Covid-19 time? This morning I noticed that my phlegm is a little yellow.  I'm assuming that's from the Pneumonia shot. Please advise.   Many thanks,  Leslie Hoffman ([phone])

## 2020-08-28 RX ORDER — ONDANSETRON 4 MG/1
TABLET, ORALLY DISINTEGRATING ORAL
Qty: 20 TABLET | Refills: 0 | Status: SHIPPED | OUTPATIENT
Start: 2020-08-28 | End: 2021-01-12 | Stop reason: SDUPTHER

## 2020-08-28 NOTE — TELEPHONE ENCOUNTER
Sumanth Dominique is calling to request a refill on the following medication(s):    Last Visit Date (If Applicable):  5/95/5019    Next Visit Date:    9/24/2020    Medication Request:  Requested Prescriptions     Pending Prescriptions Disp Refills    ondansetron (ZOFRAN-ODT) 4 MG disintegrating tablet [Pharmacy Med Name: ONDANSETRON ODT 4 MG TABLET] 20 tablet 0     Sig: DISSOLVE ONE TABLET BY MOUTH EVERY 8 HOURS AS NEEDED FOR NAUSEA    metFORMIN (GLUCOPHAGE) 500 MG tablet 90 tablet      Sig: Take 1 tablet by mouth daily

## 2020-09-17 ASSESSMENT — PATIENT HEALTH QUESTIONNAIRE - PHQ9
2. FEELING DOWN, DEPRESSED OR HOPELESS: 0
SUM OF ALL RESPONSES TO PHQ QUESTIONS 1-9: 0
1. LITTLE INTEREST OR PLEASURE IN DOING THINGS: 0
SUM OF ALL RESPONSES TO PHQ QUESTIONS 1-9: 0
SUM OF ALL RESPONSES TO PHQ9 QUESTIONS 1 & 2: 0

## 2020-09-17 ASSESSMENT — LIFESTYLE VARIABLES
AUDIT-C TOTAL SCORE: INCOMPLETE
HOW OFTEN DO YOU HAVE A DRINK CONTAINING ALCOHOL: 0
AUDIT TOTAL SCORE: INCOMPLETE
HOW OFTEN DO YOU HAVE A DRINK CONTAINING ALCOHOL: NEVER

## 2020-09-23 ENCOUNTER — HOSPITAL ENCOUNTER (OUTPATIENT)
Age: 80
Setting detail: SPECIMEN
Discharge: HOME | End: 2020-09-23
Payer: MEDICARE

## 2020-09-23 ENCOUNTER — OFFICE VISIT (OUTPATIENT)
Dept: ORTHOPEDIC SURGERY | Age: 80
End: 2020-09-23
Payer: MEDICARE

## 2020-09-23 VITALS
DIASTOLIC BLOOD PRESSURE: 78 MMHG | WEIGHT: 223 LBS | SYSTOLIC BLOOD PRESSURE: 132 MMHG | BODY MASS INDEX: 37.15 KG/M2 | HEART RATE: 59 BPM | HEIGHT: 65 IN

## 2020-09-23 LAB
ALT SERPL-CCNC: 18 U/L (ref 5–33)
AST SERPL-CCNC: 20 U/L
CHOLESTEROL/HDL RATIO: 2.4
CHOLESTEROL: 117 MG/DL
HDLC SERPL-MCNC: 49 MG/DL
LDL CHOLESTEROL: 41 MG/DL (ref 0–130)
TRIGL SERPL-MCNC: 133 MG/DL
VLDLC SERPL CALC-MCNC: NORMAL MG/DL (ref 1–30)

## 2020-09-23 PROCEDURE — 20611 DRAIN/INJ JOINT/BURSA W/US: CPT | Performed by: PHYSICIAN ASSISTANT

## 2020-09-23 RX ORDER — LIDOCAINE HYDROCHLORIDE 10 MG/ML
4 INJECTION, SOLUTION INFILTRATION; PERINEURAL ONCE
Status: COMPLETED | OUTPATIENT
Start: 2020-09-23 | End: 2020-09-23

## 2020-09-23 RX ORDER — METHYLPREDNISOLONE ACETATE 40 MG/ML
40 INJECTION, SUSPENSION INTRA-ARTICULAR; INTRALESIONAL; INTRAMUSCULAR; SOFT TISSUE ONCE
Status: COMPLETED | OUTPATIENT
Start: 2020-09-23 | End: 2020-09-23

## 2020-09-23 RX ADMIN — METHYLPREDNISOLONE ACETATE 40 MG: 40 INJECTION, SUSPENSION INTRA-ARTICULAR; INTRALESIONAL; INTRAMUSCULAR; SOFT TISSUE at 13:49

## 2020-09-23 RX ADMIN — LIDOCAINE HYDROCHLORIDE 4 ML: 10 INJECTION, SOLUTION INFILTRATION; PERINEURAL at 13:48

## 2020-09-23 NOTE — PROGRESS NOTES
[address] [facility]  [address] [place] [id number]  Dept: [phone]  Dept Fax: [phone]          Right Hip Visit - Follow up    Subjective:     Chief Complaint   Patient presents with    Hip Pain     Right hip pain, cortisone- 6/16/20     HPI:      Femi Stevenson presents today for Right hip pain. Patient is here today for a cortisone injection into the greater trochanteric right hip. Her last cortisone injection was on 06/16/2020 with good pain relief. I have reviewed the CC, and if not present in this note, I have reviewed in the patient's chart. I agree with the documentation provided by other staff and have reviewed their documentation prior to providing my signature indicating agreement. Vitals:   /78   Pulse 59   Ht 5' 5\" (1.651 m)   Wt 223 lb (101.2 kg)   BMI 37.11 kg/m²  Body mass index is 37.11 kg/m². Assessment:     1. Greater trochanteric bursitis of right hip      Procedures:    Procedure: Yes    Greater Trochanteric Bursa Injection    Location: Right Hip  Procedure: Femi Stevenson agreed today for a Corticosteroid injection into the right greater trochanteric bursa. The patient was placed in the lateral position with the affected side up. The point of the maximum tenderness was marked with the closed end of a click type pen. The skin was prepped with betadine in a sterile fashion. Utilizing sterile technique a 5 cc solution containing 4cc of 1% Lidocaine and 1 cc containing 40mg of depo-medrol was injected into the greater trochanteric bursa with a 20 gu. spinal needle. The wound was cleansed and a Band-Aid was placed. The patient tolerated the procedure without difficulty. Adverse reactions of the injection was discussed with the patient including signs of infection (increasing pain, redness, swelling) and the patient was instructed to call immediately with any of these symptoms.       Plan: Patient should return to the clinic in 3 months to follow up with Avni Duron PA-C. The patient will call the office immediately with any problems. Orders Placed This Encounter   Medications    lidocaine 1 % injection 4 mL    methylPREDNISolone acetate (DEPO-MEDROL) injection 40 mg     No orders of the defined types were placed in this encounter. IPriscella Hobby Day V, am scribing for and in the presence of Avni Duron PA-C. 9/23/2020  1:50 PM    I, Avni Duron PA-C, have personally seen this patient, reviewed the chart including history, and imaging if done. I personally  performed the physical exam and obtained any needed additional history. I placed orders, performed or supervised procedures and developed the treatment plan.     Electronically signed by Leila Minaya PA-C, on 9/24/2020 at 4:57 PM      Electronically signed by Bita Christina, on 9/23/2020 at 1:50 PM

## 2020-09-24 ENCOUNTER — OFFICE VISIT (OUTPATIENT)
Dept: FAMILY MEDICINE CLINIC | Age: 80
End: 2020-09-24
Payer: MEDICARE

## 2020-09-24 VITALS
SYSTOLIC BLOOD PRESSURE: 133 MMHG | HEART RATE: 85 BPM | HEIGHT: 65 IN | OXYGEN SATURATION: 96 % | DIASTOLIC BLOOD PRESSURE: 81 MMHG | BODY MASS INDEX: 36.82 KG/M2 | WEIGHT: 221 LBS | TEMPERATURE: 96.6 F

## 2020-09-24 PROCEDURE — 4040F PNEUMOC VAC/ADMIN/RCVD: CPT | Performed by: FAMILY MEDICINE

## 2020-09-24 PROCEDURE — 1123F ACP DISCUSS/DSCN MKR DOCD: CPT | Performed by: FAMILY MEDICINE

## 2020-09-24 PROCEDURE — G0439 PPPS, SUBSEQ VISIT: HCPCS | Performed by: FAMILY MEDICINE

## 2020-09-24 NOTE — PATIENT INSTRUCTIONS
Personalized Preventive Plan for Hasmukh Cardinal - 9/24/2020  Medicare offers a range of preventive health benefits. Some of the tests and screenings are paid in full while other may be subject to a deductible, co-insurance, and/or copay. Some of these benefits include a comprehensive review of your medical history including lifestyle, illnesses that may run in your family, and various assessments and screenings as appropriate. After reviewing your medical record and screening and assessments performed today your provider may have ordered immunizations, labs, imaging, and/or referrals for you. A list of these orders (if applicable) as well as your Preventive Care list are included within your After Visit Summary for your review. Other Preventive Recommendations:    · A preventive eye exam performed by an eye specialist is recommended every 1-2 years to screen for glaucoma; cataracts, macular degeneration, and other eye disorders. · A preventive dental visit is recommended every 6 months. · Try to get at least 150 minutes of exercise per week or 10,000 steps per day on a pedometer . · Order or download the FREE \"Exercise & Physical Activity: Your Everyday Guide\" from [employer]. Call 3-924.273.1117 or search [employer] online. · You need 1787-3081 mg of calcium and 9683-9910 IU of vitamin D per day. It is possible to meet your calcium requirement with diet alone, but a vitamin D supplement is usually necessary to meet this goal.  · When exposed to the sun, use a sunscreen that protects against both UVA and UVB radiation with an SPF of 30 or greater. Reapply every 2 to 3 hours or after sweating, drying off with a towel, or swimming. · Always wear a seat belt when traveling in a car. Always wear a helmet when riding a bicycle or motorcycle. Heart-Healthy Diet   Sodium, Fat, and Cholesterol Controlled Diet       What Is a Heart Healthy Diet?    A heart-healthy diet is one that limits sodium , certain types of fat , and cholesterol . This type of diet is recommended for:   People with any form of cardiovascular disease (eg, coronary heart disease , peripheral vascular disease , previous heart attack , previous stroke )   People with risk factors for cardiovascular disease, such as high blood pressure , high cholesterol , or diabetes   Anyone who wants to lower their risk of developing cardiovascular disease   Sodium    Sodium is a mineral found in many foods. In general, most people consume much more sodium than they need. Diets high in sodium can increase blood pressure and lead to edema (water retention). On a heart-healthy diet, you should consume no more than 2,300 mg (milligrams) of sodium per dayabout the amount in one teaspoon of table salt. The foods highest in sodium include table salt (about 50% sodium), processed foods, convenience foods, and preserved foods. Cholesterol    Cholesterol is a fat-like, waxy substance in your blood. Our bodies make some cholesterol. It is also found in animal products, with the highest amounts in fatty meat, egg yolks, whole milk, cheese, shellfish, and organ meats. On a heart-healthy diet, you should limit your cholesterol intake to less than 200 mg per day. It is normal and important to have some cholesterol in your bloodstream. But too much cholesterol can cause plaque to build up within your arteries, which can eventually lead to a heart attack or stroke. The two types of cholesterol that are most commonly referred to are:   Low-density lipoprotein (LDL) cholesterol  Also known as bad cholesterol, this is the cholesterol that tends to build up along your arteries. Bad cholesterol levels are increased by eating fats that are saturated or hydrogenated. Optimal level of this cholesterol is less than 100. Over 130 starts to get risky for heart disease.    High-density lipoprotein (HDL) cholesterol  Also known as example, this would mean 60 grams of fat or less per day. Saturated fat and trans fat in your diet raises your blood cholesterol the most, much more than dietary cholesterol does. For this reason, on a heart-healthy diet, less than 7% of your calories should come from saturated fat and ideally 0% from trans fat. On an 1800-calorie diet, this translates into less than 14 grams of saturated fat per day, leaving 46 grams of fat to come from mono- and polyunsaturated fats.    Food Choices on a Heart Healthy Diet   Food Category   Foods Recommended   Foods to Avoid   Grains   Breads and rolls without salted tops Most dry and cooked cereals Unsalted crackers and breadsticks Low-sodium or homemade breadcrumbs or stuffing All rice and pastas   Breads, rolls, and crackers with salted tops High-fat baked goods (eg, muffins, donuts, pastries) Quick breads, self-rising flour, and biscuit mixes Regular bread crumbs Instant hot cereals Commercially prepared rice, pasta, or stuffing mixes   Vegetables   Most fresh, frozen, and low-sodium canned vegetables Low-sodium and salt-free vegetable juices Canned vegetables if unsalted or rinsed   Regular canned vegetables and juices, including sauerkraut and pickled vegetables Frozen vegetables with sauces Commercially prepared potato and vegetable mixes   Fruits   Most fresh, frozen, and canned fruits All fruit juices   Fruits processed with salt or sodium   Milk   Nonfat or low-fat (1%) milk Nonfat or low-fat yogurt Cottage cheese, low-fat ricotta, cheeses labeled as low-fat and low-sodium   Whole milk Reduced-fat (2%) milk Malted and chocolate milk Full fat yogurt Most cheeses (unless low-fat and low salt) Buttermilk (no more than 1 cup per week)   Meats and Beans   Lean cuts of fresh or frozen beef, veal, lamb, or pork (look for the word loin) Fresh or frozen poultry without the skin Fresh or frozen fish and some shellfish Egg whites and egg substitutes (Limit whole eggs to three per week) Tofu Nuts or seeds (unsalted, dry-roasted), low-sodium peanut butter Dried peas, beans, and lentils   Any smoked, cured, salted, or canned meat, fish, or poultry (including bernstein, chipped beef, cold cuts, hot dogs, sausages, sardines, and anchovies) Poultry skins Breaded and/or fried fish or meats Canned peas, beans, and lentils Salted nuts   Fats and Oils   Olive oil and canola oil Low-sodium, low-fat salad dressings and mayonnaise   Butter, margarine, coconut and palm oils, bernstein fat   Snacks, Sweets, and Condiments   Low-sodium or unsalted versions of broths, soups, soy sauce, and condiments Pepper, herbs, and spices; vinegar, lemon, or lime juice Low-fat frozen desserts (yogurt, sherbet, fruit bars) Sugar, cocoa powder, honey, syrup, jam, and preserves Low-fat, trans-fat free cookies, cakes, and pies Pete and animal crackers, fig bars, nilda snaps   High-fat desserts Broth, soups, gravies, and sauces, made from instant mixes or other high-sodium ingredients Salted snack foods Canned olives Meat tenderizers, seasoning salt, and most flavored vinegars   Beverages   Low-sodium carbonated beverages Tea and coffee in moderation Soy milk   Commercially softened water   Suggestions   Make whole grains, fruits, and vegetables the base of your diet. Choose heart-healthy fats such as canola, olive, and flaxseed oil, and foods high in heart-healthy fats, such as nuts, seeds, soybeans, tofu, and fish. Eat fish at least twice per week; the fish highest in omega-3 fatty acids and lowest in mercury include salmon, herring, mackerel, sardines, and canned chunk light tuna. If you eat fish less than twice per week or have high triglycerides, talk to your doctor about taking fish oil supplements. Read food labels.    For products low in fat and cholesterol, look for fat free, low-fat, cholesterol free, saturated fat free, and trans fat freeAlso scan the Nutrition Facts Label, which lists saturated fat, trans fat, and cholesterol amounts. For products low in sodium, look for sodium free, very low sodium, low sodium, no added salt, and unsalted   Skip the salt when cooking or at the table; if food needs more flavor, get creative and try out different herbs and spices. Garlic and onion also add substantial flavor to foods. Trim any visible fat off meat and poultry before cooking, and drain the fat off after zhao. Use cooking methods that require little or no added fat, such as grilling, boiling, baking, poaching, broiling, roasting, steaming, stir-frying, and sauting. Avoid fast food and convenience food. They tend to be high in saturated and trans fat and have a lot of added salt. Talk to a registered dietitian for individualized diet advice. Last Reviewed: March 2011 Radha Hazel MS, MPH, RD   Updated: 3/29/2011   ·     Keep Your Memory Nerissa Cramer       Many factors can affect your ability to remembera hectic lifestyle, aging, stress, chronic disease, and certain medicines. But, there are steps you can take to sharpen your mind and help preserve your memory. Challenge Your Brain   Regularly challenging your mind may help keeps it in top shape. Good mental exercises include:   Crossword puzzlesUse a dictionary if you need it; you will learn more that way. Brainteasers Try some! Crafts, such as wood working and sewing   Hobbies, such as gardening and building model airplanes   SocializingVisit old friends or join groups to meet new ones. Reading   Learning a new language   Taking a class, whether it be art history or dada chi   TravelingExperience the food, history, and culture of your destination   Learning to use a computer   Going to museums, the theater, or thought-provoking movies   Changing things in your daily life, such as reversing your pattern in the grocery store or brushing your teeth using your nondominant hand   Use Memory Aids   There is no need to remember every detail on your own.  These relaxation. Choose activities that calm you down, and make it routine. Manage Chronic Conditions    Side effects of high blood pressure , diabetes, and heart disease can interfere with mental function. Many of the lifestyle steps discussed here can help manage these conditions. Strive to eat a healthy diet, exercise regularly, get stress under control, and follow your doctor's advice for your condition. Minimize Medications    Talk to your doctor about the medicines that you take. Some may be unnecessary. Also, healthy lifestyle habits may lower the need for certain drugs. Last Reviewed: April 2010 Missy Wiley MD   Updated: 4/13/2010   ·     [address]       As we get older, changes in balance, gait, strength, vision, hearing, and cognition make even the most youthful senior more prone to accidents. Falls are one of the leading health risks for older people. This increased risk of falling is related to:   Aging process (eg, decreased muscle strength, slowed reflexes)   Higher incidence of chronic health problems (eg, arthritis, diabetes) that may limit mobility, agility or sensory awareness   Side effects of medicine (eg, dizziness, blurred vision)especially medicines like prescription pain medicines and drugs used to treat mental health conditions   Depending on the brittleness of your bones, the consequences of a fall can be serious and long lasting. Home Life   Research by the Association of Aging [facility]) shows that some home accidents among older adults can be prevented by making simple lifestyle changes and basic modifications and repairs to the home environment. Here are some lifestyle changes that experts recommend:   Have your hearing and vision checked regularly. Be sure to wear prescription glasses that are right for you. Speak to your doctor or pharmacist about the possible side effects of your medicines. A number of medicines can cause dizziness.    If you have problems with sleep, talk to your doctor. Limit your intake of alcohol. If necessary, use a cane or walker to help maintain your balance. Wear supportive, rubber-soled shoes, even at home. If you live in a region that gets wintry weather, you may want to put special cleats on your shoes to prevent you from slipping on the snow and ice. Exercise regularly to help maintain muscle tone, agility, and balance. Always hold the banister when going up or down stairs. Also, use  bars when getting in or out of the bath or shower, or using the toilet. To avoid dizziness, get up slowly from a lying down position. Sit up first, dangling your legs for a minute or two before rising to a standing position. Overall Home Safety Check   According to the [employer]'s \"Older Consumer Home Safety Checklist,\" it is important to check for potential hazards in each room. And remember, proper lighting is an essential factor in home safety. If you cannot see clearly, you are more likely to fall. Important questions to ask yourself include:   Are lamp, electric, extension, and telephone cords placed out of the flow of traffic and maintained in good condition? Have frayed cords been replaced? Are all small rugs and runners slip resistant? If not, you can secure them to the floor with a special double-sided carpet tape. Are smoke detectors properly locatedone on every floor of your home and one outside of every sleeping area? Are they in good working order? Are batteries replaced at least once a year? Do you have a well-maintained carbon monoxide detector outside every sleeping are in your home? Does your furniture layout leave plenty of space to maneuver between and around chairs, tables, beds, and sofas? Are hallways, stairs and passages between rooms well lit? Can you reach a lamp without getting out of bed? Are floor surfaces well maintained?  Shag rugs, high-pile carpeting, tile floors, and polished wood floors can be particularly slippery. Stairs should always have handrails and be carpeted or fitted with a non-skid tread. Is your telephone easily reachable. Is the cord safely tucked away? Room by Room   According to the Association of Aging, bathrooms and hoang are the two most potentially hazardous rooms in your home. In the Kitchen    Be sure your stove is in proper working order and always make sure burners and the oven are off before you go out or go to sleep. Keep pots on the back burners, turn handles away from the front of the stove, and keep stove clean and free of grease build-up. Kitchen ventilation systems and range exhausts should be working properly. Keep flammable objects such as towels and pot holders away from the cooking area except when in use. Make sure kitchen curtains are tied back. Move cords and appliances away from the sink and hot surfaces. If extension cords are needed, install wiring guides so they do not hang over the sink, range, or working areas. Look for coffee pots, kettles and toaster ovens with automatic shut-offs. Keep a mop handy in the kitchen so you can wipe up spills instantly. You should also have a small fire extinguisher. Arrange your kitchen with frequently used items on lower shelves to avoid the need to stand on a stepstool to reach them. Make sure countertops are well-lit to avoid injuries while cutting and preparing food. In the Bathroom    Use a non-slip mat or decals in the tub and shower, since wet, soapy tile or porcelain surfaces are extremely slippery. Make sure bathroom rugs are non-skid or tape them firmly to the floor. Bathtubs should have at least one, preferably two, grab bars, firmly attached to structural supports in the wall. (Do not use built-in soap holders or glass shower doors as grab bars.)    Tub seats fitted with non-slip material on the legs allow you to wash sitting down.  For people with limited mobility, bathtub transfer benches allow you to slide safely into the tub. Raised toilet seats and toilet safety rails are helpful for those with knee or hip problems. In the [facility]    Make sure you use a nightlight and that the area around your bed is clear of potential obstacles. Be careful with electric blankets and never go to sleep with a heating pad, which can cause serious burns even if on a low setting. Use fire-resistant mattress covers and pillows, and NEVER smoke in bed. Keep a phone next to the bed that is programmed to dial 911 at the push of a button. If you have a chronic condition, you may want to sign on with an automatic call-in service. Typically the system includes a small pendant that connects directly to an emergency medical voice-response system. You should also make arrangements to stay in contact with someonefriend, neighbor, family memberon a regular schedule. Fire Prevention   According to the [employer]. (Smoke Alarms for Every) [address] [place] [address], senior citizens are one of the two highest risk groups for death and serious injuries due to residential fires. When cooking, wear short-sleeved items, never a bulky long-sleeved robe. The [facility]'s Safety Checklist for Older Consumers emphasizes the importance of checking basements, garages, workshops and storage areas for fire hazards, such as volatile liquids, piles of old rags or clothing and overloaded circuits. Never smoke in bed or when lying down on a couch or recliner chair. Small portable electric or kerosene heaters are responsible for many home fires and should be used cautiously if at all. If you do use one, be sure to keep them away from flammable materials. In case of fire, make sure you have a pre-established emergency exit plan. Have a professional check your fireplace and other fuel-burning appliances yearly.     Helping Hands   Baby boomers entering the horowitz years will continue to see the development of new products to help older adults live safely and independently in spite of age-related changes. Making Life More Livable  , by Cynthia Blackburn, lists over 1,000 products for \"living well in the mature years,\" such as bathing and mobility aids, household security devices, ergonomically designed knives and peelers, and faucet valves and knobs for temperature control. Medical supply stores and organizations are good sources of information about products that improve your quality of life and insure your safety.      Last Reviewed: November 2009 Lucila Call MD   Updated: 3/7/2011     ·

## 2020-09-24 NOTE — PROGRESS NOTES
Medicare Annual Wellness Visit  Name: Denise Henderson Date: 2020   MRN: [id number] Sex: Female   Age: 70 y.o. Ethnicity: Non-/Non    : 1940 Race: Aleyda Glover is here for Medicare AWV    Screenings for behavioral, psychosocial and functional/safety risks, and cognitive dysfunction are all negative except as indicated below. These results, as well as other patient data from the [address] form, are documented in Flowsheets linked to this Encounter. Allergies   Allergen Reactions    Aspirin Swelling     Hives. anaphylaxis    Atorvastatin Other (See Comments)     myalgia    Crestor [Rosuvastatin]      Myalgia      Livalo [Pitavastatin] Other (See Comments)     Muscle pain    Seasonal     Simvastatin      Myalgia      Statins      Other reaction(s): Unknown    Welchol [Colesevelam Hcl] Other (See Comments)     Leg cramp         Prior to Visit Medications    Medication Sig Taking?  Authorizing Provider   ondansetron (ZOFRAN-ODT) 4 MG disintegrating tablet DISSOLVE ONE TABLET BY MOUTH EVERY 8 HOURS AS NEEDED FOR NAUSEA Yes Nehal Ding MD   metFORMIN (GLUCOPHAGE) 500 MG tablet Take 1 tablet by mouth daily Yes Nehal Ding MD   levothyroxine (SYNTHROID) 75 MCG tablet Take 1 tablet by mouth Daily Yes Nehal Ding MD   Evolocumab 140 MG/ML SOSY Inject into the skin Yes Historical Provider, MD   famotidine (PEPCID) 20 MG tablet as needed  Yes Historical Provider, MD   albuterol sulfate HFA (VENTOLIN HFA) 108 (90 Base) MCG/ACT inhaler Inhale 2 puffs into the lungs 4 times daily as needed for Wheezing Yes Chris Car MD   metoprolol tartrate (LOPRESSOR) 25 MG tablet Take 25 mg by mouth 2 times daily Yes Historical Provider, MD   guaiFENesin (MUCINEX) 600 MG extended release tablet Take 1 tablet by mouth 2 times daily  Patient taking differently: Take 600 mg by mouth 2 times daily as needed  [facility] Past Medical History:   Diagnosis Date    Acid reflux     Diverticular disease 6/30/2014    GERD (gastroesophageal reflux disease)     Hypercholesteremia 6/30/2014    Hypothyroid 6/30/2014    Lung nodule     Meniere disease     right ear    Neuropathy     Osteopenia 6/30/2014    Prediabetes     RAD (reactive airway disease) 6/30/2014    Tremor     r hand    Vitamin D deficiency 6/30/2014       Past Surgical History:   Procedure Laterality Date    CHOLECYSTECTOMY      COLONOSCOPY      ENDOSCOPY, COLON, DIAGNOSTIC      ERCP  03/21/14    EXCISION / BIOPSY SKIN LESION OF TRUNK Left 11/8/2017    EXCISION MASS LEFT POSTERIOR SHOULDER, LEFT ANTERIOR ARM performed by Sapna Arana MD at [address] [place] [address]  01/03/2007    SKIN BIOPSY Left 11/08/2017    Excision Mass Left Posterior Shoulder, Left Anterior Arm         Family History   Problem Relation Age of Onset    Cancer Mother 48        pineal gland/throat    Cancer Father 64        colon   [facility] Cancer Brother         lung    Other Brother         pulmonary embolus    Heart Disease Paternal Grandfather         myocarditis    Stroke Maternal Aunt     Cancer Maternal Uncle     Stroke Maternal Grandmother     Diabetes Brother     Heart Disease Paternal Uncle 67        mi    Heart Disease Brother 58        mi    High Blood Pressure Brother     Heart Disease Brother 79        stents for cad    High Blood Pressure Brother        CareTeam (Including outside providers/suppliers regularly involved in providing care):   Patient Care Team:  Luann Soto MD as PCP - General (Family Medicine)  Luann Soto MD as PCP - [facility] Provider  Charley Mcdonald MD as Consulting Physician (Gastroenterology)  Christopher Chakraborty MD as Consulting Physician (Gastroenterology)  Rosa Elena Ponce MD as Consulting Physician (Otolaryngology)  Lazarus Bramble, DO as Consulting Physician (Orthopedic Surgery)  Johana Bacon MD as Consulting Physician (Pulmonology)  Ashley Pierce MD as Consulting Physician (Cardiology)    Wt Readings from Last 3 Encounters:   09/24/20 221 lb (100.2 kg)   09/23/20 223 lb (101.2 kg)   08/25/20 223 lb (101.2 kg)     Vitals:    09/24/20 1103   BP: 133/81   Pulse: 85   Temp: 96.6 °F (35.9 °C)   SpO2: 96%   Weight: 221 lb (100.2 kg)   Height: 5' 5\" (1.651 m)     Body mass index is 36.78 kg/m². 68-year-old female coming today for Medicare physical exam.  Patient states overall she has been doing good. Has been seeing the physical therapy for her BPPV. Has felt that the physical therapy has helped her. Has followed up with her cardiologist and also the pulmonologist.    Based upon direct observation of the patient, evaluation of cognition reveals recent and remote memory intact. HENT:   /81   Pulse 85   Temp 96.6 °F (35.9 °C)   Ht 5' 5\" (1.651 m)   Wt 221 lb (100.2 kg)   SpO2 96%   BMI 36.78 kg/m²   Constitutional: Alert and oriented. Well-nourished. Head: Normocephalic and atraumatic. Right Ear: External ear normal. TM: no bulging, erythema or fluid seen. Left Ear: External ear normal. TM: no bulging, erythema or fluid seen. Nose: Nose normal.   Mouth/Throat: Oropharynx is clear and moist. Teeth in good repair. Eyes: Pupils are equal, round, and reactive to light. Right eye exhibits no discharge. Left eye exhibits no discharge. No scleral icterus. Glasses in place. Neck: Normal range of motion. Neck supple. No JVD present. No tracheal deviation present. No thyromegaly present. Cardiovascular: Normal rate, regular rhythm, normal heart sounds. Pulmonary/Chest: Effort normal and breath sounds normal. No respiratory distress. She has no wheezes. She has no rales. Abdominal: Soft. Bowel sounds are normal.  She exhibits no distension and no mass. There is no tenderness. There is no rebound and no guarding. Musculoskeletal: Normal range of motion.   She exhibits no edema or tenderness. Lymphadenopathy:    She has no cervical adenopathy. Neurological:  She is alert and oriented to person, place, and time. Cranial nerves grossly intact. No sensation problem noted. Muscle strength 5/5 throughout. Skin: Skin is warm and dry. No rash noted. No erythema. Psychiatric:  She has a normal mood and affect. Behavior is normal.      Patient's complete Health Risk Assessment and screening values have been reviewed and are found in Flowsheets. The following problems were reviewed today and where indicated follow up appointments were made and/or referrals ordered. Positive Risk Factor Screenings with Interventions:     Fall Risk:  2 or more falls in past year?: no  Fall with injury in past year?: (!) yes  Fall Risk Interventions:    · Home safety tips provided  · tripped on her shoe    Health Habits/Nutrition:  Health Habits/Nutrition  Do you exercise for at least 20 minutes 2-3 times per week?: (!) No  Have you lost any weight without trying in the past 3 months?: No  Do you eat fewer than 2 meals per day?: No  Have you seen a dentist within the past year?: Yes  Body mass index is 36.78 kg/m².   Health Habits/Nutrition Interventions:  · Inadequate physical activity:  educational materials provided to promote increased physical activity    Hearing/Vision:  No exam data present  Hearing/Vision  Do you or your family notice any trouble with your hearing?: (!) Yes  Do you have difficulty driving, watching TV, or doing any of your daily activities because of your eyesight?: No  Have you had an eye exam within the past year?: Yes  Hearing/Vision Interventions:  · Hearing concerns:  Patient does not have her hearing aids in today but she has been following up with a hearing specialist    Personalized Preventive Plan   Current Health Maintenance Status  Immunization History   Administered Date(s) Administered    Influenza Virus Vaccine 10/31/2011, 11/05/2013    Influenza, High Dose (Fluzone 65 yrs and older) 11/03/2014, 08/25/2015, 08/11/2016, 10/18/2017, 11/10/2018, 11/03/2019    Pneumococcal Conjugate 13-valent ([id number]) 11/03/2014, 08/21/2020    Pneumococcal Polysaccharide ([id number]) 10/01/2008    Td, unspecified formulation 06/01/2008    Tdap (Boostrix, Adacel) 10/23/2018, 07/04/2020    Zoster Live (Zostavax) 03/01/2016    Zoster Recombinant (Shingrix) 01/14/2019, 05/25/2019        Health Maintenance   Topic Date Due    Annual Wellness Visit (AWV)  05/29/2019    Flu vaccine (1) 09/01/2020    TSH testing  06/15/2021    DTaP/Tdap/Td vaccine (3 - Td) 07/04/2030    DEXA (modify frequency per FRAX score)  Completed    Shingles Vaccine  Completed    Pneumococcal 65+ years Vaccine  Completed    Hepatitis A vaccine  Aged Out    Hepatitis B vaccine  Aged Out    Hib vaccine  Aged Out    Meningococcal (ACWY) vaccine  Aged Out     Recommendations for [employer] Due: see orders and patient instructions/AVS.  . Recommended screening schedule for the next 5-10 years is provided to the patient in written form: see Patient Iban Fuentes was seen today for medicare awv. Diagnoses and all orders for this visit:    Routine general medical examination at a health care facility    Encounter for screening mammogram for breast cancer  -     [facility]AMAR DIGITAL SCREEN W OR WO CAD BILATERAL; Future    Acquired hypothyroidism  -     Cha Thacker MD, Endocrinology, [place]    Thyroid nodule  -     Cha Thacker MD, Endocrinology, [place]        The patient is doing well overall. No concerns. She will get her flu vaccination October November this year. We will send him to endocrinologist as she wants to have a specialist check on her thyroid. Call or return to clinic prn if these symptoms worsen or fail to improve as anticipated. I have reviewed the instructions with the patient, answering all questions to her satisfaction.     (Please note that portions of this note were completed with a voice recognition program. Efforts were made to edit the dictations but occasionally words are mis-transcribed.)

## 2020-10-21 ENCOUNTER — PATIENT MESSAGE (OUTPATIENT)
Dept: FAMILY MEDICINE CLINIC | Age: 80
End: 2020-10-21

## 2020-10-21 RX ORDER — LORAZEPAM 0.5 MG/1
0.5 TABLET ORAL NIGHTLY
Qty: 30 TABLET | Refills: 0 | Status: SHIPPED | OUTPATIENT
Start: 2020-10-21 | End: 2022-05-10

## 2020-10-21 RX ORDER — AMITRIPTYLINE HYDROCHLORIDE 10 MG/1
TABLET, FILM COATED ORAL
Qty: 30 TABLET | Refills: 0 | Status: SHIPPED | OUTPATIENT
Start: 2020-10-21 | End: 2021-04-07

## 2020-10-21 NOTE — TELEPHONE ENCOUNTER
From: Nanette Sender  To: Machelle Bolivar MD  Sent: 10/21/2020 9:42 AM EDT  Subject: Prescription Question    Can I please have 2 prescriptions refilled please? 1) Lorazepam o.5 mg tablet for anxiety and or vertigo    2) Amitriptyline hcl 10 mg tab for nerve pain caused by meralgia paresthetica in my left upper thigh. I only take the above meds as needed. My pharmacy is [facility] on [address]  and [facility].    Many thanks, Kurtis

## 2020-12-08 ENCOUNTER — HOSPITAL ENCOUNTER (OUTPATIENT)
Age: 80
Setting detail: SPECIMEN
Discharge: HOME | End: 2020-12-08
Payer: MEDICARE

## 2020-12-08 LAB
ALT SERPL-CCNC: 20 U/L (ref 5–33)
AST SERPL-CCNC: 26 U/L
CHOLESTEROL/HDL RATIO: 2.3
CHOLESTEROL: 111 MG/DL
ESTIMATED AVERAGE GLUCOSE: 123 MG/DL
HBA1C MFR BLD: 5.9 % (ref 4–6)
HDLC SERPL-MCNC: 49 MG/DL
LDL CHOLESTEROL: 33 MG/DL (ref 0–130)
T3 FREE: 3.12 PG/ML (ref 2.02–4.43)
THYROXINE, FREE: 1.22 NG/DL (ref 0.93–1.7)
TRIGL SERPL-MCNC: 147 MG/DL
TSH SERPL DL<=0.05 MIU/L-ACNC: 1.98 MIU/L (ref 0.3–5)
VLDLC SERPL CALC-MCNC: NORMAL MG/DL (ref 1–30)

## 2020-12-09 LAB
THYROGLOBULIN AB: 21.7 IU/ML (ref 0–40)
THYROID PEROXIDASE (TPO) AB: 644 IU/ML (ref 0–35)

## 2020-12-10 LAB — THYROID STIMULATING IMMUNOGLOB: <0.1 IU/L

## 2021-01-05 ENCOUNTER — PROCEDURE VISIT (OUTPATIENT)
Dept: ORTHOPEDIC SURGERY | Age: 81
End: 2021-01-05
Payer: MEDICARE

## 2021-01-05 VITALS
BODY MASS INDEX: 36.82 KG/M2 | HEIGHT: 65 IN | RESPIRATION RATE: 12 BRPM | HEART RATE: 70 BPM | TEMPERATURE: 96.6 F | WEIGHT: 221 LBS

## 2021-01-05 DIAGNOSIS — M25.551 RIGHT HIP PAIN: Primary | ICD-10-CM

## 2021-01-05 DIAGNOSIS — M70.61 GREATER TROCHANTERIC BURSITIS OF RIGHT HIP: Primary | ICD-10-CM

## 2021-01-05 PROCEDURE — 20611 DRAIN/INJ JOINT/BURSA W/US: CPT | Performed by: PHYSICIAN ASSISTANT

## 2021-01-05 PROCEDURE — 1090F PRES/ABSN URINE INCON ASSESS: CPT | Performed by: PHYSICIAN ASSISTANT

## 2021-01-05 PROCEDURE — G8428 CUR MEDS NOT DOCUMENT: HCPCS | Performed by: PHYSICIAN ASSISTANT

## 2021-01-05 PROCEDURE — G8484 FLU IMMUNIZE NO ADMIN: HCPCS | Performed by: PHYSICIAN ASSISTANT

## 2021-01-05 PROCEDURE — 1036F TOBACCO NON-USER: CPT | Performed by: PHYSICIAN ASSISTANT

## 2021-01-05 PROCEDURE — 1123F ACP DISCUSS/DSCN MKR DOCD: CPT | Performed by: PHYSICIAN ASSISTANT

## 2021-01-05 PROCEDURE — 4040F PNEUMOC VAC/ADMIN/RCVD: CPT | Performed by: PHYSICIAN ASSISTANT

## 2021-01-05 PROCEDURE — G8417 CALC BMI ABV UP PARAM F/U: HCPCS | Performed by: PHYSICIAN ASSISTANT

## 2021-01-05 PROCEDURE — G8399 PT W/DXA RESULTS DOCUMENT: HCPCS | Performed by: PHYSICIAN ASSISTANT

## 2021-01-05 PROCEDURE — 99212 OFFICE O/P EST SF 10 MIN: CPT | Performed by: PHYSICIAN ASSISTANT

## 2021-01-05 RX ORDER — LIDOCAINE HYDROCHLORIDE 10 MG/ML
4 INJECTION, SOLUTION INFILTRATION; PERINEURAL ONCE
Status: COMPLETED | OUTPATIENT
Start: 2021-01-05 | End: 2021-01-05

## 2021-01-05 RX ORDER — METHYLPREDNISOLONE ACETATE 40 MG/ML
40 INJECTION, SUSPENSION INTRA-ARTICULAR; INTRALESIONAL; INTRAMUSCULAR; SOFT TISSUE ONCE
Status: COMPLETED | OUTPATIENT
Start: 2021-01-05 | End: 2021-01-05

## 2021-01-05 RX ADMIN — METHYLPREDNISOLONE ACETATE 40 MG: 40 INJECTION, SUSPENSION INTRA-ARTICULAR; INTRALESIONAL; INTRAMUSCULAR; SOFT TISSUE at 16:12

## 2021-01-05 RX ADMIN — LIDOCAINE HYDROCHLORIDE 4 ML: 10 INJECTION, SOLUTION INFILTRATION; PERINEURAL at 16:12

## 2021-01-05 ASSESSMENT — ENCOUNTER SYMPTOMS
RESPIRATORY NEGATIVE: 1
COUGH: 0
ABDOMINAL DISTENTION: 0
CONSTIPATION: 0
VOMITING: 0
APNEA: 0
CHEST TIGHTNESS: 0
SHORTNESS OF BREATH: 0
COLOR CHANGE: 0
NAUSEA: 0
DIARRHEA: 0
ABDOMINAL PAIN: 0

## 2021-01-05 NOTE — PROGRESS NOTES
[facility]  [address] [place] [id number]  Dept: [phone]  Dept Fax: [phone]        Right Hip Office Visit    Subjective:     Chief Complaint   Patient presents with    Hip Pain     Right Hip     HPI:     Lisa Martinez presents with a 5 year history of pain in the right hip but she states that her pain was the worse after Thanksgiving last year. The pain does not radiate into the thigh nor into the groin. The pain is most troublesome during ambulatory activity and now limits the patient`s walking distance to shorter distances. Night pain, which disturbs the patient`s sleep is somewhat a problem. The patient has had to give up some activities such as walking long walks, exercising and sleeping comfortably, which has produced a consequent deterioration in quality of life. She has tried Tylenol and reduction of activity and reports no improvement. Back pain is not present and does not interfere with the patient`s life as does the hip. The patient has had a cortisone injection into the greater trochanteric bursa on 09/23/2020 with good pain relief for 3.5 months. The patient has not tried physical therapy. The patient has not had surgery. The opposite hip is okay. Knee pain is not noted. She was much more sore than usual after her last shot. ROS:     Review of Systems   Constitutional: Positive for activity change. Negative for appetite change, fatigue and fever. Respiratory: Negative. Negative for apnea, cough, chest tightness and shortness of breath. Cardiovascular: Negative. Negative for chest pain, palpitations and leg swelling. Gastrointestinal: Negative for abdominal distention, abdominal pain, constipation, diarrhea, nausea and vomiting. Genitourinary: Negative for difficulty urinating, dysuria and hematuria. Musculoskeletal: Positive for arthralgias and gait problem.  Negative for joint swelling and myalgias. Skin: Negative for color change and rash. Neurological: Negative for dizziness, weakness, numbness and headaches. Psychiatric/Behavioral: Positive for sleep disturbance.        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    Lung nodule     Meniere disease     right ear    Meniere's disease, right ear 12/10/2019    Morbidly obese ([address] 7/23/2020    Neuropathy     Osteopenia 6/30/2014    Parkinson disease ([address] 7/23/2020    Postmenopausal state 12/10/2019    Postnasal drip 7/11/2020    Prediabetes     RAD (reactive airway disease) 6/30/2014    Seasonal asthma 7/11/2020    Thyroid nodule 3/1/2016    Tremor     r hand    Vitamin D deficiency 6/30/2014       Past Surgical History:    Past Surgical History:   Procedure Laterality Date    CHOLECYSTECTOMY      COLONOSCOPY      ENDOSCOPY, COLON, DIAGNOSTIC      ERCP  03/21/14    EXCISION / BIOPSY SKIN LESION OF TRUNK Left 11/8/2017    EXCISION MASS LEFT POSTERIOR SHOULDER, LEFT ANTERIOR ARM performed by Janee Alpers, MD at [address] [place] [address]  01/03/2007    SKIN BIOPSY Left 11/08/2017    Excision Mass Left Posterior Shoulder, Left Anterior Arm       CurrentMedications:   Current Outpatient Medications   Medication Sig Dispense Refill    amitriptyline (ELAVIL) 10 MG tablet TAKE ONE TABLET BY MOUTH DAILY AS NEEDED FOR SLEEP AND LEG PAIN FOR UP TO 30 DAYS 30 tablet 0    ondansetron (ZOFRAN-ODT) 4 MG disintegrating tablet DISSOLVE ONE TABLET BY MOUTH EVERY 8 HOURS AS NEEDED FOR NAUSEA 20 tablet 0    metFORMIN (GLUCOPHAGE) 500 MG tablet Take 1 tablet by mouth daily 60 tablet 3    levothyroxine (SYNTHROID) 75 MCG tablet Take 1 tablet by mouth Daily 90 tablet 0    Evolocumab 140 MG/ML SOSY Inject into the skin      famotidine (PEPCID) 20 MG tablet as needed       albuterol sulfate HFA (VENTOLIN HFA) 108 (90 Base) MCG/ACT inhaler Inhale 2 puffs into the lungs 4 times daily as needed for Wheezing 3 Inhaler 1    budesonide-formoterol (SYMBICORT) 80-4.5 MCG/ACT AERO Inhale 2 puffs into the lungs 2 times daily (Patient taking differently: Inhale 2 puffs into the lungs as needed ) 1 Inhaler 6    metoprolol tartrate (LOPRESSOR) 25 MG tablet Take 25 mg by mouth 2 times daily      guaiFENesin (MUCINEX) 600 MG extended release tablet Take 1 tablet by mouth 2 times daily (Patient taking differently: Take 600 mg by mouth 2 times daily as needed ) 20 tablet 0    butenafine (MENTAX) 1 % CREA Apply 1 Tube topically 2 times daily (Patient taking differently: Apply 1 Tube topically 2 times daily as needed ) 15 g 0    alclomethasone (ACLOVATE) 0.05 % cream Apply topically 2 times daily Apply topically 2 times daily. (Patient taking differently: Apply topically 2 times daily as needed Apply topically 2 times daily.) 45 g 1    NONFORMULARY Indications: Dietary fiber 1 tsp a day       ECHINACEA EXTRACT PO Take 450 mg by mouth Indications:  Take in winter WINTER ONLY      Cholecalciferol (VITAMIN D3) 2000 units CAPS Take 1 capsule by mouth 2 times daily 30 capsule 0    diltiazem (CARDIZEM CD) 120 MG extended release capsule Take 1 capsule by mouth every evening 30 capsule 3    fluticasone (FLONASE) 50 MCG/ACT nasal spray PLACE 1 SPRAY IN EACH NOSTRIL DAILY FOR 15 DAYS 1 Bottle 2    ketoconazole (NIZORAL) 2 % shampoo APPLY  TO AFFECTED AREAS TOPICALLY DAILY THEN RINSE OFF AFTER 5 MINUTES (Patient taking differently: APPLY  TO AFFECTED AREAS TOPICALLY DAILY THEN RINSE OFF AFTER 5 MINUTES WHEN NEEDED) 120 mL 2    ezetimibe (ZETIA) 10 MG tablet Take 10 mg by mouth daily      esomeprazole Magnesium (NEXIUM) 40 MG PACK Take 40 mg by mouth daily.  NONFORMULARY Super b complex 3 times per week      ascorbic acid (VITAMIN C) 500 MG tablet Take 500 mg by mouth daily.  chlorpheniramine (CHLOR-TRIMETON) 4 MG tablet Take 12 mg by mouth daily        No current facility-administered medications for this visit.       Facility-Administered Medications Ordered in Other Visits   Medication Dose Route Frequency Provider Last Rate Last Admin    0.9 % sodium chloride infusion   Intravenous Continuous Rafia Mays  mL/hr at 02/19/14 0827 New Bag at 02/19/14 0827       Allergies:    Aspirin, Atorvastatin, Crestor [rosuvastatin], Livalo [pitavastatin], Seasonal, Simvastatin, Statins, and Welchol [colesevelam hcl]    Social History:   Social History     Socioeconomic History    Marital status: Single     Spouse name: None    Number of children: None    Years of education: None    Highest education level: None   Occupational History    None   Social Needs    Financial resource strain: None    Food insecurity     Worry: None     Inability: None    Transportation needs     Medical: None     Non-medical: None   Tobacco Use    Smoking status: Never Smoker    Smokeless tobacco: Never Used   Substance and Sexual Activity    Alcohol use: Yes     Comment: rare    Drug use: No    Sexual activity: None   Lifestyle    Physical activity     Days per week: None     Minutes per session: None    Stress: None   Relationships    Social connections     Talks on phone: None     Gets together: None     Attends [employer] service: None     Active member of club or organization: None     Attends meetings of clubs or organizations: None     Relationship status: None    Intimate partner violence     Fear of current or ex partner: None     Emotionally abused: None     Physically abused: None     Forced sexual activity: None Other Topics Concern    None   Social History Narrative    None       Family History:  Family History   Problem Relation Age of Onset    Cancer Mother 48        pineal gland/throat    Cancer Father 64        colon   [facility] Cancer Brother         lung    Other Brother         pulmonary embolus    Heart Disease Paternal Grandfather         myocarditis    Stroke Maternal Aunt     Cancer Maternal Uncle     Stroke Maternal Grandmother     Diabetes Brother     Heart Disease Paternal Uncle 67        mi    Heart Disease Brother 58        mi    High Blood Pressure Brother     Heart Disease Brother 79        stents for cad    High Blood Pressure Brother        Vitals:   Pulse 70   Temp 96.6 °F (35.9 °C)   Resp 12   Ht 5' 5\" (1.651 m)   Wt 221 lb (100.2 kg)   BMI 36.78 kg/m²  Body mass index is 36.78 kg/m². Physical Examination:     Orthopedics:    GENERAL: Alert and oriented X3 in no acute distress. SKIN: Intact without lesions or ulcerations. NEURO: Intact to sensory and motor testing. VASC: Capillary refill is less than 3 seconds. Right Hip     GEN: Alert and oriented X 3, in no acute distress. GAIT: The patient's gait was observed while entering the exam room and was noted to be non antalgic. The extremity is in anatomic alignment. SKIN: Intact without rashes, lesions, or ulcerations. No obvious deformity or swelling. NEURO: The patient responds to light touch throughout bilateral LE. Patellar and Achilles reflexes are 2/4. VASC: The bilateral LE is neurovascularly intact with 2/4 DP and 2/4 PT pulses. Brisk capillary refill. ROM: 0 degree flexion contracture, 90 degrees flexion, 30 degrees abduction, 20 degrees adduction, 20 degrees of internal rotation, 45 degrees of external rotation. MUSC: Strength is 5/5 flexion, abduction, internal rotation, external rotation. PALP: The patient is  tender to palpation over the greater trochanter. TEST: Log roll is negative.  No apparent leg length including anti-inflammatory medications, physical therapy, injections, further imaging studies and as a last result surgery. During today's visit, we discussed that the injections work well and that her strength is still very good. The cortisone injection seemed to work really well for her and she would like to continue doing them to help decrease pain and improve quality of life. The patient has opted for a cortisone injection into the right greater trochanteric bursa to help reduce inflammation and pain. The injection site should never get red, hot, or swollen and if it does the patient will contact our office right away. The patient may experience a increase in soreness the first 24-48 hours due to a cortisone flair and can take anti-inflammatories for a short period of time to reduce that soreness. The patient should not submerge the injection site in water for a minimum of 24 hours to avoid infection. This means no lakes, pools, ponds, or hot tubs for 24 hours. If the patient is diabetic the injection may increase their blood sugar for up to one week. The patient can do this cortisone injection once every 3 months as needed. If the injections stop working and do not give the patient relief the patient should consider surgical interventions to produce long term relief. She will continue to do her stretches and exercises that she learned in physical therapy. Patient did state at the end of her appointment that she is starting to have some bilateral knee pain. I included some knee exercises for her to try and the NSAID protocol. If she is not better in a few weeks she will have make an appointment to follow-up with Ilya Yin DO for her knees. Patient should return to the clinic in 3 months or as needed to follow up with  Gay Rogers PA-C. The patient will call the office immediately with any problems.       Orders Placed This Encounter   Medications    methylPREDNISolone acetate (DEPO-MEDROL) injection 40 mg    lidocaine 1 % injection 4 mL       No orders of the defined types were placed in this encounter. Heather Sargent PA-C, have personally seen this patient, reviewed the chart including history, and imaging if done. I personally  performed the physical exam and obtained any needed additional history. I placed orders, performed or supervised procedures and developed the treatment plan.     Electronically signed by Shikha Eubanks PA-C, on 1/6/2021 at 9:17 AM      Electronically signed by Sihkha Ebuanks PA-C, on 1/6/2021 at 9:17 AM

## 2021-01-05 NOTE — PATIENT INSTRUCTIONS
CORTISONE INJECTION CARE    The injection site should never get red, hot, or swollen and if it does the patient will contact our office right away. The patient may experience a increase in soreness the first 24-48 hours due to a cortisone flair and can take anti-inflammatories for a short period of time to reduce that soreness. The patient should not submerge the injection site in water for a minimum of 24 hours to avoid infection. This means no lakes, pools, ponds, or hot tubs for 24 hours. If the patient is diabetic the injection may increase their blood sugar for up to one week. The patient can do this cortisone injection once every 3 months as needed. Patient Education        Knee: Exercises  Introduction  Here are some examples of exercises for you to try. The exercises may be suggested for a condition or for rehabilitation. Start each exercise slowly. Ease off the exercises if you start to have pain. You will be told when to start these exercises and which ones will work best for you. How to do the exercises  Quad sets   1. Sit with your leg straight and supported on the floor or a firm bed. (If you feel discomfort in the front or back of your knee, place a small towel roll under your knee.)  2. Tighten the muscles on top of your thigh by pressing the back of your knee flat down to the floor. (If you feel discomfort under your kneecap, place a small towel roll under your knee.)  3. Hold for about 6 seconds, then rest for up to 10 seconds. 4. Do 8 to 12 repetitions several times a day. Straight-leg raises to the front   1. Lie on your back with your good knee bent so that your foot rests flat on the floor. Your injured leg should be straight. Make sure that your low back has a normal curve. You should be able to slip your flat hand in between the floor and the small of your back, with your palm touching the floor and your back touching the back of your hand.   2. Tighten the thigh muscles in the injured leg by pressing the back of your knee flat down to the floor. Hold your knee straight. 3. Keeping the thigh muscles tight, lift your injured leg up so that your heel is about 12 inches off the floor. Hold for about 6 seconds and then lower slowly. 4. Do 8 to 12 repetitions, 3 times a day. Straight-leg raises to the outside   1. Lie on your side, with your injured leg on top. 2. Tighten the front thigh muscles of your injured leg to keep your knee straight. 3. Keep your hip and your leg straight in line with the rest of your body, and keep your knee pointing forward. Do not drop your hip back. 4. Lift your injured leg straight up toward the ceiling, about 12 inches off the floor. Hold for about 6 seconds, then slowly lower your leg. 5. Do 8 to 12 repetitions. Straight-leg raises to the back   1. Lie on your stomach, and lift your leg straight up behind you (toward the ceiling). 2. Lift your toes about 6 inches off the floor, hold for about 6 seconds, then lower slowly. 3. Do 8 to 12 repetitions. Straight-leg raises to the inside   1. Lie on the side of your body with the injured leg. 2. You can either prop your other (good) leg up on a chair, or you can bend your good knee and put that foot in front of your injured knee. Do not drop your hip back. 3. Tighten the muscles on the front of your thigh to straighten your injured knee. 4. Keep your kneecap pointing forward, and lift your whole leg up toward the ceiling about 6 inches. Hold for about 6 seconds, then lower slowly. 5. Do 8 to 12 repetitions. Heel dig bridging   1. Lie on your back with both knees bent and your ankles bent so that only your heels are digging into the floor. Your knees should be bent about 90 degrees. 2. Then push your heels into the floor, squeeze your buttocks, and lift your hips off the floor until your shoulders, hips, and knees are all in a straight line.   3. Hold for about 6 seconds as you continue to breathe normally, and then slowly lower your hips back down to the floor and rest for up to 10 seconds. 4. Do 8 to 12 repetitions. Hamstring curls   1. Lie on your stomach with your knees straight. If your kneecap is uncomfortable, roll up a washcloth and put it under your leg just above your kneecap. 2. Lift the foot of your injured leg by bending the knee so that you bring the foot up toward your buttock. If this motion hurts, try it without bending your knee quite as far. This may help you avoid any painful motion. 3. Slowly lower your leg back to the floor. 4. Do 8 to 12 repetitions. 5. With permission from your doctor or physical therapist, you may also want to add a cuff weight to your ankle (not more than 5 pounds). With weight, you do not have to lift your leg more than 12 inches to get a hamstring workout. Shallow standing knee bends   Do this exercise only if you have very little pain; if you have no clicking, locking, or giving way if you have an injured knee; and if it does not hurt while you are doing 8 to 12 repetitions. 1. Stand with your hands lightly resting on a counter or chair in front of you. Put your feet shoulder-width apart. 2. Slowly bend your knees so that you squat down like you are going to sit in a chair. Make sure your knees do not go in front of your toes. 3. Lower yourself about 6 inches. Your heels should remain on the floor at all times. 4. Rise slowly to a standing position. Heel raises   1. Stand with your feet a few inches apart, with your hands lightly resting on a counter or chair in front of you. 2. Slowly raise your heels off the floor while keeping your knees straight. 3. Hold for about 6 seconds, then slowly lower your heels to the floor. 4. Do 8 to 12 repetitions several times during the day. Follow-up care is a key part of your treatment and safety. Be sure to make and go to all appointments, and call your doctor if you are having problems.  It's also a good idea to know your test results and keep a list of the medicines you take. Where can you learn more? Go to [email or web address] and sign in to your [employer] account. Enter I421 in the iOmando box to learn more about \"Knee: Exercises. \"     If you do not have an account, please click on the \"Sign Up Now\" link. Current as of: March 2, 2020               Content Version: 12.6  © 7209-7447 [employer]. Care instructions adapted under license by [facility] [place] ([facility]). If you have questions about a medical condition or this instruction, always ask your healthcare professional. [address] any warranty or liability for your use of this information.

## 2021-01-11 ENCOUNTER — PATIENT MESSAGE (OUTPATIENT)
Dept: FAMILY MEDICINE CLINIC | Age: 81
End: 2021-01-11

## 2021-01-12 RX ORDER — ONDANSETRON 4 MG/1
TABLET, ORALLY DISINTEGRATING ORAL
Qty: 20 TABLET | Refills: 3 | Status: SHIPPED | OUTPATIENT
Start: 2021-01-12 | End: 2021-01-20 | Stop reason: SDUPTHER

## 2021-01-12 NOTE — TELEPHONE ENCOUNTER
From: Nasir Pozo  To: Farzana Galvan MD  Sent: 1/11/2021 2:05 PM EST  Subject: Prescription Question    Dr. Cash Lentz,  Could you please renew a presciption for me? Thank you. It's   Ondansetron ODT 4 mg. I take it for vertigo and gastroparesis issues. Right now the gastroparesis is acting up and I saw Dr. Norman Kevin last Tuesday, 1/5/21. I'm scheduled for an Endoscopy on 1/21. In the meantime, I'm having difficulty at night with food backing up into my throat. Zofran helps this immensely. Dr. Martha Michelle knows I take Zofran. I didn't ask Dr. Martha Michelle to prescribe Zofran for me, as I didn't want two docs prescribing the same med. You last prescribed this med for me on Aug. 28, when I was experiencing a lot of dizziness and nausea for vertigo. Dr. Martha Michelle doesn't have the "[employer]" system. Many thanks.   Eric Hendricks

## 2021-01-20 RX ORDER — LEVOTHYROXINE SODIUM 0.07 MG/1
75 TABLET ORAL DAILY
Qty: 90 TABLET | Refills: 0 | Status: SHIPPED | OUTPATIENT
Start: 2021-01-20 | End: 2021-07-29 | Stop reason: SDUPTHER

## 2021-01-20 RX ORDER — ONDANSETRON 4 MG/1
TABLET, ORALLY DISINTEGRATING ORAL
Qty: 20 TABLET | Refills: 3 | Status: SHIPPED | OUTPATIENT
Start: 2021-01-20 | End: 2022-10-07 | Stop reason: SDUPTHER

## 2021-02-22 ENCOUNTER — OFFICE VISIT (OUTPATIENT)
Dept: ORTHOPEDIC SURGERY | Age: 81
End: 2021-02-22
Payer: MEDICARE

## 2021-02-22 VITALS
SYSTOLIC BLOOD PRESSURE: 121 MMHG | WEIGHT: 221 LBS | HEIGHT: 65 IN | DIASTOLIC BLOOD PRESSURE: 76 MMHG | BODY MASS INDEX: 36.82 KG/M2 | HEART RATE: 63 BPM

## 2021-02-22 DIAGNOSIS — E65 FAT PAD SYNDROME: ICD-10-CM

## 2021-02-22 DIAGNOSIS — M17.12 PRIMARY OSTEOARTHRITIS OF LEFT KNEE: Primary | ICD-10-CM

## 2021-02-22 DIAGNOSIS — M25.561 PAIN IN BOTH KNEES, UNSPECIFIED CHRONICITY: Primary | ICD-10-CM

## 2021-02-22 DIAGNOSIS — M25.562 PAIN IN BOTH KNEES, UNSPECIFIED CHRONICITY: Primary | ICD-10-CM

## 2021-02-22 PROCEDURE — G8399 PT W/DXA RESULTS DOCUMENT: HCPCS | Performed by: ORTHOPAEDIC SURGERY

## 2021-02-22 PROCEDURE — 4040F PNEUMOC VAC/ADMIN/RCVD: CPT | Performed by: ORTHOPAEDIC SURGERY

## 2021-02-22 PROCEDURE — 1090F PRES/ABSN URINE INCON ASSESS: CPT | Performed by: ORTHOPAEDIC SURGERY

## 2021-02-22 PROCEDURE — G8484 FLU IMMUNIZE NO ADMIN: HCPCS | Performed by: ORTHOPAEDIC SURGERY

## 2021-02-22 PROCEDURE — 99213 OFFICE O/P EST LOW 20 MIN: CPT | Performed by: ORTHOPAEDIC SURGERY

## 2021-02-22 PROCEDURE — G8427 DOCREV CUR MEDS BY ELIG CLIN: HCPCS | Performed by: ORTHOPAEDIC SURGERY

## 2021-02-22 PROCEDURE — G8417 CALC BMI ABV UP PARAM F/U: HCPCS | Performed by: ORTHOPAEDIC SURGERY

## 2021-02-22 PROCEDURE — 1036F TOBACCO NON-USER: CPT | Performed by: ORTHOPAEDIC SURGERY

## 2021-02-22 PROCEDURE — 1123F ACP DISCUSS/DSCN MKR DOCD: CPT | Performed by: ORTHOPAEDIC SURGERY

## 2021-02-22 NOTE — PROGRESS NOTES
[facility] [address] [facility]  [address] [place] [id number]  Dept: [phone]  Dept Fax: [phone]          Bilateral Knee - Follow Up     Subjective:     Chief Complaint   Patient presents with    Knee Pain     bilateral knee pain     HPI:     Mackenzie Keene presents today for Bilateral knee pain where she states left is worse than right. The pain has been present for 2 months. The patient recalls no specific injury where the knees began to hurt. The pain is now described as sharp pain to her right knee particularly when she kneels. She describes that the left knee catches, gives out, and feels weak. She cannot take ibuprofen or aleve due to allergies. She has not had injections in either knees. Pain is worsened by activity. She has not done any recent formal physical therapy. ROS:   Review of Systems  Constitutional: Negative for fever and chills. HENT: Negative for congestion. Eyes: Negative for blurred vision and double vision. Respiratory: Negative for cough, shortness of breath and wheezing. Cardiovascular: Negative for chest pain and palpitations. Gastrointestinal: Negative for nausea. Negative for vomiting. Musculoskeletal: Positive for bilateral knee pain. Skin: Negative for itching and rash. Neurological: Negative for dizziness, sensory change and headaches.      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    Lung nodule     Meniere disease     right ear    Meniere's disease, right ear 12/10/2019    Morbidly obese ([facility] [address] 7/23/2020    Neuropathy     Osteopenia 6/30/2014    Parkinson disease ([facility] [address] 7/23/2020    Postmenopausal state 12/10/2019    Postnasal drip 7/11/2020    Prediabetes     RAD (reactive airway disease) 6/30/2014    Seasonal asthma 7/11/2020    Thyroid nodule 3/1/2016    Tremor     r hand    Vitamin D deficiency 6/30/2014       Past Surgical History:    Past Surgical History:   Procedure Laterality Date    CHOLECYSTECTOMY      COLONOSCOPY      ENDOSCOPY, COLON, DIAGNOSTIC      ERCP  03/21/14    EXCISION / BIOPSY SKIN LESION OF TRUNK Left 11/8/2017    EXCISION MASS LEFT POSTERIOR SHOULDER, LEFT ANTERIOR ARM performed by Carmela Cook MD at [address] [place] [address]  01/03/2007    SKIN BIOPSY Left 11/08/2017    Excision Mass Left Posterior Shoulder, Left Anterior Arm       CurrentMedications:   Current Outpatient Medications   Medication Sig Dispense Refill    levothyroxine (SYNTHROID) 75 MCG tablet Take 1 tablet by mouth Daily 90 tablet 0    ondansetron (ZOFRAN-ODT) 4 MG disintegrating tablet Take 1 tablet by mouth every 8 hours as needed.  20 tablet 3    amitriptyline (ELAVIL) 10 MG tablet TAKE ONE TABLET BY MOUTH DAILY AS NEEDED FOR SLEEP AND LEG PAIN FOR UP TO 30 DAYS 30 tablet 0    Evolocumab 140 MG/ML SOSY Inject into the skin      famotidine (PEPCID) 20 MG tablet as needed       albuterol sulfate HFA (VENTOLIN HFA) 108 (90 Base) MCG/ACT inhaler Inhale 2 puffs into the lungs 4 times daily as needed for Wheezing 3 Inhaler 1    metoprolol tartrate (LOPRESSOR) 25 MG tablet Take 25 mg by mouth 2 times daily      guaiFENesin (MUCINEX) 600 MG extended release tablet Take 1 tablet by mouth 2 times daily (Patient taking differently: Take 600 mg by mouth 2 times daily as needed ) 20 tablet 0    butenafine (MENTAX) 1 % CREA Apply 1 Tube topically 2 times daily (Patient taking differently: Apply 1 Tube topically 2 times daily as [pitavastatin], Seasonal, Simvastatin, Statins, and Welchol [colesevelam hcl]    Social History:   Social History     Socioeconomic History    Marital status: Single     Spouse name: None    Number of children: None    Years of education: None    Highest education level: None   Occupational History    None   Social Needs    Financial resource strain: None    Food insecurity     Worry: None     Inability: None    Transportation needs     Medical: None     Non-medical: None   Tobacco Use    Smoking status: Never Smoker    Smokeless tobacco: Never Used   Substance and Sexual Activity    Alcohol use: Yes     Comment: rare    Drug use: No    Sexual activity: None   Lifestyle    Physical activity     Days per week: None     Minutes per session: None    Stress: None   Relationships    Social connections     Talks on phone: None     Gets together: None     Attends Alevism service: None     Active member of club or organization: None     Attends meetings of clubs or organizations: None     Relationship status: None    Intimate partner violence     Fear of current or ex partner: None     Emotionally abused: None     Physically abused: None     Forced sexual activity: None   Other Topics Concern    None   Social History Narrative    None       Family History:  Family History   Problem Relation Age of Onset    Cancer Mother 48        pineal gland/throat    Cancer Father 64        colon    Cancer Brother         lung    Other Brother         pulmonary embolus    Heart Disease Paternal Grandfather         myocarditis    Stroke Maternal Aunt     Cancer Maternal Uncle     Stroke Maternal Grandmother     Diabetes Brother     Heart Disease Paternal Uncle 67        mi    Heart Disease Brother 58        mi    High Blood Pressure Brother     Heart Disease Brother 79        stents for cad    High Blood Pressure Brother        Vitals:   /76   Pulse 63   Ht 5' 5\" (1.651 m)   Wt 221 lb (100.2 kg) bodies. No dionisio erosions or periosteal reactions. Impression: Mild osteoarthritis changes in bilateral knees  Plan:   Treatment : I reviewed the X-ray with the patient and I informed them that the imaging shows signs of grade II osteoarthritis in her knees. We discussed the etiologies and natural histories of bilateral knee osteoarthritis as well as fat pad pain to her right knee and possible degenerative meniscal tear to her left knee. We discussed the various treatment alternatives including anti-inflammatory medications, physical therapy, injections, further imaging studies and as a last result surgery. During today's visit, we advised the patient to avoid activities that require her to knee on her right knee. We also discussed that since she cannot take NSAIDs we would recommend she trial tylenol for symptomatic relief. We discussed at length the potential benefits of a corticosteroid injection for her pain. The patient has opted for holding off on an injection at this point and will consider them in the future as needed. A physical therapy prescription was given. Patient should return to the clinic in 6 weeks to follow up with Brenda Lopez D.O. . The patient will call the office immediately with any problems. No orders of the defined types were placed in this encounter. Orders Placed This Encounter   Procedures    External Referral To Physical Therapy     Referral Priority:   Routine     Referral Type:   Eval and Treat     Referral Reason:   Specialty Services Required     Requested Specialty:   Physical Therapy     Number of Visits Requested:   Mitchel Carcamo [address],   Orthopedic Surgery Resident, PGY-2  [address]    Electronically signed by Holland Akbar DO, on 2/26/2021 at 8:12 PM       I, Brenda Lopez DO, have personally seen this patient and I have reviewed the CC, PMH, FHX and Social History as provided by other clinical staff.  I reassessed the HPI and ROS

## 2021-03-10 ENCOUNTER — TELEPHONE (OUTPATIENT)
Dept: FAMILY MEDICINE CLINIC | Age: 81
End: 2021-03-10

## 2021-03-10 DIAGNOSIS — R42 VERTIGO: Primary | ICD-10-CM

## 2021-03-10 NOTE — TELEPHONE ENCOUNTER
Pt requests an order for PT due to Vertigo. Pt states Vertigo is ongoing. Pt currently doing PT for pinched nerve. Pt requests PT from Gatito Rivas at [address] [phone] (P) [phone] (F)

## 2021-03-29 ENCOUNTER — TELEPHONE (OUTPATIENT)
Dept: FAMILY MEDICINE CLINIC | Age: 81
End: 2021-03-29

## 2021-03-31 DIAGNOSIS — R20.2 TINGLING OF RIGHT UPPER EXTREMITY: ICD-10-CM

## 2021-03-31 DIAGNOSIS — R52 TINGLING PAIN: Primary | ICD-10-CM

## 2021-04-07 ENCOUNTER — PATIENT MESSAGE (OUTPATIENT)
Dept: FAMILY MEDICINE CLINIC | Age: 81
End: 2021-04-07

## 2021-04-07 NOTE — TELEPHONE ENCOUNTER
From: Julia Sandoval  To: Demi Ortega MD  Sent: 4/7/2021 9:49 AM EDT  Subject: Non-Urgent Medical Question    Could you please recommend a neurologist ? I was seeing Dr. Karlos Arce, but she is moving. The letter telling me about Dr. Campbell Pérez gives me Dr. Gm Cates, Dr. Stepan Rubi, Dr. Fidelina Aragon and Melissa Armenta CNP as choices. I would like to stay in the [facility] system, but someone independent would be okay too. I have seen Dr. Fidelina Aragon in he past, and prefer someone else. Many thanks!   Antonia Galvin

## 2021-04-14 ENCOUNTER — OFFICE VISIT (OUTPATIENT)
Dept: ORTHOPEDIC SURGERY | Age: 81
End: 2021-04-14
Payer: MEDICARE

## 2021-04-14 VITALS
HEART RATE: 72 BPM | BODY MASS INDEX: 37.65 KG/M2 | HEIGHT: 65 IN | DIASTOLIC BLOOD PRESSURE: 65 MMHG | WEIGHT: 226 LBS | TEMPERATURE: 97.9 F | SYSTOLIC BLOOD PRESSURE: 109 MMHG

## 2021-04-14 DIAGNOSIS — M70.61 GREATER TROCHANTERIC BURSITIS OF RIGHT HIP: Primary | ICD-10-CM

## 2021-04-14 DIAGNOSIS — M17.12 OSTEOARTHRITIS OF LEFT KNEE, UNSPECIFIED OSTEOARTHRITIS TYPE: ICD-10-CM

## 2021-04-14 PROCEDURE — 4040F PNEUMOC VAC/ADMIN/RCVD: CPT | Performed by: PHYSICIAN ASSISTANT

## 2021-04-14 PROCEDURE — 20611 DRAIN/INJ JOINT/BURSA W/US: CPT | Performed by: PHYSICIAN ASSISTANT

## 2021-04-14 PROCEDURE — 1036F TOBACCO NON-USER: CPT | Performed by: PHYSICIAN ASSISTANT

## 2021-04-14 PROCEDURE — 1090F PRES/ABSN URINE INCON ASSESS: CPT | Performed by: PHYSICIAN ASSISTANT

## 2021-04-14 PROCEDURE — G8417 CALC BMI ABV UP PARAM F/U: HCPCS | Performed by: PHYSICIAN ASSISTANT

## 2021-04-14 PROCEDURE — G8427 DOCREV CUR MEDS BY ELIG CLIN: HCPCS | Performed by: PHYSICIAN ASSISTANT

## 2021-04-14 PROCEDURE — G8399 PT W/DXA RESULTS DOCUMENT: HCPCS | Performed by: PHYSICIAN ASSISTANT

## 2021-04-14 PROCEDURE — 1123F ACP DISCUSS/DSCN MKR DOCD: CPT | Performed by: PHYSICIAN ASSISTANT

## 2021-04-14 RX ORDER — METHYLPREDNISOLONE ACETATE 40 MG/ML
40 INJECTION, SUSPENSION INTRA-ARTICULAR; INTRALESIONAL; INTRAMUSCULAR; SOFT TISSUE ONCE
Status: COMPLETED | OUTPATIENT
Start: 2021-04-14 | End: 2021-04-14

## 2021-04-14 RX ORDER — LIDOCAINE HYDROCHLORIDE 10 MG/ML
4 INJECTION, SOLUTION INFILTRATION; PERINEURAL ONCE
Status: COMPLETED | OUTPATIENT
Start: 2021-04-14 | End: 2021-04-14

## 2021-04-14 RX ADMIN — LIDOCAINE HYDROCHLORIDE 4 ML: 10 INJECTION, SOLUTION INFILTRATION; PERINEURAL at 16:14

## 2021-04-14 RX ADMIN — METHYLPREDNISOLONE ACETATE 40 MG: 40 INJECTION, SUSPENSION INTRA-ARTICULAR; INTRALESIONAL; INTRAMUSCULAR; SOFT TISSUE at 16:15

## 2021-04-14 RX ADMIN — LIDOCAINE HYDROCHLORIDE 4 ML: 10 INJECTION, SOLUTION INFILTRATION; PERINEURAL at 16:15

## 2021-04-14 ASSESSMENT — ENCOUNTER SYMPTOMS
COUGH: 0
SHORTNESS OF BREATH: 0
VOMITING: 0
DIARRHEA: 0
ABDOMINAL DISTENTION: 0
CHEST TIGHTNESS: 0
NAUSEA: 0
CONSTIPATION: 0
COLOR CHANGE: 0
ABDOMINAL PAIN: 0
APNEA: 0

## 2021-04-14 NOTE — PROGRESS NOTES
patient has not had surgery. Patient states that she would like to have her knee injected today because physical therapy did nothing for her pain. ROS:     Review of Systems   Constitutional: Positive for activity change. Negative for appetite change, fatigue and fever. Respiratory: Negative for apnea, cough, chest tightness and shortness of breath. Cardiovascular: Negative for chest pain, palpitations and leg swelling. Gastrointestinal: Negative for abdominal distention, abdominal pain, constipation, diarrhea, nausea and vomiting. Genitourinary: Negative for difficulty urinating, dysuria and hematuria. Musculoskeletal: Positive for arthralgias. Negative for gait problem, joint swelling and myalgias. Skin: Negative for color change and rash. Neurological: Negative for dizziness, weakness, numbness and headaches. Psychiatric/Behavioral: Negative for sleep disturbance.      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    Lung nodule     Meniere disease     right ear    Meniere's disease, right ear 12/10/2019    Morbidly obese ([address] 7/23/2020    Neuropathy     Osteopenia 6/30/2014    Parkinson disease ([address] 7/23/2020    Postmenopausal state 12/10/2019    Postnasal drip 7/11/2020    Prediabetes     RAD (reactive airway disease) 6/30/2014    Seasonal asthma 7/11/2020    Thyroid nodule 3/1/2016    Tremor     r hand    Vitamin D deficiency 6/30/2014     Past Surgical History:    Past Surgical History:   Procedure Laterality Date    CHOLECYSTECTOMY      COLONOSCOPY      ENDOSCOPY, COLON, DIAGNOSTIC      ERCP  03/21/14    EXCISION / BIOPSY SKIN LESION OF TRUNK Left 11/8/2017    EXCISION MASS LEFT POSTERIOR SHOULDER, LEFT ANTERIOR ARM performed by Corinne Caster, MD at [address] [place] [address]  01/03/2007    SKIN BIOPSY Left 11/08/2017    Excision Mass Left Posterior Shoulder, Left Anterior Arm     Current Medications:   Current Outpatient Medications   Medication Sig Dispense Refill    amitriptyline (ELAVIL) 10 MG tablet TAKE ONE TABLET BY MOUTH DAILY AS NEEDED FOR SLEEP AND LEG PAIN FOR UP TO 30 DAYS 30 tablet 3    metFORMIN (GLUCOPHAGE) 500 MG tablet TAKE ONE TABLET BY MOUTH DAILY 60 tablet 2    levothyroxine (SYNTHROID) 75 MCG tablet Take 1 tablet by mouth Daily 90 tablet 0    ondansetron (ZOFRAN-ODT) 4 MG disintegrating tablet Take 1 tablet by mouth every 8 hours as needed. 20 tablet 3    Evolocumab 140 MG/ML SOSY Inject into the skin      famotidine (PEPCID) 20 MG tablet as needed       albuterol sulfate HFA (VENTOLIN HFA) 108 (90 Base) MCG/ACT inhaler Inhale 2 puffs into the lungs 4 times daily as needed for Wheezing 3 Inhaler 1    metoprolol tartrate (LOPRESSOR) 25 MG tablet Take 25 mg by mouth 2 times daily      guaiFENesin (MUCINEX) 600 MG extended release tablet Take 1 tablet by mouth 2 times daily (Patient taking differently: Take 600 mg by mouth 2 times daily as needed ) 20 tablet 0    butenafine (MENTAX) 1 % CREA Apply 1 Tube topically 2 times daily (Patient taking differently: Apply 1 Tube topically 2 times daily as needed ) 15 g 0    alclomethasone (ACLOVATE) 0.05 % cream Apply topically 2 times daily Apply topically 2 times daily. (Patient taking differently: Apply topically 2 times daily as needed Apply topically 2 times daily.) 45 g 1    NONFORMULARY Indications: Dietary fiber 1 tsp a day       ECHINACEA EXTRACT PO Take 450 mg by mouth Indications:  Take in winter WINTER ONLY      Cholecalciferol (VITAMIN D3) 2000 units CAPS Take 1 capsule by mouth 2 times daily 30 capsule 0    diltiazem (CARDIZEM CD) 120 MG extended release capsule Take 1 capsule by mouth every evening 30 capsule 3    fluticasone (FLONASE) 50 MCG/ACT nasal spray PLACE 1 SPRAY IN EACH NOSTRIL DAILY FOR 15 DAYS 1 Bottle 2    ketoconazole (NIZORAL) 2 % shampoo APPLY  TO AFFECTED AREAS TOPICALLY DAILY THEN RINSE OFF AFTER 5 MINUTES (Patient taking differently: APPLY  TO AFFECTED AREAS TOPICALLY DAILY THEN RINSE OFF AFTER 5 MINUTES WHEN NEEDED) 120 mL 2    ezetimibe (ZETIA) 10 MG tablet Take 10 mg by mouth daily      esomeprazole Magnesium (NEXIUM) 40 MG PACK Take 40 mg by mouth daily.  NONFORMULARY Super b complex 3 times per week      ascorbic acid (VITAMIN C) 500 MG tablet Take 500 mg by mouth daily.  chlorpheniramine (CHLOR-TRIMETON) 4 MG tablet Take 12 mg by mouth daily       budesonide-formoterol (SYMBICORT) 80-4.5 MCG/ACT AERO Inhale 2 puffs into the lungs 2 times daily (Patient taking differently: Inhale 2 puffs into the lungs as needed ) 1 Inhaler 6     No current facility-administered medications for this visit.       Facility-Administered Medications Ordered in Other Visits   Medication Dose Route Frequency Provider Last Rate Last Admin    0.9 % sodium chloride infusion   Intravenous Continuous Richie Lawrence  mL/hr at 02/19/14 0827 New Bag at 02/19/14 0827       Allergies:    Aspirin, Atorvastatin, Crestor [rosuvastatin], Livalo [pitavastatin], Seasonal, Simvastatin, Statins, and Welchol [colesevelam hcl]    Social History:   Social History     Socioeconomic History    Marital status: Single     Spouse name: Not on file    Number of children: Not on file    Years of education: Not on file    Highest education level: Not on file   Occupational History    Not on file   Social Needs    Financial resource strain: Not on file    Food insecurity     Worry: Not on file     Inability: Not on file   [employer] needs Medical: Not on file     Non-medical: Not on file   Tobacco Use    Smoking status: Never Smoker    Smokeless tobacco: Never Used   Substance and Sexual Activity    Alcohol use: Yes     Comment: rare    Drug use: No    Sexual activity: Not on file   Lifestyle    Physical activity     Days per week: Not on file     Minutes per session: Not on file    Stress: Not on file   Relationships    Social connections     Talks on phone: Not on file     Gets together: Not on file     Attends Sabianism service: Not on file     Active member of club or organization: Not on file     Attends meetings of clubs or organizations: Not on file     Relationship status: Not on file    Intimate partner violence     Fear of current or ex partner: Not on file     Emotionally abused: Not on file     Physically abused: Not on file     Forced sexual activity: Not on file   Other Topics Concern    Not on file   Social History Narrative    Not on file       Family History:  Family History   Problem Relation Age of Onset    Cancer Mother 48        pineal gland/throat    Cancer Father 64        colon    Cancer Brother         lung    Other Brother         pulmonary embolus    Heart Disease Paternal Grandfather         myocarditis    Stroke Maternal Aunt     Cancer Maternal Uncle     Stroke Maternal Grandmother     Diabetes Brother     Heart Disease Paternal Uncle 67        mi    Heart Disease Brother 58        mi    High Blood Pressure Brother     Heart Disease Brother 79        stents for cad    High Blood Pressure Brother        Vitals:   /65   Pulse 72   Temp 97.9 °F (36.6 °C)   Ht 5' 5\" (1.651 m)   Wt 226 lb (102.5 kg)   BMI 37.61 kg/m²  Body mass index is 37.61 kg/m². Physical Examination:     Orthopedics:    GENERAL: Alert and oriented X3 in no acute distress. SKIN: Intact without lesions or ulcerations. NEURO: Intact to sensory and motor testing. VASC: Capillary refill is less than 3 seconds.     Right Hip     GEN: Alert and oriented X 3, in no acute distress. GAIT: The patient's gait was observed while entering the exam room and was noted to be non antalgic. The extremity is in anatomic alignment. SKIN: Intact without rashes, lesions, or ulcerations. No obvious deformity or swelling. NEURO: The patient responds to light touch throughout right LE. Patellar and Achilles reflexes are 2/4. VASC: The right LE is neurovascularly intact with 2/4 DP and 2/4 PT pulses. Brisk capillary refill. ROM: 90 degrees flexion, 30 degrees abduction, 20 degrees adduction, 20 degrees of internal rotation, 40 degrees of external rotation. MUSC: Strength is 5/5 flexion, abduction, internal rotation, external rotation. PALP: The patient is tender to palpation over the greater trochanter. TEST: Log roll is (-). No apparent leg length discrepancy. Negative Trendelenburg test. Negative Dalton's test. No labral clunks. No pain on compression. Knee Exam    LOCATION:  Left Knee  GEN: Alert and oriented X 3, in no acute distress. GAIT: The patient's gait was observed while entering the exam room and was noted to be non antalgic. The extremity is in anatomic alignment. SKIN: Intact without rashes, lesions, or ulcerations. No obvious deformity or swelling. NEURO: The patient responds to light touch throughout left LE. Patellar and Achilles reflexes are 2/4. VASC: The left LE is neurovascularly intact with 2/4 DP and 2/4 PT pulses. Brisk capillary refill. ROM: 0/122 degrees. There is no effusion. MUSC: good quad tone  LIGAMENT: Lachman's test is Negative with Good endpoint. Anterior drawer Negative. Posterior drawer Negative. There is  No varus instability at 0 degrees and No varus instability at 30 degrees. There is No valgus instability at 0 degrees and No valgus instability at 30 degrees. SPECIAL: Sarah test is negative with no clunks and no pain but there is crepitation. PALP: There is medial joint line pain. Assessment:     1. Greater trochanteric bursitis of right hip    2. Osteoarthritis of left knee, unspecified osteoarthritis type      Procedures:    Procedure: yes    Greater Trochanteric Bursa Injection     Location: Right Hip  Procedure: Samuel Jhaveri agreed today for a Corticosteroid injection into the right greater trochanteric bursa. The patient was placed in the lateral position with the affected side up. The point of the maximum tenderness was marked with the closed end of a click type pen. The skin was prepped with betadine in a sterile fashion. Utilizing a [employer] ultrasound unit with a variable frequency linear transducer and sterile technique a 5 cc solution containing 4cc of 1% Lidocaine and 1 cc containing 40mg of depo-medrol was injected into the greater trochanteric bursa with a 22 gauge spinal needle. The wound was cleansed and a Band-Aid was placed. The patient tolerated the procedure without difficulty. Adverse reactions of the injection was discussed with the patient including signs of infection (increasing pain, redness, swelling) and the patient was instructed to call immediately with any of these symptoms. The images are stored on SD card in the machine until downloaded to the patient's chart. Regular Knee Injection    Location: Left Knee  Procedure: I discussed in detail the risks, benefits and complications of the corticosteroid injection which included but are not limited to: infection, skin reactions, hot swollen, and anaphylaxis with the patient. Samuel Jhaveri verbalized understanding and they have agreed to have the corticosteroid injection into the left knee. The patient was placed in the Supine position on the exam table. The superior lateral portal was identified and marked with a ball point pen. The skin was prepped with betadine in a sterile fashion.  Utilizing a [employer] ultrasound unit with a variable frequency linear transducer and clean technique with sterile gloves, a 5 cc solution containing 4 cc of 1.0% Lidocaine with 1 cc containing 40 mg of Depo-medrol  was injected. There was no resistance to the injection. The wound was cleansed and a band-aid was placed. the patient tolerated the procedure without difficulty. Adverse reactions to the injection were discussed with the patient including signs of infection (increasing pain, redness, swelling) and the patient was instructed to call immediately if experiencing any of these symptoms. Images of the injection site were recorded throughout the procedure and are saved on the SD card which is stored in the [employer] ultrasound unit. All images were downloaded and stored in patient's chart. Radiology:   X-ray was reviewed from 02/22/2021. Plan:   Treatment : I reviewed the X-ray with the patient. We discussed the etiologies and natural histories of Greater trochanteric pain syndrome of the right hip and primary osteoarthritis of the right knee. We discussed the various treatment alternatives including anti-inflammatory medications, physical therapy, injections, further imaging studies and as a last result surgery. During today's visit, I explained to the patient that I can inject her hip again so she may have good pain relief like she had at her last visit. Patient then stated that she understands but she also states that the last time she saw Dr. Omid Sharpe for her left knee, he told her that she can have it injected if she does not have any pain relief at physical therapy and she states that she did not have any pain relief. I then informed her that I have no problem doing that. The patient then asked me if she is okay to do two injections and I told her that she can. So at this time, the patient has opted for a cortisone injection into the greater trochanteric bursa of the Right hip and the joint capsule of the left knee to help reduce inflammation and pain.  The injection site should never get red, hot, or swollen and if it does the patient will contact our office right away. The patient may experience a increase in soreness the first 24-48 hours due to a cortisone flair and can take anti-inflammatories for a short period of time to reduce that soreness. The patient should not submerge the injection site in water for a minimum of 24 hours to avoid infection. This means no lakes, pools, ponds, or hot tubs for 24 hours. If the patient is diabetic the injection may increase their blood sugar for up to one week. The patient can do this cortisone injection once every 3 months as needed. If the injections stop working and do not give the patient relief the patient should consider surgical interventions to produce long term relief. Patient should return to the clinic in 3 months to follow up with Aide Presume D.O. . The patient will call the office immediately with any problems. Orders Placed This Encounter   Medications    methylPREDNISolone acetate (DEPO-MEDROL) injection 40 mg    lidocaine 1 % injection 4 mL    methylPREDNISolone acetate (DEPO-MEDROL) injection 40 mg    lidocaine 1 % injection 4 mL     No orders of the defined types were placed in this encounter. Neftali TERRAZAS am scribing for and in the presence of Sandra Stephens PA-C. 4/14/2021  5:24 PM    I, Sandra Stephens PA-C, have personally seen this patient, reviewed the chart including history, and imaging if done. I personally  performed the physical exam and obtained any needed additional history. I placed orders, performed or supervised procedures and developed the treatment plan.     Electronically signed by Marc Askew PA-C, on 4/14/2021 at 5:24 PM      Electronically signed by Marc Askew PA-C, on 4/14/2021 at 5:24 PM

## 2021-04-26 ENCOUNTER — HOSPITAL ENCOUNTER (OUTPATIENT)
Dept: NEUROLOGY | Age: 81
Discharge: HOME | End: 2021-04-26
Payer: MEDICARE

## 2021-04-26 DIAGNOSIS — R52 TINGLING PAIN: ICD-10-CM

## 2021-04-26 DIAGNOSIS — R20.2 TINGLING OF RIGHT UPPER EXTREMITY: ICD-10-CM

## 2021-04-26 PROCEDURE — 95909 NRV CNDJ TST 5-6 STUDIES: CPT | Performed by: PHYSICAL MEDICINE & REHABILITATION

## 2021-04-26 PROCEDURE — 95886 MUSC TEST DONE W/N TEST COMP: CPT | Performed by: PHYSICAL MEDICINE & REHABILITATION

## 2021-04-30 NOTE — RESULT ENCOUNTER NOTE
Blood work was with normal limits. One result per EMG there is a pinched nerve at C5-C6 right. Please order an x-ray of her cervical spine. Call if the patient is any questions. She has an appointment coming up as well where we can discuss further. Thank you.

## 2021-05-03 DIAGNOSIS — R52 TINGLING PAIN: Primary | ICD-10-CM

## 2021-05-03 DIAGNOSIS — G58.9 PINCHED NERVE: ICD-10-CM

## 2021-05-04 ENCOUNTER — HOSPITAL ENCOUNTER (OUTPATIENT)
Dept: GENERAL RADIOLOGY | Facility: CLINIC | Age: 81
Discharge: HOME | End: 2021-05-06
Payer: MEDICARE

## 2021-05-04 ENCOUNTER — HOSPITAL ENCOUNTER (OUTPATIENT)
Age: 81
Setting detail: SPECIMEN
Discharge: HOME | End: 2021-05-04
Payer: MEDICARE

## 2021-05-04 DIAGNOSIS — R52 TINGLING PAIN: ICD-10-CM

## 2021-05-04 DIAGNOSIS — G58.9 PINCHED NERVE: ICD-10-CM

## 2021-05-04 LAB
ABSOLUTE EOS #: 0.13 K/UL (ref 0–0.44)
ABSOLUTE IMMATURE GRANULOCYTE: <0.03 K/UL (ref 0–0.3)
ABSOLUTE LYMPH #: 2.12 K/UL (ref 1.1–3.7)
ABSOLUTE MONO #: 0.36 K/UL (ref 0.1–1.2)
ALBUMIN SERPL-MCNC: 4.2 G/DL (ref 3.5–5.2)
ALBUMIN/GLOBULIN RATIO: 1.4 (ref 1–2.5)
ALP BLD-CCNC: 82 U/L (ref 35–104)
ALT SERPL-CCNC: 23 U/L (ref 5–33)
ANION GAP SERPL CALCULATED.3IONS-SCNC: 11 MMOL/L (ref 9–17)
AST SERPL-CCNC: 19 U/L
BASOPHILS # BLD: 1 % (ref 0–2)
BASOPHILS ABSOLUTE: 0.07 K/UL (ref 0–0.2)
BILIRUB SERPL-MCNC: 0.51 MG/DL (ref 0.3–1.2)
BUN BLDV-MCNC: 15 MG/DL (ref 8–23)
BUN/CREAT BLD: ABNORMAL (ref 9–20)
CALCIUM SERPL-MCNC: 9.8 MG/DL (ref 8.6–10.4)
CHLORIDE BLD-SCNC: 109 MMOL/L (ref 98–107)
CHOLESTEROL/HDL RATIO: 2.1
CHOLESTEROL: 124 MG/DL
CO2: 24 MMOL/L (ref 20–31)
CREAT SERPL-MCNC: 0.76 MG/DL (ref 0.5–0.9)
DIFFERENTIAL TYPE: NORMAL
EOSINOPHILS RELATIVE PERCENT: 2 % (ref 1–4)
GFR AFRICAN AMERICAN: >60 ML/MIN
GFR NON-AFRICAN AMERICAN: >60 ML/MIN
GFR SERPL CREATININE-BSD FRML MDRD: ABNORMAL ML/MIN/{1.73_M2}
GFR SERPL CREATININE-BSD FRML MDRD: ABNORMAL ML/MIN/{1.73_M2}
GLUCOSE BLD-MCNC: 126 MG/DL (ref 70–99)
HCT VFR BLD CALC: 44.1 % (ref 36.3–47.1)
HDLC SERPL-MCNC: 60 MG/DL
HEMOGLOBIN: 14.1 G/DL (ref 11.9–15.1)
IMMATURE GRANULOCYTES: 0 %
LDL CHOLESTEROL: 44 MG/DL (ref 0–130)
LYMPHOCYTES # BLD: 33 % (ref 24–43)
MCH RBC QN AUTO: 31.4 PG (ref 25.2–33.5)
MCHC RBC AUTO-ENTMCNC: 32 G/DL (ref 28.4–34.8)
MCV RBC AUTO: 98.2 FL (ref 82.6–102.9)
MONOCYTES # BLD: 6 % (ref 3–12)
NRBC AUTOMATED: 0 PER 100 WBC
PDW BLD-RTO: 12.2 % (ref 11.8–14.4)
PLATELET # BLD: 251 K/UL (ref 138–453)
PLATELET ESTIMATE: NORMAL
PMV BLD AUTO: 10.7 FL (ref 8.1–13.5)
POTASSIUM SERPL-SCNC: 4.3 MMOL/L (ref 3.7–5.3)
RBC # BLD: 4.49 M/UL (ref 3.95–5.11)
RBC # BLD: NORMAL 10*6/UL
SEG NEUTROPHILS: 58 % (ref 36–65)
SEGMENTED NEUTROPHILS ABSOLUTE COUNT: 3.78 K/UL (ref 1.5–8.1)
SODIUM BLD-SCNC: 144 MMOL/L (ref 135–144)
THYROXINE, FREE: 1.19 NG/DL (ref 0.93–1.7)
TOTAL PROTEIN: 7.2 G/DL (ref 6.4–8.3)
TRIGL SERPL-MCNC: 101 MG/DL
TSH SERPL DL<=0.05 MIU/L-ACNC: 2.81 MIU/L (ref 0.3–5)
VLDLC SERPL CALC-MCNC: NORMAL MG/DL (ref 1–30)
WBC # BLD: 6.5 K/UL (ref 3.5–11.3)
WBC # BLD: NORMAL 10*3/UL

## 2021-05-04 PROCEDURE — 72040 X-RAY EXAM NECK SPINE 2-3 VW: CPT

## 2021-05-05 LAB — THYROID PEROXIDASE (TPO) AB: 349 IU/ML (ref 0–25)

## 2021-05-10 ENCOUNTER — OFFICE VISIT (OUTPATIENT)
Dept: FAMILY MEDICINE CLINIC | Age: 81
End: 2021-05-10
Payer: MEDICARE

## 2021-05-10 ENCOUNTER — HOSPITAL ENCOUNTER (OUTPATIENT)
Age: 81
Setting detail: SPECIMEN
Discharge: HOME | End: 2021-05-10
Payer: MEDICARE

## 2021-05-10 VITALS
BODY MASS INDEX: 37.31 KG/M2 | SYSTOLIC BLOOD PRESSURE: 120 MMHG | TEMPERATURE: 97 F | DIASTOLIC BLOOD PRESSURE: 79 MMHG | HEART RATE: 60 BPM | WEIGHT: 224.2 LBS | OXYGEN SATURATION: 95 %

## 2021-05-10 DIAGNOSIS — Z11.59 NEED FOR HEPATITIS C SCREENING TEST: ICD-10-CM

## 2021-05-10 DIAGNOSIS — G20 PARKINSON DISEASE (HCC): ICD-10-CM

## 2021-05-10 DIAGNOSIS — M50.122 CERVICAL DISC DISORDER AT C5-C6 LEVEL WITH RADICULOPATHY: Primary | ICD-10-CM

## 2021-05-10 DIAGNOSIS — J41.1 BRONCHITIS, MUCOPURULENT RECURRENT (HCC): ICD-10-CM

## 2021-05-10 DIAGNOSIS — E66.01 MORBIDLY OBESE (HCC): ICD-10-CM

## 2021-05-10 LAB — HEPATITIS C ANTIBODY: NONREACTIVE

## 2021-05-10 PROCEDURE — 1090F PRES/ABSN URINE INCON ASSESS: CPT | Performed by: FAMILY MEDICINE

## 2021-05-10 PROCEDURE — 99213 OFFICE O/P EST LOW 20 MIN: CPT | Performed by: FAMILY MEDICINE

## 2021-05-10 PROCEDURE — G8427 DOCREV CUR MEDS BY ELIG CLIN: HCPCS | Performed by: FAMILY MEDICINE

## 2021-05-10 PROCEDURE — 4040F PNEUMOC VAC/ADMIN/RCVD: CPT | Performed by: FAMILY MEDICINE

## 2021-05-10 PROCEDURE — G8417 CALC BMI ABV UP PARAM F/U: HCPCS | Performed by: FAMILY MEDICINE

## 2021-05-10 PROCEDURE — G8926 SPIRO NO PERF OR DOC: HCPCS | Performed by: FAMILY MEDICINE

## 2021-05-10 PROCEDURE — 3023F SPIROM DOC REV: CPT | Performed by: FAMILY MEDICINE

## 2021-05-10 PROCEDURE — G8399 PT W/DXA RESULTS DOCUMENT: HCPCS | Performed by: FAMILY MEDICINE

## 2021-05-10 PROCEDURE — 1036F TOBACCO NON-USER: CPT | Performed by: FAMILY MEDICINE

## 2021-05-10 PROCEDURE — 1123F ACP DISCUSS/DSCN MKR DOCD: CPT | Performed by: FAMILY MEDICINE

## 2021-05-10 SDOH — ECONOMIC STABILITY: INCOME INSECURITY: HOW HARD IS IT FOR YOU TO PAY FOR THE VERY BASICS LIKE FOOD, HOUSING, MEDICAL CARE, AND HEATING?: NOT HARD AT ALL

## 2021-05-10 ASSESSMENT — PATIENT HEALTH QUESTIONNAIRE - PHQ9
1. LITTLE INTEREST OR PLEASURE IN DOING THINGS: 0
SUM OF ALL RESPONSES TO PHQ9 QUESTIONS 1 & 2: 0
SUM OF ALL RESPONSES TO PHQ QUESTIONS 1-9: 0
SUM OF ALL RESPONSES TO PHQ QUESTIONS 1-9: 0

## 2021-05-10 NOTE — PROGRESS NOTES
General FM note    Jamie Shafer is a 68 y.o. female who presents today for follow up on her  medical conditions as noted below.   Jamie Shafer is c/o of   Chief Complaint   Patient presents with    6 Month Follow-Up       Patient Active Problem List:     Hypothyroid     Hypercholesteremia     Anxiety     Vitamin D deficiency     RAD (reactive airway disease)     Diverticular disease     Tremor     Gastroparesis     Lung nodule < 6cm on CT     Lateral femoral cutaneous neuropathy     Thyroid nodule     Prediabetes     Bilateral tinnitus     Enthesopathy of hip region     Laryngopharyngeal reflux     Meniere's disease, right ear     Postmenopausal state     Seasonal asthma     Postnasal drip     Parkinson disease ([address]     Bronchitis, mucopurulent recurrent ([address]     Morbidly obese ([address]     Past Medical History:   Diagnosis Date    Acid reflux     Anxiety 6/30/2014    ALICIA-7   09/09/19 : 4    Bilateral tinnitus 12/10/2019    Bronchitis, mucopurulent recurrent ([address] 7/23/2020    Diverticular disease 6/30/2014    Enthesopathy of hip region 12/10/2019    GERD (gastroesophageal reflux disease)     Hypercholesteremia 6/30/2014    Hypothyroid 6/30/2014    Laryngopharyngeal reflux 12/10/2019    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    Lung nodule     Meniere disease     right ear    Meniere's disease, right ear 12/10/2019    Morbidly obese ([address] 7/23/2020    Neuropathy     Osteopenia 6/30/2014    Parkinson disease ([address] 7/23/2020    Postmenopausal state 12/10/2019    Postnasal drip 7/11/2020    Prediabetes     RAD (reactive airway disease) 6/30/2014    Seasonal asthma 7/11/2020    Thyroid nodule 3/1/2016    Tremor     r hand    Vitamin D deficiency 6/30/2014      Past Surgical History:   Procedure Laterality Date    CHOLECYSTECTOMY      COLONOSCOPY      ENDOSCOPY, COLON, DIAGNOSTIC      ERCP  03/21/14    EXCISION / BIOPSY SKIN LESION OF TRUNK Left 11/8/2017    EXCISION MASS LEFT POSTERIOR SHOULDER, LEFT ANTERIOR ARM performed by Wale Mcclure MD at [address]  01/03/2007    SKIN BIOPSY Left 11/08/2017    Excision Mass Left Posterior Shoulder, Left Anterior Arm     Family History   Problem Relation Age of Onset    Cancer Mother 48        pineal gland/throat    Cancer Father 64        colon   [facility] Cancer Brother         lung    Other Brother         pulmonary embolus    Heart Disease Paternal Grandfather         myocarditis    Stroke Maternal Aunt     Cancer Maternal Uncle     Stroke Maternal Grandmother     Diabetes Brother     Heart Disease Paternal Uncle 67        mi    Heart Disease Brother 58        mi    High Blood Pressure Brother     Heart Disease Brother 79        stents for cad    High Blood Pressure Brother      Current Outpatient Medications   Medication Sig Dispense Refill    amitriptyline (ELAVIL) 10 MG tablet TAKE ONE TABLET BY MOUTH DAILY AS NEEDED FOR SLEEP AND LEG PAIN FOR UP TO 30 DAYS 30 tablet 3    metFORMIN (GLUCOPHAGE) 500 MG tablet TAKE ONE TABLET BY MOUTH DAILY 60 tablet 2    levothyroxine (SYNTHROID) 75 MCG tablet Take 1 tablet by mouth Daily 90 tablet 0    ondansetron (ZOFRAN-ODT) 4 MG disintegrating tablet Take 1 tablet by mouth every 8 hours as needed.  20 tablet 3    Evolocumab 140 MG/ML SOSY Inject into the skin      famotidine (PEPCID) 20 MG tablet as needed       albuterol sulfate HFA (VENTOLIN HFA) 108 (90 Base) MCG/ACT inhaler Inhale 2 puffs into the lungs 4 times daily as needed for Wheezing 3 Inhaler 1    metoprolol tartrate (LOPRESSOR) 25 MG tablet Take 25 mg by mouth 2 times daily      guaiFENesin (MUCINEX) 600 MG extended release tablet Take 1 tablet by mouth 2 times daily (Patient taking differently: Take 600 mg by mouth 2 times daily as needed ) 20 tablet 0    butenafine (MENTAX) 1 % CREA Apply 1 Tube topically 2 times daily (Patient taking differently: Apply 1 Tube topically 2 times daily as Welchol [Colesevelam Hcl] Other (See Comments)     Leg cramp       Social History     Tobacco Use    Smoking status: Never Smoker    Smokeless tobacco: Never Used   Substance Use Topics    Alcohol use: Yes     Comment: rare      Body mass index is 37.31 kg/m². /79   Pulse 60   Temp 97 °F (36.1 °C)   Wt 224 lb 3.2 oz (101.7 kg)   SpO2 95%   BMI 37.31 kg/m²     Subjective:      HPI    69 yo female coming today for follow-up. And also to review her recent blood work and x-ray report. Got established with Dr. Kain Lawrence. She feels it was a good choice to get this neurologist.  She was found to have an abnormal x-ray of her cervical spine. She still has been having discomfort pain in her right elbow and shooting pain from her right shoulder from her right neck. She would like to have an MRI done. She has been following up with an orthopedic specialist.  Cortisone shot every 3 months - and in R knee - NP Edis Cantu. Did do PT for her knee but then she had vertigo. Review of Systems   Constitutional: Negative for fever and unexpected weight change. Pertinent items are noted in HPI. Objective:   Physical Exam  Constitutional: VS (see above). General appearance: normal development, habitus and attention, no deformities. No distress. Eyes: normal conjunctiva and lids. CAV: RRR, no RMG. No edema lower extremities. Pulmo: CTA bilateral, no CWR. Skin: no rashes, lesions or ulcers. Musculoskeletal: normal gait. Nails: no clubbing or cyanosis. Psychiatric: alert and oriented to place, time and person. Normal mood and affect. Assessment:       Diagnosis Orders   1. Cervical disc disorder at C5-C6 level with radiculopathy  MRI CERVICAL SPINE WO CONTRAST   2. Parkinson disease ([address]     3. Bronchitis, mucopurulent recurrent ([address]     4. Morbidly obese ([address]     5. Need for hepatitis C screening test  Hepatitis C Antibody       Plan:   Await results of the MRI.   If needed patient see specialist. Continue to follow-up with the specialist as indicated she will have an appointment with pulmonologist coming up. Discussed with her exercise and diet and patient knows that she needs to lose weight. Return in about 5 months (around 10/10/2021), or if symptoms worsen or fail to improve, for medicare visit. Orders Placed This Encounter   Procedures    MRI CERVICAL SPINE WO CONTRAST     Standing Status:   Future     Standing Expiration Date:   5/10/2022    Hepatitis C Antibody     Standing Status:   Future     Number of Occurrences:   1     Standing Expiration Date:   5/10/2022     No orders of the defined types were placed in this encounter. Call or return to clinic prn if these symptoms worsen or fail to improve as anticipated. I have reviewed the instructions with the patient, answering all questions to her satisfaction. Jose Forbes received counseling on the following healthy behaviors: nutrition, exercise and medication adherence  Reviewed prior labs and health maintenance. Continue current medications, diet and exercise. Discussed use, benefit, and side effects of prescribed medications. Barriers to medication compliance addressed. Patient given educational materials - see patient instructions. All patient questions answered. Patient voiced understanding.       Electronically signed by Javier Mclean MD on 5/10/2021 at 5:26 PM       (Please note that portions of this note were completed with a voice recognition program. Efforts were made to edit the dictations but occasionally words are mis-transcribed.)

## 2021-05-11 ENCOUNTER — TELEPHONE (OUTPATIENT)
Dept: FAMILY MEDICINE CLINIC | Age: 81
End: 2021-05-11

## 2021-05-11 ENCOUNTER — HOSPITAL ENCOUNTER (OUTPATIENT)
Age: 81
Setting detail: SPECIMEN
Discharge: HOME | End: 2021-05-11
Payer: MEDICARE

## 2021-05-11 DIAGNOSIS — R73.03 PRE-DIABETES: ICD-10-CM

## 2021-05-11 DIAGNOSIS — R73.03 PRE-DIABETES: Primary | ICD-10-CM

## 2021-05-11 LAB
ESTIMATED AVERAGE GLUCOSE: 123 MG/DL
HBA1C MFR BLD: 5.9 % (ref 4–6)

## 2021-05-11 NOTE — TELEPHONE ENCOUNTER
Yes please order an A1c. Diagnosis prediabetes. Last A1c in December was 5.8. Thank you. No complaints No complaints No complaints

## 2021-05-12 NOTE — RESULT ENCOUNTER NOTE
A1C 5.9 as it was in 12 /2020. Prediabetes. Healthy diet + loosing weight as discussed with the pt will help to control the glucose level. Thank you.

## 2021-05-27 ENCOUNTER — TELEPHONE (OUTPATIENT)
Dept: ORTHOPEDIC SURGERY | Age: 81
End: 2021-05-27

## 2021-05-28 ENCOUNTER — HOSPITAL ENCOUNTER (OUTPATIENT)
Dept: MRI IMAGING | Facility: CLINIC | Age: 81
Discharge: HOME | End: 2021-05-30
Payer: MEDICARE

## 2021-05-28 DIAGNOSIS — M50.122 CERVICAL DISC DISORDER AT C5-C6 LEVEL WITH RADICULOPATHY: ICD-10-CM

## 2021-05-28 DIAGNOSIS — M48.00 STENOSIS OF FORAMEN MAGNUM: Primary | ICD-10-CM

## 2021-05-28 PROCEDURE — 72141 MRI NECK SPINE W/O DYE: CPT

## 2021-06-16 DIAGNOSIS — M70.61 GREATER TROCHANTERIC BURSITIS OF RIGHT HIP: Primary | ICD-10-CM

## 2021-06-17 ENCOUNTER — OFFICE VISIT (OUTPATIENT)
Dept: ORTHOPEDIC SURGERY | Age: 81
End: 2021-06-17
Payer: MEDICARE

## 2021-06-17 VITALS
HEIGHT: 65 IN | SYSTOLIC BLOOD PRESSURE: 118 MMHG | DIASTOLIC BLOOD PRESSURE: 71 MMHG | BODY MASS INDEX: 36.65 KG/M2 | HEART RATE: 56 BPM | WEIGHT: 220 LBS

## 2021-06-17 DIAGNOSIS — M17.12 PRIMARY OSTEOARTHRITIS OF LEFT KNEE: Primary | ICD-10-CM

## 2021-06-17 PROCEDURE — 4040F PNEUMOC VAC/ADMIN/RCVD: CPT | Performed by: ORTHOPAEDIC SURGERY

## 2021-06-17 PROCEDURE — 1123F ACP DISCUSS/DSCN MKR DOCD: CPT | Performed by: ORTHOPAEDIC SURGERY

## 2021-06-17 PROCEDURE — 1036F TOBACCO NON-USER: CPT | Performed by: ORTHOPAEDIC SURGERY

## 2021-06-17 PROCEDURE — 99213 OFFICE O/P EST LOW 20 MIN: CPT | Performed by: ORTHOPAEDIC SURGERY

## 2021-06-17 PROCEDURE — G8427 DOCREV CUR MEDS BY ELIG CLIN: HCPCS | Performed by: ORTHOPAEDIC SURGERY

## 2021-06-17 PROCEDURE — 1090F PRES/ABSN URINE INCON ASSESS: CPT | Performed by: ORTHOPAEDIC SURGERY

## 2021-06-17 PROCEDURE — G8417 CALC BMI ABV UP PARAM F/U: HCPCS | Performed by: ORTHOPAEDIC SURGERY

## 2021-06-17 PROCEDURE — G8399 PT W/DXA RESULTS DOCUMENT: HCPCS | Performed by: ORTHOPAEDIC SURGERY

## 2021-06-17 ASSESSMENT — ENCOUNTER SYMPTOMS
COUGH: 0
CONSTIPATION: 0
ABDOMINAL PAIN: 0
APNEA: 0
NAUSEA: 0
ABDOMINAL DISTENTION: 0
VOMITING: 0
CHEST TIGHTNESS: 0
DIARRHEA: 0
COLOR CHANGE: 0
SHORTNESS OF BREATH: 0

## 2021-06-17 NOTE — PROGRESS NOTES
urinating, dysuria and hematuria. Musculoskeletal: Positive for arthralgias. Negative for gait problem, joint swelling and myalgias. Skin: Negative for color change and rash. Neurological: Negative for dizziness, weakness, numbness and headaches. Psychiatric/Behavioral: Negative for sleep disturbance.        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    Lung nodule     Meniere disease     right ear    Meniere's disease, right ear 12/10/2019    Morbidly obese ([address] 7/23/2020    Neuropathy     Osteopenia 6/30/2014    Parkinson disease ([address] 7/23/2020    Postmenopausal state 12/10/2019    Postnasal drip 7/11/2020    Prediabetes     RAD (reactive airway disease) 6/30/2014    Seasonal asthma 7/11/2020    Thyroid nodule 3/1/2016    Tremor     r hand    Vitamin D deficiency 6/30/2014       Past Surgical History:    Past Surgical History:   Procedure Laterality Date    CHOLECYSTECTOMY      COLONOSCOPY      ENDOSCOPY, COLON, DIAGNOSTIC      ERCP  03/21/14    EXCISION / BIOPSY SKIN LESION OF TRUNK Left 11/8/2017    EXCISION MASS LEFT POSTERIOR SHOULDER, LEFT ANTERIOR ARM performed by Jesus Galvin MD at [address] [place] [address]  01/03/2007    SKIN BIOPSY Left 11/08/2017    Excision Mass Left Posterior Shoulder, Left Anterior Arm       CurrentMedications:   Current Outpatient Medications   Medication Sig Dispense Refill    amitriptyline (ELAVIL) 10 MG tablet TAKE ONE TABLET BY MOUTH DAILY AS NEEDED FOR SLEEP AND LEG PAIN FOR UP TO 30 DAYS 30 chlorpheniramine (CHLOR-TRIMETON) 4 MG tablet Take 12 mg by mouth daily       budesonide-formoterol (SYMBICORT) 80-4.5 MCG/ACT AERO Inhale 2 puffs into the lungs 2 times daily (Patient taking differently: Inhale 2 puffs into the lungs as needed ) 1 Inhaler 6     No current facility-administered medications for this visit. Facility-Administered Medications Ordered in Other Visits   Medication Dose Route Frequency Provider Last Rate Last Admin    0.9 % sodium chloride infusion   Intravenous Continuous Keyanna Gongora  mL/hr at 02/19/14 0827 New Bag at 02/19/14 0827       Allergies:    Aspirin, Atorvastatin, Crestor [rosuvastatin], Livalo [pitavastatin], Seasonal, Simvastatin, Statins, and Welchol [colesevelam hcl]    Social History:   Social History     Socioeconomic History    Marital status: Single     Spouse name: None    Number of children: None    Years of education: None    Highest education level: None   Occupational History    None   Tobacco Use    Smoking status: Never Smoker    Smokeless tobacco: Never Used   Vaping Use    Vaping Use: Never used   Substance and Sexual Activity    Alcohol use: Yes     Comment: rare    Drug use: No    Sexual activity: None   Other Topics Concern    None   Social History Narrative    None     Social Determinants of Health     Financial Resource Strain: Low Risk     Difficulty of Paying Living Expenses: Not hard at all   Food Insecurity: No Food Insecurity    Worried About Running Out of Food in the Last Year: Never true    Robe of Food in the Last Year: Never true   Transportation Needs:     Lack of Transportation (Medical):      Lack of Transportation (Non-Medical):    Physical Activity:     Days of Exercise per Week:     Minutes of Exercise per Session:    Stress:     Feeling of Stress :    Social Connections:     Frequency of Communication with Friends and Family:     Frequency of Social Gatherings with Friends and Family:     Attends Taoism Services:     Active Member of Clubs or Organizations:     Attends Club or Organization Meetings:     Marital Status:    Intimate Partner Violence:     Fear of Current or Ex-Partner:     Emotionally Abused:     Physically Abused:     Sexually Abused:        Family History:  Family History   Problem Relation Age of Onset    Cancer Mother 48        pineal gland/throat    Cancer Father 64        colon    Cancer Brother         lung    Other Brother         pulmonary embolus    Heart Disease Paternal Grandfather         myocarditis    Stroke Maternal Aunt     Cancer Maternal Uncle     Stroke Maternal Grandmother     Diabetes Brother     Heart Disease Paternal Uncle 67        mi    Heart Disease Brother 58        mi    High Blood Pressure Brother     Heart Disease Brother 79        stents for cad    High Blood Pressure Brother        Vitals:   /71   Pulse 56   Ht 5' 5\" (1.651 m)   Wt 220 lb (99.8 kg)   BMI 36.61 kg/m²  Body mass index is 36.61 kg/m². Physical Examination:     Orthopedics:    GENERAL: Alert and oriented X3 in no acute distress. SKIN: Intact without lesions or ulcerations. NEURO: Intact to sensory and motor testing. VASC: Capillary refill is less than 3 seconds. KNEE EXAM    LOCATION: Left Knee  GEN: Alert and oriented X 3, in no acute distress. GAIT: The patient's gait was observed while entering the exam room and was noted to be non antalgic. The extremity is in anatomic alignment. SKIN: Intact without rashes, lesions, or ulcerations. No obvious deformity or swelling. NEURO: The patient responds to light touch throughout left LE. Patellar and Achilles reflexes are 2/4. VASC: The left LE is neurovascularly intact with 2/4 DP and 2/4 PT pulses. Brisk capillary refill. ROM: 0/121 degrees. There is mild effusion. MUSC: slightly decreased quad tone  LIGAMENT: Lachman's test is Negative with Good endpoint. Anterior drawer Negative.  Posterior drawer Negative. There is No varus instability at 0 degrees and No varus instability at 30 degrees. There is No valgus instability at 0 degrees and No valgus instability at 30 degrees. SPECIAL: Sarah test is negative with no clunks, no crepitation, and no pain. There is mild PF compartment crepitation. PALP: There is no joint line pain. There is medial fat pad pain  Assessment:     1. Primary osteoarthritis of left knee      Procedures:    Procedure: no  Radiology:   No results found. Plan:   Treatment : I reviewed the X-ray with the patient. We discussed the etiologies and natural histories of fat pad impingement, primary osteoarthritis of the left knee. We discussed the various treatment alternatives including anti-inflammatory medications, physical therapy, injections, further imaging studies and as a last result surgery. During today's visit, I think it is great that she does not have any knee pain at this time. I did explain to her that her episode of the catching that she had before February was probably her fat pad in the front of her knee. I explained to her that unless she continues to have recurrent episodes of catching that she is not doing any damage to her knee. If the catching becomes more frequent we may consider an MRI of her left knee. I did tell her that [facility] my PA can inject her knee every 3 months if needed but if she is not having any pain I would hold off on that. She does have some slight decrease in quad tone today I do think she should continue at least doing some exercises to keep strength in her quad as any weakness could pre dispose her to possibly falling and we don't want that to happen. The patient has opted for continuing to do exercises on her own working on strengthening her quads. She may get a cortisone injection every 3 months if needed and she has pain.  She is planned to follow up with [facility] in 1 month for repeat injection into her right hip that is causing her the most pain but does get good relief from those. A physical therapy prescription was not given. Patient should return to the clinic in 1 months to follow up with  Angelia Flores PA-C. The patient will call the office immediately with any problems. No orders of the defined types were placed in this encounter. No orders of the defined types were placed in this encounter. Gabby JONHSTON MS, AT, ATC, am scribing for and in the presence of Beto Carwford D.O.. 6/20/2021  7:29 PM    Electronically signed by Rebeca Hardin DO, on 6/20/2021 at 7:29 PM     I, Beto Crawford DO, have personally seen this patient and I have reviewed the CC, PMH, FHX and Social History as provided by other clinical staff. I reassessed the HPI and ROS as scribed by Gabby Quintero MS AT [facility] in my presence and it is both accurate and complete. Thereafter, I personally performed the PE, reviewed the imaging and established the DX and POC. I agree with the documentation provided by the . I have reviewed all documentation in its entirety prior to providing my signature indicating agreement. Any areas of disagreement are noted on the chart.     Electronically signed by Rebeca Hardin DO on 6/20/2021 at 7:29 PM

## 2021-07-13 NOTE — PROGRESS NOTES
[address] [facility]  [address] [place] [id number]  Dept: [phone]  Dept Fax: [phone]          Right hip Visit - Follow up    Subjective:     Chief Complaint   Patient presents with    Hip Pain     Right Hip - last injection 4/14/21     HPI:     Jered Rodriguez presents today for Right hip pain. Patient is here today for cortisone injections into Right hip. Her last cortisone injection was on 04/14/2021. I have reviewed the CC, and if not present in this note, I have reviewed in the patient's chart. I agree with the documentation provided by other staff and have reviewed their documentation prior to providing my signature indicating agreement. Vitals:   /66   Pulse 53   Resp 12   Ht 5' 5\" (1.651 m)   Wt 213 lb 4.8 oz (96.8 kg)   BMI 35.49 kg/m²  Body mass index is 35.49 kg/m². Assessment:     1. Greater trochanteric bursitis of right hip        Procedure:   Procedure: Yes    Greater trochanteric Bursa Injection     Location: Right Hip  Procedure: Jered Rodriguez agreed today for a Corticosteroid injection into the right greater trochanteric bursa. The patient was placed in the lateral position with the affected side up. The point of the maximum tenderness was marked with the closed end of a click type pen. The skin was prepped with betadine in a sterile fashion. Utilizing sterile technique a 5 cc solution containing 4cc of 1% Lidocaine and 1 cc containing 40mg of depo-medrol was injected into the greater trochanteric bursa with a 20 gu. spinal needle. The wound was cleansed and a Band-Aid was placed. The patient tolerated the procedure without difficulty. Adverse reactions of the injection was discussed with the patient including signs of infection (increasing pain, redness, swelling) and the patient was instructed to call immediately with any of these symptoms.     Plan:   Patient should return to the clinic in 3 months to follow up with Josh Magallon PA-C. The patient will call the office immediately with any problems. Orders Placed This Encounter   Medications    lidocaine 1 % injection 4 mL    methylPREDNISolone acetate (DEPO-MEDROL) injection 40 mg       No orders of the defined types were placed in this encounter. [facility] have personally seen this patient, reviewed the chart including history, and imaging if done. I personally  performed the physical exam and obtained any needed additional history. I placed orders, performed or supervised procedures and developed the treatment plan.     Electronically signed by Kellen Gonzalez PA-C, on 7/14/2021 at 11:34 AM      Electronically signed by Kellen Gonzalez PA-C, on 7/14/2021 at 11:34 AM

## 2021-07-14 ENCOUNTER — OFFICE VISIT (OUTPATIENT)
Dept: ORTHOPEDIC SURGERY | Age: 81
End: 2021-07-14
Payer: MEDICARE

## 2021-07-14 VITALS
SYSTOLIC BLOOD PRESSURE: 113 MMHG | HEART RATE: 53 BPM | WEIGHT: 213.3 LBS | RESPIRATION RATE: 12 BRPM | HEIGHT: 65 IN | DIASTOLIC BLOOD PRESSURE: 66 MMHG | BODY MASS INDEX: 35.54 KG/M2

## 2021-07-14 DIAGNOSIS — M70.61 GREATER TROCHANTERIC BURSITIS OF RIGHT HIP: Primary | ICD-10-CM

## 2021-07-14 PROCEDURE — 20611 DRAIN/INJ JOINT/BURSA W/US: CPT | Performed by: PHYSICIAN ASSISTANT

## 2021-07-14 RX ORDER — LIDOCAINE HYDROCHLORIDE 10 MG/ML
4 INJECTION, SOLUTION INFILTRATION; PERINEURAL ONCE
Status: COMPLETED | OUTPATIENT
Start: 2021-07-14 | End: 2021-07-14

## 2021-07-14 RX ORDER — METHYLPREDNISOLONE ACETATE 40 MG/ML
40 INJECTION, SUSPENSION INTRA-ARTICULAR; INTRALESIONAL; INTRAMUSCULAR; SOFT TISSUE ONCE
Status: COMPLETED | OUTPATIENT
Start: 2021-07-14 | End: 2021-07-14

## 2021-07-14 RX ADMIN — LIDOCAINE HYDROCHLORIDE 4 ML: 10 INJECTION, SOLUTION INFILTRATION; PERINEURAL at 12:13

## 2021-07-14 RX ADMIN — METHYLPREDNISOLONE ACETATE 40 MG: 40 INJECTION, SUSPENSION INTRA-ARTICULAR; INTRALESIONAL; INTRAMUSCULAR; SOFT TISSUE at 12:14

## 2021-07-21 ENCOUNTER — OFFICE VISIT (OUTPATIENT)
Dept: NEUROSURGERY | Age: 81
End: 2021-07-21
Payer: MEDICARE

## 2021-07-21 VITALS
BODY MASS INDEX: 35.49 KG/M2 | SYSTOLIC BLOOD PRESSURE: 116 MMHG | HEIGHT: 65 IN | WEIGHT: 213 LBS | HEART RATE: 87 BPM | RESPIRATION RATE: 12 BRPM | DIASTOLIC BLOOD PRESSURE: 75 MMHG | OXYGEN SATURATION: 96 %

## 2021-07-21 DIAGNOSIS — M47.12 CERVICAL SPONDYLOSIS WITH MYELOPATHY: Primary | ICD-10-CM

## 2021-07-21 PROCEDURE — 99203 OFFICE O/P NEW LOW 30 MIN: CPT | Performed by: NEUROLOGICAL SURGERY

## 2021-07-21 PROCEDURE — 1123F ACP DISCUSS/DSCN MKR DOCD: CPT | Performed by: NEUROLOGICAL SURGERY

## 2021-07-21 PROCEDURE — 1036F TOBACCO NON-USER: CPT | Performed by: NEUROLOGICAL SURGERY

## 2021-07-21 PROCEDURE — G8417 CALC BMI ABV UP PARAM F/U: HCPCS | Performed by: NEUROLOGICAL SURGERY

## 2021-07-21 PROCEDURE — G8399 PT W/DXA RESULTS DOCUMENT: HCPCS | Performed by: NEUROLOGICAL SURGERY

## 2021-07-21 PROCEDURE — G8427 DOCREV CUR MEDS BY ELIG CLIN: HCPCS | Performed by: NEUROLOGICAL SURGERY

## 2021-07-21 PROCEDURE — 4040F PNEUMOC VAC/ADMIN/RCVD: CPT | Performed by: NEUROLOGICAL SURGERY

## 2021-07-21 PROCEDURE — 1090F PRES/ABSN URINE INCON ASSESS: CPT | Performed by: NEUROLOGICAL SURGERY

## 2021-07-21 NOTE — PROGRESS NOTES
[facility]  [facility] [place] [facility] [address]  [id number]  Dept: [phone]    Patient:  Isamar Villanueva  YOB: 1940  Date: 7/21/21    The patient is a 68 y.o. female who presents today for consult of the following problems:     Chief Complaint   Patient presents with    New Patient     Stenosis of Foramen Magnum             HPI:     Isamar Villanueva is a 68 y.o. female on whom neurosurgical consultation was requested by Kaiser Powers MD for management of cervical stenosis. Patient states that as of February of this year she started having radiating pain in her right upper extremity that radiated down the inside portion of the arm into the thumb with numbness and tingling. This would come on that was steadily along with pain that was a deep ache in the left side along the medial aspect of the upper arm terminating at the mid forearm. No distal numbness or tingling of either of the hands with exception of the right thumb. Symptoms were significant and at that time the patient did undergo physical therapy for 3 to 4 weeks some exacerbation. No point did she have any significant axial neck pain. She has never really used any ice heat or creams but does do some sporadic exercises at home. Recently as of May of this year she had complete resolution of the upper extremity radiculopathy in the right hand numbness but does still have a mild ache in the left deep arm upper portion that is approximately 1 out of 10 and not significantly bothersome. No significant axial pain. She does relate infrequent issues with dropping things and occasionally tripping over things but no routine issues with gait ataxia or dexterity including dropping buttoning writing or feeding. Josesito Ra       History:     Past Medical History:   Diagnosis Date    Acid reflux     Anxiety 6/30/2014    ALICIA-7   09/09/19 : 4    Bilateral tinnitus 12/10/2019    Bronchitis, mucopurulent recurrent ([address] 7/23/2020    Diverticular disease 6/30/2014    Enthesopathy of hip region 12/10/2019    GERD (gastroesophageal reflux disease)     Hypercholesteremia 6/30/2014    Hypothyroid 6/30/2014    Laryngopharyngeal reflux 12/10/2019    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    Lung nodule     Meniere disease     right ear    Meniere's disease, right ear 12/10/2019    Morbidly obese ([address] 7/23/2020    Neuropathy     Osteopenia 6/30/2014    Parkinson disease ([address] 7/23/2020    Postmenopausal state 12/10/2019    Postnasal drip 7/11/2020    Prediabetes     RAD (reactive airway disease) 6/30/2014    Seasonal asthma 7/11/2020    Thyroid nodule 3/1/2016    Tremor     r hand    Vitamin D deficiency 6/30/2014     Past Surgical History:   Procedure Laterality Date    CHOLECYSTECTOMY      COLONOSCOPY      ENDOSCOPY, COLON, DIAGNOSTIC      ERCP  03/21/14    EXCISION / BIOPSY SKIN LESION OF TRUNK Left 11/8/2017    EXCISION MASS LEFT POSTERIOR SHOULDER, LEFT ANTERIOR ARM performed by Jean Paul Hernández MD at [address] [place] [address]  01/03/2007    SKIN BIOPSY Left 11/08/2017    Excision Mass Left Posterior Shoulder, Left Anterior Arm     Family History   Problem Relation Age of Onset    Cancer Mother 48        pineal gland/throat    Cancer Father 64        colon    Cancer Brother         lung    Other Brother         pulmonary embolus    Heart Disease Paternal Grandfather         myocarditis    Stroke Maternal Aunt     Cancer Maternal Uncle     Stroke Maternal Grandmother     Diabetes Brother     Heart Disease Paternal Uncle 67        mi    Heart Disease Brother 58        mi    High Blood Pressure Brother     Heart Disease Brother 79        stents for cad    High Blood Pressure Brother      Current Outpatient Medications on File Prior to Visit   Medication Sig Dispense Refill    amitriptyline (ELAVIL) 10 MG tablet TAKE ONE TABLET BY MOUTH DAILY AS NEEDED FOR SLEEP AND LEG PAIN FOR UP TO 30 DAYS 30 tablet 3    metFORMIN (GLUCOPHAGE) 500 MG tablet TAKE ONE TABLET BY MOUTH DAILY 60 tablet 2    levothyroxine (SYNTHROID) 75 MCG tablet Take 1 tablet by mouth Daily 90 tablet 0    ondansetron (ZOFRAN-ODT) 4 MG disintegrating tablet Take 1 tablet by mouth every 8 hours as needed. 20 tablet 3    Evolocumab 140 MG/ML SOSY Inject into the skin      famotidine (PEPCID) 20 MG tablet as needed       albuterol sulfate HFA (VENTOLIN HFA) 108 (90 Base) MCG/ACT inhaler Inhale 2 puffs into the lungs 4 times daily as needed for Wheezing 3 Inhaler 1    metoprolol tartrate (LOPRESSOR) 25 MG tablet Take 25 mg by mouth 2 times daily      guaiFENesin (MUCINEX) 600 MG extended release tablet Take 1 tablet by mouth 2 times daily (Patient taking differently: Take 600 mg by mouth 2 times daily as needed ) 20 tablet 0    butenafine (MENTAX) 1 % CREA Apply 1 Tube topically 2 times daily (Patient taking differently: Apply 1 Tube topically 2 times daily as needed ) 15 g 0    alclomethasone (ACLOVATE) 0.05 % cream Apply topically 2 times daily Apply topically 2 times daily. 45 g 1    NONFORMULARY Indications: Dietary fiber 1 tsp a day       ECHINACEA EXTRACT PO Take 450 mg by mouth Indications: Take in winter WINTER ONLY      Cholecalciferol (VITAMIN D3) 2000 units CAPS Take 1 capsule by mouth 2 times daily 30 capsule 0    diltiazem (CARDIZEM CD) 120 MG extended release capsule Take 1 capsule by mouth every evening 30 capsule 3    fluticasone (FLONASE) 50 MCG/ACT nasal spray PLACE 1 SPRAY IN EACH NOSTRIL DAILY FOR 15 DAYS 1 Bottle 2    ketoconazole (NIZORAL) 2 % shampoo APPLY  TO AFFECTED AREAS TOPICALLY DAILY THEN RINSE OFF AFTER 5 MINUTES 120 mL 2    ezetimibe (ZETIA) 10 MG tablet Take 10 mg by mouth daily      esomeprazole Magnesium (NEXIUM) 40 MG PACK Take 40 mg by mouth daily.       NONFORMULARY Super b complex 3 times per week      ascorbic acid (VITAMIN C) 500 MG tablet Take 500 mg by mouth daily.  chlorpheniramine (CHLOR-TRIMETON) 4 MG tablet Take 12 mg by mouth daily       budesonide-formoterol (SYMBICORT) 80-4.5 MCG/ACT AERO Inhale 2 puffs into the lungs 2 times daily (Patient taking differently: Inhale 2 puffs into the lungs as needed ) 1 Inhaler 6     Current Facility-Administered Medications on File Prior to Visit   Medication Dose Route Frequency Provider Last Rate Last Admin    0.9 % sodium chloride infusion   Intravenous Continuous Jenny Edmondson  mL/hr at 02/19/14 0827 New Bag at 02/19/14 0827     Social History     Tobacco Use    Smoking status: Never Smoker    Smokeless tobacco: Never Used   Vaping Use    Vaping Use: Never used   Substance Use Topics    Alcohol use: Yes     Comment: rare    Drug use: No       Allergies   Allergen Reactions    Aspirin Swelling     Hives. anaphylaxis    Atorvastatin Other (See Comments)     myalgia    Crestor [Rosuvastatin]      Myalgia      Livalo [Pitavastatin] Other (See Comments)     Muscle pain    Seasonal     Simvastatin      Myalgia      Statins      Other reaction(s): Unknown    Welchol [Colesevelam Hcl] Other (See Comments)     Leg cramp       Review of Systems  Constitutional: Negative for activity change and appetite change. HENT: Negative for ear pain and facial swelling. Eyes: Negative for discharge and itching. Respiratory: Negative for choking and chest tightness. Cardiovascular: Negative for chest pain and leg swelling. Gastrointestinal: Negative for nausea and abdominal pain. Endocrine: Negative for cold intolerance and heat intolerance. Genitourinary: Negative for frequency and flank pain. Musculoskeletal: Negative for myalgias and joint swelling. Skin: Negative for rash and wound. Allergic/Immunologic: Negative for environmental allergies and food allergies. Hematological: Negative for adenopathy. Does not bruise/bleed easily.    Psychiatric/Behavioral: Negative for self-injury. The patient is not nervous/anxious. Physical Exam:      /75   Pulse 87   Resp 12   Ht 5' 5\" (1.651 m)   Wt 213 lb (96.6 kg)   SpO2 96%   BMI 35.45 kg/m²   Estimated body mass index is 35.45 kg/m² as calculated from the following:    Height as of this encounter: 5' 5\" (1.651 m). Weight as of this encounter: 213 lb (96.6 kg). General:  Evonnie Koyanagi is a 68y.o. year old female who appears her stated age. HEENT: Normocephalic atraumatic. Neck supple. Chest: regular rate; pulses equal  Abdomen: Soft nontender nondistended. Normoactive bowel sounds. Ext: DP and PT pulses 2+, good cap refill  Neuro    Mentation  Appropriate affect  Registration intact  Orientation intact  3 item recall intact  Judgement intact to situation    Cranial Nerves:   Pupils equal and reactive to light  Extraocular motion intact  Face and shrug symmetric  Tongue midline  No dysarthria  v1-3 sensation symmetric, masseter tone symmetric  Hearing symmetric and intact to finger rub    Sensation:   Intact    Motor  L deltoid 5/5; R deltoid 5/5  L biceps 5/5; R biceps 5/5  L triceps 5/5; R triceps 5/5  L wrist extension 5/5; R wrist extension 5/5  L intrinsics 5/5; R intrinsics 5/5     L iliopsoas 5/5 , R iliopsoas 5/5  L quadriceps 5/5; R quadriceps 5/5  L Dorsiflexion 5/5; R dorsiflexion 5/5  L Plantarflexion 5/5; R plantarflexion 5/5  L EHL 5/5; R EHL 5/5    Reflexes  L Brachioradialis 2+/4; R brachioradialis 2+/4  L Biceps 2+/4; R Biceps 2+/4  L Triceps 2+/4; R Triceps 2+/4  L Patellar 2+/4: R Patellar 2+/4  L Achilles 2+/4; R Achilles 2+/4    hoffmans L: neg  hoffmans R: neg  Clonus L: neg  Clonus R: neg  Babinski L: up  Babinski R; up    Studies Review:     MRI cervical spine with mild to moderate stenosis at mid C5/6 and C6. Maintained lordosis    Assessment and Plan:      1.  Cervical spondylosis with myelopathy          Plan: Patient with resolved radiculopathic symptoms and no major neuropathic subjective complaints. In addition she does not have long track signs on examination. Overall considering the moderate amount of stenosis with no active findings on examination or complaints of myelopathy would recommend conservative management and surveillance. Follow-up in 6 months patient instructed to call with any worsening symptoms as described above    Followup: No follow-ups on file. Prescriptions Ordered:  No orders of the defined types were placed in this encounter. Orders Placed:  No orders of the defined types were placed in this encounter. Electronically signed by Aidan Hale DO on 7/21/2021 at 2:00 PM    Please note that this chart was generated using voice recognition Dragon dictation software. Although every effort was made to ensure the accuracy of this automated transcription, some errors in transcription may have occurred.

## 2021-07-30 RX ORDER — LEVOTHYROXINE SODIUM 0.07 MG/1
75 TABLET ORAL DAILY
Qty: 90 TABLET | Refills: 0 | Status: SHIPPED | OUTPATIENT
Start: 2021-07-30 | End: 2021-10-21

## 2021-07-30 NOTE — TELEPHONE ENCOUNTER
Eliane Opitz is calling to request a refill on the following medication(s):    Last Visit Date (If Applicable):  3/33/1781    Next Visit Date:    10/11/2021    Medication Request:  Requested Prescriptions     Pending Prescriptions Disp Refills    levothyroxine (SYNTHROID) 75 MCG tablet 90 tablet 0     Sig: Take 1 tablet by mouth Daily

## 2021-10-11 ENCOUNTER — OFFICE VISIT (OUTPATIENT)
Dept: FAMILY MEDICINE CLINIC | Age: 81
End: 2021-10-11
Payer: MEDICARE

## 2021-10-11 VITALS
HEART RATE: 60 BPM | BODY MASS INDEX: 32.72 KG/M2 | SYSTOLIC BLOOD PRESSURE: 113 MMHG | DIASTOLIC BLOOD PRESSURE: 78 MMHG | HEIGHT: 65 IN | WEIGHT: 196.4 LBS | TEMPERATURE: 97 F | OXYGEN SATURATION: 96 %

## 2021-10-11 DIAGNOSIS — Z00.00 ROUTINE GENERAL MEDICAL EXAMINATION AT A HEALTH CARE FACILITY: Primary | ICD-10-CM

## 2021-10-11 DIAGNOSIS — Z12.31 ENCOUNTER FOR SCREENING MAMMOGRAM FOR MALIGNANT NEOPLASM OF BREAST: ICD-10-CM

## 2021-10-11 DIAGNOSIS — L30.9 DERMATITIS: ICD-10-CM

## 2021-10-11 PROCEDURE — G8399 PT W/DXA RESULTS DOCUMENT: HCPCS | Performed by: FAMILY MEDICINE

## 2021-10-11 PROCEDURE — 4040F PNEUMOC VAC/ADMIN/RCVD: CPT | Performed by: FAMILY MEDICINE

## 2021-10-11 PROCEDURE — G0439 PPPS, SUBSEQ VISIT: HCPCS | Performed by: FAMILY MEDICINE

## 2021-10-11 PROCEDURE — G8417 CALC BMI ABV UP PARAM F/U: HCPCS | Performed by: FAMILY MEDICINE

## 2021-10-11 PROCEDURE — G8427 DOCREV CUR MEDS BY ELIG CLIN: HCPCS | Performed by: FAMILY MEDICINE

## 2021-10-11 PROCEDURE — 1090F PRES/ABSN URINE INCON ASSESS: CPT | Performed by: FAMILY MEDICINE

## 2021-10-11 PROCEDURE — 1123F ACP DISCUSS/DSCN MKR DOCD: CPT | Performed by: FAMILY MEDICINE

## 2021-10-11 PROCEDURE — 1036F TOBACCO NON-USER: CPT | Performed by: FAMILY MEDICINE

## 2021-10-11 PROCEDURE — G8484 FLU IMMUNIZE NO ADMIN: HCPCS | Performed by: FAMILY MEDICINE

## 2021-10-11 PROCEDURE — 99213 OFFICE O/P EST LOW 20 MIN: CPT | Performed by: FAMILY MEDICINE

## 2021-10-11 ASSESSMENT — LIFESTYLE VARIABLES
HAVE YOU OR SOMEONE ELSE BEEN INJURED AS A RESULT OF YOUR DRINKING: 0
AUDIT-C TOTAL SCORE: 1
AUDIT TOTAL SCORE: 1
HOW OFTEN DURING THE LAST YEAR HAVE YOU BEEN UNABLE TO REMEMBER WHAT HAPPENED THE NIGHT BEFORE BECAUSE YOU HAD BEEN DRINKING: 0
HOW OFTEN DURING THE LAST YEAR HAVE YOU FAILED TO DO WHAT WAS NORMALLY EXPECTED FROM YOU BECAUSE OF DRINKING: 0
HOW OFTEN DURING THE LAST YEAR HAVE YOU FOUND THAT YOU WERE NOT ABLE TO STOP DRINKING ONCE YOU HAD STARTED: 0
HOW OFTEN DURING THE LAST YEAR HAVE YOU HAD A FEELING OF GUILT OR REMORSE AFTER DRINKING: 0
HAS A RELATIVE, FRIEND, DOCTOR, OR ANOTHER HEALTH PROFESSIONAL EXPRESSED CONCERN ABOUT YOUR DRINKING OR SUGGESTED YOU CUT DOWN: 0
HOW MANY STANDARD DRINKS CONTAINING ALCOHOL DO YOU HAVE ON A TYPICAL DAY: 0
HOW OFTEN DO YOU HAVE A DRINK CONTAINING ALCOHOL: 1
HOW OFTEN DURING THE LAST YEAR HAVE YOU NEEDED AN ALCOHOLIC DRINK FIRST THING IN THE MORNING TO GET YOURSELF GOING AFTER A NIGHT OF HEAVY DRINKING: 0
HOW OFTEN DO YOU HAVE SIX OR MORE DRINKS ON ONE OCCASION: 0

## 2021-10-11 ASSESSMENT — PATIENT HEALTH QUESTIONNAIRE - PHQ9
1. LITTLE INTEREST OR PLEASURE IN DOING THINGS: 0
SUM OF ALL RESPONSES TO PHQ QUESTIONS 1-9: 0
2. FEELING DOWN, DEPRESSED OR HOPELESS: 0
SUM OF ALL RESPONSES TO PHQ9 QUESTIONS 1 & 2: 0
SUM OF ALL RESPONSES TO PHQ QUESTIONS 1-9: 0
SUM OF ALL RESPONSES TO PHQ QUESTIONS 1-9: 0

## 2021-10-11 NOTE — PROGRESS NOTES
Medicare Annual Wellness Visit  Name: Brii Smoker Date: 10/11/2021   MRN: [id number] Sex: Female   Age: 68 y.o. Ethnicity: Non- / Non    : 1940 Race: White (non-)      Sussy Cantu is here for Medicare AWV    Screenings for behavioral, psychosocial and functional/safety risks, and cognitive dysfunction are all negative except as indicated below. These results, as well as other patient data from the [address] form, are documented in Flowsheets linked to this Encounter. Allergies   Allergen Reactions    Aspirin Swelling     Hives. anaphylaxis    Atorvastatin Other (See Comments)     myalgia    Crestor [Rosuvastatin]      Myalgia      Livalo [Pitavastatin] Other (See Comments)     Muscle pain    Seasonal     Simvastatin      Myalgia      Statins      Other reaction(s): Unknown    Welchol [Colesevelam Hcl] Other (See Comments)     Leg cramp         Prior to Visit Medications    Medication Sig Taking? Authorizing Provider   metFORMIN (GLUCOPHAGE) 500 MG tablet TAKE ONE TABLET BY MOUTH DAILY Yes Stephen Lackey MD   levothyroxine (SYNTHROID) 75 MCG tablet Take 1 tablet by mouth Daily Yes Stephen Lackey MD   amitriptyline (ELAVIL) 10 MG tablet TAKE ONE TABLET BY MOUTH DAILY AS NEEDED FOR SLEEP AND LEG PAIN FOR UP TO 30 DAYS Yes Stephen Lackey MD   ondansetron (ZOFRAN-ODT) 4 MG disintegrating tablet Take 1 tablet by mouth every 8 hours as needed.  Yes Stephen Lackey MD   Evolocumab 140 MG/ML SOSY Inject into the skin Yes Historical Provider, MD   famotidine (PEPCID) 20 MG tablet as needed  Yes Historical Provider, MD   albuterol sulfate HFA (VENTOLIN HFA) 108 (90 Base) MCG/ACT inhaler Inhale 2 puffs into the lungs 4 times daily as needed for Wheezing Yes Seble Precise, MD   metoprolol tartrate (LOPRESSOR) 25 MG tablet Take 25 mg by mouth 2 times daily Yes Historical Provider, MD   guaiFENesin (MUCINEX) 600 MG extended release tablet Take 1 tablet by mouth 2 times daily  Patient taking differently: Take 600 mg by mouth 2 times daily as needed  Yes Ricardo Hinkle MD   butenafine (MENTAX) 1 % CREA Apply 1 Tube topically 2 times daily  Patient taking differently: Apply 1 Tube topically 2 times daily as needed  Yes Marisol Tom,    alclomethasone (ACLOVATE) 0.05 % cream Apply topically 2 times daily Apply topically 2 times daily. Yes Torito Ryan,    NONFORMULARY Indications: Dietary fiber 1 tsp a day  Yes Historical Provider, MD   ECHINACEA EXTRACT PO Take 450 mg by mouth Indications: Take in winter [address] Yes Historical Provider, MD   Cholecalciferol (VITAMIN D3) 2000 units CAPS Take 1 capsule by mouth 2 times daily Yes Raynard Goltz, MD   diltiazem (CARDIZEM CD) 120 MG extended release capsule Take 1 capsule by mouth every evening Yes Raynard Goltz, MD   fluticasone (FLONASE) 50 MCG/ACT nasal spray PLACE 1 SPRAY IN EACH NOSTRIL DAILY FOR 15 DAYS Yes Randal uZniga MD   ketoconazole (NIZORAL) 2 % shampoo APPLY  TO AFFECTED AREAS TOPICALLY DAILY THEN RINSE OFF AFTER 5 MINUTES Yes Raynard Goltz, MD   ezetimibe (ZETIA) 10 MG tablet Take 10 mg by mouth daily Yes Historical Provider, MD   esomeprazole Magnesium (NEXIUM) 40 MG PACK Take 40 mg by mouth daily. Yes Historical Provider, MD   NONFORMULARY Super b complex 3 times per week Yes Historical Provider, MD   ascorbic acid (VITAMIN C) 500 MG tablet Take 500 mg by mouth daily.  Yes Historical Provider, MD   chlorpheniramine (CHLOR-TRIMETON) 4 MG tablet Take 12 mg by mouth daily  Yes Historical Provider, MD   budesonide-formoterol (SYMBICORT) 80-4.5 MCG/ACT AERO Inhale 2 puffs into the lungs 2 times daily  Patient taking differently: Inhale 2 puffs into the lungs as needed   Otto Olivarez MD         Past Medical History:   Diagnosis Date    Acid reflux     Anxiety 6/30/2014    ALICIA-7   09/09/19 : 4    Bilateral tinnitus 12/10/2019    Bronchitis, mucopurulent recurrent ([address] 7/23/2020    Diverticular disease 6/30/2014    Enthesopathy of hip region 12/10/2019    GERD (gastroesophageal reflux disease)     Hypercholesteremia 6/30/2014    Hypothyroid 6/30/2014    Laryngopharyngeal reflux 12/10/2019    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    Lung nodule     Meniere disease     right ear    Meniere's disease, right ear 12/10/2019    Morbidly obese ([address] 7/23/2020    Neuropathy     Osteopenia 6/30/2014    Parkinson disease ([facility] [address] 7/23/2020    Postmenopausal state 12/10/2019    Postnasal drip 7/11/2020    Prediabetes     RAD (reactive airway disease) 6/30/2014    Seasonal asthma 7/11/2020    Thyroid nodule 3/1/2016    Tremor     r hand    Vitamin D deficiency 6/30/2014       Past Surgical History:   Procedure Laterality Date    CHOLECYSTECTOMY      COLONOSCOPY      ENDOSCOPY, COLON, DIAGNOSTIC      ERCP  03/21/14    EXCISION / BIOPSY SKIN LESION OF TRUNK Left 11/8/2017    EXCISION MASS LEFT POSTERIOR SHOULDER, LEFT ANTERIOR ARM performed by Sadie Dillard MD at [address] [place] [address]  01/03/2007    SKIN BIOPSY Left 11/08/2017    Excision Mass Left Posterior Shoulder, Left Anterior Arm         Family History   Problem Relation Age of Onset    Cancer Mother 48        pineal gland/throat    Cancer Father 64        colon   [place] Cancer Brother         lung    Other Brother         pulmonary embolus    Heart Disease Paternal Grandfather         myocarditis    Stroke Maternal Aunt     Cancer Maternal Uncle     Stroke Maternal Grandmother     Diabetes Brother     Heart Disease Paternal Uncle 67        mi    Heart Disease Brother 58        mi    High Blood Pressure Brother     Heart Disease Brother 79        stents for cad    High Blood Pressure Brother        CareTeam (Including outside providers/suppliers regularly involved in providing care):   Patient Care Team:  Alma Mane MD as PCP - General (Family Medicine)  Alam Mane MD as PCP - [address] [place] Provider  Kathleen Fong MD as Consulting Physician (Gastroenterology)  Samuel Charles MD as Consulting Physician (Gastroenterology)  Veronica Hogan MD as Consulting Physician (Otolaryngology)  Ant Romero DO as Consulting Physician (Orthopedic Surgery)  Zurdo Medrano MD as Consulting Physician (Pulmonology)  Mariel Malloy MD as Consulting Physician (Cardiology)    Wt Readings from Last 3 Encounters:   10/11/21 196 lb 6.4 oz (89.1 kg)   07/21/21 213 lb (96.6 kg)   07/14/21 213 lb 4.8 oz (96.8 kg)     Vitals:    10/11/21 1102   BP: 113/78   Pulse: 60   Temp: 97 °F (36.1 °C)   SpO2: 96%   Weight: 196 lb 6.4 oz (89.1 kg)   Height: 5' 5\" (1.651 m)     Body mass index is 32.68 kg/m². 67 yo female here for annual visit. Patient was seen by a dermatologist.  She tells me that she has been having some discomfort itchiness at her neck area especially at night. She also tells me that she has some tingling in both of her lower extremities especially during the night. She also has some discomfort and cramping also only at night at her left calf area. She needs to have a mammogram order. She also tells me that her brothers all 4 are on blood thinner. Patient seen a cardiologist and she just was wondering if she needs to be on a blood thinner. Based upon direct observation of the patient, evaluation of cognition reveals recent and remote memory intact. HENT:   /78   Pulse 60   Temp 97 °F (36.1 °C)   Ht 5' 5\" (1.651 m)   Wt 196 lb 6.4 oz (89.1 kg)   SpO2 96%   BMI 32.68 kg/m²   Constitutional: Alert and oriented. Well-nourished. Head: Normocephalic and atraumatic. Right Ear: External ear normal. TM: no bulging, erythema or fluid seen. Left Ear: External ear normal. TM: no bulging, erythema or fluid seen. Nose: Nose normal.   Mouth/Throat: Oropharynx is clear and moist. Teeth in good repair. Eyes: Pupils are equal, round, and reactive to light.  Right eye exhibits no discharge. Left eye exhibits no discharge. No scleral icterus. Neck: Normal range of motion. Neck supple. No JVD present. No tracheal deviation present. No thyromegaly present. Cardiovascular: Normal rate, regular rhythm, normal heart sounds. Pulmonary/Chest: Effort normal and breath sounds normal. No respiratory distress. She has no wheezes. She has no rales. Abdominal: Soft. Bowel sounds are normal.  She exhibits no distension and no mass. There is no tenderness. There is no rebound and no guarding. Musculoskeletal: Normal range of motion. She exhibits no edema or tenderness. Lymphadenopathy:    She has no cervical adenopathy. Neurological:  She is alert and oriented to person, place, and time. Cranial nerves grossly intact. No sensation problem noted. Muscle strength 5/5 throughout. Skin: Skin is warm and dry. No rash noted. No erythema. Psychiatric:  She has a normal mood and affect. Behavior is normal.      Patient's complete Health Risk Assessment and screening values have been reviewed and are found in Flowsheets. The following problems were reviewed today and where indicated follow up appointments were made and/or referrals ordered. Positive Risk Factor Screenings with Interventions:     Fall Risk:  2 or more falls in past year?: no  Fall with injury in past year?: (!) yes  Fall Risk Interventions:    · Home safety tips provided          General Health and ACP:  General  In general, how would you say your health is?: Good  In the past 7 days, have you experienced any of the following?  New or Increased Pain, New or Increased Fatigue, Loneliness, Social Isolation, Stress or Anger?: None of These  Do you get the social and emotional support that you need?: Yes  Do you have a Living Will?: Yes  Advance Directives     Power of [facility] Will ACP-Advance Directive ACP-Power of     Not on File Not on File Not on File Not on [address] [facility] Interventions:  · . Health Habits/Nutrition:  Health Habits/Nutrition  Do you exercise for at least 20 minutes 2-3 times per week?: Yes  Have you lost any weight without trying in the past 3 months?: No  Do you eat only one meal per day?: No  Have you seen the dentist within the past year?: Yes  Body mass index: (!) 32.68  Health Habits/Nutrition Interventions:  · Nutritional issues:  educational materials for healthy, well-balanced diet provided       Personalized Preventive Plan   Current Health Maintenance Status  Immunization History   Administered Date(s) Administered    COVID-19, Almaraz Peter, PF, 30mcg/0.3mL 01/09/2021, 01/30/2021, 10/01/2021    Influenza Virus Vaccine 10/31/2011, 11/05/2013    Influenza, High Dose (Fluzone 65 yrs and older) 11/03/2014, 08/25/2015, 08/11/2016, 10/18/2017, 11/10/2018, 11/03/2019    Pneumococcal Conjugate 13-valent ([id number]) 11/03/2014, 08/21/2020    Pneumococcal Polysaccharide ([id number]) 10/01/2008    Td, unspecified formulation 06/01/2008    Tdap (Boostrix, Adacel) 10/23/2018, 07/04/2020    Zoster Live (Zostavax) 03/01/2016    Zoster Recombinant (Shingrix) 01/14/2019, 05/25/2019        Health Maintenance   Topic Date Due    Flu vaccine (1) 09/01/2021    Annual Wellness Visit (AWV)  09/25/2021    TSH testing  05/04/2022    DTaP/Tdap/Td vaccine (3 - Td or Tdap) 07/04/2030    DEXA (modify frequency per FRAX score)  Completed    Shingles Vaccine  Completed    Pneumococcal 65+ years Vaccine  Completed    COVID-19 Vaccine  Completed    Hepatitis C screen  Completed    Hepatitis A vaccine  Aged Out    Hepatitis B vaccine  Aged Out    Hib vaccine  Aged Out    Meningococcal (ACWY) vaccine  Aged Out     Recommendations for [employer] Due: see orders and patient instructions/AVS.  .   Recommended screening schedule for the next 5-10 years is provided to the patient in written form: see Patient Shelia Mitchell was seen today for medicare awv.    Diagnoses and all orders for this visit:    Routine general medical examination at a health care facility    Encounter for screening mammogram for malignant neoplasm of breast  -     ISAMAR DIGITAL SCREEN W OR WO CAD BILATERAL; Future    Dermatitis  -     Vitamin B12; Future  -     Vitamin B6; Future         The patient is doing quite well. Blood work ordered mammogram provided. Patient will see me back in 6 months for follow-up. Call or return to clinic prn if these symptoms worsen or fail to improve as anticipated. I have reviewed the instructions with the patient, answering all questions to her satisfaction.     (Please note that portions of this note were completed with a voice recognition program. Efforts were made to edit the dictations but occasionally words are mis-transcribed.)

## 2021-10-11 NOTE — PATIENT INSTRUCTIONS
Personalized Preventive Plan for Lottie Almaraz - 10/11/2021  Medicare offers a range of preventive health benefits. Some of the tests and screenings are paid in full while other may be subject to a deductible, co-insurance, and/or copay. Some of these benefits include a comprehensive review of your medical history including lifestyle, illnesses that may run in your family, and various assessments and screenings as appropriate. After reviewing your medical record and screening and assessments performed today your provider may have ordered immunizations, labs, imaging, and/or referrals for you. A list of these orders (if applicable) as well as your Preventive Care list are included within your After Visit Summary for your review. Other Preventive Recommendations:    · A preventive eye exam performed by an eye specialist is recommended every 1-2 years to screen for glaucoma; cataracts, macular degeneration, and other eye disorders. · A preventive dental visit is recommended every 6 months. · Try to get at least 150 minutes of exercise per week or 10,000 steps per day on a pedometer . · Order or download the FREE \"Exercise & Physical Activity: Your Everyday Guide\" from [employer]. Call 5-990.849.3738 or search [employer] online. · You need 0925-3766 mg of calcium and 9107-1924 IU of vitamin D per day. It is possible to meet your calcium requirement with diet alone, but a vitamin D supplement is usually necessary to meet this goal.  · When exposed to the sun, use a sunscreen that protects against both UVA and UVB radiation with an SPF of 30 or greater. Reapply every 2 to 3 hours or after sweating, drying off with a towel, or swimming. · Always wear a seat belt when traveling in a car. Always wear a helmet when riding a bicycle or motorcycle. Heart-Healthy Diet   Sodium, Fat, and Cholesterol Controlled Diet       What Is a Heart Healthy Diet?    A heart-healthy diet is one that limits sodium , certain types of fat , and cholesterol . This type of diet is recommended for:   People with any form of cardiovascular disease (eg, coronary heart disease , peripheral vascular disease , previous heart attack , previous stroke )   People with risk factors for cardiovascular disease, such as high blood pressure , high cholesterol , or diabetes   Anyone who wants to lower their risk of developing cardiovascular disease   Sodium    Sodium is a mineral found in many foods. In general, most people consume much more sodium than they need. Diets high in sodium can increase blood pressure and lead to edema (water retention). On a heart-healthy diet, you should consume no more than 2,300 mg (milligrams) of sodium per dayabout the amount in one teaspoon of table salt. The foods highest in sodium include table salt (about 50% sodium), processed foods, convenience foods, and preserved foods. Cholesterol    Cholesterol is a fat-like, waxy substance in your blood. Our bodies make some cholesterol. It is also found in animal products, with the highest amounts in fatty meat, egg yolks, whole milk, cheese, shellfish, and organ meats. On a heart-healthy diet, you should limit your cholesterol intake to less than 200 mg per day. It is normal and important to have some cholesterol in your bloodstream. But too much cholesterol can cause plaque to build up within your arteries, which can eventually lead to a heart attack or stroke. The two types of cholesterol that are most commonly referred to are:   Low-density lipoprotein (LDL) cholesterol  Also known as bad cholesterol, this is the cholesterol that tends to build up along your arteries. Bad cholesterol levels are increased by eating fats that are saturated or hydrogenated. Optimal level of this cholesterol is less than 100. Over 130 starts to get risky for heart disease.    High-density lipoprotein (HDL) cholesterol  Also known as good cholesterol, this type of cholesterol actually carries cholesterol away from your arteries and may, therefore, help lower your risk of having a heart attack. You want this level to be high (ideally greater than 60). It is a risk to have a level less than 40. You can raise this good cholesterol by eating olive oil, canola oil, avocados, or nuts. Exercise raises this level, too. Fat    Fat is calorie dense and packs a lot of calories into a small amount of food. Even though fats should be limited due to their high calorie content, not all fats are bad. In fact, some fats are quite healthful. Fat can be broken down into four main types. The good-for-you fats are:   Monounsaturated fat  found in oils such as olive and canola, avocados, and nuts and natural nut butters; can decrease cholesterol levels, while keeping levels of HDL cholesterol high   Polyunsaturated fat  found in oils such as safflower, sunflower, soybean, corn, and sesame; can decrease total cholesterol and LDL cholesterol   Omega-3 fatty acids  particularly those found in fatty fish (such as salmon, trout, tuna, mackerel, herring, and sardines); can decrease risk of arrhythmias, decrease triglyceride levels, and slightly lower blood pressure   The fats that you want to limit are:   Saturated fat  found in animal products, many fast foods, and a few vegetables; increases total blood cholesterol, including LDL levels   Animal fats that are saturated include: butter, lard, whole-milk dairy products, meat fat, and poultry skin   Vegetable fats that are saturated include: hydrogenated shortening, palm oil, coconut oil, cocoa butter   Hydrogenated or trans fat  found in margarine and vegetable shortening, most shelf stable snack foods, and fried foods; increases LDL and decreases HDL     It is generally recommended that you limit your total fat for the day to less than 30% of your total calories.  If you follow an 1800-calorie heart healthy diet, for example, this would mean 60 grams of fat or less per day. Saturated fat and trans fat in your diet raises your blood cholesterol the most, much more than dietary cholesterol does. For this reason, on a heart-healthy diet, less than 7% of your calories should come from saturated fat and ideally 0% from trans fat. On an 1800-calorie diet, this translates into less than 14 grams of saturated fat per day, leaving 46 grams of fat to come from mono- and polyunsaturated fats.    Food Choices on a Heart Healthy Diet   Food Category   Foods Recommended   Foods to Avoid   Grains   Breads and rolls without salted tops Most dry and cooked cereals Unsalted crackers and breadsticks Low-sodium or homemade breadcrumbs or stuffing All rice and pastas   Breads, rolls, and crackers with salted tops High-fat baked goods (eg, muffins, donuts, pastries) Quick breads, self-rising flour, and biscuit mixes Regular bread crumbs Instant hot cereals Commercially prepared rice, pasta, or stuffing mixes   Vegetables   Most fresh, frozen, and low-sodium canned vegetables Low-sodium and salt-free vegetable juices Canned vegetables if unsalted or rinsed   Regular canned vegetables and juices, including sauerkraut and pickled vegetables Frozen vegetables with sauces Commercially prepared potato and vegetable mixes   Fruits   Most fresh, frozen, and canned fruits All fruit juices   Fruits processed with salt or sodium   Milk   Nonfat or low-fat (1%) milk Nonfat or low-fat yogurt Cottage cheese, low-fat ricotta, cheeses labeled as low-fat and low-sodium   Whole milk Reduced-fat (2%) milk Malted and chocolate milk Full fat yogurt Most cheeses (unless low-fat and low salt) Buttermilk (no more than 1 cup per week)   Meats and Beans   Lean cuts of fresh or frozen beef, veal, lamb, or pork (look for the word loin) Fresh or frozen poultry without the skin Fresh or frozen fish and some shellfish Egg whites and egg substitutes (Limit whole eggs to three per week) Tofu Nuts or seeds (unsalted, dry-roasted), low-sodium peanut butter Dried peas, beans, and lentils   Any smoked, cured, salted, or canned meat, fish, or poultry (including bernstein, chipped beef, cold cuts, hot dogs, sausages, sardines, and anchovies) Poultry skins Breaded and/or fried fish or meats Canned peas, beans, and lentils Salted nuts   Fats and Oils   Olive oil and canola oil Low-sodium, low-fat salad dressings and mayonnaise   Butter, margarine, coconut and palm oils, bernstein fat   Snacks, Sweets, and Condiments   Low-sodium or unsalted versions of broths, soups, soy sauce, and condiments Pepper, herbs, and spices; vinegar, lemon, or lime juice Low-fat frozen desserts (yogurt, sherbet, fruit bars) Sugar, cocoa powder, honey, syrup, jam, and preserves Low-fat, trans-fat free cookies, cakes, and pies Pete and animal crackers, fig bars, nilda snaps   High-fat desserts Broth, soups, gravies, and sauces, made from instant mixes or other high-sodium ingredients Salted snack foods Canned olives Meat tenderizers, seasoning salt, and most flavored vinegars   Beverages   Low-sodium carbonated beverages Tea and coffee in moderation Soy milk   Commercially softened water   Suggestions   Make whole grains, fruits, and vegetables the base of your diet. Choose heart-healthy fats such as canola, olive, and flaxseed oil, and foods high in heart-healthy fats, such as nuts, seeds, soybeans, tofu, and fish. Eat fish at least twice per week; the fish highest in omega-3 fatty acids and lowest in mercury include salmon, herring, mackerel, sardines, and canned chunk light tuna. If you eat fish less than twice per week or have high triglycerides, talk to your doctor about taking fish oil supplements. Read food labels.    For products low in fat and cholesterol, look for fat free, low-fat, cholesterol free, saturated fat free, and trans fat freeAlso scan the Nutrition Facts Label, which lists saturated fat, trans fat, and cholesterol amounts. For products low in sodium, look for sodium free, very low sodium, low sodium, no added salt, and unsalted   Skip the salt when cooking or at the table; if food needs more flavor, get creative and try out different herbs and spices. Garlic and onion also add substantial flavor to foods. Trim any visible fat off meat and poultry before cooking, and drain the fat off after zhao. Use cooking methods that require little or no added fat, such as grilling, boiling, baking, poaching, broiling, roasting, steaming, stir-frying, and sauting. Avoid fast food and convenience food. They tend to be high in saturated and trans fat and have a lot of added salt. Talk to a registered dietitian for individualized diet advice. Last Reviewed: March 2011 Jorge Rahman MS, MPH, RD   Updated: 3/29/2011   ·     Preventing Osteoporosis: After Your Visit  Your Care Instructions  Osteoporosis means the bones are weak and thin enough that they can break easily. The older you are, the more likely you are to get osteoporosis. But with plenty of calcium, vitamin D, and exercise, you can help prevent osteoporosis. The preteen and teen years are a key time for bone building. With the help of calcium, vitamin D, and exercise in those early years and beyond, the bones reach their peak density and strength by age 27. After age 27, your bones naturally start to thin and weaken. The stronger your bones are at around age 27, the lower your risk for osteoporosis. But no matter what your age and risk are, your bones still need calcium, vitamin D, and exercise to stay strong. Also avoid smoking, and limit alcohol. Smoking and heavy alcohol use can make your bones thinner. Talk to your doctor about any special risks you might have, such as having a close relative with osteoporosis or taking a medicine that can weaken bones. Your doctor can tell you the best ways to protect your bones from thinning.   Follow-up care is a key part of your treatment and safety. Be sure to make and go to all appointments, and call your doctor if you are having problems. It's also a good idea to know your test results and keep a list of the medicines you take. How can you care for yourself at home? Get enough calcium and vitamin D. The [place] [employer] recommends adults younger than age 46 need 1,000 mg of calcium and 600 IU of vitamin D each day. Women ages 46 to 79 need 1,200 mg of calcium and 600 IU of vitamin D each day. Men ages 46 to 79 need 1,000 mg of calcium and 600 IU of vitamin D each day. Adults 71 and older need 1,200 mg of calcium and 800 IU of vitamin D each day. Eat foods rich in calcium, like yogurt, cheese, milk, and dark green vegetables. Eat foods rich in vitamin D, like eggs, fatty fish, cereal, and fortified milk. Get some sunshine. Your body uses sunshine to make its own vitamin D. The safest time to be out in the sun is before 10 a.m. or after 3 p.m. Avoid getting sunburned. Sunburn can increase your risk of skin cancer. Talk to your doctor about taking a calcium plus vitamin D supplement. Ask about what type of calcium is right for you, and how much to take at a time. Adults ages 23 to 48 should not get more than 2,500 mg of calcium and 4,000 IU of vitamin D each day, whether it is from supplements and/or food. Adults ages 46 and older should not get more than 2,000 mg of calcium and 4,000 IU of vitamin D each day from supplements and/or food. Get regular bone-building exercise. Weight-bearing and resistance exercises keep bones healthy by working the muscles and bones against gravity. Start out at an exercise level that feels right for you. Add a little at a time until you can do the following:  Do 30 minutes of weight-bearing exercise on most days of the week. Walking, jogging, stair climbing, and dancing are good choices. Do resistance exercises with weights or elastic bands 2 to 3 days a week.   Limit alcohol. Drink no more than 1 alcohol drink a day if you are a woman. Drink no more than 2 alcohol drinks a day if you are a man. Do not smoke. Smoking can make bones thin faster. If you need help quitting, talk to your doctor about stop-smoking programs and medicines. These can increase your chances of quitting for good. When should you call for help? Watch closely for changes in your health, and be sure to contact your doctor if:  You need help with a healthy eating plan. You need help with an exercise plan    © 4226-6393 [employer]. Care instructions adapted under license by [facility]. This care instruction is for use with your licensed healthcare professional. If you have questions about a medical condition or this instruction, always ask your healthcare professional. [address] any warranty or liability for your use of this information. Content Version: 9.4.38375; Last Revised: June 20, 2011              ·     Keep Your Memory Eduardo Delacruz       Many factors can affect your ability to remembera hectic lifestyle, aging, stress, chronic disease, and certain medicines. But, there are steps you can take to sharpen your mind and help preserve your memory. Challenge Your Brain   Regularly challenging your mind may help keeps it in top shape. Good mental exercises include:   Crossword puzzlesUse a dictionary if you need it; you will learn more that way. Brainteasers Try some! Crafts, such as wood working and sewing   Hobbies, such as gardening and building model airplanes   SocializingVisit old friends or join groups to meet new ones.    Reading   Learning a new language   Taking a class, whether it be art history or dada chi   TravelingExperience the food, history, and culture of your destination   Learning to use a computer   Going to museums, the theater, or thought-provoking movies   Changing things in your daily life, such as reversing your pattern in the grocery store or brushing your teeth using your nondominant hand   Use Memory Aids   There is no need to remember every detail on your own. These memory aids can help:   Calendars and day planners   Electronic organizers to store all sorts of helpful informationThese devices can \"beep\" to remind you of appointments. A book of days to record birthdays, anniversaries, and other occasions that occur on the same date every year   Detailed \"to-do\" lists and strategically placed sticky notes   Quick \"study\" sessionsBefore a gathering, review who will be there so their names will be fresh in your mind. Establish routinesFor example, keep your keys, wallet, and umbrella in the same place all the time or take medicine with your 8:00 AM glass of juice   Live a Healthy Life   Many actions that will keep your body strong will do the same for your mind. For example:   Talk to Your Doctor About Herbs and Supplements    Malnutrition and vitamin deficiencies can impair your mental function. For example, vitamin B12 deficiency can cause a range of symptoms, including confusion. But, what if your nutritional needs are being met? Can herbs and supplements still offer a benefit? Researchers have investigated a range of natural remedies, such as ginkgo , ginseng , and the supplement phosphatidylserine (PS). So far, though, the evidence is inconsistent as to whether these products can improve memory or thinking. If you are interested in taking herbs and supplements, talk to your doctor first because they may interact with other medicines that you are taking. Exercise Regularly    Among the many benefits of regular exercise are increased blood flow to the brain and decreased risk of certain diseases that can interfere with memory function. One study found that even moderate exercise has a beneficial effect.  Examples of \"moderate\" exercise include:   Playing 18 holes of golf once a week, without a cart   Playing tennis twice a week Walking one mile per day   Manage Stress    It can be tough to remember what is important when your mind is cluttered. Make time for relaxation. Choose activities that calm you down, and make it routine. Manage Chronic Conditions    Side effects of high blood pressure , diabetes, and heart disease can interfere with mental function. Many of the lifestyle steps discussed here can help manage these conditions. Strive to eat a healthy diet, exercise regularly, get stress under control, and follow your doctor's advice for your condition. Minimize Medications    Talk to your doctor about the medicines that you take. Some may be unnecessary. Also, healthy lifestyle habits may lower the need for certain drugs. Last Reviewed: April 2010 Dian Varghese MD   Updated: 4/13/2010   ·     [address] [place] [address]       As we get older, changes in balance, gait, strength, vision, hearing, and cognition make even the most youthful senior more prone to accidents. Falls are one of the leading health risks for older people. This increased risk of falling is related to:   Aging process (eg, decreased muscle strength, slowed reflexes)   Higher incidence of chronic health problems (eg, arthritis, diabetes) that may limit mobility, agility or sensory awareness   Side effects of medicine (eg, dizziness, blurred vision)especially medicines like prescription pain medicines and drugs used to treat mental health conditions   Depending on the brittleness of your bones, the consequences of a fall can be serious and long lasting. Home Life   Research by the Association of Aging [facility]) shows that some home accidents among older adults can be prevented by making simple lifestyle changes and basic modifications and repairs to the home environment. Here are some lifestyle changes that experts recommend:   Have your hearing and vision checked regularly. Be sure to wear prescription glasses that are right for you.    Speak to your doctor or pharmacist about the possible side effects of your medicines. A number of medicines can cause dizziness. If you have problems with sleep, talk to your doctor. Limit your intake of alcohol. If necessary, use a cane or walker to help maintain your balance. Wear supportive, rubber-soled shoes, even at home. If you live in a region that gets wintry weather, you may want to put special cleats on your shoes to prevent you from slipping on the snow and ice. Exercise regularly to help maintain muscle tone, agility, and balance. Always hold the banister when going up or down stairs. Also, use  bars when getting in or out of the bath or shower, or using the toilet. To avoid dizziness, get up slowly from a lying down position. Sit up first, dangling your legs for a minute or two before rising to a standing position. Overall Home Safety Check   According to the Consumer Product Safety Commision's \"Older Consumer Home Safety Checklist,\" it is important to check for potential hazards in each room. And remember, proper lighting is an essential factor in home safety. If you cannot see clearly, you are more likely to fall. Important questions to ask yourself include:   Are lamp, electric, extension, and telephone cords placed out of the flow of traffic and maintained in good condition? Have frayed cords been replaced? Are all small rugs and runners slip resistant? If not, you can secure them to the floor with a special double-sided carpet tape. Are smoke detectors properly locatedone on every floor of your home and one outside of every sleeping area? Are they in good working order? Are batteries replaced at least once a year? Do you have a well-maintained carbon monoxide detector outside every sleeping are in your home? Does your furniture layout leave plenty of space to maneuver between and around chairs, tables, beds, and sofas? Are hallways, stairs and passages between rooms well lit?  Can you reach as grab bars.)    Tub seats fitted with non-slip material on the legs allow you to wash sitting down. For people with limited mobility, bathtub transfer benches allow you to slide safely into the tub. Raised toilet seats and toilet safety rails are helpful for those with knee or hip problems. In the [place]    Make sure you use a nightlight and that the area around your bed is clear of potential obstacles. Be careful with electric blankets and never go to sleep with a heating pad, which can cause serious burns even if on a low setting. Use fire-resistant mattress covers and pillows, and NEVER smoke in bed. Keep a phone next to the bed that is programmed to dial 911 at the push of a button. If you have a chronic condition, you may want to sign on with an automatic call-in service. Typically the system includes a small pendant that connects directly to an emergency medical voice-response system. You should also make arrangements to stay in contact with someonefriend, neighbor, family memberon a regular schedule. Fire Prevention   According to the [employer]. (Smoke Alarms for Every) [address] [place] [address], senior citizens are one of the two highest risk groups for death and serious injuries due to residential fires. When cooking, wear short-sleeved items, never a bulky long-sleeved robe. The [facility]'s Safety Checklist for Older Consumers emphasizes the importance of checking basements, garages, workshops and storage areas for fire hazards, such as volatile liquids, piles of old rags or clothing and overloaded circuits. Never smoke in bed or when lying down on a couch or recliner chair. Small portable electric or kerosene heaters are responsible for many home fires and should be used cautiously if at all. If you do use one, be sure to keep them away from flammable materials. In case of fire, make sure you have a pre-established emergency exit plan.     Have a professional check your fireplace and other fuel-burning appliances yearly. [employer]   Baby boomers entering the horowitz years will continue to see the development of new products to help older adults live safely and independently in spite of age-related changes. Making Life More Livable  , by Jacolyn Goldmann, lists over 1,000 products for \"living well in the mature years,\" such as bathing and mobility aids, household security devices, ergonomically designed knives and peelers, and faucet valves and knobs for temperature control. Medical supply stores and organizations are good sources of information about products that improve your quality of life and insure your safety.      Last Reviewed: November 2009 Gregoria Tobias MD   Updated: 3/7/2011     ·

## 2021-10-21 RX ORDER — LEVOTHYROXINE SODIUM 0.07 MG/1
TABLET ORAL
Qty: 90 TABLET | Refills: 0 | Status: SHIPPED | OUTPATIENT
Start: 2021-10-21 | End: 2022-01-24

## 2021-10-21 NOTE — TELEPHONE ENCOUNTER
Carmela Mathis is calling to request a refill on the following medication(s):    Last Visit Date (If Applicable):  57/77/8328    Next Visit Date:    4/11/2022    Medication Request:  Requested Prescriptions     Pending Prescriptions Disp Refills    levothyroxine (SYNTHROID) 75 MCG tablet [Pharmacy Med Name: LEVOTHYROXINE 75 MCG TABLET] 90 tablet 0     Sig: TAKE ONE TABLET BY MOUTH DAILY

## 2021-12-15 ENCOUNTER — OFFICE VISIT (OUTPATIENT)
Dept: ORTHOPEDIC SURGERY | Age: 81
End: 2021-12-15
Payer: MEDICARE

## 2021-12-15 VITALS — WEIGHT: 196 LBS | BODY MASS INDEX: 32.65 KG/M2 | RESPIRATION RATE: 15 BRPM | HEIGHT: 65 IN

## 2021-12-15 DIAGNOSIS — M70.61 GREATER TROCHANTERIC BURSITIS OF RIGHT HIP: Primary | ICD-10-CM

## 2021-12-15 PROCEDURE — 20611 DRAIN/INJ JOINT/BURSA W/US: CPT | Performed by: PHYSICIAN ASSISTANT

## 2021-12-15 RX ORDER — LIDOCAINE HYDROCHLORIDE 10 MG/ML
2 INJECTION, SOLUTION INFILTRATION; PERINEURAL ONCE
Status: COMPLETED | OUTPATIENT
Start: 2021-12-15 | End: 2021-12-15

## 2021-12-15 RX ORDER — METHYLPREDNISOLONE ACETATE 80 MG/ML
80 INJECTION, SUSPENSION INTRA-ARTICULAR; INTRALESIONAL; INTRAMUSCULAR; SOFT TISSUE ONCE
Status: COMPLETED | OUTPATIENT
Start: 2021-12-15 | End: 2021-12-15

## 2021-12-15 RX ADMIN — LIDOCAINE HYDROCHLORIDE 2 ML: 10 INJECTION, SOLUTION INFILTRATION; PERINEURAL at 15:41

## 2021-12-15 RX ADMIN — METHYLPREDNISOLONE ACETATE 80 MG: 80 INJECTION, SUSPENSION INTRA-ARTICULAR; INTRALESIONAL; INTRAMUSCULAR; SOFT TISSUE at 15:42

## 2021-12-15 NOTE — PROGRESS NOTES
[address] [facility]  [address] [place] [address]  Dept: [phone]  Dept Fax: [phone]          Right Hip Visit - Follow up    Subjective:     Chief Complaint   Patient presents with    Follow-up     R Hip ( 7/14/21)     HPI:     Pieter Ortiz presents today for Right hip pain. Patient is here today for cortisone injections into Right greater trochanteric bursa. She had her last cortisone injection was on 7/14/2021. I have reviewed the CC, and if not present in this note, I have reviewed in the patient's chart. I agree with the documentation provided by other staff and have reviewed their documentation prior to providing my signature indicating agreement. Vitals:   Resp 15   Ht 5' 5\" (1.651 m)   Wt 196 lb (88.9 kg)   BMI 32.62 kg/m²  Body mass index is 32.62 kg/m². Assessment:     1. Greater trochanteric bursitis of right hip          Procedure:   Procedure: Yes    Greater Trochanteric Bursa Injection     Procedure: Pieter Ortiz agreed today for a Corticosteroid injection into the right greater trochanteric bursa. The patient was placed in the lateral position with the affected side up. The point of the maximum tenderness was marked with the closed end of a click type pen. The skin was prepped with betadine in a sterile fashion. Utilizing a [employer] ultrasound unit with a variable frequency linear transducer and sterile technique a 3 cc solution containing 2cc of 1% lidocaine  and 1 cc containing 80 mg of depomedrol was injected into the greater trochanteric bursa with a 22 gauge spinal needle. The wound was cleansed and a Band-Aid was placed. The patient tolerated the procedure without difficulty. Adverse reactions of the injection was discussed with the patient including signs of infection (increasing pain, redness, swelling) and the patient was instructed to call immediately with any of these symptoms.  The images are stored on SD card in the machine until downloaded to the patient's chart. Plan:   Patient should return to the clinic as needed to follow up with Glynn Clay PA-C. The patient will call the office immediately with any problems. Orders Placed This Encounter   Medications    methylPREDNISolone acetate (DEPO-MEDROL) injection 80 mg    lidocaine 1 % injection 2 mL       No orders of the defined types were placed in this encounter.           Electronically signed by Tonia Saleh PA-C, on 12/15/2021 at 12:06 PM

## 2021-12-16 ENCOUNTER — TELEPHONE (OUTPATIENT)
Dept: ORTHOPEDIC SURGERY | Age: 81
End: 2021-12-16

## 2021-12-16 NOTE — TELEPHONE ENCOUNTER
Zoey Cuenca called regarding her cortisone flare. LVM--  Sounds like the symptoms are heading in the right directions. Explained the body oil can -- the bursa is irritated and painful. Apply ice and OTC pain relievers as directed. Call with any other concerns   Zoey Cuenca called back     No prescribed pain medications. Yes this could make her face flushed just on one side. Yes this could make the bursa pain worse than it had been normally. Cortisone is an irritate. The elavil she took due to the pain was probably more sedative than helping the pain in the night. Would not really work for MSK pain associated with bursitis as associated with nerve pain. This should resolve with time.

## 2021-12-20 ENCOUNTER — PATIENT MESSAGE (OUTPATIENT)
Dept: FAMILY MEDICINE CLINIC | Age: 81
End: 2021-12-20

## 2021-12-20 NOTE — TELEPHONE ENCOUNTER
From: Dallin Amezquita  To: Dr. Alma Mane  Sent: 12/20/2021 11:00 AM EST  Subject: Thank you! Dr. Cruz Me,  I just want you to know that I am most appreciative for your medical assistance yesterday. [place] and [address] to you and your family here and in [place]!   Sherren Naegeli  21-

## 2021-12-21 ENCOUNTER — APPOINTMENT (OUTPATIENT)
Dept: CT IMAGING | Facility: CLINIC | Age: 81
DRG: 312 | End: 2021-12-21
Payer: MEDICARE

## 2021-12-21 ENCOUNTER — HOSPITAL ENCOUNTER (INPATIENT)
Age: 81
LOS: 2 days | Discharge: HOME | DRG: 312 | End: 2021-12-23
Attending: EMERGENCY MEDICINE | Admitting: INTERNAL MEDICINE
Payer: MEDICARE

## 2021-12-21 DIAGNOSIS — E04.1 RIGHT THYROID NODULE: ICD-10-CM

## 2021-12-21 DIAGNOSIS — R77.8 ELEVATED TROPONIN: ICD-10-CM

## 2021-12-21 DIAGNOSIS — R55 SYNCOPE AND COLLAPSE: Primary | ICD-10-CM

## 2021-12-21 LAB
ABSOLUTE EOS #: 0.1 K/UL (ref 0–0.4)
ABSOLUTE IMMATURE GRANULOCYTE: ABNORMAL K/UL (ref 0–0.3)
ABSOLUTE LYMPH #: 0.8 K/UL (ref 1–4.8)
ABSOLUTE MONO #: 0.2 K/UL (ref 0.1–1.2)
ALBUMIN SERPL-MCNC: 4.3 G/DL (ref 3.5–5.2)
ALBUMIN/GLOBULIN RATIO: 2 (ref 1–2.5)
ALP BLD-CCNC: 103 U/L (ref 35–104)
ALT SERPL-CCNC: 23 U/L (ref 5–33)
ANION GAP SERPL CALCULATED.3IONS-SCNC: 12 MMOL/L (ref 9–17)
AST SERPL-CCNC: 21 U/L
BASOPHILS # BLD: 1 % (ref 0–2)
BASOPHILS ABSOLUTE: 0 K/UL (ref 0–0.2)
BILIRUB SERPL-MCNC: 0.2 MG/DL (ref 0.3–1.2)
BILIRUBIN DIRECT: <0.08 MG/DL
BILIRUBIN URINE: NEGATIVE
BILIRUBIN, INDIRECT: ABNORMAL MG/DL (ref 0–1)
BNP INTERPRETATION: ABNORMAL
BUN BLDV-MCNC: 25 MG/DL (ref 8–23)
BUN/CREAT BLD: ABNORMAL (ref 9–20)
CALCIUM SERPL-MCNC: 10.4 MG/DL (ref 8.6–10.4)
CHLORIDE BLD-SCNC: 102 MMOL/L (ref 98–107)
CO2: 21 MMOL/L (ref 20–31)
COLOR: YELLOW
COMMENT UA: NORMAL
CREAT SERPL-MCNC: 0.8 MG/DL (ref 0.5–0.9)
D-DIMER QUANTITATIVE: 3.2 MG/L FEU
DIFFERENTIAL TYPE: ABNORMAL
EOSINOPHILS RELATIVE PERCENT: 1 % (ref 1–4)
GFR AFRICAN AMERICAN: >60 ML/MIN
GFR NON-AFRICAN AMERICAN: >60 ML/MIN
GFR SERPL CREATININE-BSD FRML MDRD: ABNORMAL ML/MIN/{1.73_M2}
GFR SERPL CREATININE-BSD FRML MDRD: ABNORMAL ML/MIN/{1.73_M2}
GLOBULIN: ABNORMAL G/DL (ref 1.5–3.8)
GLUCOSE BLD-MCNC: 149 MG/DL (ref 70–99)
GLUCOSE URINE: NEGATIVE
HCT VFR BLD CALC: 43.5 % (ref 36–46)
HEMOGLOBIN: 14.7 G/DL (ref 12–16)
IMMATURE GRANULOCYTES: ABNORMAL %
KETONES, URINE: NEGATIVE
LEUKOCYTE ESTERASE, URINE: NEGATIVE
LYMPHOCYTES # BLD: 8 % (ref 24–44)
MCH RBC QN AUTO: 32.3 PG (ref 26–34)
MCHC RBC AUTO-ENTMCNC: 33.8 G/DL (ref 31–37)
MCV RBC AUTO: 95.4 FL (ref 80–100)
MONOCYTES # BLD: 2 % (ref 2–11)
NITRITE, URINE: NEGATIVE
NRBC AUTOMATED: ABNORMAL PER 100 WBC
PDW BLD-RTO: 12.3 % (ref 12.5–15.4)
PH UA: 6 (ref 5–8)
PLATELET # BLD: 252 K/UL (ref 140–450)
PLATELET ESTIMATE: ABNORMAL
PMV BLD AUTO: 8 FL (ref 6–12)
POTASSIUM SERPL-SCNC: 4.4 MMOL/L (ref 3.7–5.3)
PRO-BNP: 1899 PG/ML
PROTEIN UA: NEGATIVE
RBC # BLD: 4.56 M/UL (ref 4–5.2)
RBC # BLD: ABNORMAL 10*6/UL
SARS-COV-2, RAPID: NOT DETECTED
SEG NEUTROPHILS: 88 % (ref 36–66)
SEGMENTED NEUTROPHILS ABSOLUTE COUNT: 9.5 K/UL (ref 1.8–7.7)
SODIUM BLD-SCNC: 135 MMOL/L (ref 135–144)
SPECIFIC GRAVITY UA: 1.01 (ref 1–1.03)
SPECIMEN DESCRIPTION: NORMAL
TOTAL PROTEIN: 6.5 G/DL (ref 6.4–8.3)
TROPONIN INTERP: ABNORMAL
TROPONIN T: ABNORMAL NG/ML
TROPONIN, HIGH SENSITIVITY: 32 NG/L (ref 0–14)
TROPONIN, HIGH SENSITIVITY: 32 NG/L (ref 0–14)
TROPONIN, HIGH SENSITIVITY: 44 NG/L (ref 0–14)
TURBIDITY: CLEAR
URINE HGB: NEGATIVE
UROBILINOGEN, URINE: NORMAL
WBC # BLD: 10.7 K/UL (ref 3.5–11)
WBC # BLD: ABNORMAL 10*3/UL

## 2021-12-21 PROCEDURE — 85025 COMPLETE CBC W/AUTO DIFF WBC: CPT

## 2021-12-21 PROCEDURE — 1200000000 HC SEMI PRIVATE

## 2021-12-21 PROCEDURE — 87635 SARS-COV-2 COVID-19 AMP PRB: CPT

## 2021-12-21 PROCEDURE — 2580000003 HC RX 258: Performed by: EMERGENCY MEDICINE

## 2021-12-21 PROCEDURE — 71260 CT THORAX DX C+: CPT

## 2021-12-21 PROCEDURE — 93005 ELECTROCARDIOGRAM TRACING: CPT | Performed by: EMERGENCY MEDICINE

## 2021-12-21 PROCEDURE — 99285 EMERGENCY DEPT VISIT HI MDM: CPT

## 2021-12-21 PROCEDURE — 6360000004 HC RX CONTRAST MEDICATION: Performed by: EMERGENCY MEDICINE

## 2021-12-21 PROCEDURE — 2580000003 HC RX 258: Performed by: NURSE PRACTITIONER

## 2021-12-21 PROCEDURE — 36415 COLL VENOUS BLD VENIPUNCTURE: CPT

## 2021-12-21 PROCEDURE — G0378 HOSPITAL OBSERVATION PER HR: HCPCS

## 2021-12-21 PROCEDURE — 70450 CT HEAD/BRAIN W/O DYE: CPT

## 2021-12-21 PROCEDURE — 83880 ASSAY OF NATRIURETIC PEPTIDE: CPT

## 2021-12-21 PROCEDURE — 85379 FIBRIN DEGRADATION QUANT: CPT

## 2021-12-21 PROCEDURE — 81003 URINALYSIS AUTO W/O SCOPE: CPT

## 2021-12-21 PROCEDURE — 6370000000 HC RX 637 (ALT 250 FOR IP): Performed by: EMERGENCY MEDICINE

## 2021-12-21 PROCEDURE — 80076 HEPATIC FUNCTION PANEL: CPT

## 2021-12-21 PROCEDURE — 80048 BASIC METABOLIC PNL TOTAL CA: CPT

## 2021-12-21 PROCEDURE — 84484 ASSAY OF TROPONIN QUANT: CPT

## 2021-12-21 PROCEDURE — 99219 PR INITIAL OBSERVATION CARE/DAY 50 MINUTES: CPT | Performed by: NURSE PRACTITIONER

## 2021-12-21 RX ORDER — SODIUM CHLORIDE 0.9 % (FLUSH) 0.9 %
5-40 SYRINGE (ML) INJECTION EVERY 12 HOURS SCHEDULED
Status: DISCONTINUED | OUTPATIENT
Start: 2021-12-21 | End: 2021-12-23 | Stop reason: HOSPADM

## 2021-12-21 RX ORDER — ACETAMINOPHEN 650 MG/1
650 SUPPOSITORY RECTAL EVERY 6 HOURS PRN
Status: DISCONTINUED | OUTPATIENT
Start: 2021-12-21 | End: 2021-12-23 | Stop reason: HOSPADM

## 2021-12-21 RX ORDER — DILTIAZEM HYDROCHLORIDE 120 MG/1
120 CAPSULE, COATED, EXTENDED RELEASE ORAL EVERY EVENING
Status: DISCONTINUED | OUTPATIENT
Start: 2021-12-21 | End: 2021-12-23 | Stop reason: HOSPADM

## 2021-12-21 RX ORDER — SODIUM CHLORIDE 0.9 % (FLUSH) 0.9 %
10 SYRINGE (ML) INJECTION PRN
Status: DISCONTINUED | OUTPATIENT
Start: 2021-12-21 | End: 2021-12-23 | Stop reason: HOSPADM

## 2021-12-21 RX ORDER — EZETIMIBE 10 MG/1
10 TABLET ORAL DAILY
Status: DISCONTINUED | OUTPATIENT
Start: 2021-12-22 | End: 2021-12-23 | Stop reason: HOSPADM

## 2021-12-21 RX ORDER — ACETAMINOPHEN 325 MG/1
650 TABLET ORAL EVERY 6 HOURS PRN
Status: DISCONTINUED | OUTPATIENT
Start: 2021-12-21 | End: 2021-12-23 | Stop reason: HOSPADM

## 2021-12-21 RX ORDER — POTASSIUM CHLORIDE 20 MEQ/1
40 TABLET, EXTENDED RELEASE ORAL PRN
Status: DISCONTINUED | OUTPATIENT
Start: 2021-12-21 | End: 2021-12-23 | Stop reason: HOSPADM

## 2021-12-21 RX ORDER — POLYETHYLENE GLYCOL 3350 17 G/17G
17 POWDER, FOR SOLUTION ORAL DAILY PRN
Status: DISCONTINUED | OUTPATIENT
Start: 2021-12-21 | End: 2021-12-23 | Stop reason: HOSPADM

## 2021-12-21 RX ORDER — BUDESONIDE AND FORMOTEROL FUMARATE DIHYDRATE 80; 4.5 UG/1; UG/1
2 AEROSOL RESPIRATORY (INHALATION) 2 TIMES DAILY
Status: DISCONTINUED | OUTPATIENT
Start: 2021-12-21 | End: 2021-12-23 | Stop reason: HOSPADM

## 2021-12-21 RX ORDER — FAMOTIDINE 20 MG/1
20 TABLET, FILM COATED ORAL DAILY
Status: DISCONTINUED | OUTPATIENT
Start: 2021-12-22 | End: 2021-12-23 | Stop reason: HOSPADM

## 2021-12-21 RX ORDER — ONDANSETRON 4 MG/1
4 TABLET, ORALLY DISINTEGRATING ORAL EVERY 8 HOURS PRN
Status: DISCONTINUED | OUTPATIENT
Start: 2021-12-21 | End: 2021-12-23 | Stop reason: HOSPADM

## 2021-12-21 RX ORDER — SODIUM CHLORIDE 0.9 % (FLUSH) 0.9 %
10 SYRINGE (ML) INJECTION PRN
Status: DISCONTINUED | OUTPATIENT
Start: 2021-12-21 | End: 2021-12-21 | Stop reason: SDUPTHER

## 2021-12-21 RX ORDER — POTASSIUM CHLORIDE 7.45 MG/ML
10 INJECTION INTRAVENOUS PRN
Status: DISCONTINUED | OUTPATIENT
Start: 2021-12-21 | End: 2021-12-23 | Stop reason: HOSPADM

## 2021-12-21 RX ORDER — MAGNESIUM SULFATE 1 G/100ML
1000 INJECTION INTRAVENOUS PRN
Status: DISCONTINUED | OUTPATIENT
Start: 2021-12-21 | End: 2021-12-23 | Stop reason: HOSPADM

## 2021-12-21 RX ORDER — LEVOTHYROXINE SODIUM 0.07 MG/1
75 TABLET ORAL DAILY
Status: DISCONTINUED | OUTPATIENT
Start: 2021-12-22 | End: 2021-12-23 | Stop reason: HOSPADM

## 2021-12-21 RX ORDER — SODIUM CHLORIDE 9 MG/ML
25 INJECTION, SOLUTION INTRAVENOUS PRN
Status: DISCONTINUED | OUTPATIENT
Start: 2021-12-21 | End: 2021-12-23 | Stop reason: HOSPADM

## 2021-12-21 RX ORDER — 0.9 % SODIUM CHLORIDE 0.9 %
1000 INTRAVENOUS SOLUTION INTRAVENOUS ONCE
Status: COMPLETED | OUTPATIENT
Start: 2021-12-21 | End: 2021-12-21

## 2021-12-21 RX ORDER — ACETAMINOPHEN 325 MG/1
650 TABLET ORAL ONCE
Status: COMPLETED | OUTPATIENT
Start: 2021-12-21 | End: 2021-12-21

## 2021-12-21 RX ORDER — ALBUTEROL SULFATE 90 UG/1
2 AEROSOL, METERED RESPIRATORY (INHALATION) 4 TIMES DAILY PRN
Status: DISCONTINUED | OUTPATIENT
Start: 2021-12-21 | End: 2021-12-23 | Stop reason: HOSPADM

## 2021-12-21 RX ORDER — ONDANSETRON 2 MG/ML
4 INJECTION INTRAMUSCULAR; INTRAVENOUS EVERY 6 HOURS PRN
Status: DISCONTINUED | OUTPATIENT
Start: 2021-12-21 | End: 2021-12-23 | Stop reason: HOSPADM

## 2021-12-21 RX ORDER — AMITRIPTYLINE HYDROCHLORIDE 10 MG/1
10 TABLET, FILM COATED ORAL NIGHTLY PRN
Status: DISCONTINUED | OUTPATIENT
Start: 2021-12-21 | End: 2021-12-23 | Stop reason: HOSPADM

## 2021-12-21 RX ORDER — SODIUM CHLORIDE 9 MG/ML
INJECTION, SOLUTION INTRAVENOUS CONTINUOUS
Status: DISCONTINUED | OUTPATIENT
Start: 2021-12-21 | End: 2021-12-23

## 2021-12-21 RX ORDER — 0.9 % SODIUM CHLORIDE 0.9 %
70 INTRAVENOUS SOLUTION INTRAVENOUS ONCE
Status: COMPLETED | OUTPATIENT
Start: 2021-12-21 | End: 2021-12-21

## 2021-12-21 RX ADMIN — SODIUM CHLORIDE, PRESERVATIVE FREE 10 ML: 5 INJECTION INTRAVENOUS at 22:08

## 2021-12-21 RX ADMIN — SODIUM CHLORIDE 1000 ML: 9 INJECTION, SOLUTION INTRAVENOUS at 14:57

## 2021-12-21 RX ADMIN — SODIUM CHLORIDE: 9 INJECTION, SOLUTION INTRAVENOUS at 22:08

## 2021-12-21 RX ADMIN — ACETAMINOPHEN 650 MG: 325 TABLET ORAL at 16:57

## 2021-12-21 RX ADMIN — IOPAMIDOL 80 ML: 755 INJECTION, SOLUTION INTRAVENOUS at 16:01

## 2021-12-21 RX ADMIN — SODIUM CHLORIDE 70 ML: 9 INJECTION, SOLUTION INTRAVENOUS at 16:02

## 2021-12-21 RX ADMIN — Medication 10 ML: at 16:02

## 2021-12-21 ASSESSMENT — PAIN SCALES - GENERAL
PAINLEVEL_OUTOF10: 0
PAINLEVEL_OUTOF10: 6
PAINLEVEL_OUTOF10: 1

## 2021-12-21 NOTE — ED PROVIDER NOTES
[facility]  [facility]  Phone: [phone]        Pt Name: Abdullahi Kaufman  MRN: [id number]  Kathleen 1940  Date of evaluation: 12/21/21      CHIEF COMPLAINT     Chief Complaint   Patient presents with    Loss of Consciousness     while on toilet this morning,woke up on the floor, unsure of how long, no head and neck pain    Rash     started this morning, on arms, started a Z-criselda and prednisone 20 mg by PCP on Sunday         HISTORY OF PRESENT ILLNESS  (Location/Symptom, Timing/Onset, Context/Setting, Quality, Duration, Modifying Factors, Severity.)    Abdullahi Kaufman is a 80 y.o. female who presents with a syncopal episode. The patient states that the end of last week she started developing a sore throat some slight congestion on Sunday was seen in urgent care where she states she had a flu test and a Covid test both of which were send outs she called her family doctor who did call in a prescription for prednisone and Zithromax the patient states today she was sitting on the toilet having a bowel movement when she had a syncopal episode she is noted a rash otherwise she has no complaints she has no headache no chest pain no shortness of breath no abdominal pain no fever no chills no dysuria no numbness no weakness no vomiting no diarrhea      REVIEW OF SYSTEMS    (2-9 systems for level 4, 10 or more for level 5)     Review of Systems   Skin: Positive for rash. Neurological: Positive for syncope. All other systems reviewed and are negative.       PAST MEDICAL HISTORY    has a past medical history of Acid reflux, Anxiety, Bilateral tinnitus, Bronchitis, mucopurulent recurrent ([address] Diverticular disease, Enthesopathy of hip region, GERD (gastroesophageal reflux disease), Hypercholesteremia, Hypothyroid, Laryngopharyngeal reflux, Lateral femoral cutaneous neuropathy, Lung nodule, Meniere disease, Meniere's disease, right ear, Morbidly obese ([address] Neuropathy, Osteopenia, Parkinson disease ([facility] Postmenopausal state, Postnasal drip, Prediabetes, RAD (reactive airway disease), Seasonal asthma, Thyroid nodule, Tremor, and Vitamin D deficiency. SURGICAL HISTORY      has a past surgical history that includes Cholecystectomy; Endoscopy, colon, diagnostic; Colonoscopy; ERCP (03/21/14); Hysterectomy (01/03/2007); skin biopsy (Left, 11/08/2017); and EXCISION / BIOPSY SKIN LESION OF TRUNK (Left, 11/8/2017). CURRENTMEDICATIONS       Previous Medications    ALBUTEROL SULFATE HFA (VENTOLIN HFA) 108 (90 BASE) MCG/ACT INHALER    Inhale 2 puffs into the lungs 4 times daily as needed for Wheezing    ALCLOMETHASONE (ACLOVATE) 0.05 % CREAM    Apply topically 2 times daily Apply topically 2 times daily. AMITRIPTYLINE (ELAVIL) 10 MG TABLET    TAKE ONE TABLET BY MOUTH DAILY AS NEEDED FOR SLEEP AND LEG PAIN FOR UP TO 30 DAYS    ASCORBIC ACID (VITAMIN C) 500 MG TABLET    Take 500 mg by mouth daily. AZITHROMYCIN (ZITHROMAX) 250 MG TABLET    Take 1 tablet by mouth See Admin Instructions for 5 days 500mg on day 1 followed by 250mg on days 2 - 5    BUDESONIDE-FORMOTEROL (SYMBICORT) 80-4.5 MCG/ACT AERO    Inhale 2 puffs into the lungs 2 times daily    BUTENAFINE (MENTAX) 1 % CREA    Apply 1 Tube topically 2 times daily    CHLORPHENIRAMINE (CHLOR-TRIMETON) 4 MG TABLET    Take 12 mg by mouth daily     CHOLECALCIFEROL (VITAMIN D3) 2000 UNITS CAPS    Take 1 capsule by mouth 2 times daily    DILTIAZEM (CARDIZEM CD) 120 MG EXTENDED RELEASE CAPSULE    Take 1 capsule by mouth every evening    ECHINACEA EXTRACT PO    Take 450 mg by mouth Indications: Take in winter WINTER ONLY    ESOMEPRAZOLE MAGNESIUM (NEXIUM) 40 MG PACK    Take 40 mg by mouth daily.     EVOLOCUMAB 140 MG/ML SOSY    Inject into the skin    EZETIMIBE (ZETIA) 10 MG TABLET    Take 10 mg by mouth daily    FAMOTIDINE (PEPCID) 20 MG TABLET    as needed     FLUTICASONE (FLONASE) 50 MCG/ACT NASAL SPRAY    PLACE 1 SPRAY IN EACH NOSTRIL DAILY FOR 15 DAYS    GUAIFENESIN (MUCINEX) 600 MG EXTENDED RELEASE TABLET    Take 1 tablet by mouth 2 times daily    KETOCONAZOLE (NIZORAL) 2 % SHAMPOO    APPLY  TO AFFECTED AREAS TOPICALLY DAILY THEN RINSE OFF AFTER 5 MINUTES    LEVOTHYROXINE (SYNTHROID) 75 MCG TABLET    TAKE ONE TABLET BY MOUTH DAILY    METFORMIN (GLUCOPHAGE) 500 MG TABLET    TAKE ONE TABLET BY MOUTH DAILY    METOPROLOL TARTRATE (LOPRESSOR) 25 MG TABLET    Take 25 mg by mouth 2 times daily    NONFORMULARY    Super b complex 3 times per week    NONFORMULARY    Indications: Dietary fiber 1 tsp a day     ONDANSETRON (ZOFRAN-ODT) 4 MG DISINTEGRATING TABLET    Take 1 tablet by mouth every 8 hours as needed. PREDNISONE (DELTASONE) 20 MG TABLET    Take 1 tablet by mouth daily for 5 days       ALLERGIES     is allergic to aspirin, atorvastatin, crestor [rosuvastatin], livalo [pitavastatin], seasonal, simvastatin, statins, and welchol [colesevelam hcl]. FAMILY HISTORY     She indicated that her mother is . She indicated that her father is . She indicated that one of her four brothers is . She indicated that the status of her maternal grandmother is unknown. She indicated that her paternal grandfather is . She indicated that the status of her maternal aunt is unknown. She indicated that the status of her maternal uncle is unknown. She indicated that the status of her paternal uncle is unknown.     family history includes Cancer in her brother and maternal uncle; Cancer (age of onset: 48) in her mother; Cancer (age of onset: 64) in her father; Diabetes in her brother; Heart Disease in her paternal grandfather; Heart Disease (age of onset: 58) in her brother; Heart Disease (age of onset: 79) in her brother; Heart Disease (age of onset: 67) in her paternal uncle; High Blood Pressure in her brother and brother; Other in her brother; Stroke in her maternal aunt and maternal grandmother.     SOCIAL HISTORY      reports that she has never smoked. She has never used smokeless tobacco. She reports current alcohol use. She reports that she does not use drugs. PHYSICAL EXAM    (up to 7 for level 4, 8 or more for level 5)   INITIAL VITALS:  height is 5' 5\" (1.651 m) and weight is 86.6 kg (191 lb). Her oral temperature is 98.1 °F (36.7 °C). Her blood pressure is 120/66 and her pulse is 80. Her respiration is 17 and oxygen saturation is 95%. Physical Exam  Vitals and nursing note reviewed. Constitutional:       Appearance: Normal appearance. HENT:      Head: Normocephalic and atraumatic. Eyes:      Extraocular Movements: Extraocular movements intact. Conjunctiva/sclera: Conjunctivae normal.      Pupils: Pupils are equal, round, and reactive to light. Cardiovascular:      Rate and Rhythm: Normal rate and regular rhythm. Pulses: Normal pulses. Heart sounds: Normal heart sounds. Pulmonary:      Effort: Pulmonary effort is normal. No respiratory distress. Breath sounds: Normal breath sounds. No stridor. No wheezing, rhonchi or rales. Abdominal:      General: There is no distension. Palpations: Abdomen is soft. There is no mass. Tenderness: There is no abdominal tenderness. There is no right CVA tenderness, left CVA tenderness, guarding or rebound. Musculoskeletal:         General: No swelling or tenderness. Normal range of motion. Cervical back: Normal range of motion and neck supple. No rigidity or tenderness. Lymphadenopathy:      Cervical: No cervical adenopathy. Skin:     Comments: Patient is noted to have a pale erythematous rash on her trunk and extremities has the appearance of a viral exanthem otherwise without further rashes or lesions   Neurological:      General: No focal deficit present. Mental Status: She is alert.          DIFFERENTIAL DIAGNOSIS/ MDM:     We will establish an IV get an EKG labs imaging UA    DIAGNOSTIC RESULTS     EKG: All EKG's are interpreted by the Emergency Department Physician who either signs or Co-signs this chart in the absence of a cardiologist.      Interpreted by Rohan Fong MD     Rhythm: normal sinus   Rate: 83  Axis: -35  Ectopy: none  Conduction: normal  ST Segments: no acute change  T Waves: no acute change  Q Waves: none    Clinical Impression: normal sinus rhythm with no acute changes/normal EKG. No acute infarction/ischemia noted. RADIOLOGY:        Interpretation per the Radiologist below, if available at the time of this note:    CT CHEST PULMONARY EMBOLISM W CONTRAST (Final result)  Result time 12/21/21 16:50:22  Final result by Dusty Archer DO (12/21/21 16:50:22)                Impression:    No evidence of pulmonary embolism or acute pulmonary abnormality. Enlarged nodular right thyroid lobe with prior recommendation for biopsy. Correlate for prior tissue sampling. Narrative:    EXAMINATION:   CTA OF THE CHEST 12/21/2021 12:57 pm     TECHNIQUE:   CTA of the chest was performed after the administration of intravenous   contrast.  Multiplanar reformatted images are provided for review.  MIP   images are provided for review. Dose modulation, iterative reconstruction,   and/or weight based adjustment of the mA/kV was utilized to reduce the   radiation dose to as low as reasonably achievable. COMPARISON:   01/14/2017, 09/04/2019     HISTORY:   ORDERING SYSTEM PROVIDED HISTORY: syncope / elevated d-dimer   TECHNOLOGIST PROVIDED HISTORY:   syncope / elevated d-dimer   Decision Support Exception - unselect if not a suspected or confirmed   emergency medical condition->Emergency Medical Condition (MA)   Reason for Exam: Syncope and elevated d-dimer     FINDINGS:   Pulmonary Arteries: Pulmonary arteries are adequately opacified for   evaluation.  No evidence of intraluminal filling defect to suggest pulmonary   embolism.  Main pulmonary artery is normal in caliber.      Mediastinum: No mediastinal lymphadenopathy.  The heart and pericardium   demonstrate no acute abnormality.  Mild coronary artery calcifications. There is no acute abnormality of the normal caliber thoracic aorta. Lungs/pleura: The lungs are without acute process.  No focal consolidation or   pulmonary edema.  Minimal scarring at the left lung base.  No pleural   effusion or pneumothorax. Upper Abdomen: Limited images of the upper abdomen are without acute   abnormality.  Cholecystectomy. Soft Tissues/Bones: No acute bone or soft tissue abnormality.  Enlarged   nodular right thyroid lobe, previously evaluated by thyroid ultrasound with   biopsy recommended.                         CT HEAD WO CONTRAST (Final result)  Result time 12/21/21 16:34:22  Final result by Shani Ramachandran MD (12/21/21 16:34:22)                Impression:    No acute intracranial abnormality. RECOMMENDATIONS:   Unavailable             Narrative:    EXAMINATION:   CT OF THE HEAD WITHOUT CONTRAST  12/21/2021 3:57 pm     TECHNIQUE:   CT of the head was performed without the administration of intravenous   contrast. Dose modulation, iterative reconstruction, and/or weight based   adjustment of the mA/kV was utilized to reduce the radiation dose to as low   as reasonably achievable. COMPARISON:   None.      HISTORY:   ORDERING SYSTEM PROVIDED HISTORY: syncope   TECHNOLOGIST PROVIDED HISTORY:     syncope   Decision Support Exception - unselect if not a suspected or confirmed   emergency medical condition->Emergency Medical Condition (MA)   Reason for Exam: Syncope     FINDINGS:   BRAIN/VENTRICLES: There is mild generalized atrophy and there is mild   periventricular white matter low attenuation that is nonspecific but most   consistent with chronic small vessel ischemia. 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  There is no acute   intracranial hemorrhage, mass effect or midline shift.  No abnormal   extra-axial fluid collection.  The gray-white differentiation is maintained   without evidence of an acute infarct.  There is no evidence of hydrocephalus. ORBITS: The visualized portion of the orbits demonstrate no acute abnormality. SINUSES: The visualized paranasal sinuses and mastoid air cells demonstrate   no acute abnormality. SOFT TISSUES/SKULL:  No acute abnormality of the visualized skull or soft   tissues. LABS:  Results for orders placed or performed during the hospital encounter of 12/21/21   COVID-19, Rapid    Specimen: Nasopharyngeal Swab   Result Value Ref Range    Specimen Description . NASOPHARYNGEAL SWAB     SARS-CoV-2, Rapid Not Detected Not Detected   Basic Metabolic Panel   Result Value Ref Range    Glucose 149 (H) 70 - 99 mg/dL    BUN 25 (H) 8 - 23 mg/dL    CREATININE 0.80 0.50 - 0.90 mg/dL    Bun/Cre Ratio NOT REPORTED 9 - 20    Calcium 10.4 8.6 - 10.4 mg/dL    Sodium 135 135 - 144 mmol/L    Potassium 4.4 3.7 - 5.3 mmol/L    Chloride 102 98 - 107 mmol/L    CO2 21 20 - 31 mmol/L    Anion Gap 12 9 - 17 mmol/L    GFR Non-African American >60 >60 mL/min    GFR African American >60 >60 mL/min    GFR Comment          GFR Staging NOT REPORTED    CBC Auto Differential   Result Value Ref Range    WBC 10.7 3.5 - 11.0 k/uL    RBC 4.56 4.0 - 5.2 m/uL    Hemoglobin 14.7 12.0 - 16.0 g/dL    Hematocrit 43.5 36 - 46 %    MCV 95.4 80 - 100 fL    MCH 32.3 26 - 34 pg    MCHC 33.8 31 - 37 g/dL    RDW 12.3 (L) 12.5 - 15.4 %    Platelets 914 233 - 400 k/uL    MPV 8.0 6.0 - 12.0 fL    NRBC Automated NOT REPORTED per 100 WBC    Differential Type NOT REPORTED     Seg Neutrophils 88 (H) 36 - 66 %    Lymphocytes 8 (L) 24 - 44 %    Monocytes 2 2 - 11 %    Eosinophils % 1 1 - 4 %    Basophils 1 0 - 2 %    Immature Granulocytes NOT REPORTED 0 %    Segs Absolute 9.50 (H) 1.8 - 7.7 k/uL    Absolute Lymph # 0.80 (L) 1.0 - 4.8 k/uL    Absolute Mono # 0.20 0.1 - 1.2 k/uL    Absolute Eos # 0.10 0.0 - 0.4 k/uL Basophils Absolute 0.00 0.0 - 0.2 k/uL    Absolute Immature Granulocyte NOT REPORTED 0.00 - 0.30 k/uL    WBC Morphology NOT REPORTED     RBC Morphology NOT REPORTED     Platelet Estimate NOT REPORTED    Hepatic Function Panel   Result Value Ref Range    Albumin 4.3 3.5 - 5.2 g/dL    Alkaline Phosphatase 103 35 - 104 U/L    ALT 23 5 - 33 U/L    AST 21 <32 U/L    Total Bilirubin 0.20 (L) 0.3 - 1.2 mg/dL    Bilirubin, Direct <0.08 <0.31 mg/dL    Bilirubin, Indirect Can not be calculated 0.00 - 1.00 mg/dL    Total Protein 6.5 6.4 - 8.3 g/dL    Globulin NOT REPORTED 1.5 - 3.8 g/dL    Albumin/Globulin Ratio 2.0 1.0 - 2.5   Troponin   Result Value Ref Range    Troponin, High Sensitivity 44 (H) 0 - 14 ng/L    Troponin T NOT REPORTED <0.03 ng/mL    Troponin Interp NOT REPORTED    Urinalysis Reflex to Culture   Result Value Ref Range    Color, UA Yellow Yellow    Turbidity UA Clear Clear    Glucose, Ur NEGATIVE NEGATIVE    Bilirubin Urine NEGATIVE NEGATIVE    Ketones, Urine NEGATIVE NEGATIVE    Specific Gravity, UA 1.015 1.005 - 1.030    Urine Hgb NEGATIVE NEGATIVE    pH, UA 6.0 5.0 - 8.0    Protein, UA NEGATIVE NEGATIVE    Urobilinogen, Urine Normal Normal    Nitrite, Urine NEGATIVE NEGATIVE    Leukocyte Esterase, Urine NEGATIVE NEGATIVE    Urinalysis Comments       Microscopic exam not performed based on chemical results unless requested in original order. Urinalysis Comments          Urinalysis Comments       Utilizing a urinalysis as the only screening method to exclude a potential uropathogen can be unreliable in many patient populations. Rapid screening tests are less sensitive than culture and if UTI is a clinical possibility, culture should be considered despite a negative urinalysis.    D-Dimer, Quantitative   Result Value Ref Range    D-Dimer, Quant 3.20 mg/L FEU           EMERGENCY DEPARTMENT COURSE:   Vitals:    Vitals:    12/21/21 1432 12/21/21 1531 12/21/21 1621 12/21/21 1651   BP: (!) 115/94 120/63 129/63 120/66   Pulse: 84 79 85 80   Resp: 16 18 17 17   Temp: 98.1 °F (36.7 °C)      TempSrc: Oral      SpO2: 95% 96% 95% 95%   Weight: 86.6 kg (191 lb)      Height: 5' 5\" (1.651 m)        -------------------------  BP: 120/66, Temp: 98.1 °F (36.7 °C), Pulse: 80, Resp: 17      RE-EVALUATION:  The patient presents after a syncopal episode does not have a history of the same was noted to have an elevated D-dimer CT of the chest showed no pulmonary emboli the patient was noted to have elevated cardiac enzyme EKG showing no ST elevations or depressions given this I do feel that she warrants admission to hospital setting for observation and further work-up the patient is allergic to aspirin she is pain-free she has no chest pain no shortness of breath no lightheaded or dizziness given the elevated troponin and the syncope again I do feel that she would benefit from observation admission    CONSULTS:  My hospitalist is kind enough to admit the patient to his service. PROCEDURES:  None    FINAL IMPRESSION      1. Syncope and collapse          DISPOSITION/PLAN   DISPOSITION        CONDITION ON DISPOSITION:   Stable    PATIENT REFERRED TO:  No follow-up provider specified. DISCHARGE MEDICATIONS:  New Prescriptions    No medications on file       (Please note that portions of this note were completed with a voicerecognition program.  Efforts were made to edit the dictations but occasionally words are mis-transcribed.)    Eliseo Santos MD,, MD, F.A.C.E.P.   Attending Emergency Medicine Physician       Eliseo Santos MD  12/21/21 0570

## 2021-12-21 NOTE — ED NOTES
Mode of arrival (squad #, walk in, police, etc) : walk in, from home        Chief complaint(s): flu, rash, syncope        Arrival Note (brief scenario, treatment PTA, etc). : Pt presented to the ED c/o flu, rash and syncope. Pt states she was seen at an Urgent Care in [place] on Sunday and was diagnosed with the flu. Pt states she was Covid tested, and the results are pending. Pt states she called her PCP and was prescribed a Z-criselda and prednisone 20 mg, which she started on Sunday. Pt states this morning, when she was on the toilet having a BM with soft stool, she noticed that her fingertips were turing white and was experiencing hot flashes. Pt states she is not sure what happened, but states that she woke up on the floor of her bathroom. Pt denies head and neck pain, denies headache at this time. Pt states she also developed a red nonitchy rash on her body this morning. Pt alert and oriented. C= \"Have you ever felt that you should Cut down on your drinking? \"  No  A= \"Have people Annoyed you by criticizing your drinking? \"  No  G= \"Have you ever felt bad or Guilty about your drinking? \"  No  E= \"Have you ever had a drink as an Eye-opener first thing in the morning to steady your nerves or to help a hangover? \"  No      Deferred []      Reason for deferring: N/A    *If yes to two or more: probable alcohol abuse. Genie Garcia RN  12/21/21 9908

## 2021-12-21 NOTE — Clinical Note
Patient Class: Observation [104]   REQUIRED: Diagnosis: Syncope and collapse [780. 2. ICD-9-CM]   Estimated Length of Stay: Estimated stay of less than 2 midnights   Admitting Provider: Karlos Christopher [[id number]]   Telemetry/Cardiac Monitoring Required?: Yes

## 2021-12-21 NOTE — ED NOTES
PT GIVEN - TURKEY SANDWICH , DARRIAN MIST, AND CHIPS.  CALL LIGHT IN [address]  12/21/21 8637

## 2021-12-22 PROBLEM — R79.89 ELEVATED TROPONIN: Status: ACTIVE | Noted: 2021-12-22

## 2021-12-22 PROBLEM — R77.8 ELEVATED TROPONIN: Status: ACTIVE | Noted: 2021-12-22

## 2021-12-22 LAB
ANION GAP SERPL CALCULATED.3IONS-SCNC: 9 MMOL/L (ref 9–17)
BUN BLDV-MCNC: 20 MG/DL (ref 8–23)
BUN/CREAT BLD: 33 (ref 9–20)
CALCIUM SERPL-MCNC: 9.1 MG/DL (ref 8.6–10.4)
CHLORIDE BLD-SCNC: 102 MMOL/L (ref 98–107)
CO2: 24 MMOL/L (ref 20–31)
CREAT SERPL-MCNC: 0.61 MG/DL (ref 0.5–0.9)
EKG ATRIAL RATE: 72 BPM
EKG ATRIAL RATE: 83 BPM
EKG P AXIS: 53 DEGREES
EKG P AXIS: 72 DEGREES
EKG P-R INTERVAL: 150 MS
EKG P-R INTERVAL: 152 MS
EKG Q-T INTERVAL: 340 MS
EKG Q-T INTERVAL: 372 MS
EKG QRS DURATION: 70 MS
EKG QRS DURATION: 76 MS
EKG QTC CALCULATION (BAZETT): 399 MS
EKG QTC CALCULATION (BAZETT): 407 MS
EKG R AXIS: -22 DEGREES
EKG R AXIS: -35 DEGREES
EKG T AXIS: 46 DEGREES
EKG T AXIS: 59 DEGREES
EKG VENTRICULAR RATE: 72 BPM
EKG VENTRICULAR RATE: 83 BPM
GFR AFRICAN AMERICAN: >60 ML/MIN
GFR NON-AFRICAN AMERICAN: >60 ML/MIN
GFR SERPL CREATININE-BSD FRML MDRD: ABNORMAL ML/MIN/{1.73_M2}
GFR SERPL CREATININE-BSD FRML MDRD: ABNORMAL ML/MIN/{1.73_M2}
GLUCOSE BLD-MCNC: 105 MG/DL (ref 65–105)
GLUCOSE BLD-MCNC: 139 MG/DL (ref 70–99)
GLUCOSE BLD-MCNC: 189 MG/DL (ref 65–105)
GLUCOSE BLD-MCNC: 99 MG/DL (ref 65–105)
HCT VFR BLD CALC: 41.2 % (ref 36.3–47.1)
HEMOGLOBIN: 13.5 G/DL (ref 11.9–15.1)
LV EF: 60 %
LVEF MODALITY: NORMAL
MAGNESIUM: 2 MG/DL (ref 1.6–2.6)
MCH RBC QN AUTO: 31.6 PG (ref 25.2–33.5)
MCHC RBC AUTO-ENTMCNC: 32.8 G/DL (ref 28.4–34.8)
MCV RBC AUTO: 96.5 FL (ref 82.6–102.9)
NRBC AUTOMATED: 0 PER 100 WBC
PDW BLD-RTO: 12 % (ref 11.8–14.4)
PLATELET # BLD: 227 K/UL (ref 138–453)
PMV BLD AUTO: 9.8 FL (ref 8.1–13.5)
POTASSIUM SERPL-SCNC: 4 MMOL/L (ref 3.7–5.3)
RBC # BLD: 4.27 M/UL (ref 3.95–5.11)
SODIUM BLD-SCNC: 135 MMOL/L (ref 135–144)
TROPONIN INTERP: ABNORMAL
TROPONIN T: ABNORMAL NG/ML
TROPONIN, HIGH SENSITIVITY: 32 NG/L (ref 0–14)
WBC # BLD: 8.9 K/UL (ref 3.5–11.3)

## 2021-12-22 PROCEDURE — 6370000000 HC RX 637 (ALT 250 FOR IP): Performed by: INTERNAL MEDICINE

## 2021-12-22 PROCEDURE — 36415 COLL VENOUS BLD VENIPUNCTURE: CPT

## 2021-12-22 PROCEDURE — 93306 TTE W/DOPPLER COMPLETE: CPT

## 2021-12-22 PROCEDURE — 97535 SELF CARE MNGMENT TRAINING: CPT

## 2021-12-22 PROCEDURE — 97116 GAIT TRAINING THERAPY: CPT

## 2021-12-22 PROCEDURE — 2580000003 HC RX 258: Performed by: NURSE PRACTITIONER

## 2021-12-22 PROCEDURE — 84484 ASSAY OF TROPONIN QUANT: CPT

## 2021-12-22 PROCEDURE — G0378 HOSPITAL OBSERVATION PER HR: HCPCS

## 2021-12-22 PROCEDURE — 97530 THERAPEUTIC ACTIVITIES: CPT

## 2021-12-22 PROCEDURE — 99225 PR SBSQ OBSERVATION CARE/DAY 25 MINUTES: CPT | Performed by: INTERNAL MEDICINE

## 2021-12-22 PROCEDURE — 86225 DNA ANTIBODY NATIVE: CPT

## 2021-12-22 PROCEDURE — 86038 ANTINUCLEAR ANTIBODIES: CPT

## 2021-12-22 PROCEDURE — 6370000000 HC RX 637 (ALT 250 FOR IP): Performed by: NURSE PRACTITIONER

## 2021-12-22 PROCEDURE — 97162 PT EVAL MOD COMPLEX 30 MIN: CPT

## 2021-12-22 PROCEDURE — 97166 OT EVAL MOD COMPLEX 45 MIN: CPT

## 2021-12-22 PROCEDURE — 83735 ASSAY OF MAGNESIUM: CPT

## 2021-12-22 PROCEDURE — 6360000002 HC RX W HCPCS: Performed by: NURSE PRACTITIONER

## 2021-12-22 PROCEDURE — 82947 ASSAY GLUCOSE BLOOD QUANT: CPT

## 2021-12-22 PROCEDURE — 85027 COMPLETE CBC AUTOMATED: CPT

## 2021-12-22 PROCEDURE — 1200000000 HC SEMI PRIVATE

## 2021-12-22 PROCEDURE — 80048 BASIC METABOLIC PNL TOTAL CA: CPT

## 2021-12-22 RX ADMIN — METOPROLOL TARTRATE 25 MG: 25 TABLET, FILM COATED ORAL at 19:25

## 2021-12-22 RX ADMIN — LEVOTHYROXINE SODIUM 75 MCG: 0.07 TABLET ORAL at 21:10

## 2021-12-22 RX ADMIN — ENOXAPARIN SODIUM 40 MG: 100 INJECTION SUBCUTANEOUS at 08:44

## 2021-12-22 RX ADMIN — ACETAMINOPHEN 650 MG: 325 TABLET ORAL at 03:13

## 2021-12-22 RX ADMIN — EZETIMIBE 10 MG: 10 TABLET ORAL at 09:34

## 2021-12-22 RX ADMIN — BENZOCAINE AND MENTHOL 1 LOZENGE: 15; 3.6 LOZENGE ORAL at 03:29

## 2021-12-22 RX ADMIN — DILTIAZEM HYDROCHLORIDE 120 MG: 120 CAPSULE, COATED, EXTENDED RELEASE ORAL at 16:42

## 2021-12-22 RX ADMIN — SODIUM CHLORIDE: 9 INJECTION, SOLUTION INTRAVENOUS at 11:44

## 2021-12-22 RX ADMIN — ACETAMINOPHEN 650 MG: 325 TABLET ORAL at 21:10

## 2021-12-22 ASSESSMENT — PAIN - FUNCTIONAL ASSESSMENT: PAIN_FUNCTIONAL_ASSESSMENT: PREVENTS OR INTERFERES SOME ACTIVE ACTIVITIES AND ADLS

## 2021-12-22 ASSESSMENT — PAIN DESCRIPTION - FREQUENCY: FREQUENCY: CONTINUOUS

## 2021-12-22 ASSESSMENT — PAIN DESCRIPTION - ONSET: ONSET: ON-GOING

## 2021-12-22 ASSESSMENT — PAIN DESCRIPTION - DESCRIPTORS: DESCRIPTORS: ACHING;TINGLING

## 2021-12-22 ASSESSMENT — PAIN SCALES - GENERAL
PAINLEVEL_OUTOF10: 8
PAINLEVEL_OUTOF10: 5
PAINLEVEL_OUTOF10: 5

## 2021-12-22 ASSESSMENT — PAIN DESCRIPTION - PAIN TYPE: TYPE: ACUTE PAIN

## 2021-12-22 ASSESSMENT — PAIN DESCRIPTION - LOCATION: LOCATION: GENERALIZED

## 2021-12-22 ASSESSMENT — PAIN DESCRIPTION - PROGRESSION: CLINICAL_PROGRESSION: GRADUALLY WORSENING

## 2021-12-22 NOTE — PROGRESS NOTES
Occupational Therapy   Occupational Therapy Initial Assessment  Date: 2021   Patient Name: Angelita Garber  MRN: [id number]     : 1940    AVTAR HAMM reports patient is medically stable for therapy treatment this date. Chart reviewed prior to treatment and patient is agreeable for therapy. All lines intact and patient positioned comfortably at end of treatment. All patient needs addressed prior to ending therapy session. Due to recent hospitalization and medical condition, pt would benefit from additional therapy at time of discharge to ensure safety. Please refer to the AM-PAC score for current functional status. Date of Service: 2021    Discharge Recommendations:  Patient would benefit from continued therapy after discharge  OT Equipment Recommendations  Equipment Needed: Yes  Mobility Devices: ADL Assistive Devices  ADL Assistive Devices: Toileting - 3-in-1 Commode;Reacher;Long-handled [place];Long-handled Sponge    Assessment   Performance deficits / Impairments: Decreased safe awareness;Decreased functional mobility ; Decreased balance;Decreased strength;Decreased ADL status; Decreased coordination;Decreased endurance;Decreased high-level IADLs  Assessment: Skilled OT is recommended to increase overall I and safety awareness with ADL and functional tasks to return home with assist as needed. Prognosis: Good  Decision Making: Medium Complexity  OT Education: OT Role;Plan of Care;Energy Conservation;Transfer Training  Patient Education: safety in function, call light use/fall prevention, recommendations for continued therapy, benefits of being up OOB as able, pursed lip breathing  REQUIRES OT FOLLOW UP: Yes  Activity Tolerance  Activity Tolerance: Patient limited by fatigue;Patient limited by pain  Activity Tolerance: fair minus  Safety Devices  Safety Devices in place: Yes  Type of devices: Call light within reach; Chair alarm in place; Left in chair;Patient at risk for falls;Gait belt;Nurse notified (BLE's elevated on stool/pillow under to increase pt's overall comfort)           Patient Diagnosis(es): The primary encounter diagnosis was Syncope and collapse. Diagnoses of Elevated troponin and Right thyroid nodule were also pertinent to this visit. has a past medical history of Acid reflux, Anxiety, Bilateral tinnitus, Bronchitis, mucopurulent recurrent (HCC), Diverticular disease, Enthesopathy of hip region, GERD (gastroesophageal reflux disease), Hypercholesteremia, Hypothyroid, Laryngopharyngeal reflux, Lateral femoral cutaneous neuropathy, Lung nodule, Meniere disease, Meniere's disease, right ear, Morbidly obese ([address] Neuropathy, Osteopenia, Parkinson disease ([address] Postmenopausal state, Postnasal drip, Prediabetes, RAD (reactive airway disease), Seasonal asthma, Thyroid nodule, Tremor, and Vitamin D deficiency. has a past surgical history that includes Cholecystectomy; Endoscopy, colon, diagnostic; Colonoscopy; ERCP (03/21/14); Hysterectomy (01/03/2007); skin biopsy (Left, 11/08/2017); and EXCISION / BIOPSY SKIN LESION OF TRUNK (Left, 11/8/2017).         PER H&P: Dallin Amezquita is a 80 y.o.  Non- / non  female who presents with Loss of Consciousness (while on toilet this morning,woke up on the floor, unsure of how long, no head and neck pain) and Rash (started this morning, on arms, started a Z-criselda and prednisone 20 mg by PCP on Sunday)   and is admitted to the hospital for the management of Syncope and collapse     Restrictions  Restrictions/Precautions  Restrictions/Precautions: Fall Risk  Position Activity Restriction  Other position/activity restrictions: up with assist, LUE IV, alarms     Subjective   General  Chart Reviewed: Yes  Patient assessed for rehabilitation services?: Yes  Family / Caregiver Present: No  *pt c/o sore throat and B hands/feet 5-6/10 and RN was notified     Social/Functional History  Social/Functional History  Lives With: Alone  Type of Home: House ([place] [id number] housing per pt)  Home Layout: Multi-level (laundry unit down ther floor)  Home Access: Elevator,Level entry  Bathroom Shower/Tub: Tub/Shower unit  Bathroom Toilet: Handicap height  Bathroom Equipment: Grab bars in [address] (pull cords in bathroom and at [facility])  [address] Help From: Friend(s) (supportive nuns/sisters)  ADL Assistance: Independent  Homemaking Assistance: Independent  Homemaking Responsibilities: Yes  Ambulation Assistance: Independent  Transfer Assistance: Independent  Active : Yes  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 Objective   Vision: Impaired  Vision Exceptions: Wears glasses for reading (pt denies visual changes)  Hearing: Within functional limits    Orientation  Overall Orientation Status: Within Functional Limits  Observation/Palpation  Observation: LUE IV, noted red rash all over body  Edema: B hands  Balance  Sitting Balance: Supervision  Standing Balance: Stand by assistance  Standing Balance  Time: stand tia 2-3 mins; pt with no c/o dizziness during session  Functional Mobility  Functional - Mobility Device: No device  Activity:  (R side of bed to toilet, toilet to sink and to bedside chair)  Assist Level: Stand by assistance  Functional Mobility Comments: MIN cues needed for pacing/slowing down movments. scanning room, and awareness/assist with lines to increase overall safety/reduce falls. Toilet Transfers  Toilet - Technique: Ambulating  Equipment Used: Grab bars  Toilet Transfer: Stand by assistance  Toilet Transfers Comments: MIN cues needed for hand placement on grab bar and awareness/assist with lines to increase overall safety.      ADL  Feeding: Setup  Grooming: Setup;Stand by assistance (to stand and wash hands at sink after toileting)  UE Bathing: Setup;Stand by assistance  LE Bathing: Setup;Stand by assistance  UE Dressing: Setup;Minimal assistance (with hosp gown)  LE Dressing: Setup;Stand by assistance (pt was able to cross BLE's and ml B slippers seated EOB)  Toileting: Supervision (with gown mgt; pt urinated and was I with hygiene seated)  Tone RUE  RUE Tone: Normotonic  Tone LUE  LUE Tone: Normotonic  Coordination  Movements Are Fluid And Coordinated: Yes  Coordination and Movement description: Tremors; Fine motor impairments (Pt states RUE essential tremor and noted in session)     Bed mobility  Supine to Sit: Supervision  Sit to Supine: Unable to assess (pt was agreeable to sit up in chair after edu on the benefits of being up OOB as able)  Comment: MIN cues needed for pursed lip breathing vs holding breath and pt with good use of rails and moves slowly. Transfers  Stand Step Transfers: Stand by assistance (with no AD)  Sit to stand: Stand by assistance  Stand to sit: Stand by assistance  Transfer Comments: MOD-MIN cues needed for hand placement reaching back to surface, pacing, scanning and awareness/assist with lines to increase overall safety. Cognition  Overall Cognitive Status: Exceptions  Arousal/Alertness: Appropriate responses to stimuli  Following Commands:  Follows all commands without difficulty  Attention Span: Appears intact  Memory: Decreased short term memory  Safety Judgement: Decreased awareness of need for safety  Problem Solving: Assistance required to identify errors made;Assistance required to correct errors made;Decreased awareness of errors  Insights: Decreased awareness of deficits  Initiation: Does not require cues  Sequencing: Does not require cues  Perception  Overall Perceptual Status: WFL     Sensation  Overall Sensation Status: Impaired (pt states she has constant paresthesias in B hands and some in his B feet)        LUE AROM (degrees)  LUE AROM : WFL  RUE AROM (degrees)  RUE AROM : WFL  LUE Strength  Gross LUE Strength: [facility]  LUE Strength Comment: BUE strength grossly 4 plus/5 except B hands 4/5  RUE Strength  Gross RUE Strength: [facility]                   Plan   Plan  Times per week: 4-5x/week 1x/day as tia  Current Treatment Recommendations: Strengthening,Balance Training,Functional Mobility Training,Safety Education & [place] Re-education,Patient/Caregiver Education & Training,Equipment Evaluation, Education, & procurement,Self-Care / ADL,Home Management Training,Pain Management                                                  AM-PAC Score   19          Goals  Short term goals  Time Frame for Short term goals: by dicharge, pt to demo  Short term goal 1: bed mob tasks with use of rail as needed to MOD I-I. Short term goal 2: I with BUE HEP with use of handouts and increase B hand strength by a 1/2 grade to assist with self care tasks. Short term goal 3: I with ADL transfers and functional mob. Short term goal 4: total body ADL tasks with use of AE/grab bar as needed MOD I-I. Short term goal 5: standing to SUP and tia > 6 mins as able to reduce falls in function. Long term goals  Long term goal 1: Pt/caregivers to be I with EC/WS and fall prevention tech as well as DME/AE recommendations with use of handouts. Patient Goals   Patient goals : Get back home!        Therapy Time   Individual Concurrent Group Co-treatment   Time In 5661 (plus 10 min chart review/nursing communication)         Time Out 0840         Minutes 43=53 mins          Timed Code Treatment Minutes: 30 Minutes       Jacinta Turner OT

## 2021-12-22 NOTE — PROGRESS NOTES
Physical Therapy    Facility/Department: [facility]  Initial Assessment    NAME: Mar Jenkins  : 1940  MRN: [id number]    Date of Service: 2021    Discharge Recommendations:  Home independently        Assessment   Body structures, Functions, Activity limitations: Decreased functional mobility ; Decreased balance  Assessment: Patient demo mild decrease in gait, transfers, and balance. Patient will benefit from skilled PT to improve gait, transfers, and balance. Prognosis: Good  Decision Making: Medium Complexity  PT Education: Goals;PT Role;Plan of Care;General Safety  Patient Education: fall prevention  REQUIRES PT FOLLOW UP: Yes  Activity Tolerance  Activity Tolerance: Patient Tolerated treatment well       Patient Diagnosis(es): The primary encounter diagnosis was Syncope and collapse. Diagnoses of Elevated troponin and Right thyroid nodule were also pertinent to this visit. has a past medical history of Acid reflux, Anxiety, Bilateral tinnitus, Bronchitis, mucopurulent recurrent (HCC), Diverticular disease, Enthesopathy of hip region, GERD (gastroesophageal reflux disease), Hypercholesteremia, Hypothyroid, Laryngopharyngeal reflux, Lateral femoral cutaneous neuropathy, Lung nodule, Meniere disease, Meniere's disease, right ear, Morbidly obese ([address] Neuropathy, Osteopenia, Parkinson disease ([address] Postmenopausal state, Postnasal drip, Prediabetes, RAD (reactive airway disease), Seasonal asthma, Thyroid nodule, Tremor, and Vitamin D deficiency. has a past surgical history that includes Cholecystectomy; Endoscopy, colon, diagnostic; Colonoscopy; ERCP (14); Hysterectomy (2007); skin biopsy (Left, 2017); and EXCISION / BIOPSY SKIN LESION OF TRUNK (Left, 2017).     Restrictions  Restrictions/Precautions  Restrictions/Precautions: General Precautions,Fall Risk  Position Activity Restriction  Other position/activity restrictions: up with assist, LUE IV, alarms  Vision/Hearing        Subjective  General  Chart Reviewed: Yes  Patient assessed for rehabilitation services?: Yes  Additional Pertinent Hx: Anxiety, Parkinson disease, obesity, bilateral tinnitus, menieres, vertigo, thyroid nodule  Response To Previous Treatment: Not applicable  Family / Caregiver Present: No  Diagnosis: Syncope, hand edema, Rash  Follows Commands: Within Functional Limits  General Comment  Comments: Rn reports patient medically appropriate for PT. Subjective  Subjective: Patient very pleasant and agreeable to PT. Pain Screening  Patient Currently in Pain: Yes     Pre Treatment Pain Screening  Intervention List: Patient able to continue with treatment    Orientation  Orientation  Overall Orientation Status: Within Normal Limits  Social/Functional History  Social/Functional History  Lives With: Alone  Type of Home: House ([place] [address] housing per pt)  Home Layout: Multi-level (laundry unit down ther floor)  Home Access: Elevator,Level entry  Bathroom Shower/Tub: Tub/Shower unit  Bathroom Toilet: Handicap height  Bathroom Equipment: Grab bars in [address] [place] [address] (pull cords in bathroom and at [facility])  [place] Help From: Friend(s) (supportive nuns/sisters)  ADL Assistance: Independent  Homemaking Assistance: Independent  Homemaking Responsibilities: Yes  Ambulation Assistance: Independent  Transfer Assistance: Independent  Active : Yes  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 Cognition   Cognition  Arousal/Alertness: Appropriate responses to stimuli  Following Commands:  Follows all commands without difficulty  Attention Span: Appears intact  Memory: Appears intact  Safety Judgement: Decreased awareness of need for safety  Problem Solving: Able to problem solve independently  Insights: Fully aware of deficits  Initiation: Does not require cues  Sequencing: Does not require cues    Objective          AROM RLE (degrees)  RLE AROM: WFL  AROM LLE (degrees)  LLE AROM : WFL  Strength RLE  Comment: 4+/5  Strength LLE  Comment: 4+/5  Tone RLE  RLE Tone: Normotonic  Tone LLE  LLE Tone: Normotonic  Motor Control  Gross Motor?: WFL     Bed mobility  Rolling to Left: Independent  Rolling to Right: Independent  Supine to Sit: Independent  Sit to Supine: Independent  Scooting: Independent  Transfers  Sit to Stand: Stand by assistance  Stand to sit: Stand by assistance  Bed to Chair: Stand by assistance  Stand Pivot Transfers: Stand by assistance  Ambulation  Ambulation?: Yes  Ambulation 1  Surface: level tile  Device: No Device  Assistance: Stand by assistance  Quality of Gait: Steady gait, patient moving cautiously due to painful edema  Gait Deviations: Increased CHRISTOPHER  Distance: 60 feet     Balance  Sitting - Static: Good  Sitting - Dynamic: Good  Standing - Static: Good;-  Standing - Dynamic: Fair;+        Plan   Plan  Times per week: 3 visits  Current Treatment Recommendations: Strengthening,Balance Training,Functional Mobility Training,Transfer [employer]  Safety Devices  Type of devices: All fall risk precautions in place,Gait belt,Nurse notified,Chair alarm in place,Left in chair,Call light within reach    OutComes Score    AM-PAC Score  AM-PAC Inpatient Mobility Raw Score : 21 (12/22/21 1420)  AM-PAC Inpatient T-Scale Score : 50.25 (12/22/21 1420)  Mobility Inpatient CMS 0-100% Score: 28.97 (12/22/21 1420)  Mobility Inpatient CMS G-Code Modifier : CJ (12/22/21 1420)          Goals  Short term goals  Time Frame for Short term goals: 3 visits  Short term goal 1: Patient will be indep with transfers. Short term goal 2: Patient will amb 200 feet indep. Short term goal 3: Patient will have good standing balance.   Patient Goals   Patient goals : Return home       Therapy Time   Individual Concurrent Group Co-treatment   Time In 1130         Time Out 1159         Minutes 29         Timed Code Treatment Minutes: [address], PT

## 2021-12-22 NOTE — ED PROVIDER NOTES
500 mg by mouth daily. AZITHROMYCIN (ZITHROMAX) 250 MG TABLET    Take 1 tablet by mouth See Admin Instructions for 5 days 500mg on day 1 followed by 250mg on days 2 - 5    BUDESONIDE-FORMOTEROL (SYMBICORT) 80-4.5 MCG/ACT AERO    Inhale 2 puffs into the lungs 2 times daily    BUTENAFINE (MENTAX) 1 % CREA    Apply 1 Tube topically 2 times daily    CHLORPHENIRAMINE (CHLOR-TRIMETON) 4 MG TABLET    Take 12 mg by mouth daily     CHOLECALCIFEROL (VITAMIN D3) 2000 UNITS CAPS    Take 1 capsule by mouth 2 times daily    DILTIAZEM (CARDIZEM CD) 120 MG EXTENDED RELEASE CAPSULE    Take 1 capsule by mouth every evening    ECHINACEA EXTRACT PO    Take 450 mg by mouth Indications: Take in winter WINTER ONLY    ESOMEPRAZOLE MAGNESIUM (NEXIUM) 40 MG PACK    Take 40 mg by mouth daily. EVOLOCUMAB 140 MG/ML SOSY    Inject into the skin Next dose due 12/27/21    EZETIMIBE (ZETIA) 10 MG TABLET    Take 10 mg by mouth daily    FAMOTIDINE (PEPCID) 20 MG TABLET    as needed     FLUTICASONE (FLONASE) 50 MCG/ACT NASAL SPRAY    PLACE 1 SPRAY IN EACH NOSTRIL DAILY FOR 15 DAYS    GUAIFENESIN (MUCINEX) 600 MG EXTENDED RELEASE TABLET    Take 1 tablet by mouth 2 times daily    KETOCONAZOLE (NIZORAL) 2 % SHAMPOO    APPLY  TO AFFECTED AREAS TOPICALLY DAILY THEN RINSE OFF AFTER 5 MINUTES    LEVOTHYROXINE (SYNTHROID) 75 MCG TABLET    TAKE ONE TABLET BY MOUTH DAILY    METFORMIN (GLUCOPHAGE) 500 MG TABLET    TAKE ONE TABLET BY MOUTH DAILY    METOPROLOL TARTRATE (LOPRESSOR) 25 MG TABLET    Take 25 mg by mouth 2 times daily    NONFORMULARY    Super b complex 3 times per week    NONFORMULARY    Indications: Dietary fiber 1 tsp a day     ONDANSETRON (ZOFRAN-ODT) 4 MG DISINTEGRATING TABLET    Take 1 tablet by mouth every 8 hours as needed.     PREDNISONE (DELTASONE) 20 MG TABLET    Take 1 tablet by mouth daily for 5 days       ALLERGIES     is allergic to aspirin, atorvastatin, crestor [rosuvastatin], livalo [pitavastatin], seasonal, simvastatin, statins, Bilirubin 0.20 (L) 0.3 - 1.2 mg/dL    Bilirubin, Direct <0.08 <0.31 mg/dL    Bilirubin, Indirect Can not be calculated 0.00 - 1.00 mg/dL    Total Protein 6.5 6.4 - 8.3 g/dL    Globulin NOT REPORTED 1.5 - 3.8 g/dL    Albumin/Globulin Ratio 2.0 1.0 - 2.5   Troponin   Result Value Ref Range    Troponin, High Sensitivity 44 (H) 0 - 14 ng/L    Troponin T NOT REPORTED <0.03 ng/mL    Troponin Interp NOT REPORTED    Urinalysis Reflex to Culture   Result Value Ref Range    Color, UA Yellow Yellow    Turbidity UA Clear Clear    Glucose, Ur NEGATIVE NEGATIVE    Bilirubin Urine NEGATIVE NEGATIVE    Ketones, Urine NEGATIVE NEGATIVE    Specific Gravity, UA 1.015 1.005 - 1.030    Urine Hgb NEGATIVE NEGATIVE    pH, UA 6.0 5.0 - 8.0    Protein, UA NEGATIVE NEGATIVE    Urobilinogen, Urine Normal Normal    Nitrite, Urine NEGATIVE NEGATIVE    Leukocyte Esterase, Urine NEGATIVE NEGATIVE    Urinalysis Comments       Microscopic exam not performed based on chemical results unless requested in original order. Urinalysis Comments          Urinalysis Comments       Utilizing a urinalysis as the only screening method to exclude a potential uropathogen can be unreliable in many patient populations. Rapid screening tests are less sensitive than culture and if UTI is a clinical possibility, culture should be considered despite a negative urinalysis.    D-Dimer, Quantitative   Result Value Ref Range    D-Dimer, Quant 3.20 mg/L FEU   Troponin   Result Value Ref Range    Troponin, High Sensitivity 32 (H) 0 - 14 ng/L    Troponin T NOT REPORTED <0.03 ng/mL    Troponin Interp NOT REPORTED    EKG 12 Lead   Result Value Ref Range    Ventricular Rate 83 BPM    Atrial Rate 83 BPM    P-R Interval 150 ms    QRS Duration 70 ms    Q-T Interval 340 ms    QTc Calculation (Bazett) 399 ms    P Axis 72 degrees    R Axis -35 degrees    T Axis 46 degrees   EKG 12 Lead   Result Value Ref Range    Ventricular Rate 72 BPM    Atrial Rate 72 BPM    P-R Interval 152 ms QRS Duration 76 ms    Q-T Interval 372 ms    QTc Calculation (Bazett) 407 ms    P Axis 53 degrees    R Axis -22 degrees    T Axis 59 degrees       RADIOLOGY:  CT CHEST PULMONARY EMBOLISM W CONTRAST   Final Result   No evidence of pulmonary embolism or acute pulmonary abnormality. Enlarged nodular right thyroid lobe with prior recommendation for biopsy. Correlate for prior tissue sampling. CT HEAD WO CONTRAST   Final Result   No acute intracranial abnormality. RECOMMENDATIONS:   Unavailable             RECENT VITALS:  BP: (!) 104/90, Temp: 98.1 °F (36.7 °C), Pulse: 75, Resp: 24     ED Course     The patient was given the following medications:  Orders Placed This Encounter   Medications    0.9 % sodium chloride bolus    sodium chloride flush 0.9 % injection 10 mL    0.9 % sodium chloride bolus    iopamidol (ISOVUE-370) 76 % injection 80 mL    acetaminophen (TYLENOL) tablet 650 mg       Medical Decision Making           The patient is a 79-year-old female who presents for evaluation after syncopal episode. Vital signs have been stable. She has a maculopapular rash on exam and is recently started on a Z-Edvin and prednisone for a URI by her PCP. Initial troponin is 44 and repeat is trending down to 32. EKG showed sinus rhythm without any ST changes. Urinalysis, CBC and CMP are grossly unremarkable. D-dimer is elevated. CT chest pulmonary embolism study shows no acute cardiopulmonary process but there is an enlarged right thyroid nodule that will need outpatient follow-up. Covid swab was negative. The patient has been accepted for transfer [facility] and we are waiting for transport at time of signout. Disposition     FINAL IMPRESSION      1. Syncope and collapse    2. Elevated troponin    3.  Right thyroid nodule          DISPOSITION/PLAN   DISPOSITION Admitted 12/21/2021 05:56:02 PM      CONDITION ON DISPOSITION:   Stable        (Please note that portions of this note were completed with a voice recognition program.  Efforts were made to edit the dictations but occasionally words are mis-transcribed.)    Lebron Michele,   Emergency Medicine Physician                 Lebron Michele,   12/21/21 2047

## 2021-12-22 NOTE — RT PROTOCOL NOTE
RT Inhaler-Nebulizer Bronchodilator Protocol Note    There is a bronchodilator order in the chart from a provider indicating to follow the RT Bronchodilator Protocol and there is an Initiate RT Inhaler-Nebulizer Bronchodilator Protocol order as well (see protocol at bottom of note). CXR Findings:  No results found. The findings from the last RT Protocol Assessment were as follows:   History Pulmonary Disease: None or smoker <15 pack years  Respiratory Pattern: Regular pattern and RR 12-20 bpm  Breath Sounds: Clear breath sounds  Cough: Strong, spontaneous, non-productive  Indication for Bronchodilator Therapy: On home bronchodilators  Bronchodilator Assessment Score: 0    Aerosolized bronchodilator medication orders have been revised according to the RT Inhaler-Nebulizer Bronchodilator Protocol below. Respiratory Therapist to perform RT Therapy Protocol Assessment initially then follow the protocol. Repeat RT Therapy Protocol Assessment PRN for score 0-3 or on second treatment, BID, and PRN for scores above 3. No Indications - adjust the frequency to every 6 hours PRN wheezing or bronchospasm, if no treatments needed after 48 hours then discontinue using Per Protocol order mode. If indication present, adjust the RT bronchodilator orders based on the Bronchodilator Assessment Score as indicated below. Use Inhaler orders unless patient has one or more of the following: on home nebulizer, not able to hold breath for 10 seconds, is not alert and oriented, cannot activate and use MDI correctly, or respiratory rate 25 breaths per minute or more, then use the equivalent nebulizer order(s) with same Frequency and PRN reasons based on the score. If a patient is on this medication at home then do not decrease Frequency below that used at home.     0-3 - enter or revise RT bronchodilator order(s) to equivalent RT Bronchodilator order with Frequency of every 4 hours PRN for wheezing or increased work of breathing using Per Protocol order mode. 4-6 - enter or revise RT Bronchodilator order(s) to two equivalent RT bronchodilator orders with one order with BID Frequency and one order with Frequency of every 4 hours PRN wheezing or increased work of breathing using Per Protocol order mode. 7-10 - enter or revise RT Bronchodilator order(s) to two equivalent RT bronchodilator orders with one order with TID Frequency and one order with Frequency of every 4 hours PRN wheezing or increased work of breathing using Per Protocol order mode. 11-13 - enter or revise RT Bronchodilator order(s) to one equivalent RT bronchodilator order with QID Frequency and an Albuterol order with Frequency of every 4 hours PRN wheezing or increased work of breathing using Per Protocol order mode. Greater than 13 - enter or revise RT Bronchodilator order(s) to one equivalent RT bronchodilator order with every 4 hours Frequency and an Albuterol order with Frequency of every 2 hours PRN wheezing or increased work of breathing using Per Protocol order mode. RT to enter RT Home Evaluation for COPD & MDI Assessment order using Per Protocol order mode.     Electronically signed by Gisel Childs RCP on 12/21/2021 at 11:50 PM

## 2021-12-22 NOTE — CARE COORDINATION
Case Management Initial Discharge Plan  Khurram Venegas,             Met with:patient to discuss discharge plans. Information verified: address, contacts, phone number, , insurance Yes  PCP: Lizett Barrera MD  Date of last visit: 10-    Insurance Provider: Dilma Saleem, [facility] Medicare    Discharge Planning    Living Arrangements:  Alone   Support Systems:  [employer],Family Members    Home has 2nd story apt  0 stairs to climb to get into front door - has elevator, 0 stairs to climb to reach second floor  Location of bedroom/bathroom in home  - main floor of apt    Patient able to perform ADL's:Independent    Current Services (outpatient & in home) none  DME equipment: BP cuff, pulse ox, grab bars in bathroom, pull cords   DME provider: N/A    Pharmacy: [facility] [place] Needed:  N/A    Patient agreeable to home care: No  [place] of choice provided:  n/a    Prior SNF/Rehab Placement and Facility: No  Agreeable to SNF/Rehab: No  [place] of choice provided: n/a   Evaluation: n/a    Expected Discharge date:  21  Patient expects to be discharged to: Follow Up Appointment: Best Day/ Time: Wednesday PM    Transportation provider: friend (nun)  Transportation arrangements needed for discharge: No    Readmission Risk              Risk of Unplanned Readmission:  0             Does patient have a readmission risk score greater than 14?: No  If yes, follow-up appointment must be made within 7 days of discharge. 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 Lives alone in Sr subsidized apt, independent and drives. Became dizzy yesterday (prone to vertigo) and passed out while on toilet. Was able to get up on her own and eventually drove herself to [facility] ED. Admitted for syncope and collapse. Echo ordered to be done.   Was seen at Urgent Care on  to get COVID test since she has been having sore throat and H/A. Has been on Zpak and steroids and and now has rash all over. Pt declines need for HHC and continue to follow plan of care.              Electronically signed by Alexandre Rodriges RN on 12/22/21 at 9:28 AM EST

## 2021-12-22 NOTE — PROGRESS NOTES
[facility]  Office: [address], DO, Deepak Malones, DO, Zohaibcandice Yoder, DO, Stephenie Okeefe, DO, Sheldon Gongora MD, Nkechi Amaya MD, Kevyn Hubbard MD, Aly Mcgowan MD, Lincoln Flores MD, Karen Negro MD, Nany Salinas MD, Anthony Matthew, DO, Bernadette Coffman, DO, Saul Ornelas MD,  Samira Staff, DO, Merly Sanchez MD, Aston Terry MD, Marion Lyles MD, Nicholas Florez MD, Shayna Hubbard MD, Jazmine Aranda MD, Kyle Cifuentes MD, Chip Rios, CNP, [facility] DelIsaccosso, CNP, Brittany Cancel, CNP, Kierra Fret, CNS, Laura Yoel, CNP, Ena Brain, CNP, Michelle Quiñones, CNP, Mary Ellen Philippe, CNP, Kehinde Grace, CNP, Humphrey Blackburn PA-C, Desiree Sanchez, DNP, Kiel Carrero, DNP, Ronna Jackson, CNP, Merle Shipley, CNP, Bridger Rao, CNP, Mari Velasquez, CNP, Adriana Sargent, CNP, [facility]    Progress Note    12/22/2021    12:28 PM    Name:   Geremias Nichols  MRN:     [id number]     Acct:      [de-identified]   Room:   2012/2012-02   Day:  0  Admit Date:  12/21/2021  2:27 PM    PCP:   Elma Alcaraz MD  Code Status:  Full Code    Subjective:     C/C:   Chief Complaint   Patient presents with    Loss of Consciousness     while on toilet this morning,woke up on the floor, unsure of how long, no head and neck pain    Rash     started this morning, on arms, started a Z-criselda and prednisone 20 mg by PCP on Sunday     Interval History Status: improved. Patient symptom in chair resting comfortably. Denies any lightheadedness, dizziness, chest pain, shortness of breath or other acute complaints. Brief History: This is a 68-year-old female that presents after a syncopal episode. She apparently was at home having a BM without straining and  She knows she woke up on the floor. She does not know how she had up there but did have dizziness prior to the event.   She denies any chest pain, nausea vomiting or other acute complaints. She presented to the emergency room at [facility] and upon evaluation was found of a mildly elevated troponin and was admitted here for further evaluation observation. She is remained asymptomatic and has undergone echocardiogram this morning which was benign. She is recently started on Zithromax and prednisone for sore throat. Review of Systems:     Constitutional:  negative for chills, fevers, sweats  Respiratory:  negative for cough, dyspnea on exertion, shortness of breath, wheezing  Cardiovascular:  negative for chest pain, chest pressure/discomfort, lower extremity edema, palpitations  Gastrointestinal:  negative for abdominal pain, constipation, diarrhea, nausea, vomiting  Neurological:  negative for dizziness, headache    Medications: Allergies: Allergies   Allergen Reactions    Aspirin Swelling     Hives.  anaphylaxis    Atorvastatin Other (See Comments)     myalgia    Crestor [Rosuvastatin]      Myalgia      Livalo [Pitavastatin] Other (See Comments)     Muscle pain    Seasonal     Simvastatin      Myalgia      Statins      Other reaction(s): Unknown    Welchol [Colesevelam Hcl] Other (See Comments)     Leg cramp       Current Meds:   Scheduled Meds:    budesonide-formoterol  2 puff Inhalation BID    dilTIAZem  120 mg Oral QPM    ezetimibe  10 mg Oral Daily    levothyroxine  75 mcg Oral Daily    metFORMIN  500 mg Oral Daily    famotidine  20 mg Oral Daily    sodium chloride flush  5-40 mL IntraVENous 2 times per day    enoxaparin  40 mg SubCUTAneous Daily     Continuous Infusions:    sodium chloride 75 mL/hr at 12/22/21 1144    sodium chloride       PRN Meds: benzocaine-menthol, amitriptyline, albuterol sulfate HFA, sodium chloride flush, sodium chloride, potassium chloride **OR** potassium alternative oral replacement **OR** potassium chloride, magnesium sulfate, ondansetron **OR** ondansetron, polyethylene glycol, acetaminophen **OR** acetaminophen, perflutren lipid microspheres    Data:     Past Medical History:   has a past medical history of Acid reflux, Anxiety, Bilateral tinnitus, Bronchitis, mucopurulent recurrent ([address] Diverticular disease, Enthesopathy of hip region, GERD (gastroesophageal reflux disease), Hypercholesteremia, Hypothyroid, Laryngopharyngeal reflux, Lateral femoral cutaneous neuropathy, Lung nodule, Meniere disease, Meniere's disease, right ear, Morbidly obese ([address] Neuropathy, Osteopenia, Parkinson disease ([facility] [address] Postmenopausal state, Postnasal drip, Prediabetes, RAD (reactive airway disease), Seasonal asthma, Thyroid nodule, Tremor, and Vitamin D deficiency. Social History:   reports that she has never smoked. She has never used smokeless tobacco. She reports current alcohol use. She reports that she does not use drugs. Family History:   Family History   Problem Relation Age of Onset   [facility] Cancer Mother 48        pineal gland/throat    Cancer Father 64        colon   [facility] Cancer Brother         lung    Other Brother         pulmonary embolus    Heart Disease Paternal Grandfather         myocarditis    Stroke Maternal Aunt     Cancer Maternal Uncle     Stroke Maternal Grandmother     Diabetes Brother     Heart Disease Paternal Uncle 67        mi    Heart Disease Brother 58        mi    High Blood Pressure Brother     Heart Disease Brother 79        stents for cad    High Blood Pressure Brother        Vitals:  BP (!) 113/52   Pulse 94   Temp 98.1 °F (36.7 °C) (Oral)   Resp 16   Ht 5' 5\" (1.651 m)   Wt 191 lb (86.6 kg)   SpO2 94%   BMI 31.78 kg/m²   Temp (24hrs), Av °F (36.7 °C), Min:97.4 °F (36.3 °C), Max:98.4 °F (36.9 °C)    Recent Labs     21  0610   POCGLU 99       I/O (24Hr):   No intake or output data in the 24 hours ending 21 1228    Labs:  Hematology:  Recent Labs     21  1451 21  0158   WBC 10.7 8.9   RBC 4.56 4.27   HGB 14.7 13.5   HCT 43.5 41.2   MCV 95.4 96.5   MCH 32.3 31.6 MCHC 33.8 32.8   RDW 12.3* 12.0    227   MPV 8.0 9.8   DDIMER 3.20  --      Chemistry:  Recent Labs     12/21/21  1451 12/21/21  1451 12/21/21  1725 12/21/21  2209 12/22/21  0158     --   --   --  135   K 4.4  --   --   --  4.0     --   --   --  102   CO2 21  --   --   --  24   GLUCOSE 149*  --   --   --  139*   BUN 25*  --   --   --  20   CREATININE 0.80  --   --   --  0.61   MG  --   --   --   --  2.0   ANIONGAP 12  --   --   --  9   LABGLOM >60  --   --   --  >60   GFRAA >60  --   --   --  >60   CALCIUM 10.4  --   --   --  9.1   PROBNP  --   --   --  1,899*  --    TROPHS 44*   < > 32* 32* 32*    < > = values in this interval not displayed. Recent Labs     12/21/21  1451 12/22/21  0610   PROT 6.5  --    LABALBU 4.3  --    AST 21  --    ALT 23  --    ALKPHOS 103  --    BILITOT 0.20*  --    BILIDIR <0.08  --    POCGLU  --  99     ABG:  Lab Results   Component Value Date    PH 6.0 10/02/2015     Lab Results   Component Value Date/Time    SPECIAL NOT REPORTED 09/26/2018 09:25 PM     Lab Results   Component Value Date/Time    CULTURE NO SIGNIFICANT GROWTH 09/26/2018 09:25 PM       Radiology:  CT HEAD WO CONTRAST    Result Date: 12/21/2021  No acute intracranial abnormality. RECOMMENDATIONS: Unavailable     CT CHEST PULMONARY EMBOLISM W CONTRAST    Result Date: 12/21/2021  No evidence of pulmonary embolism or acute pulmonary abnormality. Enlarged nodular right thyroid lobe with prior recommendation for biopsy. Correlate for prior tissue sampling.        Physical Examination:        General appearance:  alert, cooperative and no distress  Mental Status:  oriented to person, place and time and normal affect  Lungs:  clear to auscultation bilaterally, normal effort  Heart:  regular rate and rhythm, no murmur  Abdomen:  soft, nontender, nondistended, normal bowel sounds, no masses, hepatomegaly, splenomegaly  Extremities:  no edema, redness, tenderness in the calves  Skin:  no gross lesions, rashes, induration    Assessment:        Hospital Problems           Last Modified POA    * (Principal) Syncope and collapse 12/22/2021 Yes    Hypothyroid 12/22/2021 Yes    Hypercholesteremia 12/22/2021 Yes    Anxiety 12/22/2021 Yes    Overview Signed 9/9/2019 40:50 AM by Dorothy Mccoy MA     ALICIA-7     09/09/19 : 4         RAD (reactive airway disease) 12/22/2021 Yes    Gastroparesis 12/22/2021 Yes    Prediabetes 12/22/2021 Yes    Parkinson disease ([facility] [address] 12/22/2021 Yes          Plan:        1. Echocardiogram reviewed, discussed with patient  2. Continue home maintenance medications for cholesterol, thyroid etc.  3. Cardiac telemetry  4. GI DVT prophylaxis  5. Increase activity, PT and OT as needed   6. discharge planning  7.  Check BEE due to Raynaud's type symptoms with pain and color change of fingertips    Ludwin Dominguez DO  12/22/2021  12:28 PM

## 2021-12-22 NOTE — H&P
[facility]  Office: [address], DO, Joanne Villisca, DO, Kelsi Edmonds, DO, Kelsey Espinoza Blood, DO, Sonny Garcia MD, Trent Morton MD, Jv Arambula MD, Jonna Dominguez MD, Mary Garcia MD, Curtis Rosario MD, Mati Gay MD, Adrienne Dent, DO, Orly Martinez, DO, Svetlana Kimble MD,  Shania Friday, DO, Myrtle Taveras MD, Micky Whitfield MD, Lupis Weems MD, Shira Sotomayor MD, Abbie Rogel MD, Erasmo Lovett MD, Adeline Goldmann, MD, Sebastien Houston, [facility], [facility], [facility], Stiven Nurse, CNP, Evan Broussard, CNS, Brook Lilly, CNP, Alysa Gonzalez, CNP, Deisy Gardner, CNP, Stefanie Neg, CNP, Sherron Raman, CNP, Lorenzo Luz PA-C, Amara Victor, DNP, Angie Murray, DNP, Grant Calvert, CNP, Yolanda [place], CNP, Essence Squibb, CNP, Elo Duran, CNP, Henry Kumar, CNP, [facility], CNP         [facility]    HISTORY AND PHYSICAL EXAMINATION            Date:   12/22/2021  Patient name:  Carmela Mathis  Date of admission:  12/21/2021  2:27 PM  MRN:   [id number]  Account:  [de-identified]  YOB: 1940  PCP:    Abbie Woods MD  Room:   2012/2012-02  Code Status:    Full Code    Chief Complaint:     Chief Complaint   Patient presents with    Loss of Consciousness     while on toilet this morning,woke up on the floor, unsure of how long, no head and neck pain    Rash     started this morning, on arms, started a Z-criselda and prednisone 20 mg by PCP on Sunday       History Obtained From:     patient, electronic medical record    History of Present Illness:     Carmela Mathis is a 80 y.o.  Non- / non  female who presents with Loss of Consciousness (while on toilet this morning,woke up on the floor, unsure of how long, no head and neck pain) and Rash (started this morning, on arms, started a Z-criselda and prednisone 20 mg by PCP on Sunday)   and is admitted to the hospital for the management of Syncope and collapse. She was transferred here from [facility] emergency room after being evaluated for a syncopal episode. She states that this morning she was dizzy when she woke up. She states that this is not uncommon for her as she has a history of vertigo. Then while having a bowel movement she suddenly felt hot and cold at her fingers from the PIP joint to the fingertips turned white. She stated that extends remembers is waking up on the bathroom floor. She does not know how long she was out. She has been feeling ill for the past 4 days. Symptoms initially started with headache Friday night which has resolved. He does not have a history of frequent headaches or migraines. She did not have any dizziness when the headache was present. No blurred vision, slurred speech or focal weakness. Saturday she developed sore throat. She states in the past her sore throats generally lead to a sinus infection or pneumonia and she generally has to get those treated early. She went to an urgent care on Sunday and was tested for COVID which has not yet resulted. She also called her PCP on Sunday who called her prescription for prednisone and azithromycin which she has been taking as directed. Today she also noticed a rash on her arms which is also present on the legs, chest and back. No itching to the rash but she states her skin feels tight. Neither of these medications are new for her. No throat tightness or shortness of breath. Tropes in the ER were elevated at 44 and then 32. But she did not have any EKG changes according to ER notes.       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    Lung nodule     Meniere disease     right ear    Meniere's disease, right ear 12/10/2019    Morbidly obese ([facility] [address] 7/23/2020    Neuropathy     Osteopenia 6/30/2014    Parkinson disease ([facility] [address] 7/23/2020    Postmenopausal state 12/10/2019    Postnasal drip 7/11/2020    Prediabetes     RAD (reactive airway disease) 6/30/2014    Seasonal asthma 7/11/2020    Thyroid nodule 3/1/2016    Tremor     r hand    Vitamin D deficiency 6/30/2014        Past Surgical History:     Past Surgical History:   Procedure Laterality Date    CHOLECYSTECTOMY      COLONOSCOPY      ENDOSCOPY, COLON, DIAGNOSTIC      ERCP  03/21/14    EXCISION / BIOPSY SKIN LESION OF TRUNK Left 11/8/2017    EXCISION MASS LEFT POSTERIOR SHOULDER, LEFT ANTERIOR ARM performed by Jesi Cohen MD at [address] [place] [address]  01/03/2007    SKIN BIOPSY Left 11/08/2017    Excision Mass Left Posterior Shoulder, Left Anterior Arm        Medications Prior to Admission:     Prior to Admission medications    Medication Sig Start Date End Date Taking?  Authorizing Provider   predniSONE (DELTASONE) 20 MG tablet Take 1 tablet by mouth daily for 5 days  Patient taking differently: Take 20 mg by mouth daily Pt still needs two more days left as of today 12/21/21 12/19/21 12/24/21 Yes James Baker MD   azithromycin (ZITHROMAX) 250 MG tablet Take 1 tablet by mouth See Admin Instructions for 5 days 500mg on day 1 followed by 250mg on days 2 - 5  Patient taking differently: Take 250 mg by mouth See Admin Instructions 500mg on day 1 followed by 250mg on days 2 - 5 pt started on Sunday pt has 2 more days left 12/19/21 12/24/21 Yes James Baker MD   levothyroxine (SYNTHROID) 75 MCG tablet TAKE ONE TABLET BY MOUTH DAILY 10/21/21  Yes James Baker MD   metFORMIN (GLUCOPHAGE) 500 MG tablet TAKE ONE TABLET BY MOUTH DAILY 9/9/21 Yes Elma Alcaraz MD   amitriptyline (ELAVIL) 10 MG tablet TAKE ONE TABLET BY MOUTH DAILY AS NEEDED FOR SLEEP AND LEG PAIN FOR UP TO 30 DAYS 4/7/21  Yes Elma Alcaraz MD   ondansetron (ZOFRAN-ODT) 4 MG disintegrating tablet Take 1 tablet by mouth every 8 hours as needed. 1/20/21  Yes Elma Alcaraz MD   Evolocumab 140 MG/ML SOSY Inject into the skin Next dose due 12/27/21   Yes Historical Provider, MD   famotidine (PEPCID) 20 MG tablet as needed  5/15/20  Yes Historical Provider, MD   albuterol sulfate HFA (VENTOLIN HFA) 108 (90 Base) MCG/ACT inhaler Inhale 2 puffs into the lungs 4 times daily as needed for Wheezing 3/28/20  Yes Natanael Mujica MD   budesonide-formoterol [facility]) 80-4.5 MCG/ACT AERO Inhale 2 puffs into the lungs 2 times daily  Patient taking differently: Inhale 2 puffs into the lungs as needed  3/6/20 12/21/21 Yes Zurdo Medrano MD   metoprolol tartrate (LOPRESSOR) 25 MG tablet Take 25 mg by mouth 2 times daily   Yes Historical Provider, MD   guaiFENesin (MUCINEX) 600 MG extended release tablet Take 1 tablet by mouth 2 times daily  Patient taking differently: Take 600 mg by mouth 2 times daily as needed  12/8/19  Yes Laurie Donis MD   butenafine (MENTAX) 1 % CREA Apply 1 Tube topically 2 times daily  Patient taking differently: Apply 1 Tube topically 2 times daily as needed  11/6/19  Yes Willy Tom, DO   alclomethasone (ACLOVATE) 0.05 % cream Apply topically 2 times daily Apply topically 2 times daily.  6/3/19  Yes Wilfred Weber,    NONFORMULARY Indications: Dietary fiber 1 tsp a day    Yes Historical Provider, MD   Cholecalciferol (VITAMIN D3) 2000 units CAPS Take 1 capsule by mouth 2 times daily 8/15/18  Yes Shiv Donnelly MD   diltiazem [facility]) 120 MG extended release capsule Take 1 capsule by mouth every evening 6/19/18  Yes Shiv Donnelly MD   fluticasone [facility]) 50 MCG/ACT nasal spray PLACE 1 SPRAY IN EACH NOSTRIL DAILY FOR 15 DAYS 9/18/17  Yes Zurdo Medrano, 107 mmol/L    CO2 21 20 - 31 mmol/L    Anion Gap 12 9 - 17 mmol/L    GFR Non-African American >60 >60 mL/min    GFR African American >60 >60 mL/min    GFR Comment          GFR Staging NOT REPORTED    CBC Auto Differential    Collection Time: 12/21/21  2:51 PM   Result Value Ref Range    WBC 10.7 3.5 - 11.0 k/uL    RBC 4.56 4.0 - 5.2 m/uL    Hemoglobin 14.7 12.0 - 16.0 g/dL    Hematocrit 43.5 36 - 46 %    MCV 95.4 80 - 100 fL    MCH 32.3 26 - 34 pg    MCHC 33.8 31 - 37 g/dL    RDW 12.3 (L) 12.5 - 15.4 %    Platelets 300 709 - 714 k/uL    MPV 8.0 6.0 - 12.0 fL    NRBC Automated NOT REPORTED per 100 WBC    Differential Type NOT REPORTED     Seg Neutrophils 88 (H) 36 - 66 %    Lymphocytes 8 (L) 24 - 44 %    Monocytes 2 2 - 11 %    Eosinophils % 1 1 - 4 %    Basophils 1 0 - 2 %    Immature Granulocytes NOT REPORTED 0 %    Segs Absolute 9.50 (H) 1.8 - 7.7 k/uL    Absolute Lymph # 0.80 (L) 1.0 - 4.8 k/uL    Absolute Mono # 0.20 0.1 - 1.2 k/uL    Absolute Eos # 0.10 0.0 - 0.4 k/uL    Basophils Absolute 0.00 0.0 - 0.2 k/uL    Absolute Immature Granulocyte NOT REPORTED 0.00 - 0.30 k/uL    WBC Morphology NOT REPORTED     RBC Morphology NOT REPORTED     Platelet Estimate NOT REPORTED    Hepatic Function Panel    Collection Time: 12/21/21  2:51 PM   Result Value Ref Range    Albumin 4.3 3.5 - 5.2 g/dL    Alkaline Phosphatase 103 35 - 104 U/L    ALT 23 5 - 33 U/L    AST 21 <32 U/L    Total Bilirubin 0.20 (L) 0.3 - 1.2 mg/dL    Bilirubin, Direct <0.08 <0.31 mg/dL    Bilirubin, Indirect Can not be calculated 0.00 - 1.00 mg/dL    Total Protein 6.5 6.4 - 8.3 g/dL    Globulin NOT REPORTED 1.5 - 3.8 g/dL    Albumin/Globulin Ratio 2.0 1.0 - 2.5   Troponin    Collection Time: 12/21/21  2:51 PM   Result Value Ref Range    Troponin, High Sensitivity 44 (H) 0 - 14 ng/L    Troponin T NOT REPORTED <0.03 ng/mL    Troponin Interp NOT REPORTED    D-Dimer, Quantitative    Collection Time: 12/21/21  2:51 PM   Result Value Ref Range    D-Dimer, Quant 3.20 mg/L FEU   Urinalysis Reflex to Culture    Collection Time: 12/21/21  4:15 PM   Result Value Ref Range    Color, UA Yellow Yellow    Turbidity UA Clear Clear    Glucose, Ur NEGATIVE NEGATIVE    Bilirubin Urine NEGATIVE NEGATIVE    Ketones, Urine NEGATIVE NEGATIVE    Specific Gravity, UA 1.015 1.005 - 1.030    Urine Hgb NEGATIVE NEGATIVE    pH, UA 6.0 5.0 - 8.0    Protein, UA NEGATIVE NEGATIVE    Urobilinogen, Urine Normal Normal    Nitrite, Urine NEGATIVE NEGATIVE    Leukocyte Esterase, Urine NEGATIVE NEGATIVE    Urinalysis Comments       Microscopic exam not performed based on chemical results unless requested in original order. Urinalysis Comments          Urinalysis Comments       Utilizing a urinalysis as the only screening method to exclude a potential uropathogen can be unreliable in many patient populations. Rapid screening tests are less sensitive than culture and if UTI is a clinical possibility, culture should be considered despite a negative urinalysis. COVID-19, Rapid    Collection Time: 12/21/21  4:15 PM    Specimen: Nasopharyngeal Swab   Result Value Ref Range    Specimen Description . NASOPHARYNGEAL SWAB     SARS-CoV-2, Rapid Not Detected Not Detected   Troponin    Collection Time: 12/21/21  5:25 PM   Result Value Ref Range    Troponin, High Sensitivity 32 (H) 0 - 14 ng/L    Troponin T NOT REPORTED <0.03 ng/mL    Troponin Interp NOT REPORTED    EKG 12 Lead    Collection Time: 12/21/21  5:45 PM   Result Value Ref Range    Ventricular Rate 72 BPM    Atrial Rate 72 BPM    P-R Interval 152 ms    QRS Duration 76 ms    Q-T Interval 372 ms    QTc Calculation (Bazett) 407 ms    P Axis 53 degrees    R Axis -22 degrees    T Axis 59 degrees   Troponin    Collection Time: 12/21/21 10:09 PM   Result Value Ref Range    Troponin, High Sensitivity 32 (H) 0 - 14 ng/L    Troponin T NOT REPORTED <0.03 ng/mL    Troponin Interp NOT REPORTED    Brain natriuretic peptide    Collection Time: 12/21/21 10:09 PM Result Value Ref Range    Pro-BNP 1,899 (H) <300 pg/mL    BNP Interpretation NOT REPORTED    Troponin    Collection Time: 12/22/21  1:58 AM   Result Value Ref Range    Troponin, High Sensitivity 32 (H) 0 - 14 ng/L    Troponin T NOT REPORTED <0.03 ng/mL    Troponin Interp NOT REPORTED    Basic Metabolic Panel w/ Reflex to MG    Collection Time: 12/22/21  1:58 AM   Result Value Ref Range    Glucose 139 (H) 70 - 99 mg/dL    BUN 20 8 - 23 mg/dL    CREATININE 0.61 0.50 - 0.90 mg/dL    Bun/Cre Ratio 33 (H) 9 - 20    Calcium 9.1 8.6 - 10.4 mg/dL    Sodium 135 135 - 144 mmol/L    Potassium 4.0 3.7 - 5.3 mmol/L    Chloride 102 98 - 107 mmol/L    CO2 24 20 - 31 mmol/L    Anion Gap 9 9 - 17 mmol/L    GFR Non-African American >60 >60 mL/min    GFR African American >60 >60 mL/min    GFR Comment          GFR Staging NOT REPORTED    CBC    Collection Time: 12/22/21  1:58 AM   Result Value Ref Range    WBC 8.9 3.5 - 11.3 k/uL    RBC 4.27 3.95 - 5.11 m/uL    Hemoglobin 13.5 11.9 - 15.1 g/dL    Hematocrit 41.2 36.3 - 47.1 %    MCV 96.5 82.6 - 102.9 fL    MCH 31.6 25.2 - 33.5 pg    MCHC 32.8 28.4 - 34.8 g/dL    RDW 12.0 11.8 - 14.4 %    Platelets 444 501 - 880 k/uL    MPV 9.8 8.1 - 13.5 fL    NRBC Automated 0.0 0.0 per 100 WBC   Magnesium    Collection Time: 12/22/21  1:58 AM   Result Value Ref Range    Magnesium 2.0 1.6 - 2.6 mg/dL       Imaging/Diagnostics:  CT HEAD WO CONTRAST    Result Date: 12/21/2021  No acute intracranial abnormality. RECOMMENDATIONS: Unavailable     CT CHEST PULMONARY EMBOLISM W CONTRAST    Result Date: 12/21/2021  No evidence of pulmonary embolism or acute pulmonary abnormality. Enlarged nodular right thyroid lobe with prior recommendation for biopsy. Correlate for prior tissue sampling.        Assessment :      Hospital Problems           Last Modified POA    * (Principal) Syncope and collapse 12/22/2021 Yes    Hypothyroid 12/22/2021 Yes    Hypercholesteremia 12/22/2021 Yes    Anxiety 12/22/2021 Yes

## 2021-12-22 NOTE — DISCHARGE SUMMARY
[facility]  Office: [address], DO, Joanne Antonietta, DO, Kelsi Edmonds, DO, Kelsey Sabins [facility], DO, Sonny Garcia MD, Trent Morton MD, Jv Arambula MD, Jonna Dominguez MD, Mary Garcia MD, Curtis Rosario MD, Mati Gay MD, Adrienne Dent, DO, Orly Martinez, DO, Svetlana Kimble MD,  Shania Friday, DO, Myrtle Taveras MD, Micky Whitfield MD, Lupis Weems MD, Shira Sotomayor MD, Abbie Rogel MD, Erasmo Lovett MD, Adeline Goldmann, MD, Sebastien Houston, [facility], [facility], [facility], Stiven Nurse, CNP, Evan Broussard, CNS, Brook Lilly, CNP, Alysa Gonzalez, CNP, Deisy Gardner, CNP, Stefanie Neg, CNP, Sherron Raman, CNP, Lorenzo Luz PA-C, Amara Victor, DNP, Angie Murray, DNP, Grant Calvert, CNP, Yolanda [place], CNP, Essence Squibb, CNP, Elo Roger, CNP, Henry Kumar, [facility], [facility]    Discharge Summary     Patient ID: Carmela Mathis  :  1940   MRN: [id number]     ACCOUNT:  [de-identified]   Patient's PCP: Abbie Woods MD  Admit Date: 2021   Discharge Date: 2021     Length of Stay: 0  Code Status:  Full Code  Admitting Physician: No admitting provider for patient encounter. Discharge Physician: José Miguel Balderas DO     Active Discharge Diagnoses:     Hospital Problem Lists:  Principal Problem:    Syncope and collapse  Active Problems:    Hypothyroid    Hypercholesteremia    Anxiety    RAD (reactive airway disease)    Gastroparesis    Prediabetes    Parkinson disease (HCC)    Elevated troponin due to cardiac contusion with fall/syncope    Orthostatic syncope  Resolved Problems:    * No resolved hospital problems.  *      Admission Condition:  fair     Discharged Condition: stable    Hospital Stay:     Hospital Course:  Carmela Mathis is a 80 y.o. female who was admitted for the management of  Syncope and collapse , presented to ER with Loss of Consciousness (while on toilet this [address] 12/22/2021    CALCIUM 9.1 12/22/2021    LABGLOM >60 12/22/2021    GFRAA >60 12/22/2021    GFR      12/22/2021    GFR NOT REPORTED 12/22/2021        Radiology:  CT HEAD WO CONTRAST    Result Date: 12/21/2021  No acute intracranial abnormality. RECOMMENDATIONS: Unavailable     CT CHEST PULMONARY EMBOLISM W CONTRAST    Result Date: 12/21/2021  No evidence of pulmonary embolism or acute pulmonary abnormality. Enlarged nodular right thyroid lobe with prior recommendation for biopsy. Correlate for prior tissue sampling. Consultations:    Consults:     Final Specialist Recommendations/Findings:   IP CONSULT TO SOCIAL WORK  IP CONSULT TO CARDIOLOGY      The patient was seen and examined on day of discharge and this discharge summary is in conjunction with any daily progress note from day of discharge. Discharge plan:     Disposition: Home    Physician Follow Up:   PCP 1 week  Emery Dean MD  [address] [place], [place] [phone]    In 1 week  For Follow Up       Requiring Further Evaluation/Follow Up POST HOSPITALIZATION/Incidental Findings: BEE results    Diet: diabetic diet    Activity: As tolerated    Instructions to Patient: Take medication as prescribed    Discharge Medications:       No discharge procedures on file. Time Spent on discharge is  24 minutes in patient examination, evaluation, counseling as well as medication reconciliation, prescriptions for required medications, discharge plan and follow up. Electronically signed by   Ayaka Chan DO  12/23/2021  4:19 PM      Thank you Dr. Emery Dean MD for the opportunity to be involved in this patient's care.

## 2021-12-22 NOTE — FLOWSHEET NOTE
Patient receives Sacrament of the Sick (anointing) from [facility].    [facility] will follow as needed. (writer charting for [employer])     06/26/27 5831   Encounter Summary   Services provided to: Patient   Referral/Consult From: Rounding   [facility] [address]   (12/22/21 anointed)   Complexity of Encounter Low   Length of Encounter 15 minutes   Routine   Type Initial   Sacraments   Sacrament of Sick-Anointing Anointed  (12/22/21 Fr. Guera Cornelius)

## 2021-12-22 NOTE — ED NOTES
[address] [facility] ETA 8:30 PM      Silvia Patterson RN  12/21/21 [address] [facility], RN  12/21/21 9673

## 2021-12-23 VITALS
TEMPERATURE: 97.9 F | WEIGHT: 191 LBS | SYSTOLIC BLOOD PRESSURE: 124 MMHG | DIASTOLIC BLOOD PRESSURE: 66 MMHG | BODY MASS INDEX: 31.82 KG/M2 | HEIGHT: 65 IN | OXYGEN SATURATION: 98 % | RESPIRATION RATE: 16 BRPM | HEART RATE: 66 BPM

## 2021-12-23 PROBLEM — I95.1 ORTHOSTATIC SYNCOPE: Status: ACTIVE | Noted: 2021-12-23

## 2021-12-23 LAB
ANTI DNA DOUBLE STRANDED: 0.7 IU/ML
ANTI-NUCLEAR ANTIBODY (ANA): NEGATIVE
ENA ANTIBODIES SCREEN: 0.3 U/ML
GLUCOSE BLD-MCNC: 123 MG/DL (ref 65–105)
GLUCOSE BLD-MCNC: 90 MG/DL (ref 65–105)
GLUCOSE BLD-MCNC: 91 MG/DL (ref 65–105)

## 2021-12-23 PROCEDURE — 2580000003 HC RX 258: Performed by: NURSE PRACTITIONER

## 2021-12-23 PROCEDURE — 6360000002 HC RX W HCPCS: Performed by: NURSE PRACTITIONER

## 2021-12-23 PROCEDURE — 82947 ASSAY GLUCOSE BLOOD QUANT: CPT

## 2021-12-23 PROCEDURE — 6370000000 HC RX 637 (ALT 250 FOR IP): Performed by: INTERNAL MEDICINE

## 2021-12-23 PROCEDURE — G0378 HOSPITAL OBSERVATION PER HR: HCPCS

## 2021-12-23 PROCEDURE — 6370000000 HC RX 637 (ALT 250 FOR IP): Performed by: NURSE PRACTITIONER

## 2021-12-23 PROCEDURE — 99232 SBSQ HOSP IP/OBS MODERATE 35: CPT | Performed by: INTERNAL MEDICINE

## 2021-12-23 RX ORDER — MIDODRINE HYDROCHLORIDE 5 MG/1
5 TABLET ORAL 2 TIMES DAILY WITH MEALS
Status: DISCONTINUED | OUTPATIENT
Start: 2021-12-23 | End: 2021-12-23 | Stop reason: HOSPADM

## 2021-12-23 RX ORDER — CEFUROXIME AXETIL 500 MG/1
500 TABLET ORAL EVERY 12 HOURS SCHEDULED
Qty: 8 TABLET | Refills: 0 | Status: SHIPPED | OUTPATIENT
Start: 2021-12-23 | End: 2021-12-27

## 2021-12-23 RX ORDER — CEFUROXIME AXETIL 500 MG/1
500 TABLET ORAL EVERY 12 HOURS SCHEDULED
Status: DISCONTINUED | OUTPATIENT
Start: 2021-12-23 | End: 2021-12-23 | Stop reason: HOSPADM

## 2021-12-23 RX ORDER — PREDNISONE 20 MG/1
20 TABLET ORAL ONCE
Status: COMPLETED | OUTPATIENT
Start: 2021-12-23 | End: 2021-12-23

## 2021-12-23 RX ORDER — MIDODRINE HYDROCHLORIDE 5 MG/1
5 TABLET ORAL 2 TIMES DAILY WITH MEALS
Qty: 90 TABLET | Refills: 3 | Status: SHIPPED | OUTPATIENT
Start: 2021-12-23

## 2021-12-23 RX ADMIN — MIDODRINE HYDROCHLORIDE 5 MG: 5 TABLET ORAL at 16:39

## 2021-12-23 RX ADMIN — MIDODRINE HYDROCHLORIDE 5 MG: 5 TABLET ORAL at 11:45

## 2021-12-23 RX ADMIN — BENZOCAINE AND MENTHOL 1 LOZENGE: 15; 3.6 LOZENGE ORAL at 05:23

## 2021-12-23 RX ADMIN — SODIUM CHLORIDE: 9 INJECTION, SOLUTION INTRAVENOUS at 05:20

## 2021-12-23 RX ADMIN — FAMOTIDINE 20 MG: 20 TABLET ORAL at 08:20

## 2021-12-23 RX ADMIN — METOPROLOL TARTRATE 25 MG: 25 TABLET, FILM COATED ORAL at 08:20

## 2021-12-23 RX ADMIN — PREDNISONE 20 MG: 20 TABLET ORAL at 16:39

## 2021-12-23 RX ADMIN — EZETIMIBE 10 MG: 10 TABLET ORAL at 10:28

## 2021-12-23 RX ADMIN — ENOXAPARIN SODIUM 40 MG: 100 INJECTION SUBCUTANEOUS at 08:20

## 2021-12-23 NOTE — DISCHARGE INSTR - COC
Continuity of Care Form    Patient Name: Theresa Wan   :  1940  MRN:  [id number]    Admit date:  2021  Discharge date:  ***    Code Status Order: Full Code   Advance Directives:      Admitting Physician:  No admitting provider for patient encounter.   PCP: Karen Woods MD    Discharging Nurse: [facility] Unit/Room#:   Discharging Unit Phone Number: ***    Emergency Contact:   Extended Emergency Contact Information  Primary Emergency Contact: Gregory Stone, [address] Phone: [phone]  Work Phone: [phone]  Mobile Phone: [phone]  Relation: Other    Past Surgical History:  Past Surgical History:   Procedure Laterality Date    CHOLECYSTECTOMY      COLONOSCOPY      ENDOSCOPY, COLON, DIAGNOSTIC      ERCP  14    EXCISION / BIOPSY SKIN LESION OF TRUNK Left 2017    EXCISION MASS LEFT POSTERIOR SHOULDER, LEFT ANTERIOR ARM performed by Stuart Nair MD at [address]  2007    SKIN BIOPSY Left 2017    Excision Mass Left Posterior Shoulder, Left Anterior Arm       Immunization History:   Immunization History   Administered Date(s) Administered    COVID-19, Pfizer, PF, 30mcg/0.3mL 2021, 2021, 10/01/2021    Influenza Virus Vaccine 10/31/2011, 2013    Influenza, High Dose (Fluzone 65 yrs and older) 2014, 2015, 2016, 10/18/2017, 11/10/2018, 2019    Pneumococcal Conjugate 13-valent (Jillene Nicole) 2014, 2020    Pneumococcal Polysaccharide (Dnosrxhmj54) 10/01/2008    Td, unspecified formulation 2008    Tdap (Boostrix, Adacel) 10/23/2018, 2020    Zoster Live (Zostavax) 2016    Zoster Recombinant (Shingrix) 2019, 2019       Active Problems:  Patient Active Problem List   Diagnosis Code    Hypothyroid E03.9    Hypercholesteremia E78.00    Anxiety F41.9    Vitamin D deficiency E55.9    RAD (reactive airway disease) J45.900    Diverticular disease K57.90    Tremor R25.1    Gastroparesis K31.84    Lung nodule < 6cm on CT R91.1    Lateral femoral cutaneous neuropathy G57.10    Thyroid nodule E04.1    Prediabetes R73.03    Bilateral tinnitus H93.13    Enthesopathy of hip region M76.899    Laryngopharyngeal reflux K21.9    Meniere's disease, right ear H81.01    Postmenopausal state Z78.0    Seasonal asthma J45.998    Postnasal drip R09.82    Parkinson disease ([address] G20    Bronchitis, mucopurulent recurrent (HCC) J41.1    Morbidly obese (HCC) E66.01    Syncope and collapse R55    Syncope R55    Elevated troponin due to cardiac contusion with fall/syncope R77.8    Orthostatic syncope I95.1       Isolation/Infection:   Isolation            No Isolation          Patient Infection Status       None to display            Nurse Assessment:  Last Vital Signs: /66   Pulse 66   Temp 97.9 °F (36.6 °C) (Oral)   Resp 16   Ht 5' 5\" (1.651 m)   Wt 191 lb (86.6 kg)   SpO2 98%   BMI 31.78 kg/m²     Last documented pain score (0-10 scale): Pain Level: 5  Last Weight:   Wt Readings from Last 1 Encounters:   12/22/21 191 lb (86.6 kg)     Mental Status:  oriented and alert    IV Access:  - None    Nursing Mobility/ADLs:  Walking   Independent  Transfer  Independent  Bathing  Independent  Dressing  Independent  Toileting  Independent  Feeding  Independent  Med Admin  Independent  Med Delivery   whole    Wound Care Documentation and Therapy:        Elimination:  Continence: Bowel: Yes  Bladder: Yes  Urinary Catheter: None   Colostomy/Ileostomy/Ileal Conduit: No       Date of Last BM: n/a  No intake or output data in the 24 hours ending 12/23/21 1625  No intake/output data recorded. Safety Concerns:     None    Impairments/Disabilities:      None    Nutrition Therapy:  Current Nutrition Therapy:   - Oral Diet:  General    Routes of Feeding: Oral  Liquids:  Thin Liquids  Daily Fluid Restriction: no  Last Modified Barium Swallow with Video (Video Swallowing Test): not done    Treatments at the Time of Hospital Discharge:   Respiratory Treatments: n/a  Oxygen Therapy:  {Therapy; copd oxygen:65561}  Ventilator:    { CC Vent REII:314165269}    Rehab Therapies: n/a  Weight Bearing Status/Restrictions: Gabriela THURMAN Weight Bearin}  Other Medical Equipment (for information only, NOT a DME order):  none  Other Treatments: n/a    Patient's personal belongings (please select all that are sent with patient):  {P DME Belongings:479394792}    RN SIGNATURE:  Electronically signed by Claire Swartz RN on 21 at 4:27 PM EST    CASE MANAGEMENT/SOCIAL WORK SECTION    Inpatient Status Date: ***    Readmission Risk Assessment Score:  Readmission Risk              Risk of Unplanned Readmission:  0           Discharging to Facility/ Agency   Name:   Address:  Phone:  Fax:    Dialysis Facility (if applicable)   Name:  Address:  Dialysis Schedule:  Phone:  Fax:    / signature: {Esignature:[id number]}    PHYSICIAN SECTION    Prognosis: {Prognosis:3291928120}    Condition at Discharge: Gabriela Cobian Patient Condition:828642606}    Rehab Potential (if transferring to Rehab): {Prognosis:7120746293}    Recommended Labs or Other Treatments After Discharge: ***    Physician Certification: I certify the above information and transfer of Abdullahi Kaufman  is necessary for the continuing treatment of the diagnosis listed and that she requires {Admit to Appropriate Level of Care:92388} for {GREATER/LESS:103557624} 30 days.      Update Admission H&P: {CHP DME Changes in ESOJN:167775874}    PHYSICIAN SIGNATURE:  {Esignature:040296707}

## 2021-12-23 NOTE — CARE COORDINATION
YING Planning    Spoke with pt at bedside, feels better, declines hhc. Her car is now at the mother house in [facility] her [employer] sisters picked it up from the [address] [place] [address]. Zack rodriguez may need a cab voucher if she cannot find a ride home.

## 2021-12-23 NOTE — CONSULTS
[facility]  In Patient Cardiology Consult             Date:   12/23/2021  Patient name: Harini Waggoner  Date of admission:  12/21/2021  2:27 PM  MRN:   [id number]  YOB: 1940    Reason for Admission:  syncope    CHIEF COMPLAINT:  syncope     History Obtained From:  Pt and chart    HISTORY OF PRESENT ILLNESS:    This is a 80year old female who presents after syncope. Apparently was dealing with a URI for a few days. Was started on ABX and prednisone. On Tuesday, was on the toilet, having a BM, at which time, she then had LOC. Nicole Brandon to floor. Woke up. Came to ER. Was admitted for further workup. Had echo yesterday, was low risk. But then had orthostatics prior to d/c, and we were consulted. Feels less dizzy. Known to us:  PMH:  1. History of hyperlipidemia, has been on Zetia, with intolerance to statins. Her last lipid profile was checked on May 2021 with LDL of 44 and triglyceride of 101. On Repatha. 2. History of palpitations, for which she had a Holter monitor done on 3/6/2018, which showed sinus rhythm, sinus bradycardia and tachycardia, with moderately frequent PACs. 3. History of Cardiolite stress test done in 1/2016, which showed no ischemia or infarction, with left ventricular ejection fraction to be normal, with an echocardiogram done in 2/2016 showing an ejection fraction of 55%, mild LVH, mild tricuspid regurgitation, with estimated RVSP of 43 mmHg. 4. History of type 2 diabetes mellitus. 5. History of hypothyroidism. 6. History of Cardiolite stress test done 3/19/18 showing normal perfusion with no ischemia or infarction with normal LV systolic function. A/P:  1. Palpitations. PACs. Well controlled on Metoprolol. 2. HPL. Zetia and Repatha due to intolerance to statins. She has been taking Repatha every 6 weeks because when she took it more frequently every 2 weeks she had flu like symptoms. Her LDL was checked in May 2021 and it was 40. Dietary exercise and weight loss recommendations given and I will see her back in 1 year.       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    Lung nodule     Meniere disease     right ear    Meniere's disease, right ear 12/10/2019    Morbidly obese ([address] 7/23/2020    Neuropathy     Osteopenia 6/30/2014    Parkinson disease ([address] 7/23/2020    Postmenopausal state 12/10/2019    Postnasal drip 7/11/2020    Prediabetes     RAD (reactive airway disease) 6/30/2014    Seasonal asthma 7/11/2020    Thyroid nodule 3/1/2016    Tremor     r hand    Vitamin D deficiency 6/30/2014         Past Surgical History:    Past Surgical History:   Procedure Laterality Date    CHOLECYSTECTOMY      COLONOSCOPY      ENDOSCOPY, COLON, DIAGNOSTIC      ERCP  03/21/14    EXCISION / BIOPSY SKIN LESION OF TRUNK Left 11/8/2017    EXCISION MASS LEFT POSTERIOR SHOULDER, LEFT ANTERIOR ARM performed by Ilya Sethi MD at [address] [place] [address]  01/03/2007    SKIN BIOPSY Left 11/08/2017    Excision Mass Left Posterior Shoulder, Left Anterior Arm         Home Medications:    Outpatient Medications Marked as Taking for the 12/21/21 encounter [facility] Encounter)   Medication Sig Dispense Refill    predniSONE (DELTASONE) 20 MG tablet Take 1 tablet by mouth daily for 5 days (Patient taking differently: Take 20 mg by mouth daily Pt still needs two more days left as of today 12/21/21) 5 tablet 0    levothyroxine (SYNTHROID) 75 MCG tablet TAKE ONE TABLET BY MOUTH DAILY 90 tablet 0  metFORMIN (GLUCOPHAGE) 500 MG tablet TAKE ONE TABLET BY MOUTH DAILY 90 tablet 1    amitriptyline (ELAVIL) 10 MG tablet TAKE ONE TABLET BY MOUTH DAILY AS NEEDED FOR SLEEP AND LEG PAIN FOR UP TO 30 DAYS 30 tablet 3    ondansetron (ZOFRAN-ODT) 4 MG disintegrating tablet Take 1 tablet by mouth every 8 hours as needed. 20 tablet 3    Evolocumab 140 MG/ML SOSY Inject into the skin Next dose due 12/27/21      famotidine (PEPCID) 20 MG tablet as needed       albuterol sulfate HFA (VENTOLIN HFA) 108 (90 Base) MCG/ACT inhaler Inhale 2 puffs into the lungs 4 times daily as needed for Wheezing 3 Inhaler 1    budesonide-formoterol (SYMBICORT) 80-4.5 MCG/ACT AERO Inhale 2 puffs into the lungs 2 times daily (Patient taking differently: Inhale 2 puffs into the lungs as needed ) 1 Inhaler 6    metoprolol tartrate (LOPRESSOR) 25 MG tablet Take 25 mg by mouth 2 times daily      guaiFENesin (MUCINEX) 600 MG extended release tablet Take 1 tablet by mouth 2 times daily (Patient taking differently: Take 600 mg by mouth 2 times daily as needed ) 20 tablet 0    butenafine (MENTAX) 1 % CREA Apply 1 Tube topically 2 times daily (Patient taking differently: Apply 1 Tube topically 2 times daily as needed ) 15 g 0    alclomethasone (ACLOVATE) 0.05 % cream Apply topically 2 times daily Apply topically 2 times daily.  45 g 1    NONFORMULARY Indications: Dietary fiber 1 tsp a day       Cholecalciferol (VITAMIN D3) 2000 units CAPS Take 1 capsule by mouth 2 times daily 30 capsule 0    diltiazem (CARDIZEM CD) 120 MG extended release capsule Take 1 capsule by mouth every evening 30 capsule 3    fluticasone (FLONASE) 50 MCG/ACT nasal spray PLACE 1 SPRAY IN EACH NOSTRIL DAILY FOR 15 DAYS 1 Bottle 2    ketoconazole (NIZORAL) 2 % shampoo APPLY  TO AFFECTED AREAS TOPICALLY DAILY THEN RINSE OFF AFTER 5 MINUTES 120 mL 2    ezetimibe (ZETIA) 10 MG tablet Take 10 mg by mouth daily      esomeprazole Magnesium (NEXIUM) 40 MG PACK Take 40 mg by mouth daily.  NONFORMULARY Super b complex 3 times per week      ascorbic acid (VITAMIN C) 500 MG tablet Take 500 mg by mouth daily.  chlorpheniramine (CHLOR-TRIMETON) 4 MG tablet Take 12 mg by mouth daily           Allergies:  Aspirin, Atorvastatin, Crestor [rosuvastatin], Livalo [pitavastatin], Seasonal, Simvastatin, Statins, and Welchol [colesevelam hcl]    Social History:    Social History     Socioeconomic History    Marital status: Single     Spouse name: None    Number of children: None    Years of education: None    Highest education level: None   Occupational History    None   Tobacco Use    Smoking status: Never Smoker    Smokeless tobacco: Never Used   Vaping Use    Vaping Use: Never used   Substance and Sexual Activity    Alcohol use: Yes     Comment: rare    Drug use: No    Sexual activity: None   Other Topics Concern    None   Social History Narrative    None     Social Determinants of Health     Financial Resource Strain: Low Risk     Difficulty of Paying Living Expenses: Not hard at all   Food Insecurity: No Food Insecurity    Worried About Running Out of Food in the Last Year: Never true    Robe of Food in the Last Year: Never true   Transportation Needs:     Lack of Transportation (Medical): Not on file    Lack of Transportation (Non-Medical):  Not on file   Physical Activity:     Days of Exercise per Week: Not on file    Minutes of Exercise per Session: Not on file   Stress:     Feeling of Stress : Not on file   Social Connections:     Frequency of Communication with Friends and Family: Not on file    Frequency of Social Gatherings with Friends and Family: Not on file    Attends Mandaen Services: Not on file    Active Member of Clubs or Organizations: Not on file    Attends Club or Organization Meetings: Not on file    Marital Status: Not on file   Intimate Partner Violence:     Fear of Current or Ex-Partner: Not on file    Emotionally Abused: Not on file    Physically Abused: Not on file    Sexually Abused: Not on file   Housing Stability:     Unable to Pay for Housing in the Last Year: Not on file    Number of Places Lived in the Last Year: Not on file    Unstable Housing in the Last Year: Not on file        Family History:   Family History   Problem Relation Age of Onset    Cancer Mother 48        pineal gland/throat    Cancer Father 64        colon    Cancer Brother         lung    Other Brother         pulmonary embolus    Heart Disease Paternal Grandfather         myocarditis    Stroke Maternal Aunt     Cancer Maternal Uncle     Stroke Maternal Grandmother     Diabetes Brother     Heart Disease Paternal Uncle 67        mi    Heart Disease Brother 58        mi    High Blood Pressure Brother     Heart Disease Brother 79        stents for cad    High Blood Pressure Brother        REVIEW OF SYSTEMS:    · Constitutional: there has been no unanticipated weight loss. There's been No change in energy level, No change in activity level. · Eyes: No visual changes or diplopia. No scleral icterus. · ENT: No Headaches, hearing loss or vertigo. No mouth sores or sore throat. · Cardiovascular: As HPI  · Respiratory: As HPI  · Gastrointestinal: No abdominal pain, appetite loss, blood in stools. No change in bowel or bladder habits. · Genitourinary: No dysuria, trouble voiding, or hematuria. · Musculoskeletal:  No gait disturbance, No weakness or joint complaints. · Integumentary: No rash or pruritis. · Neurological: No headache, diplopia, change in muscle strength, numbness or tingling. No change in gait, balance, coordination, mood, affect, memory, mentation, behavior. · Psychiatric: No anxiety, or depression. · Endocrine: No temperature intolerance. No excessive thirst, fluid intake, or urination. No tremor. · Hematologic/Lymphatic: No abnormal bruising or bleeding, blood clots or swollen lymph nodes.   · Allergic/Immunologic: No nasal congestion or hives. PHYSICAL EXAM:    Physical Examination:    BP (!) 100/57   Pulse 60   Temp 97.7 °F (36.5 °C) (Oral)   Resp 16   Ht 5' 5\" (1.651 m)   Wt 191 lb (86.6 kg)   SpO2 97%   BMI 31.78 kg/m²    Constitutional and General Appearance: alert, cooperative, no distress and appears stated age  HEENT: PERRL, no cervical lymphadenopathy. No masses palpable. Normal oral mucosa  Respiratory:  · Normal excursion and expansion without use of accessory muscles  · Resp Auscultation: Good respiratory effort. No for increased work of breathing. On auscultation: clear  Cardiovascular:  · Heart tones are crisp and normal. regular S1 and S2. Murmurs: none  · Jugular venous pulsation Normal  Abdomen:  · No masses or tenderness  · Bowel sounds present  Extremities:  ·  No Cyanosis or Clubbing  ·  Lower extremity edema: none  ·  Skin: Warm and dry  Neurological:  · Alert and oriented. · Moves all extremities well  · No abnormalities of mood, affect, memory, mentation, or behavior are noted    DATA:    Diagnostics:      EKG:   Results for orders placed or performed during the hospital encounter of 12/21/21   EKG 12 Lead   Result Value Ref Range    Ventricular Rate 72 BPM    Atrial Rate 72 BPM    P-R Interval 152 ms    QRS Duration 76 ms    Q-T Interval 372 ms    QTc Calculation (Bazett) 407 ms    P Axis 53 degrees    R Axis -22 degrees    T Axis 59 degrees    Narrative    Normal sinus rhythm  Anteroseptal infarct (cited on or before 21-DEC-2021)  Abnormal ECG  When compared with ECG of 21-DEC-2021 14:46,  No significant change was found       Echo:  Results for orders placed or performed during the hospital encounter of 12/21/21  Left ventricle is normal in size. Mild left ventricular hypertrophy. Global left ventricular systolic function is normal with an estimated  ejection fraction of 60 % . No obvious wall motion abnormality seen. Grade I (mild) left ventricular diastolic dysfunction.   No significant valvular

## 2021-12-23 NOTE — PROGRESS NOTES
[facility]  Office: [address], DO, Will Sepulveda, DO, Salmatoy Reeder, DO, Yesseniamirza Sanz Blood, DO, Kailey Fam MD, Mary Kerr MD, Diana Rios MD, Natalia Pardo MD, Kennedi Jaimes MD, Vinod Ramsey MD, Jagdeep Lowry MD, Florinda Humphrey, DO, Frank Simmonds, DO, Michael Goode MD,  Norma Emerson DO, Latoya Butler MD, Florentino Mejia MD, Laury Kearney MD, Felix Bledsoe MD, Marciano Bernheim, MD, Mabel Aragon MD, Contreras Rodriguez MD, Danielle Todd, [facility], [facility], [facility], Adriana Craig, CNP, Aaron Beard, CNS, Zach Kam, CNP, Sharon Chester, CNP, Sahra Junior, CNP, Maria Luisa Taveras, CNP, Terrenec Self, CNP, Su Feldman PA-C, Marah Lakhani, DNP, Eladia Wade, MAGDA, Woodward Brittle, CNP, Amanda Cook, CNP, Paulo Araya, CNP, Carol Vasquez, CNP, Alondra Fairchild, [facility], [facility]    Progress Note    12/23/2021    11:39 AM    Name:   Morena Heredia  MRN:     [id number]     Acct:      [de-identified]   Room:   2012/2012-02  IP Day:  0  Admit Date:  12/21/2021  2:27 PM    PCP:   James Baker MD  Code Status:  Full Code    Subjective:     C/C:   Chief Complaint   Patient presents with    Loss of Consciousness     while on toilet this morning,woke up on the floor, unsure of how long, no head and neck pain    Rash     started this morning, on arms, started a Z-criselda and prednisone 20 mg by PCP on Sunday     Interval History Status: improved. Patient is resting comfortably, had an episode of orthostasis yesterday. Denies any chest pain, shortness of breath, nausea or vomiting or other acute complaints this time. Continue sore throat sensation. Brief History: This is a 70-year-old female that presents after a syncopal episode. She apparently was at home having a BM without straining and  She knows she woke up on the floor.   She does not know how she had up there but did have dizziness prior to the event.  She denies any chest pain, nausea vomiting or other acute complaints. She presented to the emergency room at [facility] and upon evaluation was found of a mildly elevated troponin and was admitted here for further evaluation observation. She is remained asymptomatic and has undergone echocardiogram this morning which was benign. She is recently started on Zithromax and prednisone for sore throat. Review of Systems:     Constitutional:  negative for chills, fevers, sweats  Respiratory:  negative for cough, dyspnea on exertion, shortness of breath, wheezing  Cardiovascular:  negative for chest pain, chest pressure/discomfort, lower extremity edema, palpitations  Gastrointestinal:  negative for abdominal pain, constipation, diarrhea, nausea, vomiting  Neurological:  negative for dizziness, headache    Medications: Allergies: Allergies   Allergen Reactions    Aspirin Swelling     Hives.  anaphylaxis    Atorvastatin Other (See Comments)     myalgia    Crestor [Rosuvastatin]      Myalgia      Livalo [Pitavastatin] Other (See Comments)     Muscle pain    Seasonal     Simvastatin      Myalgia      Statins      Other reaction(s): Unknown    Welchol [Colesevelam Hcl] Other (See Comments)     Leg cramp       Current Meds:   Scheduled Meds:    midodrine  5 mg Oral BID WC    budesonide-formoterol  2 puff Inhalation BID    dilTIAZem  120 mg Oral QPM    ezetimibe  10 mg Oral Daily    levothyroxine  75 mcg Oral Daily    metFORMIN  500 mg Oral Daily    famotidine  20 mg Oral Daily    sodium chloride flush  5-40 mL IntraVENous 2 times per day    enoxaparin  40 mg SubCUTAneous Daily     Continuous Infusions:    sodium chloride 75 mL/hr at 12/23/21 0520    sodium chloride       PRN Meds: benzocaine-menthol, amitriptyline, albuterol sulfate HFA, sodium chloride flush, sodium chloride, potassium chloride **OR** potassium alternative oral replacement **OR** potassium chloride, magnesium sulfate, ondansetron **OR** ondansetron, polyethylene glycol, acetaminophen **OR** acetaminophen, perflutren lipid microspheres    Data:     Past Medical History:   has a past medical history of Acid reflux, Anxiety, Bilateral tinnitus, Bronchitis, mucopurulent recurrent ([address] Diverticular disease, Enthesopathy of hip region, GERD (gastroesophageal reflux disease), Hypercholesteremia, Hypothyroid, Laryngopharyngeal reflux, Lateral femoral cutaneous neuropathy, Lung nodule, Meniere disease, Meniere's disease, right ear, Morbidly obese ([address] Neuropathy, Osteopenia, Parkinson disease ([address] Postmenopausal state, Postnasal drip, Prediabetes, RAD (reactive airway disease), Seasonal asthma, Thyroid nodule, Tremor, and Vitamin D deficiency. Social History:   reports that she has never smoked. She has never used smokeless tobacco. She reports current alcohol use. She reports that she does not use drugs. Family History:   Family History   Problem Relation Age of Onset   Sergey Eubrittni Cancer Mother 48        pineal gland/throat    Cancer Father 64        colon   Lavmaikol Euler Cancer Brother         lung    Other Brother         pulmonary embolus    Heart Disease Paternal Grandfather         myocarditis    Stroke Maternal Aunt     Cancer Maternal Uncle     Stroke Maternal Grandmother     Diabetes Brother     Heart Disease Paternal Uncle 67        mi    Heart Disease Brother 58        mi    High Blood Pressure Brother     Heart Disease Brother 79        stents for cad    High Blood Pressure Brother        Vitals:  /61   Pulse 65   Temp 97.7 °F (36.5 °C) (Oral)   Resp 17   Ht 5' 5\" (1.651 m)   Wt 191 lb (86.6 kg)   SpO2 97%   BMI 31.78 kg/m²   Temp (24hrs), Av.2 °F (36.8 °C), Min:97.7 °F (36.5 °C), Max:99.1 °F (37.3 °C)    Recent Labs     21  0610 21  1704 21  2004 21  0742   POCGLU 99 105 189* 90       I/O (24Hr):   No intake or output data in the 24 hours ending 21 [id number]:  Hematology:  Recent Labs     12/21/21  1451 12/22/21  0158   WBC 10.7 8.9   RBC 4.56 4.27   HGB 14.7 13.5   HCT 43.5 41.2   MCV 95.4 96.5   MCH 32.3 31.6   MCHC 33.8 32.8   RDW 12.3* 12.0    227   MPV 8.0 9.8   DDIMER 3.20  --      Chemistry:  Recent Labs     12/21/21  1451 12/21/21  1451 12/21/21  1725 12/21/21  2209 12/22/21  0158     --   --   --  135   K 4.4  --   --   --  4.0     --   --   --  102   CO2 21  --   --   --  24   GLUCOSE 149*  --   --   --  139*   BUN 25*  --   --   --  20   CREATININE 0.80  --   --   --  0.61   MG  --   --   --   --  2.0   ANIONGAP 12  --   --   --  9   LABGLOM >60  --   --   --  >60   GFRAA >60  --   --   --  >60   CALCIUM 10.4  --   --   --  9.1   PROBNP  --   --   --  1,899*  --    TROPHS 44*   < > 32* 32* 32*    < > = values in this interval not displayed. Recent Labs     12/21/21  1451 12/22/21  0610 12/22/21  1704 12/22/21 2004 12/23/21  0742   PROT 6.5  --   --   --   --    LABALBU 4.3  --   --   --   --    AST 21  --   --   --   --    ALT 23  --   --   --   --    ALKPHOS 103  --   --   --   --    BILITOT 0.20*  --   --   --   --    BILIDIR <0.08  --   --   --   --    POCGLU  --  99 105 189* 90     ABG:  Lab Results   Component Value Date    PH 6.0 10/02/2015     Lab Results   Component Value Date/Time    SPECIAL NOT REPORTED 09/26/2018 09:25 PM     Lab Results   Component Value Date/Time    CULTURE NO SIGNIFICANT GROWTH 09/26/2018 09:25 PM       Radiology:  CT HEAD WO CONTRAST    Result Date: 12/21/2021  No acute intracranial abnormality. RECOMMENDATIONS: Unavailable     CT CHEST PULMONARY EMBOLISM W CONTRAST    Result Date: 12/21/2021  No evidence of pulmonary embolism or acute pulmonary abnormality. Enlarged nodular right thyroid lobe with prior recommendation for biopsy. Correlate for prior tissue sampling.        Physical Examination:        General appearance:  alert, cooperative and no distress  Mental Status:  oriented to person, place and time and normal affect  Lungs:  clear to auscultation bilaterally, normal effort  Heart:  regular rate and rhythm, no murmur  Abdomen:  soft, nontender, nondistended, normal bowel sounds, no masses, hepatomegaly, splenomegaly  Extremities:  no edema, redness, tenderness in the calves  Skin:  no gross lesions, rashes, induration    Assessment:        Hospital Problems           Last Modified POA    * (Principal) Syncope and collapse 12/22/2021 Yes    Hypothyroid 12/22/2021 Yes    Hypercholesteremia 12/22/2021 Yes    Anxiety 12/22/2021 Yes    Overview Signed 9/9/2019 02:68 AM by Asiya Domingo MA     ALICIA-7     09/09/19 : 4         RAD (reactive airway disease) 12/22/2021 Yes    Gastroparesis 12/22/2021 Yes    Prediabetes 12/22/2021 Yes    Parkinson disease [address] [address] [facility] 12/22/2021 Yes    Elevated troponin due to cardiac contusion with fall/syncope 12/22/2021 Yes          Plan:        1. Complete prednisone today  2. Ceftin x4 days  3. Cardiology evaluation, metoprolol discontinued and started on ProAmatine  4. GI and DVT prophylaxis  5. Continue home maintenance medications  6.  Discharge this afternoon if stable on [employer], DO  12/23/2021  11:39 AM

## 2021-12-26 ENCOUNTER — CARE COORDINATION (OUTPATIENT)
Dept: CASE MANAGEMENT | Age: 81
End: 2021-12-26

## 2021-12-26 DIAGNOSIS — R52 BODY ACHES: Primary | ICD-10-CM

## 2021-12-26 DIAGNOSIS — R55 SYNCOPE, UNSPECIFIED SYNCOPE TYPE: Primary | ICD-10-CM

## 2021-12-26 PROCEDURE — 1111F DSCHRG MED/CURRENT MED MERGE: CPT | Performed by: FAMILY MEDICINE

## 2021-12-26 RX ORDER — PREDNISONE 20 MG/1
TABLET ORAL
Qty: 30 TABLET | Refills: 0 | Status: SHIPPED | OUTPATIENT
Start: 2021-12-26 | End: 2022-01-05

## 2021-12-26 RX ORDER — ACETAMINOPHEN AND CODEINE PHOSPHATE 300; 30 MG/1; MG/1
1 TABLET ORAL EVERY 4 HOURS PRN
Qty: 18 TABLET | Refills: 0 | Status: SHIPPED | OUTPATIENT
Start: 2021-12-26 | End: 2021-12-29

## 2021-12-26 NOTE — CARE COORDINATION
[facility] Transitions Initial Follow Up Call    Call within 2 business days of discharge: Yes    Patient: Carmela Mathis Patient : 1940   MRN: <[id number]>  Reason for Admission: Syncope and collapse  Discharge Date: 21 RARS: Readmission Risk Score: 9.5 ( )      Last Discharge [facility]       Complaint Diagnosis Description Type Department Provider    21 Loss of Consciousness; Rash Syncope and collapse . .. ED to Hosp-Admission (Discharged) (ADMITTED) [facility], DO; Betty Garcia . .. Spoke with: 24 HOUR INITIAL CALL. Spoke to Kurtis. Kurtis states she is \"not good\" pt. States she has another episode of syncope and LOC since she has returned home. She called EMS and the medics came and checked her blood pressure. She was not transported back to the ED. Pt. States she has had a reaction to medications and has [place] lot of pain\". Pt states she also has a rash over her entire body and sore throat. She denies shortness of breath or difficulty swallowing at this time. The pain \"is from head to toe\" she states she was told her pain is a reaction to her body having a \"crisis\" [place] states she called her PCP and Dr Jimbo Frye  Who ordered new pain medications for her. Pt. States she has had all of her Covid vaccines and tested negative. Pt. States she is a [employer] and lives alone. She was offered assist of the local mother house. Encouraged pt. To be with others for assist r/t syncope and fainting- safety issues/ pt. Declines at this time. Pt. Chooses to remain independent. Appetite is fair. Bowel and bladder WNL. Pt states she will call her PCP on Monday to make a f/u appt. Will continue to follow. Facility: [facility]  Transitions of Care Initial Call    Was this an external facility discharge?  No Discharge Facility: n/a    Challenges to be reviewed by the provider   Additional needs identified to be addressed with provider: No  none             Method of communication with provider : none      Advance Care Planning:   Does patient have an Advance Directive: reviewed and current. Was this a readmission? No  Patient stated reason for admission: syncope and collapse  Patients top risk factors for readmission: medical condition-syncope and collapse    Care Transition Nurse (CTN) contacted the patient by telephone to perform post hospital discharge assessment. Verified name and  with patient as identifiers. Provided introduction to self, and explanation of the CTN role. CTN reviewed discharge instructions, medical action plan and red flags with patient who verbalized understanding. Patient given an opportunity to ask questions and does not have any further questions or concerns at this time. Were discharge instructions available to patient? Yes. Reviewed appropriate site of care based on symptoms and resources available to patient including: PCP, Specialist, When to call 911 and [address]. The patient agrees to contact the PCP office for questions related to their healthcare. Medication reconciliation was performed with patient, who verbalizes understanding of administration of home medications. Advised obtaining a 90-day supply of all daily and as-needed medications. Covid Risk Education     Educated patient about risk for severe COVID-19 due to risk factors according to CDC guidelines. LPN CC reviewed discharge instructions, medical action plan and red flag symptoms with the patient who verbalized understanding. Discussed COVID vaccination status: Yes. Education provided on COVID-19 vaccination as appropriate. Discussed exposure protocols and quarantine with CDC Guidelines. Patient was given an opportunity to verbalize any questions and concerns and agrees to contact LPN CC or health care provider for questions related to their healthcare. Reviewed and educated patient on any new and changed medications related to discharge diagnosis. Was patient discharged with a pulse oximeter? No Discussed and confirmed pulse oximeter discharge instructions and when to notify provider or seek emergency care. LPN CC provided contact information. Plan for follow-up call in 3-5 days based on severity of symptoms and risk factors.   Plan for next call: symptom management-syncope  fainting  LOC  follow up appointment-WITH PCP        Non-face-to-face services provided:  Obtained and reviewed discharge summary and/or continuity of care documents    Care Transitions 24 Hour Call    Do you have all of your prescriptions and are they filled?: Yes  Care Transitions Interventions         Follow Up  Future Appointments   Date Time Provider Tez Gonzalez   1/24/2022 11:10 AM Gregg Welsh Neuro [facility]   4/11/2022 11:00 AM Sobeida Jean MD [facility] Judy Arevalo LPN

## 2021-12-27 ENCOUNTER — TELEPHONE (OUTPATIENT)
Dept: FAMILY MEDICINE CLINIC | Age: 81
End: 2021-12-27

## 2021-12-28 ENCOUNTER — CARE COORDINATION (OUTPATIENT)
Dept: CASE MANAGEMENT | Age: 81
End: 2021-12-28

## 2021-12-28 ENCOUNTER — HOSPITAL ENCOUNTER (OUTPATIENT)
Dept: GENERAL RADIOLOGY | Facility: CLINIC | Age: 81
Discharge: HOME | End: 2021-12-30
Payer: MEDICARE

## 2021-12-28 ENCOUNTER — OFFICE VISIT (OUTPATIENT)
Dept: FAMILY MEDICINE CLINIC | Age: 81
End: 2021-12-28
Payer: MEDICARE

## 2021-12-28 VITALS
TEMPERATURE: 97.3 F | OXYGEN SATURATION: 96 % | DIASTOLIC BLOOD PRESSURE: 89 MMHG | BODY MASS INDEX: 32.25 KG/M2 | WEIGHT: 193.8 LBS | SYSTOLIC BLOOD PRESSURE: 138 MMHG | HEART RATE: 85 BPM

## 2021-12-28 DIAGNOSIS — M25.59 PAIN IN OTHER JOINT: Primary | ICD-10-CM

## 2021-12-28 DIAGNOSIS — B34.9 VIRAL INFECTION: ICD-10-CM

## 2021-12-28 DIAGNOSIS — M25.59 PAIN IN OTHER JOINT: ICD-10-CM

## 2021-12-28 PROCEDURE — 99495 TRANSJ CARE MGMT MOD F2F 14D: CPT | Performed by: FAMILY MEDICINE

## 2021-12-28 PROCEDURE — 73130 X-RAY EXAM OF HAND: CPT

## 2021-12-28 PROCEDURE — 1111F DSCHRG MED/CURRENT MED MERGE: CPT | Performed by: FAMILY MEDICINE

## 2021-12-28 RX ORDER — DULOXETIN HYDROCHLORIDE 20 MG/1
20 CAPSULE, DELAYED RELEASE ORAL DAILY
Qty: 30 CAPSULE | Refills: 3 | Status: SHIPPED | OUTPATIENT
Start: 2021-12-28 | End: 2022-03-25

## 2021-12-28 RX ORDER — ACETAMINOPHEN AND CODEINE PHOSPHATE 300; 30 MG/1; MG/1
1 TABLET ORAL EVERY 4 HOURS PRN
Qty: 18 TABLET | Refills: 0 | Status: SHIPPED | OUTPATIENT
Start: 2021-12-28 | End: 2021-12-31

## 2021-12-28 RX ORDER — ALCLOMETASONE DIPROPIONATE 0.5 MG/G
CREAM TOPICAL 2 TIMES DAILY
Qty: 45 G | Refills: 1 | Status: SHIPPED | OUTPATIENT
Start: 2021-12-28 | End: 2022-05-02

## 2021-12-28 ASSESSMENT — PATIENT HEALTH QUESTIONNAIRE - PHQ9
SUM OF ALL RESPONSES TO PHQ9 QUESTIONS 1 & 2: 0
1. LITTLE INTEREST OR PLEASURE IN DOING THINGS: 0
SUM OF ALL RESPONSES TO PHQ QUESTIONS 1-9: 0
2. FEELING DOWN, DEPRESSED OR HOPELESS: 0

## 2021-12-28 NOTE — PROGRESS NOTES
Post-Discharge Transitional Care Management Services or Hospital Follow Up      Nerissa Stephens   YOB: 1940    Date of Office Visit:  12/28/2021  Date of Hospital Admission: 12/21/21  Date of Hospital Discharge: 12/23/21  Risk of hospital readmission (high >=14%. Medium >=10%) :Readmission Risk Score: 9.5 ( )      Care management risk score Rising risk (score 2-5) and Complex Care (Scores >=6): 2     Non face to face  following discharge, date last encounter closed (first attempt may have been earlier): 12/26/2021 10:28 AM    Call initiated 2 business days of discharge: Yes    Patient Active Problem List   Diagnosis    Hypothyroid    Hypercholesteremia    Anxiety    Vitamin D deficiency    RAD (reactive airway disease)    Diverticular disease    Tremor    Gastroparesis    Lung nodule < 6cm on CT    Lateral femoral cutaneous neuropathy    Thyroid nodule    Prediabetes    Bilateral tinnitus    Enthesopathy of hip region    Laryngopharyngeal reflux    Meniere's disease, right ear    Postmenopausal state    Seasonal asthma    Postnasal drip    Parkinson disease ([address]    Bronchitis, mucopurulent recurrent ([address]    Morbidly obese ([address]    Syncope and collapse    Syncope    Elevated troponin due to cardiac contusion with fall/syncope    Orthostatic syncope       Allergies   Allergen Reactions    Aspirin Swelling     Hives. anaphylaxis    Atorvastatin Other (See Comments)     myalgia    Crestor [Rosuvastatin]      Myalgia      Livalo [Pitavastatin] Other (See Comments)     Muscle pain    Seasonal     Simvastatin      Myalgia      Statins      Other reaction(s): Unknown    Welchol [Colesevelam Hcl] Other (See Comments)     Leg cramp       Medications listed as ordered at the time of discharge from hospital     Medication List          Accurate as of December 28, 2021  9:01 AM. If you have any questions, ask your nurse or doctor.             START taking these medications amitriptyline 10 MG tablet  Commonly known as: ELAVIL  TAKE ONE TABLET BY MOUTH DAILY AS NEEDED FOR SLEEP AND LEG PAIN FOR UP TO 30 DAYS     ascorbic acid 500 MG tablet  Commonly known as: VITAMIN C     chlorpheniramine 4 MG tablet  Commonly known as: CHLOR-TRIMETON     dilTIAZem 120 MG extended release capsule  Commonly known as: Cardizem CD  Take 1 capsule by mouth every evening     ECHINACEA EXTRACT PO     esomeprazole Magnesium 40 MG Pack  Commonly known as: NEXIUM     Evolocumab 140 MG/ML Sosy     ezetimibe 10 MG tablet  Commonly known as: ZETIA     famotidine 20 MG tablet  Commonly known as: PEPCID     fluticasone 50 MCG/ACT nasal spray  Commonly known as: FLONASE  PLACE 1 SPRAY IN EACH NOSTRIL DAILY FOR 15 DAYS     ketoconazole 2 % shampoo  Commonly known as: NIZORAL  APPLY  TO AFFECTED AREAS TOPICALLY DAILY THEN RINSE OFF AFTER 5 MINUTES     levothyroxine 75 MCG tablet  Commonly known as: SYNTHROID  TAKE ONE TABLET BY MOUTH DAILY     metFORMIN 500 MG tablet  Commonly known as: GLUCOPHAGE  TAKE ONE TABLET BY MOUTH DAILY     midodrine 5 MG tablet  Commonly known as: PROAMATINE  Take 1 tablet by mouth 2 times daily (with meals)     NONFORMULARY     NONFORMULARY     ondansetron 4 MG disintegrating tablet  Commonly known as: ZOFRAN-ODT  Take 1 tablet by mouth every 8 hours as needed. predniSONE 20 MG tablet  Commonly known as: DELTASONE  4 tabs po daily for 4 days, then 3 po daily for 3 days, then 2 po daily for 2 days, then 1 po daily for 1 day.      Vitamin D3 50 MCG (2000 UT) Caps  Take 1 capsule by mouth 2 times daily           Where to Get Your Medications      These medications were sent to [address], [address] [place] [address], [address] [place] [id number]    Phone: [phone]   · acetaminophen-codeine 300-30 MG per tablet  · alclomethasone 0.05 % cream  · butenafine 1 % Crea  · DULoxetine 20 MG extended release capsule having a lot of joint aches especially in both of her hands but also her knees. The patient did call in the weekend and I called in Tylenol 3 which is helped her. Inpatient course: Discharge summary reviewed- see chart. Interval history/Current status: Good    A comprehensive review of systems was negative except for what was noted in the HPI. Vitals:    12/28/21 0741   BP: 138/89   Pulse: 85   Temp: 97.3 °F (36.3 °C)   SpO2: 96%   Weight: 193 lb 12.8 oz (87.9 kg)     Body mass index is 32.25 kg/m². Wt Readings from Last 3 Encounters:   12/28/21 193 lb 12.8 oz (87.9 kg)   12/22/21 191 lb (86.6 kg)   12/15/21 196 lb (88.9 kg)     BP Readings from Last 3 Encounters:   12/28/21 138/89   12/23/21 124/66   10/11/21 113/78        Physical Exam:  HENT:   /89   Pulse 85   Temp 97.3 °F (36.3 °C)   Wt 193 lb 12.8 oz (87.9 kg)   SpO2 96%   BMI 32.25 kg/m²   Constitutional: Alert and oriented. Well-nourished. Head: Normocephalic and atraumatic. Cardiovascular: Normal rate, regular rhythm, normal heart sounds. Pulmonary/Chest: Effort normal and breath sounds normal. No respiratory distress. She has no wheezes. She has no rales. Musculoskeletal: Normal range of motion. She exhibits no edema or tenderness. Lymphadenopathy:    She has no cervical adenopathy. Neurological:  She is alert and oriented to person, place, and time. Cranial nerves grossly intact. No sensation problem noted. Muscle strength 5/5 throughout. Skin: Skin is warm and dry. No rash noted. No erythema. Patches throughout her body. Psychiatric:  She has a normal mood and affect. Behavior is normal.    Neil Tam was seen today for follow-up from hospital.    Diagnoses and all orders for this visit:    Pain in other joint  -     acetaminophen-codeine (TYLENOL/CODEINE #3) 300-30 MG per tablet; Take 1 tablet by mouth every 4 hours as needed for Pain for up to 3 days. Intended supply: 3 days.  Take lowest dose possible to manage pain  -     XR HAND LEFT (MIN 3 VIEWS); Future  -     XR HAND RIGHT (MIN 3 VIEWS); Future  -     PA DISCHARGE MEDS RECONCILED W/ CURRENT OUTPATIENT MED LIST    Viral infection  -     PA DISCHARGE MEDS RECONCILED W/ CURRENT OUTPATIENT MED LIST    Other orders  -     DULoxetine (CYMBALTA) 20 MG extended release capsule; Take 1 capsule by mouth daily  -     alclomethasone (ACLOVATE) 0.05 % cream; Apply topically 2 times daily Apply topically 2 times daily. -     butenafine (MENTAX) 1 % CREA; Apply 1 Tube topically 2 times daily as needed (skin irritation)    I believe that the rash is related to a viral infection. Patient was negative for COVID-19. She will continue current medication including Tylenol 3. X-rays ordered for both hands due to acute pain. Also will start on low-dose of Cymbalta for the joint aches. Patient will call for any changes. Call or return to clinic prn if these symptoms worsen or fail to improve as anticipated. I have reviewed the instructions with the patient, answering all questions to her and her nieces satisfaction.       Medical Decision Making: moderate complexity     (Please note that portions of this note were completed with a voice recognition program. Efforts were made to edit the dictations but occasionally words are mis-transcribed.)

## 2021-12-28 NOTE — CARE COORDINATION
[facility] Transitions Follow Up Call    2021    Patient: Morena Heredia  Patient : 1940   MRN: <[id number]>  Reason for Admission: SYNCOPE AND COLLAPSE  Discharge Date: 21 RARS: Readmission Risk Score: 9.5 ( )         Spoke with: Subsequent call. No answer. Left HIPPA compliant message to return call to this writer. Pt was seen by Dr Timi Navarro today and also roman X rays. Care Transitions Subsequent and Final Call    Subsequent and Final Calls  Care Transitions Interventions  Other Interventions:            Follow Up  Future Appointments   Date Time Provider Tez Gonzalez   2022 11:00 AM Yogesh Ramirez Neuro [facility]   2022 11:00 AM James Baker MD [address] Care Transitions Nurse   [phone]

## 2021-12-30 ENCOUNTER — PATIENT MESSAGE (OUTPATIENT)
Dept: FAMILY MEDICINE CLINIC | Age: 81
End: 2021-12-30

## 2022-01-03 NOTE — TELEPHONE ENCOUNTER
From: Devora Marinelli  To: Dr. Gordon Jama  Sent: 12/30/2021 4:14 PM EST  Subject: My follow up per your orders    Dr. Marco Nevarez.  Again many thanks for asssisting me so well in my current health situation. Just wanted you to know that I have a Jan. 11 with Dr. Andrea Davis, Cardiologist. And I have a phone call in as of yesterday (12/29) to Dr. Niru Yañez, [facility] doc to inquire about calcium (per your suggestion) having relux disease. Also telling him that I'm getting food and pills caught in my throat. Just had the stretching procedure done recently and the getting stuck is happening again. Started at [address] (12/22) with a piece of chicken and a large vitamin. At one time I was taking calcium and am not currently. Steroids and pain meds are helping me to function. Steroids have me hyper, which is okay. Will finish them next year, Jan. 2nd.   Gratitude and blessings to you and yours,  Marcy Britt

## 2022-01-07 ENCOUNTER — CARE COORDINATION (OUTPATIENT)
Dept: CASE MANAGEMENT | Age: 82
End: 2022-01-07

## 2022-01-07 NOTE — CARE COORDINATION
[facility] Transitions Follow Up Call    2022    Patient: Duane Tang  Patient : 1940   MRN: <[id number]>  Reason for Admission: Syncope and collapse   Discharge Date: 21 RARS: Readmission Risk Score: 9.5 ( )         Spoke with: subsequent call. Spoke to Kurtis. Kurtis states continues to have muscle and joint pain. Pt. States she thinks she has Parkinson's, RA or Fibromyalgia. No episodes of dizziness or falling. Appetite is good. No use of walker or cane. Back to driving. Discussed f/u appt with neurology on 22. Sees Dr. Sarah Duke 22. Pt. Was seen by PCP on 21. Will continue to follow. Care Transitions Follow Up Call    Needs to be reviewed by the provider   Additional needs identified to be addressed with provider: No  none             Method of communication with provider : none      Care Transition Nurse (CTN) contacted the patient by telephone to follow up after admission on 21   Verified name and  with patient as identifiers. Addressed changes since last contact: none  Discussed follow-up appointments. If no appointment was previously scheduled, appointment scheduling offered: Yes. Is follow up appointment scheduled within 7 days of discharge? Yes. Advance Care Planning:   Does patient have an Advance Directive: reviewed and current. CTN reviewed discharge instructions, medical action plan and red flags with patient and discussed any barriers to care and/or understanding of plan of care after discharge. Discussed appropriate site of care based on symptoms and resources available to patient including: PCP, Specialist, When to call 911 and [address]. The patient agrees to contact the PCP office for questions related to their healthcare.      Patients top risk factors for readmission: medical condition-syncope and collapse  Interventions to address risk factors: Obtained and reviewed discharge summary and/or continuity of care documents      Non-BS follow up appointment(s): Dr Selena Silva      CTN provided contact information for future needs. Plan for follow-up call in 7-10 days based on severity of symptoms and risk factors. Plan for next call: symptom management-syncope and collapse    follow up appointment-with Dr Kita Jamil and Final Call    Subsequent and Final Calls  Care Transitions Interventions  Other Interventions:            Follow Up  Future Appointments   Date Time Provider Tez Gonzalez   1/24/2022 11:00 AM Yogesh Perez Barley Neuro [facility]   4/11/2022 11:00 AM Cash Toribio MD [facility] [address] [facility], [address] [facility] Care Transitions Nurse   [phone]

## 2022-01-14 ENCOUNTER — CARE COORDINATION (OUTPATIENT)
Dept: CASE MANAGEMENT | Age: 82
End: 2022-01-14

## 2022-01-14 NOTE — CARE COORDINATION
[facility] Transitions Follow Up Call    2022    Patient: Chelsea Javier  Patient : 1940   MRN: <[id number]>  Reason for Admission: syncope and collapse  Discharge Date: 21 RARS: Readmission Risk Score: 9.5 ( )         Spoke with: subsequent call. No answer. Left HIPPA Compliant message to return call to this writer. Care Transitions Subsequent and Final Call    Subsequent and Final Calls  Care Transitions Interventions  Other Interventions:            Follow Up  Future Appointments   Date Time Provider Tez Gonzalez   2022 11:00 AM Jani Monet MD [address] [place] [address] Care Transitions Nurse   [phone]

## 2022-01-19 ENCOUNTER — HOSPITAL ENCOUNTER (OUTPATIENT)
Age: 82
Setting detail: SPECIMEN
Discharge: HOME | End: 2022-01-19

## 2022-01-19 DIAGNOSIS — L30.9 DERMATITIS: ICD-10-CM

## 2022-01-19 LAB
ALT SERPL-CCNC: 19 U/L (ref 5–33)
AST SERPL-CCNC: 18 U/L
CHOLESTEROL, FASTING: 175 MG/DL
CHOLESTEROL/HDL RATIO: 2.6
ESTIMATED AVERAGE GLUCOSE: 120 MG/DL
FOLATE: 15.6 NG/ML
HBA1C MFR BLD: 5.8 % (ref 4–6)
HDLC SERPL-MCNC: 67 MG/DL
LDL CHOLESTEROL: 90 MG/DL (ref 0–130)
SEDIMENTATION RATE, ERYTHROCYTE: 8 MM (ref 0–30)
TOTAL CK: 55 U/L (ref 26–192)
TRIGLYCERIDE, FASTING: 91 MG/DL
TSH SERPL DL<=0.05 MIU/L-ACNC: 1.24 MIU/L (ref 0.3–5)
VITAMIN B-12: 475 PG/ML (ref 232–1245)
VITAMIN B-12: 475 PG/ML (ref 232–1245)
VLDLC SERPL CALC-MCNC: NORMAL MG/DL (ref 1–30)

## 2022-01-20 LAB
ANTI DNA DOUBLE STRANDED: <0.5 IU/ML
ANTI-NUCLEAR ANTIBODY (ANA): NEGATIVE
ENA ANTIBODIES SCREEN: 0.2 U/ML
RHEUMATOID FACTOR: <10 IU/ML

## 2022-01-21 ENCOUNTER — CARE COORDINATION (OUTPATIENT)
Dept: CASE MANAGEMENT | Age: 82
End: 2022-01-21

## 2022-01-21 PROBLEM — R79.89 ELEVATED TROPONIN: Status: RESOLVED | Noted: 2021-12-22 | Resolved: 2022-01-21

## 2022-01-21 PROBLEM — R77.8 ELEVATED TROPONIN: Status: RESOLVED | Noted: 2021-12-22 | Resolved: 2022-01-21

## 2022-01-21 NOTE — CARE COORDINATION
[facility] Transitions Follow Up Call    2022    Patient: Laz Cabrera  Patient : 1940   MRN: <[id number]>  Reason for Admission: Syncope and collapse   Discharge Date: 21 RARS: Readmission Risk Score: 9.5 ( )         Spoke with: subsequent call. Spoke to Kurtis. Pt has not had any further syncopal episodes. occasional vertigo. Appetite is good. Taking all medications as directed. Pt. States she thought she had RA but tests were negative. Seen by PCP on 21. Kurtis states she has seen Dr Maria Luz Martinez cardiology and  Neurology Dr Carter Child. Discussed next appt with  PCP on 22. Pt verbalized understanding. Patient is aware of when to contact MD with any new or worsening symptoms. Advised to contact PCP  with any health concerns for early outpatient intervention in an effort to avoid hospitalization. Report any worsening symptoms to PCP and/or Call 911 and/or GO TO EMERGENCY ROOM if symptoms are severe. Expresses understanding. Final call. Care Transitions Follow Up Call    Needs to be reviewed by the provider   Additional needs identified to be addressed with provider: No  none             Method of communication with provider : none      Care Transition Nurse (CTN) contacted the patient by telephone to follow up after admission on 21Verified name and  with patient as identifiers. Addressed changes since last contact: none  Discussed follow-up appointments. If no appointment was previously scheduled, appointment scheduling offered: NO  Is follow up appointment scheduled within 7 days of discharge? Yes. Advance Care Planning:   Does patient have an Advance Directive: reviewed and current. CTN reviewed discharge instructions, medical action plan and red flags with patient and discussed any barriers to care and/or understanding of plan of care after discharge.  Discussed appropriate site of care based on symptoms and resources available to patient including: PCP, Specialist, When to call 911 and [address]. The patient agrees to contact the PCP office for questions related to their healthcare. Patients top risk factors for readmission: medical condition-SYNCOPE AND COLLAPSE  Interventions to address risk factors: Obtained and reviewed discharge summary and/or continuity of care documents      Non-[facility] follow up appointment(s): N/A    CTN provided contact information for future needs. No further follow-up call indicated based on severity of symptoms and risk factors. Plan for next call: N/A          Care Transitions Subsequent and Final Call    Subsequent and Final Calls  Do you have any ongoing symptoms?: Yes  Patient-reported symptoms: Pain  Have your medications changed?: No  Do you have any questions related to your medications?: No  Do you currently have any active services?: No  Do you have any needs or concerns that I can assist you with?: No  Identified Barriers: None  Care Transitions Interventions  No Identified Needs  Other Interventions:            Follow Up  Future Appointments   Date Time Provider Tez Gonzalez   2/1/2022 11:00 AM Mariangel Parker MD [facility]   4/11/2022 11:00 AM Mariangel Parker MD [address] [place] [address], [address] [facility] Care Transitions Nurse   [phone]

## 2022-01-24 RX ORDER — LEVOTHYROXINE SODIUM 0.07 MG/1
TABLET ORAL
Qty: 90 TABLET | Refills: 0 | Status: SHIPPED | OUTPATIENT
Start: 2022-01-24 | End: 2022-04-25 | Stop reason: SDUPTHER

## 2022-01-24 NOTE — TELEPHONE ENCOUNTER
Devora Marinelli is calling to request a refill on the following medication(s):    Last Visit Date (If Applicable):  29/91/1611    Next Visit Date:    2/1/2022    Medication Request:  Requested Prescriptions     Pending Prescriptions Disp Refills    levothyroxine (SYNTHROID) 75 MCG tablet [Pharmacy Med Name: LEVOTHYROXINE 75 MCG TABLET] 90 tablet 0     Sig: TAKE ONE TABLET BY MOUTH DAILY

## 2022-01-26 LAB — VITAMIN B6: 88.3 NMOL/L (ref 20–125)

## 2022-02-01 ENCOUNTER — OFFICE VISIT (OUTPATIENT)
Dept: FAMILY MEDICINE CLINIC | Age: 82
End: 2022-02-01
Payer: MEDICARE

## 2022-02-01 VITALS
WEIGHT: 188.6 LBS | OXYGEN SATURATION: 97 % | SYSTOLIC BLOOD PRESSURE: 138 MMHG | BODY MASS INDEX: 31.38 KG/M2 | TEMPERATURE: 97.2 F | HEART RATE: 88 BPM | DIASTOLIC BLOOD PRESSURE: 87 MMHG

## 2022-02-01 DIAGNOSIS — B09 VIRAL RASH: ICD-10-CM

## 2022-02-01 DIAGNOSIS — G20 PARKINSON'S DISEASE (HCC): ICD-10-CM

## 2022-02-01 DIAGNOSIS — J41.1 BRONCHITIS, MUCOPURULENT RECURRENT (HCC): ICD-10-CM

## 2022-02-01 DIAGNOSIS — H81.10 BENIGN PAROXYSMAL POSITIONAL VERTIGO, UNSPECIFIED LATERALITY: ICD-10-CM

## 2022-02-01 DIAGNOSIS — R52 TINGLING PAIN: Primary | ICD-10-CM

## 2022-02-01 PROCEDURE — 99214 OFFICE O/P EST MOD 30 MIN: CPT | Performed by: FAMILY MEDICINE

## 2022-02-01 PROCEDURE — 1090F PRES/ABSN URINE INCON ASSESS: CPT | Performed by: FAMILY MEDICINE

## 2022-02-01 PROCEDURE — G8484 FLU IMMUNIZE NO ADMIN: HCPCS | Performed by: FAMILY MEDICINE

## 2022-02-01 PROCEDURE — G8399 PT W/DXA RESULTS DOCUMENT: HCPCS | Performed by: FAMILY MEDICINE

## 2022-02-01 PROCEDURE — 3023F SPIROM DOC REV: CPT | Performed by: FAMILY MEDICINE

## 2022-02-01 PROCEDURE — G8427 DOCREV CUR MEDS BY ELIG CLIN: HCPCS | Performed by: FAMILY MEDICINE

## 2022-02-01 PROCEDURE — 4040F PNEUMOC VAC/ADMIN/RCVD: CPT | Performed by: FAMILY MEDICINE

## 2022-02-01 PROCEDURE — 1123F ACP DISCUSS/DSCN MKR DOCD: CPT | Performed by: FAMILY MEDICINE

## 2022-02-01 PROCEDURE — 1036F TOBACCO NON-USER: CPT | Performed by: FAMILY MEDICINE

## 2022-02-01 PROCEDURE — G8417 CALC BMI ABV UP PARAM F/U: HCPCS | Performed by: FAMILY MEDICINE

## 2022-02-01 ASSESSMENT — PATIENT HEALTH QUESTIONNAIRE - PHQ9
SUM OF ALL RESPONSES TO PHQ QUESTIONS 1-9: 0
2. FEELING DOWN, DEPRESSED OR HOPELESS: 0
SUM OF ALL RESPONSES TO PHQ9 QUESTIONS 1 & 2: 0
1. LITTLE INTEREST OR PLEASURE IN DOING THINGS: 0
SUM OF ALL RESPONSES TO PHQ QUESTIONS 1-9: 0

## 2022-02-01 NOTE — PROGRESS NOTES
General FM note    Merissa Cuevas is a 80 y.o. female who presents today for follow up on her  medical conditions as noted below.   Merissa Cuevas is c/o of   Chief Complaint   Patient presents with    Muscle Pain     both arms and legs    Joint Pain    Chills    Rash     viral rash on arms, thighs       Patient Active Problem List:     Hypothyroid     Hypercholesteremia     Anxiety     Vitamin D deficiency     RAD (reactive airway disease)     Diverticular disease     Tremor     Gastroparesis     Lung nodule < 6cm on CT     Lateral femoral cutaneous neuropathy     Thyroid nodule     Prediabetes     Bilateral tinnitus     Enthesopathy of hip region     Laryngopharyngeal reflux     Meniere's disease, right ear     Postmenopausal state     Seasonal asthma     Postnasal drip     Parkinson disease (HCC)     Bronchitis, mucopurulent recurrent (HCC)     Morbidly obese (HCC)     Syncope and collapse     Syncope     Orthostatic syncope     Past Medical History:   Diagnosis Date    Acid reflux     Anxiety 6/30/2014    ALICIA-7   09/09/19 : 4    Bilateral tinnitus 12/10/2019    Bronchitis, mucopurulent recurrent ([address] 7/23/2020    Diverticular disease 6/30/2014    Enthesopathy of hip region 12/10/2019    GERD (gastroesophageal reflux disease)     Hypercholesteremia 6/30/2014    Hypothyroid 6/30/2014    Laryngopharyngeal reflux 12/10/2019    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    Lung nodule     Meniere disease     right ear    Meniere's disease, right ear 12/10/2019    Morbidly obese ([address] 7/23/2020    Neuropathy     Osteopenia 6/30/2014    Parkinson disease ([address] 7/23/2020    Postmenopausal state 12/10/2019    Postnasal drip 7/11/2020    Prediabetes     RAD (reactive airway disease) 6/30/2014    Seasonal asthma 7/11/2020    Thyroid nodule 3/1/2016    Tremor     r hand    Vitamin D deficiency 6/30/2014      Past Surgical History:   Procedure Laterality Date    CHOLECYSTECTOMY      COLONOSCOPY      ENDOSCOPY, COLON, DIAGNOSTIC      ERCP  03/21/14    EXCISION / BIOPSY SKIN LESION OF TRUNK Left 11/8/2017    EXCISION MASS LEFT POSTERIOR SHOULDER, LEFT ANTERIOR ARM performed by Zenon Carnes MD at [address] [place] [address]  01/03/2007    SKIN BIOPSY Left 11/08/2017    Excision Mass Left Posterior Shoulder, Left Anterior Arm     Family History   Problem Relation Age of Onset    Cancer Mother 48        pineal gland/throat    Cancer Father 64        colon   Gaylin Mo Cancer Brother         lung    Other Brother         pulmonary embolus    Heart Disease Paternal Grandfather         myocarditis    Stroke Maternal Aunt     Cancer Maternal Uncle     Stroke Maternal Grandmother     Diabetes Brother     Heart Disease Paternal Uncle 67        mi    Heart Disease Brother 58        mi    High Blood Pressure Brother     Heart Disease Brother 79        stents for cad    High Blood Pressure Brother      Current Outpatient Medications   Medication Sig Dispense Refill    levothyroxine (SYNTHROID) 75 MCG tablet TAKE ONE TABLET BY MOUTH DAILY 90 tablet 0    DULoxetine (CYMBALTA) 20 MG extended release capsule Take 1 capsule by mouth daily 30 capsule 3    alclomethasone (ACLOVATE) 0.05 % cream Apply topically 2 times daily Apply topically 2 times daily. 45 g 1    butenafine (MENTAX) 1 % CREA Apply 1 Tube topically 2 times daily as needed (skin irritation) 30 g 1    midodrine (PROAMATINE) 5 MG tablet Take 1 tablet by mouth 2 times daily (with meals) 90 tablet 3    metFORMIN (GLUCOPHAGE) 500 MG tablet TAKE ONE TABLET BY MOUTH DAILY 90 tablet 1    amitriptyline (ELAVIL) 10 MG tablet TAKE ONE TABLET BY MOUTH DAILY AS NEEDED FOR SLEEP AND LEG PAIN FOR UP TO 30 DAYS 30 tablet 3    ondansetron (ZOFRAN-ODT) 4 MG disintegrating tablet Take 1 tablet by mouth every 8 hours as needed.  20 tablet 3    Evolocumab 140 MG/ML SOSY Inject into the skin Next dose due 12/27/21      famotidine (PEPCID) 20 MG tablet as needed       albuterol sulfate HFA (VENTOLIN HFA) 108 (90 Base) MCG/ACT inhaler Inhale 2 puffs into the lungs 4 times daily as needed for Wheezing 3 Inhaler 1    guaiFENesin (MUCINEX) 600 MG extended release tablet Take 1 tablet by mouth 2 times daily (Patient taking differently: Take 600 mg by mouth 2 times daily as needed ) 20 tablet 0    NONFORMULARY Indications: Dietary fiber 1 tsp a day       Cholecalciferol (VITAMIN D3) 2000 units CAPS Take 1 capsule by mouth 2 times daily 30 capsule 0    diltiazem (CARDIZEM CD) 120 MG extended release capsule Take 1 capsule by mouth every evening 30 capsule 3    fluticasone (FLONASE) 50 MCG/ACT nasal spray PLACE 1 SPRAY IN EACH NOSTRIL DAILY FOR 15 DAYS 1 Bottle 2    ketoconazole (NIZORAL) 2 % shampoo APPLY  TO AFFECTED AREAS TOPICALLY DAILY THEN RINSE OFF AFTER 5 MINUTES 120 mL 2    ezetimibe (ZETIA) 10 MG tablet Take 10 mg by mouth daily      esomeprazole Magnesium (NEXIUM) 40 MG PACK Take 40 mg by mouth daily.  NONFORMULARY Super b complex 3 times per week      ascorbic acid (VITAMIN C) 500 MG tablet Take 500 mg by mouth daily.  chlorpheniramine (CHLOR-TRIMETON) 4 MG tablet Take 12 mg by mouth daily       budesonide-formoterol (SYMBICORT) 80-4.5 MCG/ACT AERO Inhale 2 puffs into the lungs 2 times daily (Patient taking differently: Inhale 2 puffs into the lungs as needed ) 1 Inhaler 6    ECHINACEA EXTRACT PO Take 450 mg by mouth Indications: Take in winter WINTER ONLY (Patient not taking: Reported on 12/26/2021)       No current facility-administered medications for this visit. Facility-Administered Medications Ordered in Other Visits   Medication Dose Route Frequency Provider Last Rate Last Admin    0.9 % sodium chloride infusion   IntraVENous Continuous Skylar Rehman  mL/hr at 02/19/14 0827 New Bag at 02/19/14 0827     ALLERGIES:    Allergies   Allergen Reactions    Aspirin Swelling     Hives.  anaphylaxis    Atorvastatin Other (See Comments)     myalgia    Crestor [Rosuvastatin]      Myalgia      Livalo [Pitavastatin] Other (See Comments)     Muscle pain    Seasonal     Simvastatin      Myalgia      Statins      Other reaction(s): Unknown    Welchol [Colesevelam Hcl] Other (See Comments)     Leg cramp       Social History     Tobacco Use    Smoking status: Never Smoker    Smokeless tobacco: Never Used   Substance Use Topics    Alcohol use: Yes     Comment: rare      Body mass index is 31.38 kg/m². /87   Pulse 88   Temp 97.2 °F (36.2 °C)   Wt 188 lb 9.6 oz (85.5 kg)   SpO2 97%   BMI 31.38 kg/m²     Subjective:      HPI    80 y.o. female coming today for follow-up. Patient was seen on 12/21/2021 in the hospital due to viral infection. She has followed up since then with me here in the office. She states that she still has some muscle aches. She states that she still has this rash especially at her forearms. She also has pruritus. But she has chronic hives so she is on and antihistamine: Which has helped the rash. And she was told that the rash is probably viral.  The patient has lost weight. She did lose quite some weight with diet and Weight Watchers at the end of last year. And then lost more weight during his sickness. Patient also states that she did see the neurologist and also her current he did was normal.  But the patient feels that there is something wrong she still has some tremor but she still has especially at night and is tingling sensation at her next. And she states that she also had tingling in her fingertips at times. She is very concerned that she might have late onset MS. She would like to see another neurologist for second opinion. The patient also would like a referral to a physical therapist for the Epley maneuver.   Patient states that she did have a dental procedure done where she was laying in a chair of the dentist for multiple hrs then she got up and she started to have vertigo. Review of Systems   Constitutional: Negative for fever and unexpected weight change. Pertinent items are noted in HPI. Objective:   Physical Exam  Constitutional: VS (see above). General appearance: normal development, habitus and attention, no deformities. No distress. Eyes: normal conjunctiva and lids. CAV: RRR, no RMG. No edema lower extremities. Pulmo: CTA bilateral, no CWR. Skin: no rashes, lesions or ulcers. Musculoskeletal: normal gait. Nails: no clubbing or cyanosis. Tremor present. Psychiatric: alert and oriented to place, time and person. Normal mood and affect. Assessment:       Diagnosis Orders   1. Tingling pain  SUSY Schroeder MD, Neurology, [facility]   2. Parkinson's disease ([facility]  Marcus Soriano MD, Neurology, [facility]   3. Bronchitis, mucopurulent recurrent ([facility]     4. Benign paroxysmal positional vertigo, unspecified laterality  External Referral To Physical Therapy   5. Viral rash         Plan:   Follow-up with the specialist.  Take 5 tabs of the steroid/1 tab of steroids 1 a day for 5 days. See if this helps the rash. Follow-up with PT. Call for any changes. I discussed with her that the muscle aches could be due to a viral infection but also the Repatha which has been used for her lipids could induce muscle aches/4%. She will pay more attention when she gets the next injection. Return in about 6 months (around 8/1/2022), or if symptoms worsen or fail to improve.   Orders Placed This Encounter   Procedures   Yves Encarnacion MD, Neurology, [facility]     Referral Priority:   Routine     Referral Type:   Eval and Treat     Referral Reason:   Specialty Services Required     Referred to Provider:   Elicia Cazares MD     Requested Specialty:   Neurology     Number of Visits Requested:   1    External Referral To Physical Therapy     Referral Priority:   Routine     Referral Type:   Eval and Treat     Referral Reason:   Specialty Services Required Requested Specialty:   Physical Therapy     Number of Visits Requested:   1     No orders of the defined types were placed in this encounter. Call or return to clinic prn if these symptoms worsen or fail to improve as anticipated. I have reviewed the instructions with the patient, answering all questions to patient's satisfaction. Abby Toscano received counseling on the following healthy behaviors: nutrition, exercise, and medication adherence  Reviewed prior labs and health maintenance. Continue current medications, diet and exercise. Discussed use, benefit, and side effects of prescribed medications. Barriers to medication compliance addressed. Patient given educational materials - see patient instructions. All patient questions answered. Patient voiced understanding.       Electronically signed by Grant Mark MD on 2/1/2022 at 12:24 PM       (Please note that portions of this note were completed with a voice recognition program. Efforts were made to edit the dictations but occasionally words are mis-transcribed.)

## 2022-03-13 ENCOUNTER — PATIENT MESSAGE (OUTPATIENT)
Dept: FAMILY MEDICINE CLINIC | Age: 82
End: 2022-03-13

## 2022-03-14 NOTE — TELEPHONE ENCOUNTER
From: Cha Acosta  To: Dr. Harleen Alonso  Sent: 3/13/2022 11:37 AM EDT  Subject: Further follow up? By who? Dr. Simone Aguero,  I hope this message finds you well. The diagnoses from the neurologist is neuropathy of both legs. However I still have muscle pain that comes and goes along with the neuropathy. So this all began on 12/21/21 with fainting (heart enzymes were up) sore throat, body rash and pain (nerves and muscles) in my arms and legs. which put me in the hospital for 3 nights and 2 days. Do I stop here with trying to find out what's wrong? or do I see someone else? Is there someone to see for muscle pain in the arms and legs? Heart situation seems to be taken care of. Whatever this is, it has set me back in terms of energy and what I can and cannot do.   Many thanks,  Shantelle Anderson

## 2022-03-21 ENCOUNTER — HOSPITAL ENCOUNTER (OUTPATIENT)
Age: 82
Setting detail: SPECIMEN
Discharge: HOME | End: 2022-03-21

## 2022-03-21 LAB
T3 FREE: 2.78 PG/ML (ref 2.02–4.43)
THYROXINE, FREE: 1.24 NG/DL (ref 0.93–1.7)
TSH SERPL DL<=0.05 MIU/L-ACNC: 3.04 MIU/L (ref 0.3–5)

## 2022-03-25 ENCOUNTER — TELEPHONE (OUTPATIENT)
Dept: FAMILY MEDICINE CLINIC | Age: 82
End: 2022-03-25

## 2022-04-25 RX ORDER — LEVOTHYROXINE SODIUM 0.07 MG/1
75 TABLET ORAL DAILY
Qty: 90 TABLET | Refills: 1 | Status: SHIPPED | OUTPATIENT
Start: 2022-04-25 | End: 2022-10-26

## 2022-04-25 RX ORDER — LEVOTHYROXINE SODIUM 0.07 MG/1
TABLET ORAL
Qty: 90 TABLET | Refills: 0 | OUTPATIENT
Start: 2022-04-25

## 2022-04-25 NOTE — TELEPHONE ENCOUNTER
----- Message from Jensen Damian sent at 4/22/2022  3:11 PM EDT -----  Subject: Message to Provider    QUESTIONS  Information for Provider? Saw her dermatologist Dr Neumann Back this morning   for moles on her back but also noticed last week a bump on the top on her   head about inch long (it is not round) It is on the top of her forehead   and the sides are soft . There is no drainage or redness No pain but is   tender to touch at times   ---------------------------------------------------------------------------  --------------  CALL BACK INFO  What is the best way for the office to contact you? OK to leave message on   voicemail  Preferred Call Back Phone Number? [phone]  ---------------------------------------------------------------------------  --------------  SCRIPT ANSWERS  Relationship to Patient? Self  Are you having swelling in your throat or face? No  Are you having difficulty breathing? No  Have the symptoms worsened or spread in the last day? No  Are you having fevers (100.4), chills or sweats? No  Have you recently (14 days) seen a provider for this issue?  Yes

## 2022-04-26 ENCOUNTER — TELEPHONE (OUTPATIENT)
Dept: FAMILY MEDICINE CLINIC | Age: 82
End: 2022-04-26

## 2022-04-27 ENCOUNTER — PATIENT MESSAGE (OUTPATIENT)
Dept: FAMILY MEDICINE CLINIC | Age: 82
End: 2022-04-27

## 2022-04-27 RX ORDER — CLOBETASOL PROPIONATE 0.46 MG/ML
SOLUTION TOPICAL
Qty: 50 ML | Refills: 2 | Status: SHIPPED | OUTPATIENT
Start: 2022-04-27 | End: 2022-05-02

## 2022-04-27 NOTE — TELEPHONE ENCOUNTER
From: Humble Appiah  To: Dr. Lizzie Linares  Sent: 2022 9:25 AM EDT  Subject: Need a prescription please    Dr. Patrina Soulier,    Can you please submit a request to my pharmacy ([address] on [address] [place] [address]. and [address] for  Clobetasol topical solution? It's a prescription I've had in the past.  I use it for a dry itchy scalp. Haven't had to use it in awhile and the bottle I have is .   Many thanks,  Jeannette Mcgregor

## 2022-04-27 NOTE — TELEPHONE ENCOUNTER
From: Archie Seen  To: Dr. Bjorn Witt  Sent: 4/27/2022 4:34 PM EDT  Subject: Dr. Leeann Moritz    thank you so very much for forwarding a script for  Clobetasol, a topical clear liquid med for dry itchy scalp. The pharmacy ([facility]) called me to say that the  doesn't make it anymore. Can you please prescribe something similar? Thank you very much!     Kevyn Duke  [phone]

## 2022-04-28 ENCOUNTER — TELEPHONE (OUTPATIENT)
Dept: FAMILY MEDICINE CLINIC | Age: 82
End: 2022-04-28

## 2022-04-28 ENCOUNTER — HOSPITAL ENCOUNTER (OUTPATIENT)
Dept: GENERAL RADIOLOGY | Facility: CLINIC | Age: 82
Discharge: HOME | End: 2022-04-30
Payer: MEDICARE

## 2022-04-28 ENCOUNTER — OFFICE VISIT (OUTPATIENT)
Dept: FAMILY MEDICINE CLINIC | Age: 82
End: 2022-04-28
Payer: MEDICARE

## 2022-04-28 VITALS
SYSTOLIC BLOOD PRESSURE: 137 MMHG | DIASTOLIC BLOOD PRESSURE: 84 MMHG | WEIGHT: 204.4 LBS | HEART RATE: 65 BPM | BODY MASS INDEX: 34.01 KG/M2 | TEMPERATURE: 96.7 F | OXYGEN SATURATION: 100 %

## 2022-04-28 DIAGNOSIS — R22.0 HEAD LUMP: Primary | ICD-10-CM

## 2022-04-28 DIAGNOSIS — R22.0 HEAD LUMP: ICD-10-CM

## 2022-04-28 PROCEDURE — 1123F ACP DISCUSS/DSCN MKR DOCD: CPT | Performed by: FAMILY MEDICINE

## 2022-04-28 PROCEDURE — G8399 PT W/DXA RESULTS DOCUMENT: HCPCS | Performed by: FAMILY MEDICINE

## 2022-04-28 PROCEDURE — 4040F PNEUMOC VAC/ADMIN/RCVD: CPT | Performed by: FAMILY MEDICINE

## 2022-04-28 PROCEDURE — 99213 OFFICE O/P EST LOW 20 MIN: CPT | Performed by: FAMILY MEDICINE

## 2022-04-28 PROCEDURE — G8417 CALC BMI ABV UP PARAM F/U: HCPCS | Performed by: FAMILY MEDICINE

## 2022-04-28 PROCEDURE — G8427 DOCREV CUR MEDS BY ELIG CLIN: HCPCS | Performed by: FAMILY MEDICINE

## 2022-04-28 PROCEDURE — 1090F PRES/ABSN URINE INCON ASSESS: CPT | Performed by: FAMILY MEDICINE

## 2022-04-28 PROCEDURE — 70260 X-RAY EXAM OF SKULL: CPT

## 2022-04-28 PROCEDURE — 1036F TOBACCO NON-USER: CPT | Performed by: FAMILY MEDICINE

## 2022-04-28 NOTE — PROGRESS NOTES
General FM note    Jere Wayne is a 80 y.o. female who presents today for follow up on her  medical conditions as noted below.   Jere Wayne is c/o of   Chief Complaint   Patient presents with    Mass     head       Patient Active Problem List:     Hypothyroid     Hypercholesteremia     Anxiety     Vitamin D deficiency     RAD (reactive airway disease)     Diverticular disease     Tremor     Gastroparesis     Lung nodule < 6cm on CT     Lateral femoral cutaneous neuropathy     Thyroid nodule     Prediabetes     Bilateral tinnitus     Enthesopathy of hip region     Laryngopharyngeal reflux     Meniere's disease, right ear     Postmenopausal state     Seasonal asthma     Postnasal drip     Parkinson disease ([address]     Bronchitis, mucopurulent recurrent ([address]     Morbidly obese ([address]     Syncope and collapse     Syncope     Orthostatic syncope     Past Medical History:   Diagnosis Date    Acid reflux     Anxiety 6/30/2014    ALICIA-7   09/09/19 : 4    Bilateral tinnitus 12/10/2019    Bronchitis, mucopurulent recurrent ([address] 7/23/2020    Diverticular disease 6/30/2014    Enthesopathy of hip region 12/10/2019    GERD (gastroesophageal reflux disease)     Hypercholesteremia 6/30/2014    Hypothyroid 6/30/2014    Laryngopharyngeal reflux 12/10/2019    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    Lung nodule     Meniere disease     right ear    Meniere's disease, right ear 12/10/2019    Morbidly obese ([address] 7/23/2020    Neuropathy     Osteopenia 6/30/2014    Parkinson disease ([address] 7/23/2020    Postmenopausal state 12/10/2019    Postnasal drip 7/11/2020    Prediabetes     RAD (reactive airway disease) 6/30/2014    Seasonal asthma 7/11/2020    Thyroid nodule 3/1/2016    Tremor     r hand    Vitamin D deficiency 6/30/2014      Past Surgical History:   Procedure Laterality Date    CHOLECYSTECTOMY      COLONOSCOPY      ENDOSCOPY, COLON, DIAGNOSTIC      ERCP  03/21/14    EXCISION / BIOPSY SKIN LESION OF TRUNK Left 11/8/2017    EXCISION MASS LEFT POSTERIOR SHOULDER, LEFT ANTERIOR ARM performed by Duran Hui MD at [address]  01/03/2007    SKIN BIOPSY Left 11/08/2017    Excision Mass Left Posterior Shoulder, Left Anterior Arm     Family History   Problem Relation Age of Onset    Cancer Mother 48        pineal gland/throat    Cancer Father 64        colon   Rosado Cancer Brother         lung    Other Brother         pulmonary embolus    Heart Disease Paternal Grandfather         myocarditis    Stroke Maternal Aunt     Cancer Maternal Uncle     Stroke Maternal Grandmother     Diabetes Brother     Heart Disease Paternal Uncle 67        mi    Heart Disease Brother 58        mi    High Blood Pressure Brother     Heart Disease Brother 79        stents for cad    High Blood Pressure Brother      Current Outpatient Medications   Medication Sig Dispense Refill    fluocinolone acetonide (CAPEX) 0.01 % SHAM shampoo Apply topically 3 times daily 120 mL 2    clobetasol (TEMOVATE) 0.05 % external solution APPLY TO AFFECTED AREA(S) ON SCALPP TWO TIMES A DAY AS NEEDED 50 mL 2    levothyroxine (SYNTHROID) 75 MCG tablet Take 1 tablet by mouth Daily 90 tablet 1    metFORMIN (GLUCOPHAGE) 500 MG tablet TAKE ONE TABLET BY MOUTH DAILY 90 tablet 1    alclomethasone (ACLOVATE) 0.05 % cream Apply topically 2 times daily Apply topically 2 times daily. 45 g 1    butenafine (MENTAX) 1 % CREA Apply 1 Tube topically 2 times daily as needed (skin irritation) 30 g 1    midodrine (PROAMATINE) 5 MG tablet Take 1 tablet by mouth 2 times daily (with meals) 90 tablet 3    amitriptyline (ELAVIL) 10 MG tablet TAKE ONE TABLET BY MOUTH DAILY AS NEEDED FOR SLEEP AND LEG PAIN FOR UP TO 30 DAYS 30 tablet 3    ondansetron (ZOFRAN-ODT) 4 MG disintegrating tablet Take 1 tablet by mouth every 8 hours as needed.  20 tablet 3    Evolocumab 140 MG/ML SOSY Inject into the skin Next dose due 12/27/21      famotidine (PEPCID) 20 MG tablet as needed       guaiFENesin (MUCINEX) 600 MG extended release tablet Take 1 tablet by mouth 2 times daily (Patient taking differently: Take 600 mg by mouth 2 times daily as needed ) 20 tablet 0    NONFORMULARY Indications: Dietary fiber 1 tsp a day       Cholecalciferol (VITAMIN D3) 2000 units CAPS Take 1 capsule by mouth 2 times daily 30 capsule 0    diltiazem (CARDIZEM CD) 120 MG extended release capsule Take 1 capsule by mouth every evening 30 capsule 3    fluticasone (FLONASE) 50 MCG/ACT nasal spray PLACE 1 SPRAY IN EACH NOSTRIL DAILY FOR 15 DAYS 1 Bottle 2    ketoconazole (NIZORAL) 2 % shampoo APPLY  TO AFFECTED AREAS TOPICALLY DAILY THEN RINSE OFF AFTER 5 MINUTES 120 mL 2    ezetimibe (ZETIA) 10 MG tablet Take 10 mg by mouth daily      esomeprazole Magnesium (NEXIUM) 40 MG PACK Take 40 mg by mouth daily.  NONFORMULARY Super b complex 3 times per week      ascorbic acid (VITAMIN C) 500 MG tablet Take 500 mg by mouth daily.  chlorpheniramine (CHLOR-TRIMETON) 4 MG tablet Take 12 mg by mouth daily        No current facility-administered medications for this visit. Facility-Administered Medications Ordered in Other Visits   Medication Dose Route Frequency Provider Last Rate Last Admin    0.9 % sodium chloride infusion   IntraVENous Continuous Elsie Lui  mL/hr at 02/19/14 0827 New Bag at 02/19/14 0827     ALLERGIES:    Allergies   Allergen Reactions    Aspirin Swelling     Hives.  anaphylaxis    Atorvastatin Other (See Comments)     myalgia    Crestor [Rosuvastatin]      Myalgia      Livalo [Pitavastatin] Other (See Comments)     Muscle pain    Seasonal     Simvastatin      Myalgia      Statins      Other reaction(s): Unknown    Welchol [Colesevelam Hcl] Other (See Comments)     Leg cramp       Social History     Tobacco Use    Smoking status: Never Smoker    Smokeless tobacco: Never Used   Substance Use Topics    Alcohol use: Yes     Comment: rare      Body mass index is 34.01 kg/m². /84   Pulse 65   Temp 96.7 °F (35.9 °C)   Wt 204 lb 6.4 oz (92.7 kg)   SpO2 100%   BMI 34.01 kg/m²     Subjective:      HPI    80 y.o. female coming today with concerns about a tender lump which she discovered just recently and over 04/14/2022 at her left hand. She states that she was she washed her hair and then she felt it. She is very concerned about this lump. The patient has been following up with multiple specialist including neurologist sleep specialist due to the overall fatigue not feeling well. She has been having some weakness in her upper arms. Again she will see the specialist.  She denies any headaches any vision changes speech problems. No hearing problems. She denies any recent falls. Review of Systems   Constitutional: Negative for fever and unexpected weight change. Pertinent items are noted in HPI. Objective:   Physical Exam  Constitutional: VS (see above). General appearance: normal development, habitus and attention, no deformities. No distress. Eyes: normal conjunctiva and lids. CAV: RRR, no RMG. No edema lower extremities. Pulmo: CTA bilateral, no CWR. Skin: no rashes, lesions or ulcers. Physical Exam  HENT:      Head:         Musculoskeletal: normal gait. Nails: no clubbing or cyanosis. Psychiatric: alert and oriented to place, time and person. Normal mood and affect. Assessment:       Diagnosis Orders   1. Head lump  XR SKULL (MIN 4 VIEWS)       Plan:   I will order an x-ray. Await results. Previous CT head without contrast from December 2021 was normal did not show any acute intracranial abnormalities. The patient will continue to follow-up with specialist.    Return in about 6 months (around 10/28/2022), or if symptoms worsen or fail to improve.   Orders Placed This Encounter   Procedures    XR SKULL (MIN 4 VIEWS)     Standing Status:   Future     Standing Expiration Date:   4/28/2023 No orders of the defined types were placed in this encounter. Call or return to clinic prn if these symptoms worsen or fail to improve as anticipated. I have reviewed the instructions with the patient, answering all questions to patient's satisfaction. Adolph Lesches received counseling on the following healthy behaviors: nutrition, exercise, and medication adherence  Reviewed prior labs and health maintenance. Continue current medications, diet and exercise. Discussed use, benefit, and side effects of prescribed medications. Barriers to medication compliance addressed. Patient given educational materials - see patient instructions. All patient questions answered. Patient voiced understanding.       Electronically signed by Sawyer El MD on 4/28/2022 at 10:21 AM       (Please note that portions of this note were completed with a voice recognition program. Efforts were made to edit the dictations but occasionally words are mis-transcribed.)

## 2022-04-29 ENCOUNTER — PATIENT MESSAGE (OUTPATIENT)
Dept: FAMILY MEDICINE CLINIC | Age: 82
End: 2022-04-29

## 2022-04-29 NOTE — TELEPHONE ENCOUNTER
From: Zackery Sherman  To: Dr. Salvador Mode  Sent: 4/29/2022 2:43 PM EDT  Subject: Still working on trying to get a prescription    Dr. Haider Nolasco,  The [address] [place] [address] can not get the shampoo that you prescribed. the Pharmacist said what they can get is  Fluocinolone . 0l Solution. This is for my dry itchy scalp. Clobetasol, which I originally requested in topical solution form, is not available. You then sent a script for a shampoo and that is not available. Can you please prescribe the Fluocinolene Solution? Many thanks,  Edilberto Rodriguez  [birth year]  p.s. my recent difficulty in trying to get an appointment with you is due to a new system that hasn't worked out the kinks yet.

## 2022-05-03 RX ORDER — FLUOCINOLONE ACETONIDE 0.1 MG/ML
SOLUTION TOPICAL
Qty: 90 ML | Refills: 1 | Status: SHIPPED | OUTPATIENT
Start: 2022-05-03 | End: 2022-06-06

## 2022-05-09 DIAGNOSIS — F41.9 ANXIETY: ICD-10-CM

## 2022-05-09 NOTE — TELEPHONE ENCOUNTER
Maddy Rajan is calling to request a refill on the following medication(s):    Last Visit Date (If Applicable):  9/79/3255    Next Visit Date:    6/6/2022    Medication Request:  Requested Prescriptions     Pending Prescriptions Disp Refills    LORazepam (ATIVAN) 0.5 MG tablet [Pharmacy Med Name: LORazepam 0.5 MG TABLET] 30 tablet      Sig: TAKE ONE TABLET BY MOUTH ONCE NIGHTLY

## 2022-05-10 ENCOUNTER — HOSPITAL ENCOUNTER (OUTPATIENT)
Dept: MAMMOGRAPHY | Facility: CLINIC | Age: 82
Discharge: HOME | End: 2022-05-12
Payer: MEDICARE

## 2022-05-10 ENCOUNTER — HOSPITAL ENCOUNTER (OUTPATIENT)
Dept: GENERAL RADIOLOGY | Facility: CLINIC | Age: 82
Discharge: HOME | End: 2022-05-12
Payer: MEDICARE

## 2022-05-10 DIAGNOSIS — Z12.31 ENCOUNTER FOR SCREENING MAMMOGRAM FOR MALIGNANT NEOPLASM OF BREAST: ICD-10-CM

## 2022-05-10 DIAGNOSIS — M54.12 RADICULOPATHY, CERVICAL REGION: ICD-10-CM

## 2022-05-10 PROCEDURE — 77067 SCR MAMMO BI INCL CAD: CPT

## 2022-05-10 PROCEDURE — 72040 X-RAY EXAM NECK SPINE 2-3 VW: CPT

## 2022-05-10 RX ORDER — LORAZEPAM 0.5 MG/1
TABLET ORAL
Qty: 30 TABLET | Refills: 0 | Status: SHIPPED | OUTPATIENT
Start: 2022-05-10 | End: 2022-06-09

## 2022-05-19 ENCOUNTER — HOSPITAL ENCOUNTER (OUTPATIENT)
Dept: MRI IMAGING | Facility: CLINIC | Age: 82
Discharge: HOME | End: 2022-05-21
Payer: MEDICARE

## 2022-05-19 DIAGNOSIS — M54.16 LUMBAR RADICULOPATHY: ICD-10-CM

## 2022-05-19 PROCEDURE — 72148 MRI LUMBAR SPINE W/O DYE: CPT

## 2022-06-06 ENCOUNTER — HOSPITAL ENCOUNTER (OUTPATIENT)
Age: 82
Setting detail: SPECIMEN
Discharge: HOME | End: 2022-06-06

## 2022-06-06 ENCOUNTER — OFFICE VISIT (OUTPATIENT)
Dept: FAMILY MEDICINE CLINIC | Age: 82
End: 2022-06-06
Payer: MEDICARE

## 2022-06-06 VITALS
TEMPERATURE: 97.3 F | BODY MASS INDEX: 34.61 KG/M2 | DIASTOLIC BLOOD PRESSURE: 78 MMHG | HEART RATE: 67 BPM | SYSTOLIC BLOOD PRESSURE: 115 MMHG | OXYGEN SATURATION: 97 % | WEIGHT: 208 LBS

## 2022-06-06 DIAGNOSIS — M48.061 FORAMINAL STENOSIS OF LUMBAR REGION: Primary | ICD-10-CM

## 2022-06-06 DIAGNOSIS — R53.82 CHRONIC FATIGUE: ICD-10-CM

## 2022-06-06 DIAGNOSIS — J41.1 BRONCHITIS, MUCOPURULENT RECURRENT (HCC): ICD-10-CM

## 2022-06-06 DIAGNOSIS — B09 VIRAL RASH: ICD-10-CM

## 2022-06-06 PROCEDURE — G8399 PT W/DXA RESULTS DOCUMENT: HCPCS | Performed by: FAMILY MEDICINE

## 2022-06-06 PROCEDURE — 99213 OFFICE O/P EST LOW 20 MIN: CPT | Performed by: FAMILY MEDICINE

## 2022-06-06 PROCEDURE — G8417 CALC BMI ABV UP PARAM F/U: HCPCS | Performed by: FAMILY MEDICINE

## 2022-06-06 PROCEDURE — 3023F SPIROM DOC REV: CPT | Performed by: FAMILY MEDICINE

## 2022-06-06 PROCEDURE — G8427 DOCREV CUR MEDS BY ELIG CLIN: HCPCS | Performed by: FAMILY MEDICINE

## 2022-06-06 PROCEDURE — 1123F ACP DISCUSS/DSCN MKR DOCD: CPT | Performed by: FAMILY MEDICINE

## 2022-06-06 PROCEDURE — 1036F TOBACCO NON-USER: CPT | Performed by: FAMILY MEDICINE

## 2022-06-06 PROCEDURE — 1090F PRES/ABSN URINE INCON ASSESS: CPT | Performed by: FAMILY MEDICINE

## 2022-06-06 RX ORDER — ALCLOMETASONE DIPROPIONATE 0.5 MG/G
CREAM TOPICAL 2 TIMES DAILY
COMMUNITY

## 2022-06-06 RX ORDER — ALBUTEROL SULFATE 90 UG/1
2 AEROSOL, METERED RESPIRATORY (INHALATION) EVERY 6 HOURS PRN
Qty: 18 G | Refills: 3 | Status: SHIPPED | OUTPATIENT
Start: 2022-06-06

## 2022-06-06 SDOH — ECONOMIC STABILITY: FOOD INSECURITY: WITHIN THE PAST 12 MONTHS, THE FOOD YOU BOUGHT JUST DIDN'T LAST AND YOU DIDN'T HAVE MONEY TO GET MORE.: NEVER TRUE

## 2022-06-06 SDOH — ECONOMIC STABILITY: FOOD INSECURITY: WITHIN THE PAST 12 MONTHS, YOU WORRIED THAT YOUR FOOD WOULD RUN OUT BEFORE YOU GOT MONEY TO BUY MORE.: NEVER TRUE

## 2022-06-06 ASSESSMENT — SOCIAL DETERMINANTS OF HEALTH (SDOH): HOW HARD IS IT FOR YOU TO PAY FOR THE VERY BASICS LIKE FOOD, HOUSING, MEDICAL CARE, AND HEATING?: NOT HARD AT ALL

## 2022-06-06 NOTE — PROGRESS NOTES
General FM note    Bernardo Gonzalez is a 80 y.o. female who presents today for follow up on her  medical conditions as noted below.   Bernardo Gonzalez is c/o of   Chief Complaint   Patient presents with    Check-Up       Patient Active Problem List:     Hypothyroid     Hypercholesteremia     Anxiety     Vitamin D deficiency     RAD (reactive airway disease)     Diverticular disease     Tremor     Gastroparesis     Lung nodule < 6cm on CT     Lateral femoral cutaneous neuropathy     Thyroid nodule     Prediabetes     Bilateral tinnitus     Enthesopathy of hip region     Laryngopharyngeal reflux     Meniere's disease, right ear     Postmenopausal state     Seasonal asthma     Postnasal drip     Parkinson disease ([address]     Bronchitis, mucopurulent recurrent ([address]     Morbidly obese ([address]     Syncope and collapse     Syncope     Orthostatic syncope     Past Medical History:   Diagnosis Date    Acid reflux     Anxiety 6/30/2014    ALICIA-7   09/09/19 : 4    Bilateral tinnitus 12/10/2019    Bronchitis, mucopurulent recurrent ([address] 7/23/2020    Diverticular disease 6/30/2014    Enthesopathy of hip region 12/10/2019    GERD (gastroesophageal reflux disease)     Hypercholesteremia 6/30/2014    Hypothyroid 6/30/2014    Laryngopharyngeal reflux 12/10/2019    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    Lung nodule     Meniere disease     right ear    Meniere's disease, right ear 12/10/2019    Morbidly obese ([address] 7/23/2020    Neuropathy     Osteopenia 6/30/2014    Parkinson disease ([address] 7/23/2020    Postmenopausal state 12/10/2019    Postnasal drip 7/11/2020    Prediabetes     RAD (reactive airway disease) 6/30/2014    Seasonal asthma 7/11/2020    Thyroid nodule 3/1/2016    Tremor     r hand    Vitamin D deficiency 6/30/2014      Past Surgical History:   Procedure Laterality Date    CHOLECYSTECTOMY      COLONOSCOPY      ENDOSCOPY, COLON, DIAGNOSTIC      ERCP  03/21/14    EXCISION / BIOPSY SKIN LESION OF TRUNK Left 11/8/2017    EXCISION MASS LEFT POSTERIOR SHOULDER, LEFT ANTERIOR ARM performed by Romero Blount MD at [address]  01/03/2007    SKIN BIOPSY Left 11/08/2017    Excision Mass Left Posterior Shoulder, Left Anterior Arm     Family History   Problem Relation Age of Onset    Cancer Mother 48        pineal gland/throat    Cancer Father 64        colon   [employer] Cancer Brother         lung    Other Brother         pulmonary embolus    Heart Disease Paternal Grandfather         myocarditis    Stroke Maternal Aunt     Cancer Maternal Uncle     Stroke Maternal Grandmother     Diabetes Brother     Heart Disease Paternal Uncle 67        mi    Heart Disease Brother 58        mi    High Blood Pressure Brother     Heart Disease Brother 79        stents for cad    High Blood Pressure Brother      Current Outpatient Medications   Medication Sig Dispense Refill    alclomethasone (ACLOVATE) 0.05 % cream Apply topically 2 times daily Prn      albuterol sulfate HFA (PROVENTIL HFA) 108 (90 Base) MCG/ACT inhaler Inhale 2 puffs into the lungs every 6 hours as needed for Wheezing 18 g 3    LORazepam (ATIVAN) 0.5 MG tablet TAKE ONE TABLET BY MOUTH ONCE NIGHTLY 30 tablet 0    levothyroxine (SYNTHROID) 75 MCG tablet Take 1 tablet by mouth Daily 90 tablet 1    metFORMIN (GLUCOPHAGE) 500 MG tablet TAKE ONE TABLET BY MOUTH DAILY 90 tablet 1    midodrine (PROAMATINE) 5 MG tablet Take 1 tablet by mouth 2 times daily (with meals) (Patient taking differently: Take 2.5 mg by mouth 2 times daily (with meals) ) 90 tablet 3    amitriptyline (ELAVIL) 10 MG tablet TAKE ONE TABLET BY MOUTH DAILY AS NEEDED FOR SLEEP AND LEG PAIN FOR UP TO 30 DAYS (Patient taking differently: nightly as needed TAKE ONE TABLET BY MOUTH DAILY AS NEEDED FOR SLEEP AND LEG PAIN FOR UP TO 30 DAYS) 30 tablet 3    ondansetron (ZOFRAN-ODT) 4 MG disintegrating tablet Take 1 tablet by mouth every 8 hours as needed. 20 tablet 3    Evolocumab 140 MG/ML SOSY Inject into the skin Next dose due 12/27/21      famotidine (PEPCID) 20 MG tablet as needed       guaiFENesin (MUCINEX) 600 MG extended release tablet Take 1 tablet by mouth 2 times daily (Patient taking differently: Take 600 mg by mouth 2 times daily as needed ) 20 tablet 0    NONFORMULARY Indications: Dietary fiber 1 tsp a day       Cholecalciferol (VITAMIN D3) 2000 units CAPS Take 1 capsule by mouth 2 times daily 30 capsule 0    diltiazem (CARDIZEM CD) 120 MG extended release capsule Take 1 capsule by mouth every evening 30 capsule 3    fluticasone (FLONASE) 50 MCG/ACT nasal spray PLACE 1 SPRAY IN EACH NOSTRIL DAILY FOR 15 DAYS 1 Bottle 2    ketoconazole (NIZORAL) 2 % shampoo APPLY  TO AFFECTED AREAS TOPICALLY DAILY THEN RINSE OFF AFTER 5 MINUTES 120 mL 2    ezetimibe (ZETIA) 10 MG tablet Take 10 mg by mouth daily      esomeprazole Magnesium (NEXIUM) 40 MG PACK Take 40 mg by mouth daily.  NONFORMULARY Super b complex 3 times per week      ascorbic acid (VITAMIN C) 500 MG tablet Take 500 mg by mouth daily.  chlorpheniramine (CHLOR-TRIMETON) 4 MG tablet Take 12 mg by mouth daily        No current facility-administered medications for this visit. Facility-Administered Medications Ordered in Other Visits   Medication Dose Route Frequency Provider Last Rate Last Admin    0.9 % sodium chloride infusion   IntraVENous Continuous Maryruth Dakin,  mL/hr at 02/19/14 0827 New Bag at 02/19/14 0827     ALLERGIES:    Allergies   Allergen Reactions    Aspirin Swelling     Hives.  anaphylaxis    Atorvastatin Other (See Comments)     myalgia    Crestor [Rosuvastatin]      Myalgia      Livalo [Pitavastatin] Other (See Comments)     Muscle pain    Seasonal     Simvastatin      Myalgia      Statins      Other reaction(s): Unknown    Welchol [Colesevelam Hcl] Other (See Comments)     Leg cramp       Social History     Tobacco Use    Smoking status: Never Smoker    Smokeless tobacco: Never Used   Substance Use Topics    Alcohol use: Yes     Comment: rare      Body mass index is 34.61 kg/m². /78   Pulse 67   Temp 97.3 °F (36.3 °C)   Wt 208 lb (94.3 kg)   SpO2 97%   BMI 34.61 kg/m²     Subjective:      HPI    80 y.o. female coming in today to give me overall update. In 12/2021 she did have a rash at her right arm where she was told multiple times from different specialist that this was a viral rash. Since then she feels that she is not up with her health especially with her mental health/memory which set her back not as energetic and not able to keep alex. She had to go off a counseling bc not keeping up. Last week wheezing - usually she has an inhaler. She would like to have a albuterol inhaler. Has been going to a chiro practor - last MRI on back 2015 - did have a alex with neuro NP alex - MRI back -- standing to long - severe pain. Relief with sitting down. \"  Multilevel degenerative disc disease with associated facet hypertrophy with   bilateral foraminal narrowing as described above. \"  EMG arm - result still pending. Overall the patient is doing well but she feels that this viral infection really took hold of her. She is not able to do what she was able to do last year. Review of Systems   Constitutional: Negative for fever and unexpected weight change. Pertinent items are noted in HPI. Objective:   Physical Exam  Constitutional: VS (see above). General appearance: normal development, habitus and attention, no deformities. No distress. Eyes: normal conjunctiva and lids. CAV: RRR, no RMG. No edema lower extremities. Pulmo: CTA bilateral, no CWR. Skin: no rashes, lesions or ulcers. Musculoskeletal: normal gait. Nails: no clubbing or cyanosis. Psychiatric: alert and oriented to place, time and person. Normal mood and affect. Assessment:       Diagnosis Orders   1.  Foraminal stenosis of lumbar region  [place] - completed with a voice recognition program. Efforts were made to edit the dictations but occasionally words are mis-transcribed.)

## 2022-06-08 LAB
EBV EARLY ANTIGEN AB, IGG: 66.7 U/ML (ref 0–10.9)
EBV NUCLEAR AG AB: 12.2 U/ML (ref 0–21.9)
EPSTEIN-BARR VCA IGG: >750 U/ML (ref 0–21.9)
EPSTEIN-BARR VCA IGM: <10 U/ML (ref 0–43.9)

## 2022-06-10 ENCOUNTER — PATIENT MESSAGE (OUTPATIENT)
Dept: FAMILY MEDICINE CLINIC | Age: 82
End: 2022-06-10

## 2022-06-10 DIAGNOSIS — R53.82 CHRONIC FATIGUE: ICD-10-CM

## 2022-06-10 DIAGNOSIS — B27.00 POSITIVE TEST FOR EPSTEIN-BARR VIRUS (EBV): ICD-10-CM

## 2022-06-10 DIAGNOSIS — B09 VIRAL RASH: Primary | ICD-10-CM

## 2022-06-10 NOTE — TELEPHONE ENCOUNTER
From: Bernardo Gonzalez  To: Dr. Malcolm Drivers  Sent: 6/10/2022 10:42 AM EDT  Subject: do I need to see an infectious disease doctor    Dr. Sarah Rowe.,   Thank you for the call from your office yeterday with the results of my Remmaikol Pointer blood test that confirmed that I had mono. Of course, now I have more questions. Can you refer me please to an infectious disease doc? Preferably in the [facility], or the [facility], or [facility].   Many thanks,  Brandi Jackson    6-10-22

## 2022-06-28 ENCOUNTER — INITIAL CONSULT (OUTPATIENT)
Dept: PAIN MANAGEMENT | Age: 82
End: 2022-06-28
Payer: MEDICARE

## 2022-06-28 VITALS
DIASTOLIC BLOOD PRESSURE: 79 MMHG | BODY MASS INDEX: 34.66 KG/M2 | SYSTOLIC BLOOD PRESSURE: 122 MMHG | HEIGHT: 65 IN | WEIGHT: 208 LBS

## 2022-06-28 DIAGNOSIS — M47.817 LUMBOSACRAL SPONDYLOSIS WITHOUT MYELOPATHY: Primary | ICD-10-CM

## 2022-06-28 DIAGNOSIS — M51.36 DDD (DEGENERATIVE DISC DISEASE), LUMBAR: ICD-10-CM

## 2022-06-28 DIAGNOSIS — M54.51 VERTEBROGENIC LOW BACK PAIN: ICD-10-CM

## 2022-06-28 PROBLEM — M51.369 DDD (DEGENERATIVE DISC DISEASE), LUMBAR: Status: ACTIVE | Noted: 2022-06-28

## 2022-06-28 PROCEDURE — 1123F ACP DISCUSS/DSCN MKR DOCD: CPT | Performed by: ANESTHESIOLOGY

## 2022-06-28 PROCEDURE — 1090F PRES/ABSN URINE INCON ASSESS: CPT | Performed by: ANESTHESIOLOGY

## 2022-06-28 PROCEDURE — 1036F TOBACCO NON-USER: CPT | Performed by: ANESTHESIOLOGY

## 2022-06-28 PROCEDURE — G8427 DOCREV CUR MEDS BY ELIG CLIN: HCPCS | Performed by: ANESTHESIOLOGY

## 2022-06-28 PROCEDURE — G8417 CALC BMI ABV UP PARAM F/U: HCPCS | Performed by: ANESTHESIOLOGY

## 2022-06-28 PROCEDURE — G8399 PT W/DXA RESULTS DOCUMENT: HCPCS | Performed by: ANESTHESIOLOGY

## 2022-06-28 PROCEDURE — 99205 OFFICE O/P NEW HI 60 MIN: CPT | Performed by: ANESTHESIOLOGY

## 2022-06-28 RX ORDER — LIDOCAINE 50 MG/G
1 PATCH TOPICAL DAILY
Qty: 10 PATCH | Refills: 0 | Status: SHIPPED | OUTPATIENT
Start: 2022-06-28 | End: 2022-07-08

## 2022-06-28 ASSESSMENT — ENCOUNTER SYMPTOMS
EYES NEGATIVE: 1
RESPIRATORY NEGATIVE: 1
GASTROINTESTINAL NEGATIVE: 1
BACK PAIN: 1
ALLERGIC/IMMUNOLOGIC NEGATIVE: 1

## 2022-06-28 NOTE — PROGRESS NOTES
The patient is a 80 y. o. Non- / non  female. Chief Complaint   Patient presents with    Consultation    Back Pain        HPI    Requesting physician for the evaluation of Ulises Jules 1940:     Pain History  Very pleasant 19-year-old female with history of chronic low back pain  Onset many years ago  Symptoms are progressively been worsening particularly over last 6 months  Pain is located in the lower lumbar area describes it as a constant pain  Aching throbbing stabbing sensation aggravated by excessive activity prolonged standing walking prolonged sitting lifting bending  Alleviating factors are rest lying down and heat application  No dermatomal extension no radiation in leg  No associated dermatomal numbness or paresthesia  No changes in bladder or bowel control  Pain is predominantly axial progressively worsening affecting quality of life  She has been doing chiropractic treatments for several years that have stopped helping  Did complete physical therapy last year without much improvement  Had recent MRI lumbar spine  No previous lumbar spine injection history  No previous lumbar spine surgical history  Systemic NSAID contraindicated because of advanced age  Currently taking Elavil    Pain score today  5  1. Location: back  2. Radiation: both legs  3. Character: penetrating, nagging, aching, throbbing, shooting, sharp, tender  5. Duration:   6. Onset: has had back pain for years, started getting worse a few months ago  7. Did an injury cause pain: no  8. Aggravating factors: standing, walking, sitting  9. Alleviating factors: heating pad, laying down  10. Associated symptoms (numbness / tingling / weakness):  no  -Where at:   -Down into finger tips or toes (specify which finger or toes):   -constant or intermitting:  intermitting  11. Red Flags: (weight loss / chills / loss of bladder or bowel control): none    Previous management history  1.  Previous diagnostic workup: (Imaging/EMG)   CT, MRI, or Xray: MRI  What part of the body: back  What facility did they have it at: [facility]  What year or specific date: 05/19/2022   EMG:  yes    2. Previous non interventional treatments tried:  chiropractor or physical therapy: chiropractor, PT  What part of the body: back  What facility was it done at:  [facility]  How long ago was it last tried:year  Did it work: No  Did they complete it:Yes    3. Previous Medications tried  NSAID's: no  Neurontin: no  Lyrica: no  Trycyclic antidepressant (Ellavil / Pamelor ): yes  Cymbalta: yes  Opioids (Ultram / Vicodin / Percocet / Morphine / Dilaudid / Oramorph/ Fentanyl etc.): none  Last Pain medication taken (name of med and date):    4. Previous Interventional pain procedures tried:  What kind of injection: pt is not sure  Who did the injection: [facility]  did the injection help: yes  Last time injection was done: 2007    5.  Previous surgeries for pain  What part of the body did they have the surgery: n/a  What physician did the surgery: [address] [place] [address] did they have the surgery done: na/  Date of Surgery:N/A    Social History:  Marital status: single  Employment History:   Working  No  Full time Or Part time:    Disability      Legal Issues related to pain complaint: No     Pain Disability Index score : 33    Lab Results   Component Value Date    LABA1C 5.8 01/19/2022     Lab Results   Component Value Date     01/19/2022     Informant: patient      Past Medical History:   Diagnosis Date    Acid reflux     Anxiety 6/30/2014    ALICIA-7   09/09/19 : 4    Bilateral tinnitus 12/10/2019    Bronchitis, mucopurulent recurrent ([address] 7/23/2020    Diverticular disease 6/30/2014    Enthesopathy of hip region 12/10/2019    GERD (gastroesophageal reflux disease)     Hypercholesteremia 6/30/2014    Hypothyroid 6/30/2014    Laryngopharyngeal reflux 12/10/2019    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    Lung nodule     Meniere disease     right ear  Meniere's disease, right ear 12/10/2019    Morbidly [facility] ([facility] 7/23/2020    Neuropathy     Osteopenia 6/30/2014    Parkinson disease ([facility] 7/23/2020    Postmenopausal state 12/10/2019    Postnasal drip 7/11/2020    Prediabetes     RAD (reactive airway disease) 6/30/2014    Seasonal asthma 7/11/2020    Thyroid nodule 3/1/2016    Tremor     r hand    Vitamin D deficiency 6/30/2014        Past Surgical History:   Procedure Laterality Date    CHOLECYSTECTOMY      COLONOSCOPY      ENDOSCOPY, COLON, DIAGNOSTIC      ERCP  03/21/14    EXCISION / BIOPSY SKIN LESION OF TRUNK Left 11/8/2017    EXCISION MASS LEFT POSTERIOR SHOULDER, LEFT ANTERIOR ARM performed by Richy Lozada MD at [address] [place] [address] ([address] [place] [address])  01/03/2007    SKIN BIOPSY Left 11/08/2017    Excision Mass Left Posterior Shoulder, Left Anterior Arm       Social History     Socioeconomic History    Marital status: Single     Spouse name: None    Number of children: None    Years of education: None    Highest education level: None   Occupational History    None   Tobacco Use    Smoking status: Never Smoker    Smokeless tobacco: Never Used   Vaping Use    Vaping Use: Never used   Substance and Sexual Activity    Alcohol use: Yes     Comment: rare    Drug use: No    Sexual activity: None   Other Topics Concern    None   Social History Narrative    None     Social Determinants of Health     Financial Resource Strain: Low Risk     Difficulty of Paying Living Expenses: Not hard at all   Food Insecurity: No Food Insecurity    Worried About Running Out of Food in the Last Year: Never true    Robe of Food in the Last Year: Never true   Transportation Needs:     Lack of Transportation (Medical): Not on file    Lack of Transportation (Non-Medical):  Not on file   Physical Activity:     Days of Exercise per Week: Not on file    Minutes of Exercise per Session: Not on file   Stress:     Feeling of Stress : Not on file   Social Connections:     Frequency of Communication with Friends and Family: Not on file    Frequency of Social Gatherings with Friends and Family: Not on file    Attends Mosque Services: Not on file    Active Member of [address] [place] [address] [employer] or Organizations: Not on file    Attends Club or Organization Meetings: Not on file    Marital Status: Not on file   Intimate Partner Violence:     Fear of Current or Ex-Partner: Not on file    Emotionally Abused: Not on file    Physically Abused: Not on file    Sexually Abused: Not on file   Housing Stability:     Unable to Pay for Housing in the Last Year: Not on file    Number of [place] in the Last Year: Not on file    Unstable Housing in the Last Year: Not on file       Family History   Problem Relation Age of Onset    Cancer Mother 48        pineal gland/throat    Cancer Father 64        colon    Cancer Brother         lung    Other Brother         pulmonary embolus    Heart Disease Paternal Grandfather         myocarditis    Stroke Maternal Aunt     Cancer Maternal Uncle     Stroke Maternal Grandmother     Diabetes Brother     Heart Disease Paternal Uncle 67        mi    Heart Disease Brother 58        mi    High Blood Pressure Brother     Heart Disease Brother 79        stents for cad    High Blood Pressure Brother        Allergies   Allergen Reactions    Aspirin Swelling     Hives.  anaphylaxis    Atorvastatin Other (See Comments)     myalgia    Crestor [Rosuvastatin]      Myalgia      Livalo [Pitavastatin] Other (See Comments)     Muscle pain    Seasonal     Simvastatin      Myalgia      Statins      Other reaction(s): Unknown    Welchol [Colesevelam Hcl] Other (See Comments)     Leg cramp       Vitals:    06/28/22 1207   BP: 122/79       Current Outpatient Medications   Medication Sig Dispense Refill    alclomethasone (ACLOVATE) 0.05 % cream Apply topically 2 times daily Prn      albuterol sulfate HFA (PROVENTIL HFA) 108 (90 Base) MCG/ACT inhaler Inhale 2 puffs into the lungs every 6 hours as needed for Wheezing 18 g 3    levothyroxine (SYNTHROID) 75 MCG tablet Take 1 tablet by mouth Daily 90 tablet 1    metFORMIN (GLUCOPHAGE) 500 MG tablet TAKE ONE TABLET BY MOUTH DAILY 90 tablet 1    midodrine (PROAMATINE) 5 MG tablet Take 1 tablet by mouth 2 times daily (with meals) (Patient taking differently: Take 2.5 mg by mouth 2 times daily (with meals) ) 90 tablet 3    amitriptyline (ELAVIL) 10 MG tablet TAKE ONE TABLET BY MOUTH DAILY AS NEEDED FOR SLEEP AND LEG PAIN FOR UP TO 30 DAYS (Patient taking differently: nightly as needed TAKE ONE TABLET BY MOUTH DAILY AS NEEDED FOR SLEEP AND LEG PAIN FOR UP TO 30 DAYS) 30 tablet 3    ondansetron (ZOFRAN-ODT) 4 MG disintegrating tablet Take 1 tablet by mouth every 8 hours as needed. 20 tablet 3    Evolocumab 140 MG/ML SOSY Inject into the skin Next dose due 12/27/21      famotidine (PEPCID) 20 MG tablet as needed       guaiFENesin (MUCINEX) 600 MG extended release tablet Take 1 tablet by mouth 2 times daily (Patient taking differently: Take 600 mg by mouth 2 times daily as needed ) 20 tablet 0    NONFORMULARY Indications: Dietary fiber 1 tsp a day       Cholecalciferol (VITAMIN D3) 2000 units CAPS Take 1 capsule by mouth 2 times daily 30 capsule 0    diltiazem (CARDIZEM CD) 120 MG extended release capsule Take 1 capsule by mouth every evening 30 capsule 3    fluticasone (FLONASE) 50 MCG/ACT nasal spray PLACE 1 SPRAY IN EACH NOSTRIL DAILY FOR 15 DAYS 1 Bottle 2    ketoconazole (NIZORAL) 2 % shampoo APPLY  TO AFFECTED AREAS TOPICALLY DAILY THEN RINSE OFF AFTER 5 MINUTES 120 mL 2    ezetimibe (ZETIA) 10 MG tablet Take 10 mg by mouth daily      esomeprazole Magnesium (NEXIUM) 40 MG PACK Take 40 mg by mouth daily.  NONFORMULARY Super b complex 3 times per week      ascorbic acid (VITAMIN C) 500 MG tablet Take 500 mg by mouth daily.       chlorpheniramine (CHLOR-TRIMETON) 4 MG tablet Take 12 mg by mouth daily        No current facility-administered medications for this visit. Facility-Administered Medications Ordered in Other Visits   Medication Dose Route Frequency Provider Last Rate Last Admin    0.9 % sodium chloride infusion   IntraVENous Continuous Ollie Castleman,  mL/hr at 02/19/14 0827 New Bag at 02/19/14 0827       Review of Systems   Constitutional: Negative. Negative for fever. HENT: Negative. Eyes: Negative. Respiratory: Negative. Cardiovascular: Negative. Gastrointestinal: Negative. Endocrine: Negative. Genitourinary: Negative. Musculoskeletal: Positive for back pain. Skin: Negative. Allergic/Immunologic: Negative. Neurological: Positive for tremors. Hematological: Bruises/bleeds easily. Psychiatric/Behavioral: Negative. Objective:  General Appearance:  Uncomfortable and in pain. Vital signs: (most recent): Blood pressure 122/79, height 5' 5\" (1.651 m), weight 208 lb (94.3 kg), not currently breastfeeding. Vital signs are normal.  No fever. Output: Producing urine and producing stool. HEENT: Normal HEENT exam.    Lungs:  Normal effort and normal respiratory rate. She is not in respiratory distress. Heart: Normal rate. Extremities: Normal range of motion. There is no deformity. Neurological: Patient is alert and oriented to person, place and time. Normal strength. Patient has normal reflexes, normal muscle tone and normal coordination. Pupils:  Pupils are equal, round, and reactive to light. Pupils are equal.   Skin:  Warm and dry. No rash or cyanosis.    Spine examination  Slight loss of lumbar lordosis on inspection  Gait stable  Range of motion preserved but associated with pain with movement  Tenderness to palpation in the lumbar area in midline and paramidline area  Facet loading maneuver positive  Straight leg raise negative    Assessment & Plan     Pain History  Very pleasant 70-year-old female with history of chronic low back pain  Onset many years ago  Symptoms are progressively been worsening particularly over last 6 months  Pain is located in the lower lumbar area describes it as a constant pain  Aching throbbing stabbing sensation aggravated by excessive activity prolonged standing walking prolonged sitting lifting bending  Alleviating factors are rest lying down and heat application  No dermatomal extension no radiation in leg  No associated dermatomal numbness or paresthesia  No changes in bladder or bowel control  Pain is predominantly axial progressively worsening affecting quality of life  She has been doing chiropractic treatments for several years that have stopped helping  Did complete physical therapy last year without much improvement  Had recent MRI lumbar spine  No previous lumbar spine injection history  No previous lumbar spine surgical history  Systemic NSAID contraindicated because of advanced age  Currently taking Elavil    Pain score today  5    EXAMINATION: MRI OF THE LUMBAR SPINE WITHOUT CONTRAST, 5/19/2022 11:56 am    L2-L3: There is a circumferential disc bulge with facet hypertrophy. There is no canal stenosis. There is mild bilateral foraminal narrowing. L3-L4: There is a circumferential disc bulge with facet hypertrophy. There is no canal stenosis. There is moderate left and severe right foraminal narrowing. L4-L5: There is a circumferential disc bulge with facet hypertrophy. There is no canal stenosis. There is mild bilateral foraminal narrowing. L5-S1: There is a circumferential disc bulge with facet hypertrophy. There is no canal stenosis. There is moderate to severe bilateral foraminal narrowing. 1. Lumbosacral spondylosis without myelopathy    2. DDD (degenerative disc disease), lumbar    3.  Vertebrogenic low back pain        MRI lumbar spine  Report is reviewed  Images reviewed independently  Finding discussed in detail with patient  Loss of lumbar lordosis  Severe degenerative disc disease at L5-S1 level with near complete loss of disc height and type II Modic changes involving L5 and S1 endplates  Severe facet arthropathy at L4-5 and L5-S1    Explained to patient that there are 2 likely etiologies for her chronic axial lower back pain  It can either be facet agenic pain or vertebral agenic pain  Since she has failed conservative measures and pain is affecting quality of life with progressively worsening  Clinical exam suggesting facet mediated pain  I will schedule for diagnostic lumbar medial branch nerve block to evaluate for facet pain  If that fails then will recommend for basivertebral nerve ablation using interested procedure  If diagnostic medial branch nerve block provide good short-term relief then we will proceed with confirmatory nerve block and did RFA of the facet joint    It is very likely that both of these sources are together involved in her chronic back pain and she would likely require treatment at both sites    Treatment plan discussed with patient  She voiced understanding and wished to proceed    Consultation note sent to the referring physician  Orders Placed This Encounter   Procedures    DC INJ DX/THER AGNT PARAVERT FACET JOINT, LUMBAR/SAC, 1ST LEVEL      No orders of the defined types were placed in this encounter.            Electronically signed by Gigi Rowe MD on 6/28/2022 at 12:47 PM

## 2022-06-29 ENCOUNTER — TELEPHONE (OUTPATIENT)
Dept: PAIN MANAGEMENT | Age: 82
End: 2022-06-29

## 2022-06-29 NOTE — TELEPHONE ENCOUNTER
Patient is requesting a call as soon as possible regarding how long her procedure will take so that her  will know. She   can be reached at [phone]. Okay to leave a message.

## 2022-07-20 ENCOUNTER — HOSPITAL ENCOUNTER (OUTPATIENT)
Dept: PAIN MANAGEMENT | Facility: CLINIC | Age: 82
Discharge: HOME | End: 2022-07-20
Payer: MEDICARE

## 2022-07-20 VITALS
TEMPERATURE: 97.9 F | SYSTOLIC BLOOD PRESSURE: 137 MMHG | HEIGHT: 65 IN | RESPIRATION RATE: 13 BRPM | HEART RATE: 56 BPM | DIASTOLIC BLOOD PRESSURE: 81 MMHG | BODY MASS INDEX: 34.66 KG/M2 | OXYGEN SATURATION: 93 % | WEIGHT: 208 LBS

## 2022-07-20 DIAGNOSIS — M47.817 LUMBOSACRAL SPONDYLOSIS WITHOUT MYELOPATHY: Primary | ICD-10-CM

## 2022-07-20 DIAGNOSIS — R52 PAIN MANAGEMENT: ICD-10-CM

## 2022-07-20 LAB — GLUCOSE BLD-MCNC: 116 MG/DL (ref 65–105)

## 2022-07-20 PROCEDURE — 64493 INJ PARAVERT F JNT L/S 1 LEV: CPT

## 2022-07-20 PROCEDURE — 99152 MOD SED SAME PHYS/QHP 5/>YRS: CPT | Performed by: ANESTHESIOLOGY

## 2022-07-20 PROCEDURE — 64495 INJ PARAVERT F JNT L/S 3 LEV: CPT

## 2022-07-20 PROCEDURE — 6360000002 HC RX W HCPCS: Performed by: ANESTHESIOLOGY

## 2022-07-20 PROCEDURE — 64494 INJ PARAVERT F JNT L/S 2 LEV: CPT

## 2022-07-20 PROCEDURE — 64635 DESTROY LUMB/SAC FACET JNT: CPT | Performed by: ANESTHESIOLOGY

## 2022-07-20 PROCEDURE — 64636 DESTROY L/S FACET JNT ADDL: CPT | Performed by: ANESTHESIOLOGY

## 2022-07-20 PROCEDURE — 6360000004 HC RX CONTRAST MEDICATION: Performed by: ANESTHESIOLOGY

## 2022-07-20 PROCEDURE — 82947 ASSAY GLUCOSE BLOOD QUANT: CPT

## 2022-07-20 PROCEDURE — 2500000003 HC RX 250 WO HCPCS: Performed by: ANESTHESIOLOGY

## 2022-07-20 RX ORDER — LIDOCAINE HYDROCHLORIDE 10 MG/ML
INJECTION, SOLUTION EPIDURAL; INFILTRATION; INTRACAUDAL; PERINEURAL
Status: COMPLETED | OUTPATIENT
Start: 2022-07-20 | End: 2022-07-20

## 2022-07-20 RX ORDER — MIDAZOLAM HYDROCHLORIDE 2 MG/2ML
INJECTION, SOLUTION INTRAMUSCULAR; INTRAVENOUS
Status: COMPLETED | OUTPATIENT
Start: 2022-07-20 | End: 2022-07-20

## 2022-07-20 RX ORDER — FENTANYL CITRATE 50 UG/ML
INJECTION, SOLUTION INTRAMUSCULAR; INTRAVENOUS
Status: COMPLETED | OUTPATIENT
Start: 2022-07-20 | End: 2022-07-20

## 2022-07-20 RX ORDER — BUPIVACAINE HYDROCHLORIDE 5 MG/ML
INJECTION, SOLUTION EPIDURAL; INTRACAUDAL
Status: COMPLETED | OUTPATIENT
Start: 2022-07-20 | End: 2022-07-20

## 2022-07-20 RX ADMIN — FENTANYL CITRATE 50 MCG: 50 INJECTION INTRAMUSCULAR; INTRAVENOUS at 12:02

## 2022-07-20 RX ADMIN — MIDAZOLAM HYDROCHLORIDE 1 MG: 1 INJECTION, SOLUTION INTRAMUSCULAR; INTRAVENOUS at 12:01

## 2022-07-20 RX ADMIN — BUPIVACAINE HYDROCHLORIDE 3 ML: 5 INJECTION, SOLUTION EPIDURAL; INTRACAUDAL; PERINEURAL at 12:03

## 2022-07-20 RX ADMIN — LIDOCAINE HYDROCHLORIDE 5 ML: 10 INJECTION, SOLUTION EPIDURAL; INFILTRATION; INTRACAUDAL at 12:02

## 2022-07-20 RX ADMIN — IOPAMIDOL 3 ML: 408 INJECTION, SOLUTION INTRATHECAL at 12:02

## 2022-07-20 ASSESSMENT — PAIN DESCRIPTION - DESCRIPTORS: DESCRIPTORS: SHARP;ACHING

## 2022-07-20 ASSESSMENT — PAIN - FUNCTIONAL ASSESSMENT
PAIN_FUNCTIONAL_ASSESSMENT: NONE - DENIES PAIN
PAIN_FUNCTIONAL_ASSESSMENT: 0-10
PAIN_FUNCTIONAL_ASSESSMENT: PREVENTS OR INTERFERES SOME ACTIVE ACTIVITIES AND ADLS

## 2022-07-20 NOTE — OP NOTE
Patient Name: Aleyda Fuentes   YOB: 1940  Room/Bed: Room/bed info not found  Medical Record Number: [id number]  Date: 7/20/2022       Sedation/ Anesthesia Plan:   intravenous sedation   as needed. Medications Planned:   midazolam (Versed) / Fentanyl  Intravenously  as needed. Preoperative Diagnosis: Lumbar spondylosis w/o myelopathy or radiculopathy  Postoperative Diagnosis: Lumbar spondylosis w/o myelopathy or radiculopathy  Blood Loss: none    Procedure Performed:  Bilateral Lumbar Medial Branch nerve Blocks at the transverse processes of  L4, L5 and sacral  ala under fluoroscopy guidance    Procedure: The Patient was seen in the preop area, chart was reviewed, informed consent was obtained. Patient was taken to procedure room and was placed in prone position. Vital signs were monitored through out the  Procedure. A time out was completed. The skin over the back was prepped and draped in sterile manner. The target point was marked at the junction of Transverse process and superior articular process at the target levels. Skin and deep tissues were anesthetized with 1 % lidocaine. A 25-gauge needlele was advanced to the target spots under fluoroscopy guidance in AP / Lateral and Oblique views. Then after negative aspiration contrast dye was injected with live fluoroscopy in AP views that showed  spread of the contrast with no epidural space and no vascular runoff or intrathecal spread. Finally 0.5 ml of treatment solution 0.5 % bupivacaine  was injected at each level. The needle was removed and a Band-Aid was placed over the needle  insertion site. The patient's vital signs remained stable and the patient tolerated the procedure well.       Post Procedure pain score in PACU was assessed with ambulation 0/10    Electronically signed by Ruben Villa MD on 7/20/2022 at 12:38 PM    SEDATION NOTE:    ASA CLASSIFICATION  3  MP   CLASSIFICATION  3    Moderate intravenous conscious sedation was supervised by Dr. James Holden  The patient was independently monitored by a Registered Nurse assigned to the Procedure Room  Monitoring included automated blood pressure, continuous EKG, Capnography and continuous pulse oximetry. The detailed Conscious Record is permanently stored in the [place] [id number].      The following is the conscious sedation record;  Start Time:  1158  End times:  1214  Duration:  16 minutes  MEDS GIVEN 1 MG VERSED AND 50 MCG FENTANYL

## 2022-07-20 NOTE — DISCHARGE INSTRUCTIONS
Discharge Instructions following Sedation or Anesthesia:  You have  received  a sedative/anesthetic therefore, you should not consume any alcoholic beverages for minimum of 12 hours. Do not drive or operate machinery for 24 hours. Do not sign legal documents for 24 hours. Dizziness, drowsiness, and unsteadiness may occur. Rest when need to. Increase diet as tolerated. Keep up on fluids if diet allows. General Instructions:  Do not take a tub bath for 72 hours after procedure (this includes hot tubs and swimming pools). You may shower, but avoid hot water to injection site. Avoid strenuous activity TODAY especially if you experience dizziness. Remove band-aid the next day. Wash off any residual iodine   Do not use heat, heating pad, or any other heating device over the injection site for 3 days after the procedure. If you experience pain after your procedure, you may continue with your current pain medication as prescribed. (DO NOT INCREASE YOUR PAIN MEDICATION WITHOUT TALKING TO DOCTOR)  Soreness and pain at injection site is common, may use ice to reduce soreness. Please complete pain diary as instructed.      Call Jailene Sofia at [phone] if you experience:   Fever, chills or temperature over 100    Vomiting, Headache, persistent stiff neck, nausea, blurred vision   Difficulty in urinating or unable to urinate with 8 hours   Increase in weakness, numbness or loss of function   Increased redness, swelling or drainage at the injection site

## 2022-07-26 ENCOUNTER — OFFICE VISIT (OUTPATIENT)
Dept: PAIN MANAGEMENT | Age: 82
End: 2022-07-26
Payer: MEDICARE

## 2022-07-26 VITALS — BODY MASS INDEX: 34.66 KG/M2 | WEIGHT: 208 LBS | HEIGHT: 65 IN

## 2022-07-26 DIAGNOSIS — M51.36 DDD (DEGENERATIVE DISC DISEASE), LUMBAR: ICD-10-CM

## 2022-07-26 DIAGNOSIS — M47.817 LUMBOSACRAL SPONDYLOSIS WITHOUT MYELOPATHY: Primary | ICD-10-CM

## 2022-07-26 DIAGNOSIS — M54.51 VERTEBROGENIC LOW BACK PAIN: ICD-10-CM

## 2022-07-26 PROCEDURE — 1123F ACP DISCUSS/DSCN MKR DOCD: CPT | Performed by: ANESTHESIOLOGY

## 2022-07-26 PROCEDURE — 99024 POSTOP FOLLOW-UP VISIT: CPT | Performed by: ANESTHESIOLOGY

## 2022-07-26 ASSESSMENT — ENCOUNTER SYMPTOMS
BACK PAIN: 1
EYES NEGATIVE: 1
RESPIRATORY NEGATIVE: 1

## 2022-07-26 NOTE — PROGRESS NOTES
The patient is a 80 y. o. Non- / non  female. Chief Complaint   Patient presents with    Back Pain    Follow Up After Procedure     MBB        HPI  Very pleasant 53-year-old female with a history of chronic low back pain  Pain is predominantly axial describing as constant aching nagging stiffness that aggravates with routine activity  Also have pain extending over the hip and gluteal region that aggravates with standing and walking    She failed conservative measures  Imaging showed multiple pathologies including severe facet arthropathy degenerative disc disease with Modic changes type II involving L5-S1 vertebrae and moderate spinal stenosis    Since axial back pain was the major issue we did diagnostic lumbar medial branch nerve block  Today for postprocedure follow-up report excellent short-term relief with improved range of motion    I will recommend for her second diagnostic confirmatory nerve block  If that also provide good short-term relief then we will proceed with radiofrequency ablation    Residual axial back pain would likely be vertebral genic    For claudication symptom may consider for epidural injection in future      S/P: MBB  Outcome   Any improvement of activity?   Yes   Any side effects (appetite,leg cramping,facial fleshing): none   Increase of pain:  No  Pain score Today:  3  % of pain relief: 80%  Pain diary (medial branch block): Yes    Lab Results   Component Value Date    LABA1C 5.8 01/19/2022     Lab Results   Component Value Date     01/19/2022         Past Medical History:   Diagnosis Date    Acid reflux     Anxiety 6/30/2014    ALICIA-7   09/09/19 : 4    Bilateral tinnitus 12/10/2019    Bronchitis, mucopurulent recurrent ([address] 7/23/2020    Diverticular disease 6/30/2014    Enthesopathy of hip region 12/10/2019    GERD (gastroesophageal reflux disease)     Hypercholesteremia 6/30/2014    Hypothyroid 6/30/2014    Laryngopharyngeal reflux 12/10/2019    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    Lung nodule     Meniere disease     right ear    Meniere's disease, right ear 12/10/2019    Morbidly obese ([facility] [address] 7/23/2020    Neuropathy     Osteopenia 6/30/2014    Parkinson disease ([facility] [address] 7/23/2020    Postmenopausal state 12/10/2019    Postnasal drip 7/11/2020    Prediabetes     RAD (reactive airway disease) 6/30/2014    Seasonal asthma 7/11/2020    Thyroid nodule 3/1/2016    Tremor     r hand    Vitamin D deficiency 6/30/2014        Past Surgical History:   Procedure Laterality Date    CHOLECYSTECTOMY      COLONOSCOPY      ENDOSCOPY, COLON, DIAGNOSTIC      ERCP  03/21/14    EXCISION / BIOPSY SKIN LESION OF TRUNK Left 11/8/2017    EXCISION MASS LEFT POSTERIOR SHOULDER, LEFT ANTERIOR ARM performed by Dao Hyatt MD at [address] ([facility])  01/03/2007    SKIN BIOPSY Left 11/08/2017    Excision Mass Left Posterior Shoulder, Left Anterior Arm       Social History     Socioeconomic History    Marital status: Single     Spouse name: None    Number of children: None    Years of education: None    Highest education level: None   Tobacco Use    Smoking status: Never    Smokeless tobacco: Never   Vaping Use    Vaping Use: Never used   Substance and Sexual Activity    Alcohol use: Yes     Comment: rare    Drug use: No     Social Determinants of Health     Financial Resource Strain: Low Risk     Difficulty of Paying Living Expenses: Not hard at all   Food Insecurity: No Food Insecurity    Worried About Running Out of Food in the Last Year: Never true    Ran Out of Food in the Last Year: Never true       Family History   Problem Relation Age of Onset    Cancer Mother 48        pineal gland/throat    Cancer Father 64        colon    Cancer Brother         lung    Other Brother         pulmonary embolus    Heart Disease Paternal Grandfather         myocarditis    Stroke Maternal Aunt     Cancer Maternal Uncle     Stroke Maternal Grandmother Diabetes Brother     Heart Disease Paternal Uncle 67        mi    Heart Disease Brother 58        mi    High Blood Pressure Brother     Heart Disease Brother 79        stents for cad    High Blood Pressure Brother        Allergies   Allergen Reactions    Aspirin Swelling     Hives. anaphylaxis    Atorvastatin Other (See Comments)     myalgia    Crestor [Rosuvastatin]      Myalgia      Livalo [Pitavastatin] Other (See Comments)     Muscle pain    Seasonal     Simvastatin      Myalgia      Statins      Other reaction(s): Unknown    Welchol [Colesevelam Hcl] Other (See Comments)     Leg cramp       There were no vitals filed for this visit. Current Outpatient Medications   Medication Sig Dispense Refill    alclomethasone (ACLOVATE) 0.05 % cream Apply topically 2 times daily Prn      albuterol sulfate HFA (PROVENTIL HFA) 108 (90 Base) MCG/ACT inhaler Inhale 2 puffs into the lungs every 6 hours as needed for Wheezing 18 g 3    levothyroxine (SYNTHROID) 75 MCG tablet Take 1 tablet by mouth Daily 90 tablet 1    metFORMIN (GLUCOPHAGE) 500 MG tablet TAKE ONE TABLET BY MOUTH DAILY 90 tablet 1    midodrine (PROAMATINE) 5 MG tablet Take 1 tablet by mouth 2 times daily (with meals) (Patient taking differently: Take 2.5 mg by mouth in the morning and 2.5 mg in the evening. Take with meals.) 90 tablet 3    amitriptyline (ELAVIL) 10 MG tablet TAKE ONE TABLET BY MOUTH DAILY AS NEEDED FOR SLEEP AND LEG PAIN FOR UP TO 30 DAYS (Patient taking differently: nightly as needed TAKE ONE TABLET BY MOUTH DAILY AS NEEDED FOR SLEEP AND LEG PAIN FOR UP TO 30 DAYS) 30 tablet 3    ondansetron (ZOFRAN-ODT) 4 MG disintegrating tablet Take 1 tablet by mouth every 8 hours as needed.  20 tablet 3    Evolocumab 140 MG/ML SOSY Inject into the skin Next dose due 12/27/21      famotidine (PEPCID) 20 MG tablet as needed       guaiFENesin (MUCINEX) 600 MG extended release tablet Take 1 tablet by mouth 2 times daily (Patient taking differently: Take 600 mg by mouth 2 times daily as needed) 20 tablet 0    NONFORMULARY Indications: Dietary fiber 1 tsp a day       Cholecalciferol (VITAMIN D3) 2000 units CAPS Take 1 capsule by mouth 2 times daily 30 capsule 0    diltiazem (CARDIZEM CD) 120 MG extended release capsule Take 1 capsule by mouth every evening 30 capsule 3    fluticasone (FLONASE) 50 MCG/ACT nasal spray PLACE 1 SPRAY IN EACH NOSTRIL DAILY FOR 15 DAYS 1 Bottle 2    ketoconazole (NIZORAL) 2 % shampoo APPLY  TO AFFECTED AREAS TOPICALLY DAILY THEN RINSE OFF AFTER 5 MINUTES 120 mL 2    ezetimibe (ZETIA) 10 MG tablet Take 10 mg by mouth daily      esomeprazole Magnesium (NEXIUM) 40 MG PACK Take 40 mg by mouth daily. NONFORMULARY Super b complex 3 times per week      ascorbic acid (VITAMIN C) 500 MG tablet Take 500 mg by mouth daily. chlorpheniramine (CHLOR-TRIMETON) 4 MG tablet Take 12 mg by mouth daily        No current facility-administered medications for this visit. Facility-Administered Medications Ordered in Other Visits   Medication Dose Route Frequency Provider Last Rate Last Admin    0.9 % sodium chloride infusion   IntraVENous Continuous Akua Washburn  mL/hr at 02/19/14 0827 New Bag at 02/19/14 0827       Review of Systems   Constitutional: Negative. Negative for fever. HENT: Negative. Eyes: Negative. Respiratory: Negative. Cardiovascular: Negative. Musculoskeletal:  Positive for back pain. Objective:  General Appearance:  Uncomfortable, in pain and in no acute distress. Vital signs: (most recent): Height 5' 5\" (1.651 m), weight 208 lb (94.3 kg), not currently breastfeeding. Vital signs are normal.  No fever. Output: Producing urine and producing stool. HEENT: Normal HEENT exam.    Lungs:  Normal effort and normal respiratory rate. She is not in respiratory distress. Heart: Normal rate. Extremities: Normal range of motion. There is no deformity.     Neurological: Patient is alert and oriented to person, place and time. Patient has normal coordination. Pupils:  Pupils are equal, round, and reactive to light. Pupils are equal.   Skin:  Warm and dry. No rash or cyanosis. Assessment & Plan  Very pleasant 59-year-old female with a history of chronic low back pain  Pain is predominantly axial describing as constant aching nagging stiffness that aggravates with routine activity  Also have pain extending over the hip and gluteal region that aggravates with standing and walking    She failed conservative measures  Imaging showed multiple pathologies including severe facet arthropathy degenerative disc disease with Modic changes type II involving L5-S1 vertebrae and moderate spinal stenosis    Since axial back pain was the major issue we did diagnostic lumbar medial branch nerve block  Today for postprocedure follow-up report excellent short-term relief with improved range of motion    I will recommend for her second diagnostic confirmatory nerve block  If that also provide good short-term relief then we will proceed with radiofrequency ablation    Residual axial back pain would likely be vertebral genic    For claudication symptom may consider for epidural injection in future    1. Lumbosacral spondylosis without myelopathy    2. DDD (degenerative disc disease), lumbar    3. Vertebrogenic low back pain        Orders Placed This Encounter   Procedures    MO INJ DX/THER AGNT PARAVERT FACET JOINT, LUMBAR/SAC, 1ST LEVEL      No orders of the defined types were placed in this encounter.            Electronically signed by Marva Sparks MD on 7/26/2022 at 12:10 PM

## 2022-08-03 ENCOUNTER — OFFICE VISIT (OUTPATIENT)
Dept: INFECTIOUS DISEASES | Age: 82
End: 2022-08-03
Payer: MEDICARE

## 2022-08-03 VITALS
SYSTOLIC BLOOD PRESSURE: 131 MMHG | BODY MASS INDEX: 34.82 KG/M2 | DIASTOLIC BLOOD PRESSURE: 80 MMHG | WEIGHT: 209 LBS | HEIGHT: 65 IN | HEART RATE: 68 BPM | TEMPERATURE: 97.2 F

## 2022-08-03 DIAGNOSIS — R53.82 CHRONIC FATIGUE: Primary | ICD-10-CM

## 2022-08-03 DIAGNOSIS — E55.9 HYPOVITAMINOSIS D: ICD-10-CM

## 2022-08-03 DIAGNOSIS — R76.0 ELEVATED EBV ANTIBODY TITER: ICD-10-CM

## 2022-08-03 DIAGNOSIS — R53.1 WEAKNESS: ICD-10-CM

## 2022-08-03 PROCEDURE — G8427 DOCREV CUR MEDS BY ELIG CLIN: HCPCS | Performed by: INTERNAL MEDICINE

## 2022-08-03 PROCEDURE — 1123F ACP DISCUSS/DSCN MKR DOCD: CPT | Performed by: INTERNAL MEDICINE

## 2022-08-03 PROCEDURE — G8417 CALC BMI ABV UP PARAM F/U: HCPCS | Performed by: INTERNAL MEDICINE

## 2022-08-03 PROCEDURE — 99204 OFFICE O/P NEW MOD 45 MIN: CPT | Performed by: INTERNAL MEDICINE

## 2022-08-03 PROCEDURE — 1090F PRES/ABSN URINE INCON ASSESS: CPT | Performed by: INTERNAL MEDICINE

## 2022-08-03 PROCEDURE — G8399 PT W/DXA RESULTS DOCUMENT: HCPCS | Performed by: INTERNAL MEDICINE

## 2022-08-03 PROCEDURE — 1036F TOBACCO NON-USER: CPT | Performed by: INTERNAL MEDICINE

## 2022-08-03 ASSESSMENT — ENCOUNTER SYMPTOMS
EYE DISCHARGE: 0
COLOR CHANGE: 0
APNEA: 0
BACK PAIN: 1
ABDOMINAL DISTENTION: 0

## 2022-08-03 NOTE — PATIENT INSTRUCTIONS
3 wk f/u approved by Dr Humaira Qiu - Dr Humaira Qiu will call pt with lab results. Patient will call office if she doesn't get a call.    ANNALISA

## 2022-08-03 NOTE — PROGRESS NOTES
[facility] - Initial Consult Note  Today's Date: 8/3/2022    Impression :   Prediabetic on metformin and A1C <6  Hypothyroid - on pills   Meniere disease  Osteoarthritis of the spine - spinal pain shot 2022 to the back 7/2022  Chronic fatigue x 12/2021  VPBs - metoprolol  Elevated EBV titers, looks like old possibly chronic illness,  B12 low and replaced, correctedWeight stable, added lately a little  [employer]- also on B vitamin, B6 normal, folate normal  Takes vitamin D     Recommendations   Lyme titers,ESR, beta 2 microglobin, cbc diff, kappa lambda, vitam D and  calcium phosphorus an if abnormal, get then PTH  If all neg, then nothing to treat  Looking for lyme or for lymphoma  Keep the vitamin d 4000 iu daily, keep the B12, complex B   See in 2 weeks   Diagnosis Orders   1. Chronic fatigue  Lyme Ab    Sedimentation Rate    Beta 2 Microglobulin, Serum    Kappa/Lambda Quantitative Free Light Chains, Serum    CBC with Auto Differential    Calcium, Ionized    Phosphorus      2. Elevated EBV antibody titer  Lyme Ab    Sedimentation Rate    Beta 2 Microglobulin, Serum    Kappa/Lambda Quantitative Free Light Chains, Serum    CBC with Auto Differential    Calcium, Ionized    Phosphorus      3. Hypovitaminosis D  Calcium, Ionized    Phosphorus    Vitamin D 25 Hydroxy      4. Weakness  Calcium, Ionized    Phosphorus          Return in about 2 weeks (around 8/17/2022). History of Present Illness:   Kiley Busch is a 80y.o.-year-old  female who presents with   Chief Complaint   Patient presents with    Frequent Infections     NEW PATIENT Chronic fatique     New pt - chronic fatigue and EBV -3/5/14  Worship nun  [phone] she would have URTI flu like last too long, had seen pulm for that. Then recover. Dec 2021 sore throat and tested neg for covid, admitted to [facility], ,[facility] for syncope and stress test neg, later found w hypothyroidism and a thyroid nodule.   Also very weak legs and legs at the time, palpitations, and DC d home and better slightly aT HOME. Has been fully vaccinated and boosted for covid. Saw neuro for the weak ,limbs, neuropathy in legs and arms are ok, but could not understand why she was weak to the limbs. MRI LS 5/2022 showed multi level osteo arthritis, sees pain management. 2 weeks ago started w fatigue and sore throat again and covid tested neg, lasted 4-5 days and Improved. When she has the fatigue, she becomes lot slower and needs a lot of rest, cant do her daily chores. When improves, she can function again but never back to normal.    No wt loss- no fever or sweats, but some chills. Appetite good. BEE and RF were neg. TSH normal but she was started on T4 for low thyroid  B12 475 normal, B6 normal  A1c neg    EBV VCA IGG >750, IGM <10, EA 66, NA 12 low, old disease not recovered and possibly active. Not sure if this is old w increased pushed titers from something else or real active. No tx for that specifically but would like to get a LAZCANO for :  Look for lyme, ESR and beta 2 microglobin      Has GERD- gastroperesis, prediabetic, A1c was 6 then dropped on metformin - hysterectomy and GB out, [facility]    CT chest 12/2021 neg, no LN    I have personally reviewed the past medical history, past surgical history, medications, social history, and family history, and I haveupdated the database accordingly.   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    Lung nodule     Meniere disease     right ear    Meniere's disease, right ear 12/10/2019    Morbidly obese ([facility] [address] 7/23/2020    Neuropathy     Osteopenia 6/30/2014    Parkinson disease ([facility] [address] 7/23/2020    Postmenopausal state 12/10/2019    Postnasal drip 7/11/2020    Prediabetes     RAD (reactive airway disease) 6/30/2014    Seasonal asthma 7/11/2020    Thyroid nodule 3/1/2016    Tremor     r hand    Vitamin D deficiency 6/30/2014       Past Surgical  History:     Past Surgical History:   Procedure Laterality Date    CHOLECYSTECTOMY      COLONOSCOPY      ENDOSCOPY, COLON, DIAGNOSTIC      ERCP  03/21/14    EXCISION / BIOPSY SKIN LESION OF TRUNK Left 11/8/2017    EXCISION MASS LEFT POSTERIOR SHOULDER, LEFT ANTERIOR ARM performed by Jabari Pisano MD at [address] (CERVIX STATUS UNKNOWN)  01/03/2007    SKIN BIOPSY Left 11/08/2017    Excision Mass Left Posterior Shoulder, Left Anterior Arm       Medications:     Current Outpatient Medications:     alclomethasone (ACLOVATE) 0.05 % cream, Apply topically 2 times daily Prn, Disp: , Rfl:     albuterol sulfate HFA (PROVENTIL HFA) 108 (90 Base) MCG/ACT inhaler, Inhale 2 puffs into the lungs every 6 hours as needed for Wheezing, Disp: 18 g, Rfl: 3    levothyroxine (SYNTHROID) 75 MCG tablet, Take 1 tablet by mouth Daily, Disp: 90 tablet, Rfl: 1    metFORMIN (GLUCOPHAGE) 500 MG tablet, TAKE ONE TABLET BY MOUTH DAILY, Disp: 90 tablet, Rfl: 1    midodrine (PROAMATINE) 5 MG tablet, Take 1 tablet by mouth 2 times daily (with meals) (Patient taking differently: Take 2.5 mg by mouth in the morning and 2.5 mg in the evening.  Take with meals.), Disp: 90 tablet, Rfl: 3    amitriptyline (ELAVIL) 10 MG tablet, TAKE ONE TABLET BY MOUTH DAILY AS NEEDED FOR SLEEP AND LEG PAIN FOR UP TO 30 DAYS (Patient taking differently: nightly as needed TAKE ONE TABLET BY MOUTH DAILY AS NEEDED FOR SLEEP AND LEG PAIN FOR UP TO 30 DAYS), Disp: 30 tablet, Rfl: 3    ondansetron (ZOFRAN-ODT) 4 MG disintegrating tablet, Take 1 tablet by mouth every 8 hours as needed. , Disp: 20 tablet, Rfl: 3    Evolocumab 140 MG/ML SOSY, Inject into the skin Next dose due 12/27/21, Disp: , Rfl:     famotidine (PEPCID) 20 MG tablet, as needed , Disp: , Rfl:     guaiFENesin (MUCINEX) 600 MG extended release tablet, Take 1 tablet by mouth 2 times daily (Patient taking differently: Take 600 mg by mouth 2 times daily as needed), Disp: 20 tablet, Rfl: 0    NONFORMULARY, Indications: Dietary fiber 1 tsp a day , Disp: , Rfl:     Cholecalciferol (VITAMIN D3) 2000 units CAPS, Take 1 capsule by mouth 2 times daily, Disp: 30 capsule, Rfl: 0    diltiazem (CARDIZEM CD) 120 MG extended release capsule, Take 1 capsule by mouth every evening, Disp: 30 capsule, Rfl: 3    fluticasone (FLONASE) 50 MCG/ACT nasal spray, PLACE 1 SPRAY IN EACH NOSTRIL DAILY FOR 15 DAYS, Disp: 1 Bottle, Rfl: 2    ketoconazole (NIZORAL) 2 % shampoo, APPLY  TO AFFECTED AREAS TOPICALLY DAILY THEN RINSE OFF AFTER 5 MINUTES, Disp: 120 mL, Rfl: 2    ezetimibe (ZETIA) 10 MG tablet, Take 10 mg by mouth daily, Disp: , Rfl:     esomeprazole Magnesium (NEXIUM) 40 MG PACK, Take 40 mg by mouth daily. , Disp: , Rfl:     NONFORMULARY, Super b complex 3 times per week, Disp: , Rfl:     ascorbic acid (VITAMIN C) 500 MG tablet, Take 500 mg by mouth daily. , Disp: , Rfl:     chlorpheniramine (CHLOR-TRIMETON) 4 MG tablet, Take 12 mg by mouth daily , Disp: , Rfl:       Social History:     Social History     Socioeconomic History    Marital status: Single     Spouse name: Not on file    Number of children: Not on file    Years of education: Not on file    Highest education level: Not on file   Occupational History    Not on file   Tobacco Use    Smoking status: Never    Smokeless tobacco: Never   Vaping Use    Vaping Use: Never used   Substance and Sexual Activity    Alcohol use: Yes     Comment: rare    Drug use: No    Sexual activity: Not on file   Other Topics Concern    Not on file   Social History Narrative    Not on file     Social Determinants of Health     Financial Resource Strain: Low Risk     Difficulty of Paying Living Expenses: Not hard at all   Food Insecurity: No Food Insecurity    Worried About Running Out of Food in the Last Year: Never true    Ran Out of Food in the Last Year: Never true   Transportation Needs: Not on file   Physical Activity: Not on file   Stress: Not on file   Social Connections: Not on file   Intimate Partner Violence: Not on file   Housing Stability: Not on file       Family History:     Family History   Problem Relation Age of Onset    Cancer Mother 48        pineal gland/throat    Cancer Father 64        colon    Cancer Brother         lung    Other Brother         pulmonary embolus    Heart Disease Paternal Grandfather         myocarditis    Stroke Maternal Aunt     Cancer Maternal Uncle     Stroke Maternal Grandmother     Diabetes Brother     Heart Disease Paternal Uncle 67        mi    Heart Disease Brother 58        mi    High Blood Pressure Brother     Heart Disease Brother 79        stents for cad    High Blood Pressure Brother         Allergies:   Aspirin, Atorvastatin, Crestor [rosuvastatin], Livalo [pitavastatin], Seasonal, Simvastatin, Statins, and Welchol [colesevelam hcl]     Review of Systems:   Review of Systems   Constitutional:  Positive for fatigue. Negative for activity change. HENT:  Negative for congestion. Eyes:  Negative for discharge. Respiratory:  Negative for apnea. Cardiovascular:  Negative for chest pain. Gastrointestinal:  Negative for abdominal distention. Endocrine: Negative for polydipsia. Genitourinary:  Negative for dysuria. Musculoskeletal:  Positive for back pain. Negative for arthralgias. Skin:  Negative for color change. Allergic/Immunologic: Negative for immunocompromised state. Neurological:  Positive for weakness. Negative for dizziness and headaches. Hematological:  Negative for adenopathy. Psychiatric/Behavioral:  Negative for agitation. Physical Examination :   Blood pressure 131/80, pulse 68, temperature 97.2 °F (36.2 °C), height 5' 5\" (1.651 m), weight 209 lb (94.8 kg), not currently breastfeeding. Physical Exam  Constitutional:       Appearance: Normal appearance. She is not ill-appearing. HENT:      Head: Normocephalic and atraumatic. Nose: Nose normal. No congestion. Mouth/Throat:      Mouth: Mucous membranes are moist.   Eyes:      General: No scleral icterus. Pupils: Pupils are equal, round, and reactive to light. Cardiovascular:      Rate and Rhythm: Normal rate and regular rhythm. Heart sounds: Normal heart sounds. No murmur heard. Pulmonary:      Effort: No respiratory distress. Breath sounds: Normal breath sounds. No wheezing. Abdominal:      General: There is no distension. Palpations: Abdomen is soft. Tenderness: There is no abdominal tenderness. Genitourinary:     Comments: No neves  Musculoskeletal:         General: No swelling or deformity. Cervical back: Neck supple. No rigidity. Skin:     Coloration: Skin is not jaundiced. Findings: No bruising. Neurological:      Mental Status: She is alert. Cranial Nerves: No cranial nerve deficit. Motor: No weakness. Psychiatric:         Mood and Affect: Mood normal.         Thought Content:  Thought content normal.         Medical Decision Making:   I have independently reviewed/ordered the following labs:    CBCwith Differential:   Lab Results   Component Value Date/Time    WBC 8.9 12/22/2021 01:58 AM    WBC 10.7 12/21/2021 02:51 PM    HGB 13.5 12/22/2021 01:58 AM    HGB 14.7 12/21/2021 02:51 PM    HCT 41.2 12/22/2021 01:58 AM    HCT 43.5 12/21/2021 02:51 PM     12/22/2021 01:58 AM     12/21/2021 02:51 PM    LYMPHOPCT 8 12/21/2021 02:51 PM    LYMPHOPCT 33 05/04/2021 09:30 AM    LYMPHOPCT 30.9 03/15/2019 02:55 PM    LYMPHOPCT 26.5 10/22/2018 09:31 AM    MONOPCT 2 12/21/2021 02:51 PM    MONOPCT 6 05/04/2021 09:30 AM    EOSPCT 2.2 03/15/2019 02:55 PM    EOSPCT 2.0 10/22/2018 09:31 AM     BMP:  Lab Results   Component Value Date/Time     12/22/2021 01:58 AM     12/21/2021 02:51 PM    K 4.0 12/22/2021 01:58 AM    K 4.4 12/21/2021 02:51 PM     12/22/2021 01:58 AM     12/21/2021 02:51 PM    CO2 24 12/22/2021 01:58 AM    CO2 21 12/21/2021 02:51 PM    BUN 20 12/22/2021 01:58 AM    BUN 25 12/21/2021 02:51 PM    CREATININE 0.61 12/22/2021 01:58 AM    CREATININE 0.80 12/21/2021 02:51 PM    MG 2.0 12/22/2021 01:58 AM    MG 2.0 03/15/2019 02:55 PM     Hepatic Function Panel:   Lab Results   Component Value Date/Time    PROT 6.5 12/21/2021 02:51 PM    PROT 7.2 05/04/2021 09:30 AM    LABALBU 4.3 12/21/2021 02:51 PM    LABALBU 4.2 05/04/2021 09:30 AM    BILIDIR <0.08 12/21/2021 02:51 PM    BILIDIR <0.2 10/22/2018 09:31 AM    IBILI Can not be calculated 12/21/2021 02:51 PM    IBILI 0.18 02/16/2017 10:05 AM    BILITOT 0.20 12/21/2021 02:51 PM    BILITOT 0.51 05/04/2021 09:30 AM    BILITOT NEGATIVE 10/02/2015 11:19 AM    ALKPHOS 103 12/21/2021 02:51 PM    ALKPHOS 82 05/04/2021 09:30 AM    ALT 19 01/19/2022 10:11 AM    ALT 23 12/21/2021 02:51 PM    AST 18 01/19/2022 10:11 AM    AST 21 12/21/2021 02:51 PM     No results found for: RPR  No results found for: HIV  No results found for: [facility]  Lab Results   Component Value Date/Time    MUCUS DISREGARD RESULTS. CLERICAL ERROR. 07/31/2020 11:11 AM    PH 6.0 10/02/2015 11:19 AM    RBC 4.27 12/22/2021 01:58 AM    RBC 4.18 03/15/2019 02:55 PM    TRICHOMONAS DISREGARD RESULTS. CLERICAL ERROR. 07/31/2020 11:11 AM    WBC 8.9 12/22/2021 01:58 AM    YEAST DISREGARD RESULTS. CLERICAL ERROR. 07/31/2020 11:11 AM    TURBIDITY Clear 12/21/2021 04:15 PM     Lab Results   Component Value Date/Time    CREATININE 0.61 12/22/2021 01:58 AM    GLUCOSE 139 12/22/2021 01:58 AM    GLUCOSE 106 08/29/2019 02:05 PM   you for allowing us to participate in the care of this patient.  Please call with questions. Sridevi De La Garza MD  - Office: [phone]    Please note that this chart was generated using voice recognition Dragon dictation software. Although every effort was made to ensure the accuracy of this automated transcription, some errors in transcription mayhave occurred.

## 2022-08-08 ENCOUNTER — HOSPITAL ENCOUNTER (OUTPATIENT)
Age: 82
Setting detail: SPECIMEN
Discharge: HOME | End: 2022-08-08

## 2022-08-08 DIAGNOSIS — R53.1 WEAKNESS: ICD-10-CM

## 2022-08-08 DIAGNOSIS — E55.9 HYPOVITAMINOSIS D: ICD-10-CM

## 2022-08-08 DIAGNOSIS — R76.0 ELEVATED EBV ANTIBODY TITER: ICD-10-CM

## 2022-08-08 DIAGNOSIS — R53.82 CHRONIC FATIGUE: ICD-10-CM

## 2022-08-08 LAB
ABSOLUTE EOS #: 0.12 K/UL (ref 0–0.44)
ABSOLUTE IMMATURE GRANULOCYTE: <0.03 K/UL (ref 0–0.3)
ABSOLUTE LYMPH #: 1.98 K/UL (ref 1.1–3.7)
ABSOLUTE MONO #: 0.31 K/UL (ref 0.1–1.2)
ALT SERPL-CCNC: 17 U/L (ref 5–33)
AST SERPL-CCNC: 19 U/L
BASOPHILS # BLD: 1 % (ref 0–2)
BASOPHILS ABSOLUTE: 0.08 K/UL (ref 0–0.2)
CALCIUM IONIZED: 1.35 MMOL/L (ref 1.13–1.33)
CHOLESTEROL/HDL RATIO: 3.3
CHOLESTEROL: 163 MG/DL
EOSINOPHILS RELATIVE PERCENT: 2 % (ref 1–4)
FREE KAPPA/LAMBDA RATIO: 0.83 (ref 0.26–1.65)
HCT VFR BLD CALC: 44.7 % (ref 36.3–47.1)
HDLC SERPL-MCNC: 50 MG/DL
HEMOGLOBIN: 14.4 G/DL (ref 11.9–15.1)
IMMATURE GRANULOCYTES: 0 %
KAPPA FREE LIGHT CHAINS QNT: 1.72 MG/DL (ref 0.37–1.94)
LAMBDA FREE LIGHT CHAINS QNT: 2.06 MG/DL (ref 0.57–2.63)
LDL CHOLESTEROL: 87 MG/DL (ref 0–130)
LYMPHOCYTES # BLD: 35 % (ref 24–43)
MCH RBC QN AUTO: 30.7 PG (ref 25.2–33.5)
MCHC RBC AUTO-ENTMCNC: 32.2 G/DL (ref 28.4–34.8)
MCV RBC AUTO: 95.3 FL (ref 82.6–102.9)
MONOCYTES # BLD: 6 % (ref 3–12)
NRBC AUTOMATED: 0 PER 100 WBC
PDW BLD-RTO: 12.3 % (ref 11.8–14.4)
PHOSPHORUS: 2.8 MG/DL (ref 2.6–4.5)
PLATELET # BLD: 259 K/UL (ref 138–453)
PMV BLD AUTO: 10.7 FL (ref 8.1–13.5)
RBC # BLD: 4.69 M/UL (ref 3.95–5.11)
SEDIMENTATION RATE, ERYTHROCYTE: 17 MM/HR (ref 0–30)
SEG NEUTROPHILS: 56 % (ref 36–65)
SEGMENTED NEUTROPHILS ABSOLUTE COUNT: 3.18 K/UL (ref 1.5–8.1)
TRIGL SERPL-MCNC: 129 MG/DL
VITAMIN D 25-HYDROXY: 75.8 NG/ML
WBC # BLD: 5.7 K/UL (ref 3.5–11.3)

## 2022-08-09 LAB
BETA-2 MICROGLOBULIN: 2.5 MG/L (ref 0.6–2.4)
LYME ANTIBODY: 0.57

## 2022-08-10 ENCOUNTER — HOSPITAL ENCOUNTER (OUTPATIENT)
Age: 82
Setting detail: SPECIMEN
Discharge: HOME | End: 2022-08-10

## 2022-08-10 DIAGNOSIS — E83.52 HYPERCALCEMIA: ICD-10-CM

## 2022-08-10 DIAGNOSIS — E83.52 HYPERCALCEMIA: Primary | ICD-10-CM

## 2022-08-10 LAB — PTH INTACT: 36.61 PG/ML (ref 15–65)

## 2022-08-15 ENCOUNTER — PATIENT MESSAGE (OUTPATIENT)
Dept: FAMILY MEDICINE CLINIC | Age: 82
End: 2022-08-15

## 2022-08-15 NOTE — TELEPHONE ENCOUNTER
From: Mar Jenkins  To: Dr. Mira Souza  Sent: 8/15/2022 4:04 PM EDT  Subject: Appointment with Dr. Rachel Mcguire (endocrinologist)    Dr. Laura Wilson,  Thank you for the call from your office to tell me to schedule an appt. with Dr. Rachel Mcguire (sp.? ) re my elevated calcium blood test. This e-mail is to let you know that I did call her office this afternoon, and the soonest they can take me is Monday, December 12 at 11:00 a.m. I am fine with this, unless you think I should be seen sooner. If I need to be seen sooner, I would need your assistance.   Many thanks,  RUSTe Frames  53-

## 2022-08-17 ENCOUNTER — HOSPITAL ENCOUNTER (OUTPATIENT)
Dept: PAIN MANAGEMENT | Facility: CLINIC | Age: 82
Discharge: HOME | End: 2022-08-17
Payer: MEDICARE

## 2022-08-17 VITALS
RESPIRATION RATE: 6 BRPM | DIASTOLIC BLOOD PRESSURE: 72 MMHG | HEART RATE: 66 BPM | HEIGHT: 65 IN | TEMPERATURE: 97.1 F | BODY MASS INDEX: 34.82 KG/M2 | OXYGEN SATURATION: 94 % | SYSTOLIC BLOOD PRESSURE: 137 MMHG | WEIGHT: 209 LBS

## 2022-08-17 DIAGNOSIS — R52 PAIN MANAGEMENT: ICD-10-CM

## 2022-08-17 DIAGNOSIS — M47.817 LUMBOSACRAL SPONDYLOSIS WITHOUT MYELOPATHY: Primary | ICD-10-CM

## 2022-08-17 LAB — GLUCOSE BLD-MCNC: 109 MG/DL (ref 65–105)

## 2022-08-17 PROCEDURE — 82947 ASSAY GLUCOSE BLOOD QUANT: CPT

## 2022-08-17 PROCEDURE — 64493 INJ PARAVERT F JNT L/S 1 LEV: CPT

## 2022-08-17 PROCEDURE — 64495 INJ PARAVERT F JNT L/S 3 LEV: CPT

## 2022-08-17 PROCEDURE — 2580000003 HC RX 258: Performed by: ANESTHESIOLOGY

## 2022-08-17 PROCEDURE — 64493 INJ PARAVERT F JNT L/S 1 LEV: CPT | Performed by: ANESTHESIOLOGY

## 2022-08-17 PROCEDURE — 2500000003 HC RX 250 WO HCPCS: Performed by: ANESTHESIOLOGY

## 2022-08-17 PROCEDURE — 6360000004 HC RX CONTRAST MEDICATION: Performed by: ANESTHESIOLOGY

## 2022-08-17 PROCEDURE — 99152 MOD SED SAME PHYS/QHP 5/>YRS: CPT | Performed by: ANESTHESIOLOGY

## 2022-08-17 PROCEDURE — 6360000002 HC RX W HCPCS: Performed by: ANESTHESIOLOGY

## 2022-08-17 PROCEDURE — 64494 INJ PARAVERT F JNT L/S 2 LEV: CPT | Performed by: ANESTHESIOLOGY

## 2022-08-17 PROCEDURE — 64494 INJ PARAVERT F JNT L/S 2 LEV: CPT

## 2022-08-17 RX ORDER — MIDAZOLAM HYDROCHLORIDE 2 MG/2ML
INJECTION, SOLUTION INTRAMUSCULAR; INTRAVENOUS
Status: COMPLETED | OUTPATIENT
Start: 2022-08-17 | End: 2022-08-17

## 2022-08-17 RX ORDER — FENTANYL CITRATE 50 UG/ML
INJECTION, SOLUTION INTRAMUSCULAR; INTRAVENOUS
Status: COMPLETED | OUTPATIENT
Start: 2022-08-17 | End: 2022-08-17

## 2022-08-17 RX ORDER — METOPROLOL SUCCINATE 25 MG/1
12.5 TABLET, EXTENDED RELEASE ORAL 2 TIMES DAILY
COMMUNITY

## 2022-08-17 RX ORDER — SODIUM CHLORIDE 0.9 % (FLUSH) 0.9 %
SYRINGE (ML) INJECTION
Status: COMPLETED | OUTPATIENT
Start: 2022-08-17 | End: 2022-08-17

## 2022-08-17 RX ORDER — BUPIVACAINE HYDROCHLORIDE 5 MG/ML
INJECTION, SOLUTION EPIDURAL; INTRACAUDAL
Status: COMPLETED | OUTPATIENT
Start: 2022-08-17 | End: 2022-08-17

## 2022-08-17 RX ORDER — LIDOCAINE HYDROCHLORIDE 10 MG/ML
INJECTION, SOLUTION EPIDURAL; INFILTRATION; INTRACAUDAL; PERINEURAL
Status: COMPLETED | OUTPATIENT
Start: 2022-08-17 | End: 2022-08-17

## 2022-08-17 RX ADMIN — LIDOCAINE HYDROCHLORIDE 5 ML: 10 INJECTION, SOLUTION EPIDURAL; INFILTRATION; INTRACAUDAL at 09:45

## 2022-08-17 RX ADMIN — BUPIVACAINE HYDROCHLORIDE 6 ML: 5 INJECTION, SOLUTION EPIDURAL; INTRACAUDAL; PERINEURAL at 09:49

## 2022-08-17 RX ADMIN — Medication 3 ML: at 09:45

## 2022-08-17 RX ADMIN — IOPAMIDOL 1.5 ML: 408 INJECTION, SOLUTION INTRATHECAL at 09:48

## 2022-08-17 RX ADMIN — FENTANYL CITRATE 50 MCG: 50 INJECTION INTRAMUSCULAR; INTRAVENOUS at 09:45

## 2022-08-17 RX ADMIN — MIDAZOLAM HYDROCHLORIDE 1 MG: 1 INJECTION, SOLUTION INTRAMUSCULAR; INTRAVENOUS at 09:45

## 2022-08-17 ASSESSMENT — PAIN DESCRIPTION - DESCRIPTORS: DESCRIPTORS: ACHING;SHARP

## 2022-08-17 ASSESSMENT — PAIN - FUNCTIONAL ASSESSMENT
PAIN_FUNCTIONAL_ASSESSMENT: NONE - DENIES PAIN
PAIN_FUNCTIONAL_ASSESSMENT: PREVENTS OR INTERFERES WITH MANY ACTIVE NOT PASSIVE ACTIVITIES
PAIN_FUNCTIONAL_ASSESSMENT: 0-10

## 2022-08-17 NOTE — OP NOTE
sedation was supervised by Dr. Dotty Greco  The patient was independently monitored by a Registered Nurse assigned to the Procedure Room  Monitoring included automated blood pressure, continuous EKG, Capnography and continuous pulse oximetry. The detailed Conscious Record is permanently stored in the [place] [id number].      The following is the conscious sedation record;  Start Time:  0942  End times:  0953  Duration:  11 MINUTES  MEDS GIVEN 1 MG VERSED AND 50 MCG FENTANYL

## 2022-08-17 NOTE — H&P
UPDATE:  Office visit pain clinic in [facility] with all required elements of H&P dated 07/26/2022  Patient seen preop, chart reviewed,   No changes in medical history and health assessment since last evaluation. PE:  AAO x 3, in NAD, VSS,. Resp: breathing on RA, no respiratory distress  Cardiac: rate normal    Risk / Benefits explained to patient, patient agree to proceed with plan.   ASA 3  MP 3

## 2022-08-19 ENCOUNTER — TELEPHONE (OUTPATIENT)
Dept: PAIN MANAGEMENT | Age: 82
End: 2022-08-19

## 2022-08-19 NOTE — TELEPHONE ENCOUNTER
S/P: Bilateral Lumbar Medial Branch nerve Blocks at the transverse processes of L4, L5 and sacral  ala under fluoroscopy guidance  DOS: 08/17/2022    Pain  before procedure with activity :    Pain after procedure with activity:    Activities following procedure include:    % of pain relief:    Procedure successful:     OV or OR scheduled: 08/30/2022

## 2022-08-24 ENCOUNTER — OFFICE VISIT (OUTPATIENT)
Dept: INFECTIOUS DISEASES | Age: 82
End: 2022-08-24
Payer: MEDICARE

## 2022-08-24 VITALS
WEIGHT: 208 LBS | HEART RATE: 76 BPM | BODY MASS INDEX: 34.66 KG/M2 | RESPIRATION RATE: 16 BRPM | HEIGHT: 65 IN | DIASTOLIC BLOOD PRESSURE: 74 MMHG | SYSTOLIC BLOOD PRESSURE: 132 MMHG | TEMPERATURE: 98.2 F

## 2022-08-24 DIAGNOSIS — B27.90 CHRONIC EBV INFECTION: Primary | ICD-10-CM

## 2022-08-24 DIAGNOSIS — R53.82 CHRONIC FATIGUE: ICD-10-CM

## 2022-08-24 DIAGNOSIS — E83.52 HYPERCALCEMIA: ICD-10-CM

## 2022-08-24 PROCEDURE — G8427 DOCREV CUR MEDS BY ELIG CLIN: HCPCS | Performed by: INTERNAL MEDICINE

## 2022-08-24 PROCEDURE — 99214 OFFICE O/P EST MOD 30 MIN: CPT | Performed by: INTERNAL MEDICINE

## 2022-08-24 PROCEDURE — G8417 CALC BMI ABV UP PARAM F/U: HCPCS | Performed by: INTERNAL MEDICINE

## 2022-08-24 PROCEDURE — 1036F TOBACCO NON-USER: CPT | Performed by: INTERNAL MEDICINE

## 2022-08-24 PROCEDURE — G8399 PT W/DXA RESULTS DOCUMENT: HCPCS | Performed by: INTERNAL MEDICINE

## 2022-08-24 PROCEDURE — 1123F ACP DISCUSS/DSCN MKR DOCD: CPT | Performed by: INTERNAL MEDICINE

## 2022-08-24 PROCEDURE — 1090F PRES/ABSN URINE INCON ASSESS: CPT | Performed by: INTERNAL MEDICINE

## 2022-08-24 ASSESSMENT — ENCOUNTER SYMPTOMS
BACK PAIN: 1
ABDOMINAL DISTENTION: 0
APNEA: 0
COLOR CHANGE: 0
EYE DISCHARGE: 0

## 2022-08-24 NOTE — PROGRESS NOTES
[facility] - Initial Consult Note  Today's Date: 8/24/2022    Impression :   Prediabetic on metformin and A1C <6  Hypothyroid - on pills   Meniere disease  Osteoarthritis of the spine - spinal pain shot to the back 7/2022 and 8/2022  Chronic fatigue worse since 12/2021  VPBs - metoprolol  Elevated EBV titers, looks like old possibly chronic illness,  B12 low and replaced, corrected  Weight stable, added lately a little  Thyroid nodule - past 3  biopsy neg - size stable -FU w endocrine  [facility] nu- also on B complex vitamin,  folate normal  hypercalcemia  Takes vitamin D   Hyper calcemia  CaIonised-  5.46 ( 2014) -1.35 ( 8/8/2022)  [facility] 8/8/22 labs:  PTH I 36 NL- Phosphorus normal  Vitamin D 75 - 75   Lyme neg  - ESR 17 - beta 2 microglobin 2.5 -kappa lambda neg -  TSH FT3 and FT4 neg  ESR < 10  Recommendations     Keep the vitamin d 4000 iu daily, keep the B12, complex B  Stop Vitamin D   See me PRN  See endocrinology for the calcium - seems better - unclear  cause  Has a Sleep study pend due to fatigue though sleeps well-     Diagnosis Orders   1. Chronic EBV infection        2. Chronic fatigue        3. Hypercalcemia            Return if symptoms worsen or fail to improve. History of Present Illness:   Mar Jenkins is a 80y.o.-year-old  female who presents with   Chief Complaint   Patient presents with    Frequent Infections     Follow up on chronic fatigue      New pt - chronic fatigue and EBV -[birth year]  [phone] she would have URTI flu like last too long, had seen pulm for that. Then recover. Dec 2021 sore throat and tested neg for covid, admitted to [facility], ,[facility] for syncope and stress test neg, later found w hypothyroidism and a thyroid nodule. Also very weak legs and legs at the time, palpitations, and DC d home and better slightly aT HOME. Has been fully vaccinated and boosted for covid.     Saw neuro for the weak ,limbs, neuropathy in legs and arms are ok, but could not understand why she was weak to the limbs. MRI LS 5/2022 showed multi level osteo arthritis, sees pain management. 2 weeks ago started w fatigue and sore throat again and covid tested neg, lasted 4-5 days and Improved. When she has the fatigue, she becomes lot slower and needs a lot of rest, cant do her daily chores. When improves, she can function again but never back to normal.    No wt loss- no fever or sweats, but some chills. Appetite good. BEE and RF were neg. TSH normal but she was started on T4 for low thyroid  B12 475 normal, B6 normal  A1c neg    EBV VCA IGG >750, IGM <10, EA 66, NA 12 low, old disease not recovered and possibly active. Not sure if this is old w increased pushed titers from something else or real active. No tx for that specifically but would like to get a [facility]O for :  Look for lyme, ESR and beta 2 microglobin      Has GERD- gastroperesis, prediabetic, A1c was 6 then dropped on metformin - hysterectomy and GB out, [facility]    CT chest 12/2021 neg, no LN    Lyme titers,ESR, beta 2 microglobin, cbc diff, kappa lambda, vitam D and  calcium phosphorus an if abnormal, get then PTH  If all neg, then nothing to treat  Looking for lyme or for lymphoma  Keep the vitamin d 4000 iu daily, keep the B12, complex B   See in 2 weeks      Visit 8/24/22  hypercalcemia  Takes vitamin D   Hyper calcemia  CaIonised-  5.46 ( 2014) -1.35 ( 8/8/2022)  [facility] 8/8/22 labs:  PTH I 36 NL- Phosphorus normal  Vitamin D 75 - 75   Lyme neg  - ESR 17 - beta 2 microglobin 2.5 -kappa lambda neg -  TSH FT3 and FT4 neg  ESR < 10  Still very tired and increased sleeping  - ox 3 and not feeling better  Exam neg  Sees endocrine in some time  See me PRN      I have personally reviewed the past medical history, past surgical history, medications, social history, and family history, and I haveupdated the database accordingly.   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    Lung nodule     Meniere disease     right ear    Meniere's disease, right ear 12/10/2019    Morbidly obese ([address] 7/23/2020    Neuropathy     Osteopenia 6/30/2014    Parkinson disease ([address] 7/23/2020    Postmenopausal state 12/10/2019    Postnasal drip 7/11/2020    Prediabetes     RAD (reactive airway disease) 6/30/2014    Seasonal asthma 7/11/2020    Thyroid nodule 3/1/2016    Tremor     r hand    Vitamin D deficiency 6/30/2014       Past Surgical  History:     Past Surgical History:   Procedure Laterality Date    CHOLECYSTECTOMY      COLONOSCOPY      ENDOSCOPY, COLON, DIAGNOSTIC      ERCP  03/21/14    EXCISION / BIOPSY SKIN LESION OF TRUNK Left 11/8/2017    EXCISION MASS LEFT POSTERIOR SHOULDER, LEFT ANTERIOR ARM performed by Naima Saleh MD at [address] (CERVIX STATUS UNKNOWN)  01/03/2007    SKIN BIOPSY Left 11/08/2017    Excision Mass Left Posterior Shoulder, Left Anterior Arm       Medications:     Current Outpatient Medications:     metoprolol succinate (TOPROL XL) 25 MG extended release tablet, Take 12.5 mg by mouth in the morning and at bedtime, Disp: , Rfl:     alclomethasone (ACLOVATE) 0.05 % cream, Apply topically 2 times daily Prn, Disp: , Rfl:     albuterol sulfate HFA (PROVENTIL HFA) 108 (90 Base) MCG/ACT inhaler, Inhale 2 puffs into the lungs every 6 hours as needed for Wheezing, Disp: 18 g, Rfl: 3    levothyroxine (SYNTHROID) 75 MCG tablet, Take 1 tablet by mouth Daily, Disp: 90 tablet, Rfl: 1    metFORMIN (GLUCOPHAGE) 500 MG tablet, TAKE ONE TABLET BY MOUTH DAILY, Disp: 90 tablet, Rfl: 1    midodrine (PROAMATINE) 5 MG tablet, Take 1 tablet by mouth 2 times daily (with meals) (Patient taking differently: Take 2.5 mg by mouth 2 times daily (with meals)), Disp: 90 tablet, Rfl: 3    amitriptyline (ELAVIL) 10 MG tablet, TAKE ONE TABLET BY MOUTH DAILY AS NEEDED FOR SLEEP AND LEG PAIN FOR UP TO 30 DAYS (Patient taking differently: nightly as needed TAKE ONE TABLET BY MOUTH DAILY AS NEEDED FOR SLEEP AND LEG PAIN FOR UP TO 30 DAYS), Disp: 30 tablet, Rfl: 3    ondansetron (ZOFRAN-ODT) 4 MG disintegrating tablet, Take 1 tablet by mouth every 8 hours as needed. , Disp: 20 tablet, Rfl: 3    Evolocumab 140 MG/ML SOSY, Inject into the skin Next dose due 12/27/21, Disp: , Rfl:     famotidine (PEPCID) 20 MG tablet, as needed, Disp: , Rfl:     guaiFENesin (MUCINEX) 600 MG extended release tablet, Take 1 tablet by mouth 2 times daily (Patient taking differently: Take 600 mg by mouth 2 times daily as needed), Disp: 20 tablet, Rfl: 0    NONFORMULARY, Indications: Dietary fiber 1 tsp a day , Disp: , Rfl:     Cholecalciferol (VITAMIN D3) 2000 units CAPS, Take 1 capsule by mouth 2 times daily, Disp: 30 capsule, Rfl: 0    diltiazem (CARDIZEM CD) 120 MG extended release capsule, Take 1 capsule by mouth every evening, Disp: 30 capsule, Rfl: 3    fluticasone (FLONASE) 50 MCG/ACT nasal spray, PLACE 1 SPRAY IN EACH NOSTRIL DAILY FOR 15 DAYS, Disp: 1 Bottle, Rfl: 2    ketoconazole (NIZORAL) 2 % shampoo, APPLY  TO AFFECTED AREAS TOPICALLY DAILY THEN RINSE OFF AFTER 5 MINUTES, Disp: 120 mL, Rfl: 2    ezetimibe (ZETIA) 10 MG tablet, Take 10 mg by mouth daily, Disp: , Rfl:     esomeprazole Magnesium (NEXIUM) 40 MG PACK, Take 40 mg by mouth daily. , Disp: , Rfl:     NONFORMULARY, Super b complex 3 times per week, Disp: , Rfl:     ascorbic acid (VITAMIN C) 500 MG tablet, Take 500 mg by mouth daily. , Disp: , Rfl:     chlorpheniramine (CHLOR-TRIMETON) 4 MG tablet, Take 12 mg by mouth daily , Disp: , Rfl: Social History:     Social History     Socioeconomic History    Marital status: Single     Spouse name: Not on file    Number of children: Not on file    Years of education: Not on file    Highest education level: Not on file   Occupational History    Not on file   Tobacco Use    Smoking status: Never    Smokeless tobacco: Never   Vaping Use    Vaping Use: Never used   Substance and Sexual Activity    Alcohol use: Yes     Comment: rare    Drug use: No    Sexual activity: Not on file   Other Topics Concern    Not on file   Social History Narrative    Not on file     Social Determinants of Health     Financial Resource Strain: Low Risk     Difficulty of Paying Living Expenses: Not hard at all   Food Insecurity: No Food Insecurity    Worried About Running Out of Food in the Last Year: Never true    Ran Out of Food in the Last Year: Never true   Transportation Needs: Not on file   Physical Activity: Not on file   Stress: Not on file   Social Connections: Not on file   Intimate Partner Violence: Not on file   Housing Stability: Not on file       Family History:     Family History   Problem Relation Age of Onset    Cancer Mother 48        pineal gland/throat    Cancer Father 64        colon    Cancer Brother         lung    Other Brother         pulmonary embolus    Heart Disease Paternal Grandfather         myocarditis    Stroke Maternal Aunt     Cancer Maternal Uncle     Stroke Maternal Grandmother     Diabetes Brother     Heart Disease Paternal Uncle 67        mi    Heart Disease Brother 58        mi    High Blood Pressure Brother     Heart Disease Brother 79        stents for cad    High Blood Pressure Brother         Allergies:   Aspirin, Atorvastatin, Crestor [rosuvastatin], Livalo [pitavastatin], Seasonal, Simvastatin, Statins, and Welchol [colesevelam hcl]     Review of Systems:   Review of Systems   Constitutional:  Positive for fatigue. Negative for activity change. HENT:  Negative for congestion. Eyes:  Negative for discharge. Respiratory:  Negative for apnea. Cardiovascular:  Negative for chest pain. Gastrointestinal:  Negative for abdominal distention. Endocrine: Negative for polydipsia. Genitourinary:  Negative for dysuria. Musculoskeletal:  Positive for back pain. Negative for arthralgias. Skin:  Negative for color change. Allergic/Immunologic: Negative for immunocompromised state. Neurological:  Positive for weakness. Negative for dizziness, light-headedness, numbness and headaches. Hematological:  Negative for adenopathy. Psychiatric/Behavioral:  Negative for agitation. Physical Examination :   Blood pressure 132/74, pulse 76, temperature 98.2 °F (36.8 °C), resp. rate 16, height 5' 5\" (1.651 m), weight 208 lb (94.3 kg), not currently breastfeeding. Physical Exam  Constitutional:       Appearance: Normal appearance. She is not ill-appearing. HENT:      Head: Normocephalic and atraumatic. Nose: Nose normal. No congestion. Mouth/Throat:      Mouth: Mucous membranes are moist.   Eyes:      General: No scleral icterus. Pupils: Pupils are equal, round, and reactive to light. Cardiovascular:      Rate and Rhythm: Normal rate and regular rhythm. Heart sounds: Normal heart sounds. No murmur heard. Pulmonary:      Effort: No respiratory distress. Breath sounds: Normal breath sounds. No wheezing. Abdominal:      General: There is no distension. Palpations: Abdomen is soft. Tenderness: There is no abdominal tenderness. Genitourinary:     Comments: No neves  Musculoskeletal:         General: No swelling, tenderness, deformity or signs of injury. Cervical back: Neck supple. No rigidity. Skin:     Coloration: Skin is not jaundiced. Findings: No bruising. Neurological:      Mental Status: She is alert. Cranial Nerves: No cranial nerve deficit. Motor: No weakness.    Psychiatric:         Mood and Affect: Mood normal. Thought Content:  Thought content normal.         Medical Decision Making:   I have independently reviewed/ordered the following labs:    CBCwith Differential:   Lab Results   Component Value Date/Time    WBC 5.7 08/08/2022 09:15 AM    WBC 8.9 12/22/2021 01:58 AM    HGB 14.4 08/08/2022 09:15 AM    HGB 13.5 12/22/2021 01:58 AM    HCT 44.7 08/08/2022 09:15 AM    HCT 41.2 12/22/2021 01:58 AM     08/08/2022 09:15 AM     12/22/2021 01:58 AM    LYMPHOPCT 35 08/08/2022 09:15 AM    LYMPHOPCT 8 12/21/2021 02:51 PM    LYMPHOPCT 30.9 03/15/2019 02:55 PM    LYMPHOPCT 26.5 10/22/2018 09:31 AM    MONOPCT 6 08/08/2022 09:15 AM    MONOPCT 2 12/21/2021 02:51 PM    EOSPCT 2.2 03/15/2019 02:55 PM    EOSPCT 2.0 10/22/2018 09:31 AM     BMP:  Lab Results   Component Value Date/Time     12/22/2021 01:58 AM     12/21/2021 02:51 PM    K 4.0 12/22/2021 01:58 AM    K 4.4 12/21/2021 02:51 PM     12/22/2021 01:58 AM     12/21/2021 02:51 PM    CO2 24 12/22/2021 01:58 AM    CO2 21 12/21/2021 02:51 PM    BUN 20 12/22/2021 01:58 AM    BUN 25 12/21/2021 02:51 PM    CREATININE 0.61 12/22/2021 01:58 AM    CREATININE 0.80 12/21/2021 02:51 PM    MG 2.0 12/22/2021 01:58 AM    MG 2.0 03/15/2019 02:55 PM     Hepatic Function Panel:   Lab Results   Component Value Date/Time    PROT 6.5 12/21/2021 02:51 PM    PROT 7.2 05/04/2021 09:30 AM    LABALBU 4.3 12/21/2021 02:51 PM    LABALBU 4.2 05/04/2021 09:30 AM    BILIDIR <0.08 12/21/2021 02:51 PM    BILIDIR <0.2 10/22/2018 09:31 AM    IBILI Can not be calculated 12/21/2021 02:51 PM    IBILI 0.18 02/16/2017 10:05 AM    BILITOT 0.20 12/21/2021 02:51 PM    BILITOT 0.51 05/04/2021 09:30 AM    BILITOT NEGATIVE 10/02/2015 11:19 AM    ALKPHOS 103 12/21/2021 02:51 PM    ALKPHOS 82 05/04/2021 09:30 AM    ALT 17 08/08/2022 09:15 AM    ALT 19 01/19/2022 10:11 AM    AST 19 08/08/2022 09:15 AM    AST 18 01/19/2022 10:11 AM     No results found for: RPR  No results found for: HIV  No results found for: [facility]  Lab Results   Component Value Date/Time    MUCUS DISREGARD RESULTS. CLERICAL ERROR. 07/31/2020 11:11 AM    PH 6.0 10/02/2015 11:19 AM    RBC 4.69 08/08/2022 09:15 AM    RBC 4.18 03/15/2019 02:55 PM    TRICHOMONAS DISREGARD RESULTS. CLERICAL ERROR. 07/31/2020 11:11 AM    WBC 5.7 08/08/2022 09:15 AM    YEAST DISREGARD RESULTS. CLERICAL ERROR. 07/31/2020 11:11 AM    TURBIDITY Clear 12/21/2021 04:15 PM     Lab Results   Component Value Date/Time    CREATININE 0.61 12/22/2021 01:58 AM    GLUCOSE 139 12/22/2021 01:58 AM    GLUCOSE 106 08/29/2019 02:05 PM   you for allowing us to participate in the care of this patient. Please call with questions. Renata Sherwood MD  - Office: [phone]    Please note that this chart was generated using voice recognition Dragon dictation software. Although every effort was made to ensure the accuracy of this automated transcription, some errors in transcription mayhave occurred.

## 2022-08-30 ENCOUNTER — TELEMEDICINE (OUTPATIENT)
Dept: PAIN MANAGEMENT | Age: 82
End: 2022-08-30
Payer: MEDICARE

## 2022-08-30 DIAGNOSIS — I50.22 CHRONIC SYSTOLIC (CONGESTIVE) HEART FAILURE (HCC): ICD-10-CM

## 2022-08-30 DIAGNOSIS — M54.51 VERTEBROGENIC LOW BACK PAIN: ICD-10-CM

## 2022-08-30 DIAGNOSIS — M47.817 LUMBOSACRAL SPONDYLOSIS WITHOUT MYELOPATHY: Primary | ICD-10-CM

## 2022-08-30 PROCEDURE — G8427 DOCREV CUR MEDS BY ELIG CLIN: HCPCS | Performed by: ANESTHESIOLOGY

## 2022-08-30 PROCEDURE — 99213 OFFICE O/P EST LOW 20 MIN: CPT | Performed by: ANESTHESIOLOGY

## 2022-08-30 PROCEDURE — G8399 PT W/DXA RESULTS DOCUMENT: HCPCS | Performed by: ANESTHESIOLOGY

## 2022-08-30 PROCEDURE — 1123F ACP DISCUSS/DSCN MKR DOCD: CPT | Performed by: ANESTHESIOLOGY

## 2022-08-30 PROCEDURE — 1090F PRES/ABSN URINE INCON ASSESS: CPT | Performed by: ANESTHESIOLOGY

## 2022-08-30 ASSESSMENT — ENCOUNTER SYMPTOMS
VOMITING: 0
COUGH: 0
BACK PAIN: 1
CONSTIPATION: 0
NAUSEA: 0
DIARRHEA: 0
WHEEZING: 0
SHORTNESS OF BREATH: 0

## 2022-08-30 NOTE — PROGRESS NOTES
The patient is a 80 y. o. Non- / non  female. Chief Complaint   Patient presents with    Back Pain    Follow Up After Procedure      Lumbar Medial Branch nerve Blocks         Back Pain  Pertinent negatives include no fever, headaches, numbness or weakness.      Chronic axial low back pain  Located in the lower lumbar area onset many years ago progressively worsened over time  Failed conservative measures with therapy and medication  Was diagnosed with facet mediated pain  MRI showed severe degenerative disc disease with Modic changes also  We believe pain is likely multifactorial  Patient had 2 diagnostic lumbar medial branch nerve block  Today for follow-up after second diagnostic medial branch nerve block  Report 80 to 100% improvement for a few hours after the procedure with improved range of motion and ability to stand up  She states she had several occasion when she had 0 pain that day  Pain then returned back to its baseline and she is now back at her baseline pain level    Considering excellent short-term pain relief with the 2 previous diagnostic lumbar medial branch nerve block I will not recommend to proceed with medial branch nerve radiofrequency ablation    Residual axial back pain after RFA will likely be related to Modic changes and will be vertebral agenic in origin  May consider for intercept procedure in future    Treatment plan discussed with patient  She voiced understanding and wished to proceed as discussed        Lab Results   Component Value Date    LABA1C 5.8 01/19/2022     Lab Results   Component Value Date     01/19/2022         Past Medical History:   Diagnosis Date    Acid reflux     Anxiety 6/30/2014    ALICIA-7   09/09/19 : 4    Bilateral tinnitus 12/10/2019    Bronchitis, mucopurulent recurrent ([address] 7/23/2020    Diverticular disease 6/30/2014    Enthesopathy of hip region 12/10/2019    GERD (gastroesophageal reflux disease)     Hypercholesteremia 6/30/2014 Hypothyroid 6/30/2014    Laryngopharyngeal reflux 12/10/2019    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    Lung nodule     Meniere disease     right ear    Meniere's disease, right ear 12/10/2019    Morbidly obese ([facility] [address] 7/23/2020    Neuropathy     Osteopenia 6/30/2014    Parkinson disease ([facility] [address] 7/23/2020    Postmenopausal state 12/10/2019    Postnasal drip 7/11/2020    Prediabetes     RAD (reactive airway disease) 6/30/2014    Seasonal asthma 7/11/2020    Thyroid nodule 3/1/2016    Tremor     r hand    Vitamin D deficiency 6/30/2014        Past Surgical History:   Procedure Laterality Date    CHOLECYSTECTOMY      COLONOSCOPY      ENDOSCOPY, COLON, DIAGNOSTIC      ERCP  03/21/14    EXCISION / BIOPSY SKIN LESION OF TRUNK Left 11/8/2017    EXCISION MASS LEFT POSTERIOR SHOULDER, LEFT ANTERIOR ARM performed by Katia Martinez MD at [address] (CERVIX STATUS UNKNOWN)  01/03/2007    SKIN BIOPSY Left 11/08/2017    Excision Mass Left Posterior Shoulder, Left Anterior Arm       Social History     Socioeconomic History    Marital status: Single   Tobacco Use    Smoking status: Never    Smokeless tobacco: Never   Vaping Use    Vaping Use: Never used   Substance and Sexual Activity    Alcohol use: Yes     Comment: rare    Drug use: No     Social Determinants of Health     Financial Resource Strain: Low Risk     Difficulty of Paying Living Expenses: Not hard at all   Food Insecurity: No Food Insecurity    Worried About Running Out of Food in the Last Year: Never true    Ran Out of Food in the Last Year: Never true       Family History   Problem Relation Age of Onset    Cancer Mother 48        pineal gland/throat    Cancer Father 64        colon    Cancer Brother         lung    Other Brother         pulmonary embolus    Heart Disease Paternal Grandfather         myocarditis    Stroke Maternal Aunt     Cancer Maternal Uncle     Stroke Maternal Grandmother     Diabetes Brother     Heart Disease Paternal Uncle 67        mi    Heart Disease Brother 58        mi    High Blood Pressure Brother     Heart Disease Brother 79        stents for cad    High Blood Pressure Brother        Allergies   Allergen Reactions    Aspirin Swelling     Hives. anaphylaxis    Atorvastatin Other (See Comments)     myalgia    Crestor [Rosuvastatin]      Myalgia      Livalo [Pitavastatin] Other (See Comments)     Muscle pain    Seasonal     Simvastatin      Myalgia      Statins      Other reaction(s): Unknown    Welchol [Colesevelam Hcl] Other (See Comments)     Leg cramp       There were no vitals filed for this visit. Current Outpatient Medications   Medication Sig Dispense Refill    metoprolol succinate (TOPROL XL) 25 MG extended release tablet Take 12.5 mg by mouth in the morning and at bedtime      alclomethasone (ACLOVATE) 0.05 % cream Apply topically 2 times daily Prn      albuterol sulfate HFA (PROVENTIL HFA) 108 (90 Base) MCG/ACT inhaler Inhale 2 puffs into the lungs every 6 hours as needed for Wheezing 18 g 3    levothyroxine (SYNTHROID) 75 MCG tablet Take 1 tablet by mouth Daily 90 tablet 1    metFORMIN (GLUCOPHAGE) 500 MG tablet TAKE ONE TABLET BY MOUTH DAILY 90 tablet 1    midodrine (PROAMATINE) 5 MG tablet Take 1 tablet by mouth 2 times daily (with meals) (Patient taking differently: Take 2.5 mg by mouth 2 times daily (with meals)) 90 tablet 3    amitriptyline (ELAVIL) 10 MG tablet TAKE ONE TABLET BY MOUTH DAILY AS NEEDED FOR SLEEP AND LEG PAIN FOR UP TO 30 DAYS (Patient taking differently: nightly as needed TAKE ONE TABLET BY MOUTH DAILY AS NEEDED FOR SLEEP AND LEG PAIN FOR UP TO 30 DAYS) 30 tablet 3    ondansetron (ZOFRAN-ODT) 4 MG disintegrating tablet Take 1 tablet by mouth every 8 hours as needed.  20 tablet 3    Evolocumab 140 MG/ML SOSY Inject into the skin Next dose due 12/27/21      famotidine (PEPCID) 20 MG tablet as needed      guaiFENesin (MUCINEX) 600 MG extended release tablet Take 1 tablet by mouth 2 times daily (Patient taking differently: Take 600 mg by mouth 2 times daily as needed) 20 tablet 0    NONFORMULARY Indications: Dietary fiber 1 tsp a day       Cholecalciferol (VITAMIN D3) 2000 units CAPS Take 1 capsule by mouth 2 times daily 30 capsule 0    diltiazem (CARDIZEM CD) 120 MG extended release capsule Take 1 capsule by mouth every evening 30 capsule 3    fluticasone (FLONASE) 50 MCG/ACT nasal spray PLACE 1 SPRAY IN EACH NOSTRIL DAILY FOR 15 DAYS 1 Bottle 2    ketoconazole (NIZORAL) 2 % shampoo APPLY  TO AFFECTED AREAS TOPICALLY DAILY THEN RINSE OFF AFTER 5 MINUTES 120 mL 2    ezetimibe (ZETIA) 10 MG tablet Take 10 mg by mouth daily      esomeprazole Magnesium (NEXIUM) 40 MG PACK Take 40 mg by mouth daily. NONFORMULARY Super b complex 3 times per week      ascorbic acid (VITAMIN C) 500 MG tablet Take 500 mg by mouth daily. chlorpheniramine (CHLOR-TRIMETON) 4 MG tablet Take 12 mg by mouth daily        No current facility-administered medications for this visit. Facility-Administered Medications Ordered in Other Visits   Medication Dose Route Frequency Provider Last Rate Last Admin    0.9 % sodium chloride infusion   IntraVENous Continuous Damon Cohen  mL/hr at 02/19/14 0827 New Bag at 02/19/14 0827       Review of Systems   Constitutional:  Negative for chills, fatigue and fever. Respiratory:  Negative for cough, shortness of breath and wheezing. Gastrointestinal:  Negative for constipation, diarrhea, nausea and vomiting. Musculoskeletal:  Positive for back pain, gait problem, neck pain and neck stiffness. Neurological:  Positive for dizziness. Negative for weakness, numbness and headaches. Normal every morning        Objective:  General Appearance: In no acute distress, in pain and uncomfortable. Vital signs: (most recent): not currently breastfeeding. Vital signs are normal.  No fever. Output: Producing urine and producing stool. Lungs:  Normal effort. She is not in respiratory distress. Heart: Normal rate. Neurological: Patient is alert and oriented to person, place and time. Assessment & Plan  Chronic axial low back pain  Located in the lower lumbar area onset many years ago progressively worsened over time  Failed conservative measures with therapy and medication  Was diagnosed with facet mediated pain  MRI showed severe degenerative disc disease with Modic changes also  We believe pain is likely multifactorial  Patient had 2 diagnostic lumbar medial branch nerve block  Today for follow-up after second diagnostic medial branch nerve block  Report 80 to 100% improvement for a few hours after the procedure with improved range of motion and ability to stand up  She states she had several occasion when she had 0 pain that day  Pain then returned back to its baseline and she is now back at her baseline pain level    Considering excellent short-term pain relief with the 2 previous diagnostic lumbar medial branch nerve block I will not recommend to proceed with medial branch nerve radiofrequency ablation    Residual axial back pain after RFA will likely be related to Modic changes and will be vertebral agenic in origin  May consider for intercept procedure in future    Treatment plan discussed with patient  She voiced understanding and wished to proceed as discussed    1. Lumbosacral spondylosis without myelopathy    2. Chronic systolic (congestive) heart failure    3. Vertebrogenic low back pain        Orders Placed This Encounter   Procedures    FACET JOINT L/S SINGLE        No orders of the defined types were placed in this encounter.            Electronically signed by Pal Quintana MD on 8/30/2022 at 11:37 AM

## 2022-08-30 NOTE — PROGRESS NOTES
The patient is a 80 y. o. Non- / non  female. Chief Complaint   Patient presents with    Back Pain    Follow Up After Procedure      Lumbar Medial Branch nerve Blocks         Back Pain  Pertinent negatives include no fever, headaches, numbness or weakness. Dx:  S/P: Lumbar Medial Branch nerve Blocks       Outcome   Any improvement of activity?  Yes    Any side effects (appetite,leg cramping,facial fleshing): no   Increase of pain:  No  Pain score Today:  2  % of pain relief: 50%  Pain diary (medial branch block): Yes    Lab Results   Component Value Date    LABA1C 5.8 01/19/2022     Lab Results   Component Value Date     01/19/2022         Past Medical History:   Diagnosis Date    Acid reflux     Anxiety 6/30/2014    ALICIA-7   09/09/19 : 4    Bilateral tinnitus 12/10/2019    Bronchitis, mucopurulent recurrent ([address] 7/23/2020    Diverticular disease 6/30/2014    Enthesopathy of hip region 12/10/2019    GERD (gastroesophageal reflux disease)     Hypercholesteremia 6/30/2014    Hypothyroid 6/30/2014    Laryngopharyngeal reflux 12/10/2019    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    Lung nodule     Meniere disease     right ear    Meniere's disease, right ear 12/10/2019    Morbidly obese ([address] 7/23/2020    Neuropathy     Osteopenia 6/30/2014    Parkinson disease ([address] 7/23/2020    Postmenopausal state 12/10/2019    Postnasal drip 7/11/2020    Prediabetes     RAD (reactive airway disease) 6/30/2014    Seasonal asthma 7/11/2020    Thyroid nodule 3/1/2016    Tremor     r hand    Vitamin D deficiency 6/30/2014        Past Surgical History:   Procedure Laterality Date    CHOLECYSTECTOMY      COLONOSCOPY      ENDOSCOPY, COLON, DIAGNOSTIC      ERCP  03/21/14    EXCISION / BIOPSY SKIN LESION OF TRUNK Left 11/8/2017    EXCISION MASS LEFT POSTERIOR SHOULDER, LEFT ANTERIOR ARM performed by Jesi Cohen MD at [address] ([facility])  01/03/2007    SKIN BIOPSY Left 11/08/2017    Excision Mass Left Posterior Shoulder, Left Anterior Arm       Social History     Socioeconomic History    Marital status: Single   Tobacco Use    Smoking status: Never    Smokeless tobacco: Never   Vaping Use    Vaping Use: Never used   Substance and Sexual Activity    Alcohol use: Yes     Comment: rare    Drug use: No     Social Determinants of Health     Financial Resource Strain: Low Risk     Difficulty of Paying Living Expenses: Not hard at all   Food Insecurity: No Food Insecurity    Worried About Running Out of Food in the Last Year: Never true    Ran Out of Food in the Last Year: Never true       Family History   Problem Relation Age of Onset    Cancer Mother 48        pineal gland/throat    Cancer Father 64        colon    Cancer Brother         lung    Other Brother         pulmonary embolus    Heart Disease Paternal Grandfather         myocarditis    Stroke Maternal Aunt     Cancer Maternal Uncle     Stroke Maternal Grandmother     Diabetes Brother     Heart Disease Paternal Uncle 67        mi    Heart Disease Brother 58        mi    High Blood Pressure Brother     Heart Disease Brother 79        stents for cad    High Blood Pressure Brother        Allergies   Allergen Reactions    Aspirin Swelling     Hives. anaphylaxis    Atorvastatin Other (See Comments)     myalgia    Crestor [Rosuvastatin]      Myalgia      Livalo [Pitavastatin] Other (See Comments)     Muscle pain    Seasonal     Simvastatin      Myalgia      Statins      Other reaction(s): Unknown    Welchol [Colesevelam Hcl] Other (See Comments)     Leg cramp       There were no vitals filed for this visit.     Current Outpatient Medications   Medication Sig Dispense Refill    metoprolol succinate (TOPROL XL) 25 MG extended release tablet Take 12.5 mg by mouth in the morning and at bedtime      alclomethasone (ACLOVATE) 0.05 % cream Apply topically 2 times daily Prn      albuterol sulfate HFA (PROVENTIL HFA) 108 (90 Base) MCG/ACT inhaler Inhale 2 puffs into the lungs every 6 hours as needed for Wheezing 18 g 3    levothyroxine (SYNTHROID) 75 MCG tablet Take 1 tablet by mouth Daily 90 tablet 1    metFORMIN (GLUCOPHAGE) 500 MG tablet TAKE ONE TABLET BY MOUTH DAILY 90 tablet 1    midodrine (PROAMATINE) 5 MG tablet Take 1 tablet by mouth 2 times daily (with meals) (Patient taking differently: Take 2.5 mg by mouth 2 times daily (with meals)) 90 tablet 3    amitriptyline (ELAVIL) 10 MG tablet TAKE ONE TABLET BY MOUTH DAILY AS NEEDED FOR SLEEP AND LEG PAIN FOR UP TO 30 DAYS (Patient taking differently: nightly as needed TAKE ONE TABLET BY MOUTH DAILY AS NEEDED FOR SLEEP AND LEG PAIN FOR UP TO 30 DAYS) 30 tablet 3    ondansetron (ZOFRAN-ODT) 4 MG disintegrating tablet Take 1 tablet by mouth every 8 hours as needed. 20 tablet 3    Evolocumab 140 MG/ML SOSY Inject into the skin Next dose due 12/27/21      famotidine (PEPCID) 20 MG tablet as needed      guaiFENesin (MUCINEX) 600 MG extended release tablet Take 1 tablet by mouth 2 times daily (Patient taking differently: Take 600 mg by mouth 2 times daily as needed) 20 tablet 0    NONFORMULARY Indications: Dietary fiber 1 tsp a day       Cholecalciferol (VITAMIN D3) 2000 units CAPS Take 1 capsule by mouth 2 times daily 30 capsule 0    diltiazem (CARDIZEM CD) 120 MG extended release capsule Take 1 capsule by mouth every evening 30 capsule 3    fluticasone (FLONASE) 50 MCG/ACT nasal spray PLACE 1 SPRAY IN EACH NOSTRIL DAILY FOR 15 DAYS 1 Bottle 2    ketoconazole (NIZORAL) 2 % shampoo APPLY  TO AFFECTED AREAS TOPICALLY DAILY THEN RINSE OFF AFTER 5 MINUTES 120 mL 2    ezetimibe (ZETIA) 10 MG tablet Take 10 mg by mouth daily      esomeprazole Magnesium (NEXIUM) 40 MG PACK Take 40 mg by mouth daily. NONFORMULARY Super b complex 3 times per week      ascorbic acid (VITAMIN C) 500 MG tablet Take 500 mg by mouth daily.       chlorpheniramine (CHLOR-TRIMETON) 4 MG tablet Take 12 mg by mouth daily No current facility-administered medications for this visit. Facility-Administered Medications Ordered in Other Visits   Medication Dose Route Frequency Provider Last Rate Last Admin    0.9 % sodium chloride infusion   IntraVENous Continuous Bruce Thomas  mL/hr at 02/19/14 0827 New Bag at 02/19/14 0827       Review of Systems   Constitutional:  Negative for chills, fatigue and fever. Respiratory:  Negative for cough, shortness of breath and wheezing. Gastrointestinal:  Negative for constipation, diarrhea, nausea and vomiting. Musculoskeletal:  Positive for back pain, gait problem, neck pain and neck stiffness. Neurological:  Positive for dizziness. Negative for weakness, numbness and headaches. Normal every morning        Objective:  Vital signs: (most recent): not currently breastfeeding. No fever. Assessment & Plan  No diagnosis found. No orders of the defined types were placed in this encounter. No orders of the defined types were placed in this encounter.            Electronically signed by Geremias Bustamante MA on 8/30/2022 at 9:45 AM

## 2022-09-15 NOTE — TELEPHONE ENCOUNTER
----- Message from Codie Gray sent at 9/15/2022  8:25 AM EDT -----  Subject: Refill Request    QUESTIONS  Name of Medication? metFORMIN (GLUCOPHAGE) 500 MG tablet  Patient-reported dosage and instructions? 500MG taken once a day  How many days do you have left? 3  Preferred Pharmacy? [facility] [id number]  Pharmacy phone number (if available)? [phone]  Additional Information for Provider? Pt needs a 90 day supply of this Rx.  ---------------------------------------------------------------------------  --------------  CALL BACK INFO  What is the best way for the office to contact you? OK to respond with   electronic message via MobileHelp portal (only for patients who have   registered MobileHelp account)  Preferred Call Back Phone Number? [phone]  ---------------------------------------------------------------------------  --------------  SCRIPT ANSWERS  Relationship to Patient?  Self

## 2022-10-07 ENCOUNTER — TELEPHONE (OUTPATIENT)
Dept: PAIN MANAGEMENT | Age: 82
End: 2022-10-07

## 2022-10-07 DIAGNOSIS — M47.817 LUMBOSACRAL SPONDYLOSIS WITHOUT MYELOPATHY: Primary | ICD-10-CM

## 2022-10-07 RX ORDER — ONDANSETRON 4 MG/1
TABLET, ORALLY DISINTEGRATING ORAL
Qty: 20 TABLET | Refills: 3 | Status: SHIPPED | OUTPATIENT
Start: 2022-10-07

## 2022-10-10 RX ORDER — ONDANSETRON 4 MG/1
TABLET, ORALLY DISINTEGRATING ORAL
Qty: 20 TABLET | Refills: 3 | Status: SHIPPED | OUTPATIENT
Start: 2022-10-10

## 2022-10-20 NOTE — PROGRESS NOTES
[address] [place] [address] AND SPORTS MEDICINE  [address] [facility] [place] [address]  Dept: [phone]  Dept Fax: [phone]        Left Hand/thumb  Established patient new problem    Subjective:     Chief Complaint   Patient presents with    Hand Injury     Left thumb       HPI:     Lisa Kraft presents with left thumb pain. The patient is right-handed. She has been having this issue since mid September. [facility] told her to wear of brace that went up to her thumb. She wore that and her thumb is just continued to get worse. She states the pain is achy, dull, sharp and shooting. It seems to get worse as she uses it. She does have painful popping and clicking in the finger does get stuck. The brace does seem to help. She has never had an issue with her thumb prior to a month ago. ROS:     Review of Systems   Constitutional:  Positive for activity change. Negative for appetite change, fatigue and fever. Respiratory: Negative. Negative for apnea, cough, chest tightness and shortness of breath. Cardiovascular: Negative. Negative for chest pain, palpitations and leg swelling. Gastrointestinal:  Negative for abdominal distention, abdominal pain, constipation, diarrhea, nausea and vomiting. Genitourinary:  Negative for difficulty urinating, dysuria and hematuria. Musculoskeletal:  Positive for arthralgias and joint swelling. Negative for gait problem and myalgias. Skin:  Negative for color change and rash. Neurological:  Negative for dizziness, weakness, numbness and headaches. Psychiatric/Behavioral:  Positive for sleep disturbance.       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    Lung nodule     Meniere disease     right ear    Meniere's disease, right ear 12/10/2019    Morbidly obese ([facility] 7/23/2020    Neuropathy     Osteopenia 6/30/2014    Parkinson disease ([facility] 7/23/2020    Postmenopausal state 12/10/2019    Postnasal drip 7/11/2020    Prediabetes     RAD (reactive airway disease) 6/30/2014    Seasonal asthma 7/11/2020    Thyroid nodule 3/1/2016    Tremor     r hand    Vitamin D deficiency 6/30/2014       Past Surgical History:    Past Surgical History:   Procedure Laterality Date    CHOLECYSTECTOMY      COLONOSCOPY      ENDOSCOPY, COLON, DIAGNOSTIC      ERCP  03/21/14    EXCISION / BIOPSY SKIN LESION OF TRUNK Left 11/8/2017    EXCISION MASS LEFT POSTERIOR SHOULDER, LEFT ANTERIOR ARM performed by Deonte Velazco MD at [address] (CERVIX STATUS UNKNOWN)  01/03/2007    SKIN BIOPSY Left 11/08/2017    Excision Mass Left Posterior Shoulder, Left Anterior Arm       CurrentMedications:   Current Outpatient Medications   Medication Sig Dispense Refill    ondansetron (ZOFRAN-ODT) 4 MG disintegrating tablet DISSOLVE ONE TABLET BY MOUTH EVERY 8 HOURS AS NEEDED 20 tablet 3    ondansetron (ZOFRAN-ODT) 4 MG disintegrating tablet Take 1 tablet by mouth every 8 hours as needed.  20 tablet 3    metFORMIN (GLUCOPHAGE) 500 MG tablet TAKE ONE TABLET BY MOUTH DAILY 90 tablet 1    metoprolol succinate (TOPROL XL) 25 MG extended release tablet Take 12.5 mg by mouth in the morning and at bedtime      alclomethasone (ACLOVATE) 0.05 % cream Apply topically 2 times daily Prn      albuterol sulfate HFA (PROVENTIL HFA) 108 (90 Base) MCG/ACT inhaler Inhale 2 puffs into the lungs every 6 hours as needed for Wheezing 18 g 3    levothyroxine (SYNTHROID) 75 MCG tablet Take 1 tablet by mouth Daily 90 tablet 1    midodrine (PROAMATINE) 5 MG tablet Take 1 tablet by mouth 2 times daily (with meals) (Patient taking differently: Take 2.5 mg by mouth 2 times daily (with meals)) 90 tablet 3    amitriptyline (ELAVIL) 10 MG tablet TAKE ONE TABLET BY MOUTH DAILY AS NEEDED FOR SLEEP AND LEG PAIN FOR UP TO 30 DAYS (Patient taking differently: nightly as needed TAKE ONE TABLET BY MOUTH DAILY AS NEEDED FOR SLEEP AND LEG PAIN FOR UP TO 30 DAYS) 30 tablet 3    Evolocumab 140 MG/ML SOSY Inject into the skin Next dose due 12/27/21      famotidine (PEPCID) 20 MG tablet as needed      guaiFENesin (MUCINEX) 600 MG extended release tablet Take 1 tablet by mouth 2 times daily (Patient taking differently: Take 600 mg by mouth 2 times daily as needed) 20 tablet 0    NONFORMULARY Indications: Dietary fiber 1 tsp a day       Cholecalciferol (VITAMIN D3) 2000 units CAPS Take 1 capsule by mouth 2 times daily 30 capsule 0    diltiazem (CARDIZEM CD) 120 MG extended release capsule Take 1 capsule by mouth every evening 30 capsule 3    fluticasone (FLONASE) 50 MCG/ACT nasal spray PLACE 1 SPRAY IN EACH NOSTRIL DAILY FOR 15 DAYS 1 Bottle 2    ketoconazole (NIZORAL) 2 % shampoo APPLY  TO AFFECTED AREAS TOPICALLY DAILY THEN RINSE OFF AFTER 5 MINUTES 120 mL 2    ezetimibe (ZETIA) 10 MG tablet Take 10 mg by mouth daily      esomeprazole Magnesium (NEXIUM) 40 MG PACK Take 40 mg by mouth daily. NONFORMULARY Super b complex 3 times per week      ascorbic acid (VITAMIN C) 500 MG tablet Take 500 mg by mouth daily. chlorpheniramine (CHLOR-TRIMETON) 4 MG tablet Take 12 mg by mouth daily        No current facility-administered medications for this visit.      Facility-Administered Medications Ordered in Other Visits   Medication Dose Route Frequency Provider Last Rate Last Admin    0.9 % sodium chloride infusion   IntraVENous Continuous Jackie Lemus  mL/hr at 02/19/14 0827 New Bag at 02/19/14 0827       Allergies:    Aspirin, Atorvastatin, Crestor [rosuvastatin], Livalo [pitavastatin], Seasonal, Simvastatin, Statins, and Welchol [colesevelam hcl]    Social History:   Social History     Socioeconomic History    Marital status: Single     Spouse name: None    Number of children: None    Years of education: None    Highest education level: None   Tobacco Use    Smoking status: Never    Smokeless tobacco: Never   Vaping Use    Vaping Use: Never used   Substance and Sexual Activity    Alcohol use: Yes     Comment: rare    Drug use: No     Social Determinants of Health     Financial Resource Strain: Low Risk     Difficulty of Paying Living Expenses: Not hard at all   Food Insecurity: No Food Insecurity    Worried About Running Out of Food in the Last Year: Never true    Ran Out of Food in the Last Year: Never true       Family History:  Family History   Problem Relation Age of Onset    Cancer Mother 48        pineal gland/throat    Cancer Father 64        colon    Cancer Brother         lung    Other Brother         pulmonary embolus    Heart Disease Paternal Grandfather         myocarditis    Stroke Maternal Aunt     Cancer Maternal Uncle     Stroke Maternal Grandmother     Diabetes Brother     Heart Disease Paternal Uncle 67        mi    Heart Disease Brother 58        mi    High Blood Pressure Brother     Heart Disease Brother 79        stents for cad    High Blood Pressure Brother        Vitals:   /81   Pulse 91   Ht 5' 5\" (1.651 m)   Wt 208 lb (94.3 kg)   SpO2 94%   BMI 34.61 kg/m²  Body mass index is 34.61 kg/m². Physical Examination:     Orthopedics    GENERAL: Alert and oriented X3 in no acute distress. SKIN: Visual inspection reveals no soft tissue swelling or dionisio abnormality, no rotational deformity, Intact without lesions or ulcerations. NEURO: Radial, Ulnar and Median nerves are intact to sensory and motor testing.   VASC: Capillary refill is less than 3 seconds, no signs of thoracic outlet syndrome, negative Johnson's test.    Hand & Wrist Exam    LOCATION: Left Hand & Wrist  MUSC: Good strength is available in these motions. WRIST: No pain to palpation. No dionisio pain or instability to palpation. WRIST TESTS: Negative finkelstein's, Negative Tinel's test, Negative Phalen's, Negative Harley Compression  ROM: Full flexor and extensor tendon function is intact. Triggering of the thumb with thumb flexion  PALPATION: pain over the nodule at the A-1 pulley of the left thumb . Assessment:     1. Trigger finger of left thumb      Procedures:    Procedure: yes    TRIGGER FINGER INJECTION    Location: left Thumb   The stenosing portion of the flexor tendon at the A-1 pulley was identified volarly and marked distal to the pulley with the hollow end of a click type ball-point pen. The skin was prepped with a Betadine swab in a sterile fashion. The flexor tendon sheath was injected under sterile technique with a 2 cc solution containing 1% lidocaine  and 1 cc of 6 mg Celestone with fluid traveling up the tendon sheath. Injection site was verified by flexing and extending the digit and sensing the needle rubbing on the flexor tendon. The skin was cleansed and a Band-Aid was placed. The patient tolerated the procedure without difficulty. The patient was told to watch for signs of infection and to call immediately with any problems. Radiology:   X-ray hand left minimum 3 views      FINDINGS:     3 views the hand were obtained. There is no focal osseous abnormality. Bone density is within normal limits. Articular surfaces are intact. Some joint space narrowing at the interphalangeal greatest at the first digit     IMPRESSION:     No acute osseous abnormality evident radiographically. Plan:   Treatment : I reviewed the X-ray with the patient and I informed them that the xray is negative. We discussed the etiologies and natural histories of Trigger finger of the left thumb.  We discussed the various treatment alternatives including anti-inflammatory medications, physical therapy, injections, further imaging studies and as a last result surgery. During today's visit, we discussed that the patient has a trigger finger of the left thumb. Often we can get this better with an injection. If it does not work then there is a surgery to fix the trigger finger. The patient would like to avoid surgery if at all possible so we will try conservative therapies first.  She can also rub Voltaren gel in that area. The patient has opted for a cortisone injection into the left trigger thumb to help reduce inflammation and pain. The injection site should never get red, hot, or swollen and if it does the patient will contact our office right away. The patient may experience a increase in soreness the first 24-48 hours due to a cortisone flair and can take anti-inflammatories for a short period of time to reduce that soreness. The patient should not submerge the injection site in water for a minimum of 24 hours to avoid infection. This means no lakes, pools, ponds, or hot tubs for 24 hours. If the patient is diabetic the injection may increase their blood sugar for up to one week. The patient can do this cortisone injection once every 3 months as needed. If the injections stop working and do not give the patient relief the patient should consider surgical interventions to produce long term relief. Patient should return to the clinic in 6 weeks to follow up with  Charissa Hardin PA-C. The patient will call the office immediately with any problems. Orders Placed This Encounter   Medications    betamethasone acetate-betamethasone sodium phosphate (CELESTONE) injection 6 mg    lidocaine 1 % injection 1 mL         No orders of the defined types were placed in this encounter.       This note is created with the assistance of a speech recognition program.  While intending to generate a document that actually reflects the content of the visit, the document can still have some errors including those of syntax and sound a like substitutions which may escape proof reading.   In such instances, actual meaning can be extrapolated by contextual diversion      Electronically signed by Efren Nelson PA-C, on 10/21/2022 at 12:57 PM

## 2022-10-21 ENCOUNTER — OFFICE VISIT (OUTPATIENT)
Dept: ORTHOPEDIC SURGERY | Age: 82
End: 2022-10-21
Payer: MEDICARE

## 2022-10-21 VITALS
SYSTOLIC BLOOD PRESSURE: 118 MMHG | HEIGHT: 65 IN | BODY MASS INDEX: 34.66 KG/M2 | DIASTOLIC BLOOD PRESSURE: 81 MMHG | HEART RATE: 91 BPM | WEIGHT: 208 LBS | OXYGEN SATURATION: 94 %

## 2022-10-21 DIAGNOSIS — M65.312 TRIGGER FINGER OF LEFT THUMB: Primary | ICD-10-CM

## 2022-10-21 PROCEDURE — 20604 DRAIN/INJ JOINT/BURSA W/US: CPT | Performed by: PHYSICIAN ASSISTANT

## 2022-10-21 PROCEDURE — 1036F TOBACCO NON-USER: CPT | Performed by: PHYSICIAN ASSISTANT

## 2022-10-21 PROCEDURE — G8417 CALC BMI ABV UP PARAM F/U: HCPCS | Performed by: PHYSICIAN ASSISTANT

## 2022-10-21 PROCEDURE — G8399 PT W/DXA RESULTS DOCUMENT: HCPCS | Performed by: PHYSICIAN ASSISTANT

## 2022-10-21 PROCEDURE — G8484 FLU IMMUNIZE NO ADMIN: HCPCS | Performed by: PHYSICIAN ASSISTANT

## 2022-10-21 PROCEDURE — 99213 OFFICE O/P EST LOW 20 MIN: CPT | Performed by: PHYSICIAN ASSISTANT

## 2022-10-21 PROCEDURE — 1123F ACP DISCUSS/DSCN MKR DOCD: CPT | Performed by: PHYSICIAN ASSISTANT

## 2022-10-21 PROCEDURE — G8427 DOCREV CUR MEDS BY ELIG CLIN: HCPCS | Performed by: PHYSICIAN ASSISTANT

## 2022-10-21 PROCEDURE — 1090F PRES/ABSN URINE INCON ASSESS: CPT | Performed by: PHYSICIAN ASSISTANT

## 2022-10-21 RX ORDER — BETAMETHASONE SODIUM PHOSPHATE AND BETAMETHASONE ACETATE 3; 3 MG/ML; MG/ML
6 INJECTION, SUSPENSION INTRA-ARTICULAR; INTRALESIONAL; INTRAMUSCULAR; SOFT TISSUE ONCE
Status: COMPLETED | OUTPATIENT
Start: 2022-10-21 | End: 2022-10-21

## 2022-10-21 RX ORDER — LIDOCAINE HYDROCHLORIDE 10 MG/ML
1 INJECTION, SOLUTION INFILTRATION; PERINEURAL ONCE
Status: COMPLETED | OUTPATIENT
Start: 2022-10-21 | End: 2022-10-21

## 2022-10-21 RX ADMIN — LIDOCAINE HYDROCHLORIDE 1 ML: 10 INJECTION, SOLUTION INFILTRATION; PERINEURAL at 12:26

## 2022-10-21 RX ADMIN — BETAMETHASONE SODIUM PHOSPHATE AND BETAMETHASONE ACETATE 6 MG: 3; 3 INJECTION, SUSPENSION INTRA-ARTICULAR; INTRALESIONAL; INTRAMUSCULAR; SOFT TISSUE at 12:25

## 2022-10-21 ASSESSMENT — ENCOUNTER SYMPTOMS
APNEA: 0
CHEST TIGHTNESS: 0
ABDOMINAL DISTENTION: 0
VOMITING: 0
COLOR CHANGE: 0
SHORTNESS OF BREATH: 0
NAUSEA: 0
RESPIRATORY NEGATIVE: 1
CONSTIPATION: 0
DIARRHEA: 0
ABDOMINAL PAIN: 0
COUGH: 0

## 2022-10-26 RX ORDER — LEVOTHYROXINE SODIUM 0.07 MG/1
75 TABLET ORAL DAILY
Qty: 90 TABLET | Refills: 1 | OUTPATIENT
Start: 2022-10-26

## 2022-10-26 RX ORDER — LEVOTHYROXINE SODIUM 0.07 MG/1
TABLET ORAL
Qty: 90 TABLET | Refills: 1 | Status: SHIPPED | OUTPATIENT
Start: 2022-10-26

## 2022-11-01 RX ORDER — AMITRIPTYLINE HYDROCHLORIDE 10 MG/1
TABLET, FILM COATED ORAL
Qty: 90 TABLET | Refills: 2 | Status: SHIPPED | OUTPATIENT
Start: 2022-11-01

## 2022-11-01 NOTE — TELEPHONE ENCOUNTER
Shabnam John is calling to request a refill on the following medication(s):    Last Visit Date (If Applicable):  7/7/0657    Next Visit Date:    11/7/2022    Medication Request:  Requested Prescriptions     Pending Prescriptions Disp Refills    amitriptyline (ELAVIL) 10 MG tablet [Pharmacy Med Name: AMITRIPTYLINE HCL 10 MG TAB] 90 tablet      Sig: TAKE ONE TABLET BY MOUTH DAILY AS NEEDED FOR SLEEP AND LEG PAIN FOR UP TO 30 DAYS

## 2022-11-07 ENCOUNTER — OFFICE VISIT (OUTPATIENT)
Dept: FAMILY MEDICINE CLINIC | Age: 82
End: 2022-11-07
Payer: MEDICARE

## 2022-11-07 ENCOUNTER — HOSPITAL ENCOUNTER (OUTPATIENT)
Age: 82
Setting detail: SPECIMEN
Discharge: HOME | End: 2022-11-07

## 2022-11-07 VITALS
SYSTOLIC BLOOD PRESSURE: 120 MMHG | OXYGEN SATURATION: 94 % | BODY MASS INDEX: 34.66 KG/M2 | DIASTOLIC BLOOD PRESSURE: 77 MMHG | HEIGHT: 65 IN | TEMPERATURE: 97 F | WEIGHT: 208 LBS | HEART RATE: 64 BPM

## 2022-11-07 DIAGNOSIS — Z00.00 MEDICARE ANNUAL WELLNESS VISIT, SUBSEQUENT: Primary | ICD-10-CM

## 2022-11-07 DIAGNOSIS — R89.9 ABNORMAL LABORATORY TEST: ICD-10-CM

## 2022-11-07 DIAGNOSIS — E04.1 THYROID NODULE: ICD-10-CM

## 2022-11-07 DIAGNOSIS — Z78.0 ASYMPTOMATIC MENOPAUSE: ICD-10-CM

## 2022-11-07 LAB
CALCIUM IONIZED: 1.41 MMOL/L (ref 1.13–1.33)
PTH INTACT: 40.7 PG/ML (ref 14–72)
TSH SERPL DL<=0.05 MIU/L-ACNC: 2.67 UIU/ML (ref 0.3–5)

## 2022-11-07 PROCEDURE — G8484 FLU IMMUNIZE NO ADMIN: HCPCS | Performed by: FAMILY MEDICINE

## 2022-11-07 PROCEDURE — G0439 PPPS, SUBSEQ VISIT: HCPCS | Performed by: FAMILY MEDICINE

## 2022-11-07 PROCEDURE — 1036F TOBACCO NON-USER: CPT | Performed by: FAMILY MEDICINE

## 2022-11-07 PROCEDURE — 99213 OFFICE O/P EST LOW 20 MIN: CPT | Performed by: FAMILY MEDICINE

## 2022-11-07 PROCEDURE — 1090F PRES/ABSN URINE INCON ASSESS: CPT | Performed by: FAMILY MEDICINE

## 2022-11-07 PROCEDURE — G8399 PT W/DXA RESULTS DOCUMENT: HCPCS | Performed by: FAMILY MEDICINE

## 2022-11-07 PROCEDURE — 1123F ACP DISCUSS/DSCN MKR DOCD: CPT | Performed by: FAMILY MEDICINE

## 2022-11-07 PROCEDURE — G8417 CALC BMI ABV UP PARAM F/U: HCPCS | Performed by: FAMILY MEDICINE

## 2022-11-07 PROCEDURE — G8427 DOCREV CUR MEDS BY ELIG CLIN: HCPCS | Performed by: FAMILY MEDICINE

## 2022-11-07 SDOH — HEALTH STABILITY: PHYSICAL HEALTH: ON AVERAGE, HOW MANY DAYS PER WEEK DO YOU ENGAGE IN MODERATE TO STRENUOUS EXERCISE (LIKE A BRISK WALK)?: 4 DAYS

## 2022-11-07 SDOH — HEALTH STABILITY: PHYSICAL HEALTH: ON AVERAGE, HOW MANY MINUTES DO YOU ENGAGE IN EXERCISE AT THIS LEVEL?: 30 MIN

## 2022-11-07 ASSESSMENT — PATIENT HEALTH QUESTIONNAIRE - PHQ9
SUM OF ALL RESPONSES TO PHQ QUESTIONS 1-9: 0
SUM OF ALL RESPONSES TO PHQ9 QUESTIONS 1 & 2: 0
2. FEELING DOWN, DEPRESSED OR HOPELESS: 0
SUM OF ALL RESPONSES TO PHQ QUESTIONS 1-9: 0
1. LITTLE INTEREST OR PLEASURE IN DOING THINGS: 0

## 2022-11-07 ASSESSMENT — LIFESTYLE VARIABLES
HOW OFTEN DO YOU HAVE SIX OR MORE DRINKS ON ONE OCCASION: 1
HOW OFTEN DO YOU HAVE A DRINK CONTAINING ALCOHOL: MONTHLY OR LESS
HOW MANY STANDARD DRINKS CONTAINING ALCOHOL DO YOU HAVE ON A TYPICAL DAY: PATIENT DOES NOT DRINK
HOW MANY STANDARD DRINKS CONTAINING ALCOHOL DO YOU HAVE ON A TYPICAL DAY: 0
HOW OFTEN DO YOU HAVE A DRINK CONTAINING ALCOHOL: 2

## 2022-11-07 NOTE — PATIENT INSTRUCTIONS
Personalized Preventive Plan for Lisandra Lyssa - 11/7/2022  Medicare offers a range of preventive health benefits. Some of the tests and screenings are paid in full while other may be subject to a deductible, co-insurance, and/or copay. Some of these benefits include a comprehensive review of your medical history including lifestyle, illnesses that may run in your family, and various assessments and screenings as appropriate. After reviewing your medical record and screening and assessments performed today your provider may have ordered immunizations, labs, imaging, and/or referrals for you. A list of these orders (if applicable) as well as your Preventive Care list are included within your After Visit Summary for your review. Other Preventive Recommendations:    A preventive eye exam performed by an eye specialist is recommended every 1-2 years to screen for glaucoma; cataracts, macular degeneration, and other eye disorders. A preventive dental visit is recommended every 6 months. Try to get at least 150 minutes of exercise per week or 10,000 steps per day on a pedometer . Order or download the FREE \"Exercise & Physical Activity: Your Everyday Guide\" from [employer]. Call 5-383.437.4705 or search [employer] online. You need 8086-0129 mg of calcium and 0675-9288 IU of vitamin D per day. It is possible to meet your calcium requirement with diet alone, but a vitamin D supplement is usually necessary to meet this goal.  When exposed to the sun, use a sunscreen that protects against both UVA and UVB radiation with an SPF of 30 or greater. Reapply every 2 to 3 hours or after sweating, drying off with a towel, or swimming. Always wear a seat belt when traveling in a car. Always wear a helmet when riding a bicycle or motorcycle. Heart-Healthy Diet   Sodium, Fat, and Cholesterol Controlled Diet       What Is a Heart Healthy Diet?    A heart-healthy diet is one type of cholesterol actually carries cholesterol away from your arteries and may, therefore, help lower your risk of having a heart attack. You want this level to be high (ideally greater than 60). It is a risk to have a level less than 40. You can raise this good cholesterol by eating olive oil, canola oil, avocados, or nuts. Exercise raises this level, too. Fat    Fat is calorie dense and packs a lot of calories into a small amount of food. Even though fats should be limited due to their high calorie content, not all fats are bad. In fact, some fats are quite healthful. Fat can be broken down into four main types. The good-for-you fats are:   Monounsaturated fat  found in oils such as olive and canola, avocados, and nuts and natural nut butters; can decrease cholesterol levels, while keeping levels of HDL cholesterol high   Polyunsaturated fat  found in oils such as safflower, sunflower, soybean, corn, and sesame; can decrease total cholesterol and LDL cholesterol   Omega-3 fatty acids  particularly those found in fatty fish (such as salmon, trout, tuna, mackerel, herring, and sardines); can decrease risk of arrhythmias, decrease triglyceride levels, and slightly lower blood pressure   The fats that you want to limit are:   Saturated fat  found in animal products, many fast foods, and a few vegetables; increases total blood cholesterol, including LDL levels   Animal fats that are saturated include: butter, lard, whole-milk dairy products, meat fat, and poultry skin   Vegetable fats that are saturated include: hydrogenated shortening, palm oil, coconut oil, cocoa butter   Hydrogenated or trans fat  found in margarine and vegetable shortening, most shelf stable snack foods, and fried foods; increases LDL and decreases HDL     It is generally recommended that you limit your total fat for the day to less than 30% of your total calories.  If you follow an 1800-calorie heart healthy diet, for example, this would mean 60 grams of fat or less per day. Saturated fat and trans fat in your diet raises your blood cholesterol the most, much more than dietary cholesterol does. For this reason, on a heart-healthy diet, less than 7% of your calories should come from saturated fat and ideally 0% from trans fat. On an 1800-calorie diet, this translates into less than 14 grams of saturated fat per day, leaving 46 grams of fat to come from mono- and polyunsaturated fats.    Food Choices on a Heart Healthy Diet   Food Category   Foods Recommended   Foods to Avoid   Grains   Breads and rolls without salted tops Most dry and cooked cereals Unsalted crackers and breadsticks Low-sodium or homemade breadcrumbs or stuffing All rice and pastas   Breads, rolls, and crackers with salted tops High-fat baked goods (eg, muffins, donuts, pastries) Quick breads, self-rising flour, and biscuit mixes Regular bread crumbs Instant hot cereals Commercially prepared rice, pasta, or stuffing mixes   Vegetables   Most fresh, frozen, and low-sodium canned vegetables Low-sodium and salt-free vegetable juices Canned vegetables if unsalted or rinsed   Regular canned vegetables and juices, including sauerkraut and pickled vegetables Frozen vegetables with sauces Commercially prepared potato and vegetable mixes   Fruits   Most fresh, frozen, and canned fruits All fruit juices   Fruits processed with salt or sodium   Milk   Nonfat or low-fat (1%) milk Nonfat or low-fat yogurt Cottage cheese, low-fat ricotta, cheeses labeled as low-fat and low-sodium   Whole milk Reduced-fat (2%) milk Malted and chocolate milk Full fat yogurt Most cheeses (unless low-fat and low salt) Buttermilk (no more than 1 cup per week)   Meats and Beans   Lean cuts of fresh or frozen beef, veal, lamb, or pork (look for the word loin) Fresh or frozen poultry without the skin Fresh or frozen fish and some shellfish Egg whites and egg substitutes (Limit whole eggs to three per week) Tofu Nuts or seeds (unsalted, dry-roasted), low-sodium peanut butter Dried peas, beans, and lentils   Any smoked, cured, salted, or canned meat, fish, or poultry (including bernstein, chipped beef, cold cuts, hot dogs, sausages, sardines, and anchovies) Poultry skins Breaded and/or fried fish or meats Canned peas, beans, and lentils Salted nuts   Fats and Oils   Olive oil and canola oil Low-sodium, low-fat salad dressings and mayonnaise   Butter, margarine, coconut and palm oils, bernstein fat   Snacks, Sweets, and Condiments   Low-sodium or unsalted versions of broths, soups, soy sauce, and condiments Pepper, herbs, and spices; vinegar, lemon, or lime juice Low-fat frozen desserts (yogurt, sherbet, fruit bars) Sugar, cocoa powder, honey, syrup, jam, and preserves Low-fat, trans-fat free cookies, cakes, and pies Pete and animal crackers, fig bars, nilda snaps   High-fat desserts Broth, soups, gravies, and sauces, made from instant mixes or other high-sodium ingredients Salted snack foods Canned olives Meat tenderizers, seasoning salt, and most flavored vinegars   Beverages   Low-sodium carbonated beverages Tea and coffee in moderation Soy milk   Commercially softened water   Suggestions   Make whole grains, fruits, and vegetables the base of your diet. Choose heart-healthy fats such as canola, olive, and flaxseed oil, and foods high in heart-healthy fats, such as nuts, seeds, soybeans, tofu, and fish. Eat fish at least twice per week; the fish highest in omega-3 fatty acids and lowest in mercury include salmon, herring, mackerel, sardines, and canned chunk light tuna. If you eat fish less than twice per week or have high triglycerides, talk to your doctor about taking fish oil supplements. Read food labels. For products low in fat and cholesterol, look for fat free, low-fat, cholesterol free, saturated fat free, and trans fat freeAlso scan the Nutrition Facts Label, which lists saturated fat, trans fat, and cholesterol amounts. For products low in sodium, look for sodium free, very low sodium, low sodium, no added salt, and unsalted   Skip the salt when cooking or at the table; if food needs more flavor, get creative and try out different herbs and spices. Garlic and onion also add substantial flavor to foods. Trim any visible fat off meat and poultry before cooking, and drain the fat off after zhao. Use cooking methods that require little or no added fat, such as grilling, boiling, baking, poaching, broiling, roasting, steaming, stir-frying, and sauting. Avoid fast food and convenience food. They tend to be high in saturated and trans fat and have a lot of added salt. Talk to a registered dietitian for individualized diet advice. Last Reviewed: March 2011 Gage Mederos MS, MPH, RD   Updated: 3/29/2011       Preventing Osteoporosis: After Your Visit  Your Care Instructions  Osteoporosis means the bones are weak and thin enough that they can break easily. The older you are, the more likely you are to get osteoporosis. But with plenty of calcium, vitamin D, and exercise, you can help prevent osteoporosis. The preteen and teen years are a key time for bone building. With the help of calcium, vitamin D, and exercise in those early years and beyond, the bones reach their peak density and strength by age 27. After age 27, your bones naturally start to thin and weaken. The stronger your bones are at around age 27, the lower your risk for osteoporosis. But no matter what your age and risk are, your bones still need calcium, vitamin D, and exercise to stay strong. Also avoid smoking, and limit alcohol. Smoking and heavy alcohol use can make your bones thinner. Talk to your doctor about any special risks you might have, such as having a close relative with osteoporosis or taking a medicine that can weaken bones. Your doctor can tell you the best ways to protect your bones from thinning.   Follow-up care is a key part of your treatment and safety. Be sure to make and go to all appointments, and call your doctor if you are having problems. It's also a good idea to know your test results and keep a list of the medicines you take. How can you care for yourself at home? Get enough calcium and vitamin D. The [place] [employer] recommends adults younger than age 46 need 1,000 mg of calcium and 600 IU of vitamin D each day. Women ages 46 to 79 need 1,200 mg of calcium and 600 IU of vitamin D each day. Men ages 46 to 79 need 1,000 mg of calcium and 600 IU of vitamin D each day. Adults 71 and older need 1,200 mg of calcium and 800 IU of vitamin D each day. Eat foods rich in calcium, like yogurt, cheese, milk, and dark green vegetables. Eat foods rich in vitamin D, like eggs, fatty fish, cereal, and fortified milk. Get some sunshine. Your body uses sunshine to make its own vitamin D. The safest time to be out in the sun is before 10 a.m. or after 3 p.m. Avoid getting sunburned. Sunburn can increase your risk of skin cancer. Talk to your doctor about taking a calcium plus vitamin D supplement. Ask about what type of calcium is right for you, and how much to take at a time. Adults ages 23 to 48 should not get more than 2,500 mg of calcium and 4,000 IU of vitamin D each day, whether it is from supplements and/or food. Adults ages 46 and older should not get more than 2,000 mg of calcium and 4,000 IU of vitamin D each day from supplements and/or food. Get regular bone-building exercise. Weight-bearing and resistance exercises keep bones healthy by working the muscles and bones against gravity. Start out at an exercise level that feels right for you. Add a little at a time until you can do the following:  Do 30 minutes of weight-bearing exercise on most days of the week. Walking, jogging, stair climbing, and dancing are good choices. Do resistance exercises with weights or elastic bands 2 to 3 days a week. Limit alcohol.  Drink no more than 1 alcohol drink a day if you are a woman. Drink no more than 2 alcohol drinks a day if you are a man. Do not smoke. Smoking can make bones thin faster. If you need help quitting, talk to your doctor about stop-smoking programs and medicines. These can increase your chances of quitting for good. When should you call for help? Watch closely for changes in your health, and be sure to contact your doctor if:  You need help with a healthy eating plan. You need help with an exercise plan    © 4592-4782 [employer]. Care instructions adapted under license by [facility]. This care instruction is for use with your licensed healthcare professional. If you have questions about a medical condition or this instruction, always ask your healthcare professional. [address] any warranty or liability for your use of this information. Content Version: 9.4.55510;  Last Revised: June 20, 2011

## 2022-11-07 NOTE — PROGRESS NOTES
Medicare Annual Wellness Visit    Tammy Garrido is here for Medicare AWV    Assessment & Plan   Medicare annual wellness visit, subsequent  Asymptomatic menopause  -     DEXA BONE DENSITY 2 SITES; Future  Abnormal laboratory test  -     PTH, Intact with Ionized Calcium; Future  -     TSH With Reflex Ft4; Future  Thyroid nodule  -     TSH With Reflex Ft4; Future      We will recheck the PTH and the calcium. As well as the TSH. Patient is complaining about fatigue. She has an appointment with her endocrinologist already. She will call for any changes or concerns. Call or return to clinic prn if these symptoms worsen or fail to improve as anticipated. I have reviewed the instructions with the patient, answering all questions to her satisfaction. Recommendations for Preventive Services Due: see orders and patient instructions/AVS.  Recommended screening schedule for the next 5-10 years is provided to the patient in written form: see Patient Instructions/AVS.     Return in 6 months (on 5/7/2023), or if symptoms worsen or fail to improve, for Medicare Annual Wellness Visit in 1 year. Subjective   The following acute and/or chronic problems were also addressed today:  Questions about fatigue and labs. Vitamin D normal.  Calcium just slightly elevated above normal at 1.35. PTH recently normal.    Patient's complete Health Risk Assessment and screening values have been reviewed and are found in Flowsheets. The following problems were reviewed today and where indicated follow up appointments were made and/or referrals ordered.     Positive Risk Factor Screenings with Interventions:             General Health and ACP:  General  In general, how would you say your health is?: Good  In the past 7 days, have you experienced any of the following: New or Increased Pain, New or Increased Fatigue, Loneliness, Social Isolation, Stress or Anger?: No  Do you get the social and emotional support that you need?: Yes  Do you have a Living Will?: Yes    Advance Directives       Power of Bonita Gilbert Will ACP-Advance Directive ACP-Power of     Not on File Not on File Not on File Not on File        General Health Risk Interventions:  NA    Health Habits/Nutrition:  Physical Activity: Insufficiently Active    Days of Exercise per Week: 4 days    Minutes of Exercise per Session: 30 min     Have you lost any weight without trying in the past 3 months?: No  Body mass index: (!) 34.61  Have you seen the dentist within the past year?: Yes  Health Habits/Nutrition Interventions:  Nutritional issues:  educational materials for healthy, well-balanced diet provided    Hearing/Vision:  Do you or your family notice any trouble with your hearing that hasn't been managed with hearing aids?: (!) Yes  Do you have difficulty driving, watching TV, or doing any of your daily activities because of your eyesight?: No  Have you had an eye exam within the past year?: Yes  No results found. Hearing/Vision Interventions:  Hearing concerns:  patient declines any further evaluation/treatment for hearing issues            Objective   Vitals:    11/07/22 1123   BP: 120/77   Pulse: 64   Temp: 97 °F (36.1 °C)   SpO2: 94%   Weight: 208 lb (94.3 kg)   Height: 5' 5\" (1.651 m)      Body mass index is 34.61 kg/m². HENT:   /77   Pulse 64   Temp 97 °F (36.1 °C)   Ht 5' 5\" (1.651 m)   Wt 208 lb (94.3 kg)   SpO2 94%   BMI 34.61 kg/m²   Constitutional: Alert and oriented. Well-nourished. Head: Normocephalic and atraumatic. Right Ear: External ear normal. TM: no bulging, erythema or fluid seen. Left Ear: External ear normal. TM: no bulging, erythema or fluid seen. Nose: Nose normal.   Mouth/Throat: Oropharynx is clear and moist.  Teeth in very good repair. Eyes: Pupils are equal, round, and reactive to light. Right eye exhibits no discharge. Left eye exhibits no discharge. No scleral icterus. Neck: Normal range of motion. Neck supple.  No JVD present. No tracheal deviation present. No thyromegaly present. Cardiovascular: Normal rate, regular rhythm, normal heart sounds. Pulmonary/Chest: Effort normal and breath sounds normal. No respiratory distress. She has no wheezes. She has no rales. Abdominal: Soft. Bowel sounds are normal.  She exhibits no distension and no mass. There is no tenderness. There is no rebound and no guarding. Musculoskeletal: Normal range of motion. She exhibits no edema or tenderness. Lymphadenopathy:    She has no cervical adenopathy. Neurological:  She is alert and oriented to person, place, and time. Cranial nerves grossly intact. No sensation problem noted. Muscle strength 5/5 throughout. Skin: Skin is warm and dry. No rash noted. No erythema. Psychiatric:  She has a normal mood and affect. Behavior is normal.        Allergies   Allergen Reactions    Aspirin Swelling     Hives. anaphylaxis    Atorvastatin Other (See Comments)     myalgia    Crestor [Rosuvastatin]      Myalgia      Livalo [Pitavastatin] Other (See Comments)     Muscle pain    Seasonal     Simvastatin      Myalgia      Statins      Other reaction(s): Unknown    Welchol [Colesevelam Hcl] Other (See Comments)     Leg cramp     Prior to Visit Medications    Medication Sig Taking?  Authorizing Provider   amitriptyline (ELAVIL) 10 MG tablet TAKE ONE TABLET BY MOUTH DAILY AS NEEDED FOR SLEEP AND LEG PAIN FOR UP TO 30 DAYS Yes Shawnee Anguiano MD   levothyroxine (SYNTHROID) 75 MCG tablet TAKE ONE TABLET BY MOUTH DAILY Yes Shawnee Anguiano MD   ondansetron (ZOFRAN-ODT) 4 MG disintegrating tablet DISSOLVE ONE TABLET BY MOUTH EVERY 8 HOURS AS NEEDED Yes Debra Whitman,    metFORMIN (GLUCOPHAGE) 500 MG tablet TAKE ONE TABLET BY MOUTH DAILY Yes Shawnee Anguiano MD   metoprolol succinate (TOPROL XL) 25 MG extended release tablet Take 12.5 mg by mouth in the morning and at bedtime Yes Historical Provider, MD   alclomethasone (ACLOVATE) 0.05 % cream Apply topically 2 times daily Prn Yes Historical Provider, MD   albuterol sulfate HFA (PROVENTIL HFA) 108 (90 Base) MCG/ACT inhaler Inhale 2 puffs into the lungs every 6 hours as needed for Wheezing Yes Colt Coffman MD   midodrine (PROAMATINE) 5 MG tablet Take 1 tablet by mouth 2 times daily (with meals)  Patient taking differently: Take 2.5 mg by mouth 2 times daily (with meals) Yes Missael Iqbal Orlop, DO   Evolocumab 140 MG/ML SOSY Inject into the skin Next dose due 12/27/21 Yes Historical Provider, MD   famotidine (PEPCID) 20 MG tablet as needed Yes Historical Provider, MD   guaiFENesin (MUCINEX) 600 MG extended release tablet Take 1 tablet by mouth 2 times daily  Patient taking differently: Take 600 mg by mouth 2 times daily as needed Yes Yuko Lang MD   NONFORMULARY Indications: Dietary fiber 1 tsp a day  Yes Historical Provider, MD   Cholecalciferol (VITAMIN D3) 2000 units CAPS Take 1 capsule by mouth 2 times daily Yes Guillaume Quintero MD   diltiazem (CARDIZEM CD) 120 MG extended release capsule Take 1 capsule by mouth every evening Yes Guillaume Quintero MD   fluticasone (FLONASE) 50 MCG/ACT nasal spray PLACE 1 SPRAY IN EACH NOSTRIL DAILY FOR 15 DAYS Yes Randal Zuniga MD   ketoconazole (NIZORAL) 2 % shampoo APPLY  TO AFFECTED AREAS TOPICALLY DAILY THEN RINSE OFF AFTER 5 MINUTES Yes Guillaume Quintero MD   ezetimibe (ZETIA) 10 MG tablet Take 10 mg by mouth daily Yes Historical Provider, MD   esomeprazole Magnesium (NEXIUM) 40 MG PACK Take 40 mg by mouth daily. Yes Historical Provider, MD   NONFORMULARY Super b complex 3 times per week Yes Historical Provider, MD   ascorbic acid (VITAMIN C) 500 MG tablet Take 500 mg by mouth daily.  Yes Historical Provider, MD   chlorpheniramine (CHLOR-TRIMETON) 4 MG tablet Take 12 mg by mouth daily  Yes Historical Provider, MD Lu (Including outside providers/suppliers regularly involved in providing care):   Patient Care Team:  Colt Coffman MD as PCP - General (Family Medicine)  Colt Coffman MD as PCP - [facility] Empaneled Provider  Dawna Rodriguez MD as Consulting Physician (Gastroenterology)  Chip Paul MD as Consulting Physician (Gastroenterology)  Darleen Sethi MD as Consulting Physician (Otolaryngology)  Laurita Beltran DO as Consulting Physician (Orthopedic Surgery)  Guadalupe Heart MD as Consulting Physician (Pulmonology)  Fauzia Whitehead MD as Consulting Physician (Cardiology)     Reviewed and updated this visit:  Allergies  Meds  Problems           (Please note that portions of this note were completed with a voice recognition program. Efforts were made to edit the dictations but occasionally words are mis-transcribed.)

## 2022-11-08 DIAGNOSIS — R89.9 ABNORMAL LABORATORY TEST: Primary | ICD-10-CM

## 2022-11-09 ENCOUNTER — HOSPITAL ENCOUNTER (OUTPATIENT)
Dept: PAIN MANAGEMENT | Facility: CLINIC | Age: 82
Discharge: HOME | End: 2022-11-09
Payer: MEDICARE

## 2022-11-09 VITALS
SYSTOLIC BLOOD PRESSURE: 149 MMHG | BODY MASS INDEX: 34.66 KG/M2 | WEIGHT: 208 LBS | HEIGHT: 65 IN | DIASTOLIC BLOOD PRESSURE: 92 MMHG | TEMPERATURE: 97.1 F | OXYGEN SATURATION: 95 % | RESPIRATION RATE: 10 BRPM | HEART RATE: 59 BPM

## 2022-11-09 DIAGNOSIS — M47.817 LUMBOSACRAL SPONDYLOSIS WITHOUT MYELOPATHY: Primary | ICD-10-CM

## 2022-11-09 DIAGNOSIS — R52 PAIN MANAGEMENT: ICD-10-CM

## 2022-11-09 LAB — GLUCOSE BLD-MCNC: 110 MG/DL (ref 65–105)

## 2022-11-09 PROCEDURE — 99152 MOD SED SAME PHYS/QHP 5/>YRS: CPT | Performed by: ANESTHESIOLOGY

## 2022-11-09 PROCEDURE — 64636 DESTROY L/S FACET JNT ADDL: CPT | Performed by: ANESTHESIOLOGY

## 2022-11-09 PROCEDURE — 64635 DESTROY LUMB/SAC FACET JNT: CPT

## 2022-11-09 PROCEDURE — 2500000003 HC RX 250 WO HCPCS: Performed by: ANESTHESIOLOGY

## 2022-11-09 PROCEDURE — 64635 DESTROY LUMB/SAC FACET JNT: CPT | Performed by: ANESTHESIOLOGY

## 2022-11-09 PROCEDURE — 82947 ASSAY GLUCOSE BLOOD QUANT: CPT

## 2022-11-09 PROCEDURE — 64636 DESTROY L/S FACET JNT ADDL: CPT

## 2022-11-09 PROCEDURE — 6360000002 HC RX W HCPCS: Performed by: ANESTHESIOLOGY

## 2022-11-09 RX ORDER — MIDAZOLAM HYDROCHLORIDE 2 MG/2ML
INJECTION, SOLUTION INTRAMUSCULAR; INTRAVENOUS
Status: COMPLETED | OUTPATIENT
Start: 2022-11-09 | End: 2022-11-09

## 2022-11-09 RX ORDER — LIDOCAINE HYDROCHLORIDE 10 MG/ML
INJECTION, SOLUTION EPIDURAL; INFILTRATION; INTRACAUDAL; PERINEURAL
Status: COMPLETED | OUTPATIENT
Start: 2022-11-09 | End: 2022-11-09

## 2022-11-09 RX ORDER — LIDOCAINE HYDROCHLORIDE 40 MG/ML
INJECTION, SOLUTION RETROBULBAR; TOPICAL
Status: COMPLETED | OUTPATIENT
Start: 2022-11-09 | End: 2022-11-09

## 2022-11-09 RX ORDER — FENTANYL CITRATE 50 UG/ML
INJECTION, SOLUTION INTRAMUSCULAR; INTRAVENOUS
Status: COMPLETED | OUTPATIENT
Start: 2022-11-09 | End: 2022-11-09

## 2022-11-09 RX ADMIN — FENTANYL CITRATE 50 MCG: 50 INJECTION, SOLUTION INTRAMUSCULAR; INTRAVENOUS at 09:56

## 2022-11-09 RX ADMIN — MIDAZOLAM HYDROCHLORIDE 1 MG: 1 INJECTION, SOLUTION INTRAMUSCULAR; INTRAVENOUS at 10:02

## 2022-11-09 RX ADMIN — LIDOCAINE HYDROCHLORIDE 5 ML: 10 INJECTION, SOLUTION EPIDURAL; INFILTRATION; INTRACAUDAL at 09:56

## 2022-11-09 RX ADMIN — LIDOCAINE HYDROCHLORIDE 5 ML: 40 SOLUTION RETROBULBAR; TOPICAL at 09:57

## 2022-11-09 RX ADMIN — MIDAZOLAM HYDROCHLORIDE 1 MG: 1 INJECTION, SOLUTION INTRAMUSCULAR; INTRAVENOUS at 09:56

## 2022-11-09 ASSESSMENT — PAIN - FUNCTIONAL ASSESSMENT
PAIN_FUNCTIONAL_ASSESSMENT: PREVENTS OR INTERFERES SOME ACTIVE ACTIVITIES AND ADLS
PAIN_FUNCTIONAL_ASSESSMENT: 0-10

## 2022-11-09 ASSESSMENT — PAIN DESCRIPTION - DESCRIPTORS: DESCRIPTORS: SHARP;ACHING

## 2022-11-09 NOTE — DISCHARGE INSTRUCTIONS
Discharge Instructions following Sedation or Anesthesia:  You have  received  a sedative/anesthetic therefore, you should not consume any alcoholic beverages for minimum of 12 hours. Do not drive or operate machinery for 24 hours. Do not sign legal documents for 24 hours. Dizziness, drowsiness, and unsteadiness may occur. Rest when need to. Increase diet as tolerated. Keep up on fluids if diet allows. General Instructions:  Do not take a tub bath for 72 hours after procedure (this includes hot tubs and swimming pools). You may shower, but avoid hot water to injection site. Avoid strenuous activity TODAY especially if you experience dizziness. Remove band-aid the next day. Wash off any residual iodine   Do not use heat, heating pad, or any other heating device over the injection site for 3 days after the procedure. If you experience pain after your procedure, you may continue with your current pain medication as prescribed. (DO NOT INCREASE YOUR PAIN MEDICATION WITHOUT TALKING TO DOCTOR)  Soreness and pain at injection site is common, may use ice to reduce soreness.     Call Jailene Sofia at [phone] if you experience:   Fever, chills or temperature over 100    Vomiting, Headache, persistent stiff neck, nausea, blurred vision   Difficulty in urinating or unable to urinate with 8 hours   Increase in weakness, numbness or loss of function   Increased redness, swelling or drainage at the injection site

## 2022-11-09 NOTE — H&P
Posterior Shoulder, Left Anterior Arm       Family History   Problem Relation Age of Onset    Cancer Mother 48        pineal gland/throat    Cancer Father 64        colon    Cancer Brother         lung    Other Brother         pulmonary embolus    Heart Disease Paternal Grandfather         myocarditis    Stroke Maternal Aunt     Cancer Maternal Uncle     Stroke Maternal Grandmother     Diabetes Brother     Heart Disease Paternal Uncle 67        mi    Heart Disease Brother 58        mi    High Blood Pressure Brother     Heart Disease Brother 79        stents for cad    High Blood Pressure Brother        Allergies   Allergen Reactions    Aspirin Swelling     Hives.  anaphylaxis    Atorvastatin Other (See Comments)     myalgia    Crestor [Rosuvastatin]      Myalgia      Livalo [Pitavastatin] Other (See Comments)     Muscle pain    Seasonal     Simvastatin      Myalgia      Statins      Other reaction(s): Unknown    Welchol [Colesevelam Hcl] Other (See Comments)     Leg cramp         Current Outpatient Medications:     amitriptyline (ELAVIL) 10 MG tablet, TAKE ONE TABLET BY MOUTH DAILY AS NEEDED FOR SLEEP AND LEG PAIN FOR UP TO 30 DAYS, Disp: 90 tablet, Rfl: 2    levothyroxine (SYNTHROID) 75 MCG tablet, TAKE ONE TABLET BY MOUTH DAILY, Disp: 90 tablet, Rfl: 1    ondansetron (ZOFRAN-ODT) 4 MG disintegrating tablet, DISSOLVE ONE TABLET BY MOUTH EVERY 8 HOURS AS NEEDED, Disp: 20 tablet, Rfl: 3    metFORMIN (GLUCOPHAGE) 500 MG tablet, TAKE ONE TABLET BY MOUTH DAILY, Disp: 90 tablet, Rfl: 1    metoprolol succinate (TOPROL XL) 25 MG extended release tablet, Take 12.5 mg by mouth in the morning and at bedtime, Disp: , Rfl:     alclomethasone (ACLOVATE) 0.05 % cream, Apply topically 2 times daily Prn, Disp: , Rfl:     albuterol sulfate HFA (PROVENTIL HFA) 108 (90 Base) MCG/ACT inhaler, Inhale 2 puffs into the lungs every 6 hours as needed for Wheezing, Disp: 18 g, Rfl: 3    midodrine (PROAMATINE) 5 MG tablet, Take 1 tablet by mouth 2 times daily (with meals) (Patient taking differently: Take 2.5 mg by mouth 2 times daily (with meals)), Disp: 90 tablet, Rfl: 3    Evolocumab 140 MG/ML SOSY, Inject into the skin Next dose due 12/27/21, Disp: , Rfl:     famotidine (PEPCID) 20 MG tablet, as needed, Disp: , Rfl:     guaiFENesin (MUCINEX) 600 MG extended release tablet, Take 1 tablet by mouth 2 times daily (Patient taking differently: Take 600 mg by mouth 2 times daily as needed), Disp: 20 tablet, Rfl: 0    NONFORMULARY, Indications: Dietary fiber 1 tsp a day , Disp: , Rfl:     Cholecalciferol (VITAMIN D3) 2000 units CAPS, Take 1 capsule by mouth 2 times daily, Disp: 30 capsule, Rfl: 0    diltiazem (CARDIZEM CD) 120 MG extended release capsule, Take 1 capsule by mouth every evening, Disp: 30 capsule, Rfl: 3    fluticasone (FLONASE) 50 MCG/ACT nasal spray, PLACE 1 SPRAY IN EACH NOSTRIL DAILY FOR 15 DAYS, Disp: 1 Bottle, Rfl: 2    ketoconazole (NIZORAL) 2 % shampoo, APPLY  TO AFFECTED AREAS TOPICALLY DAILY THEN RINSE OFF AFTER 5 MINUTES, Disp: 120 mL, Rfl: 2    ezetimibe (ZETIA) 10 MG tablet, Take 10 mg by mouth daily, Disp: , Rfl:     esomeprazole Magnesium (NEXIUM) 40 MG PACK, Take 40 mg by mouth daily. , Disp: , Rfl:     NONFORMULARY, Super b complex 3 times per week, Disp: , Rfl:     ascorbic acid (VITAMIN C) 500 MG tablet, Take 500 mg by mouth daily. , Disp: , Rfl:     chlorpheniramine (CHLOR-TRIMETON) 4 MG tablet, Take 12 mg by mouth daily , Disp: , Rfl:   No current facility-administered medications for this encounter.     Facility-Administered Medications Ordered in Other Encounters:     0.9 % sodium chloride infusion, , IntraVENous, Continuous, Lana Soler MD, Last Rate: 100 mL/hr at 02/19/14 0827, New Bag at 02/19/14 0827    Social History     Tobacco Use    Smoking status: Never    Smokeless tobacco: Never   Substance Use Topics    Alcohol use: Yes     Comment: rare       Review of Systems:   Focused review of systems was performed, and negative as pertinent to diagnosis, except as stated in HPI. Physical Exam  Constitutional:       Appearance: Normal appearance. Pulmonary:      Effort: Pulmonary effort is normal.   Neurological:      Mental Status: alert. Psychiatric:         Attention and Perception: Attention and perception normal.         Mood and Affect: Mood and affect normal.   Cardiovascular:      Rate: Normal rate. ASA: 3          Mallampati: 3       Patient's current physical status, medications, medical history, and HPI have been reviewed and updated as appropriate on this date: 11/09/22    Risk/Benefit(s): The risks, benefits, alternatives, and potential complications have been discussed with the patient/family and informed consent has been obtained for the procedure/sedation. Diagnosis:   1.  Lumbosacral spondylosis without myelopathy            Plan:   BILATERAL LUMBAR RFA MEDIAL BRANCH NERVES RFA AT L4/5 AND L5/S1        James Burgess MD

## 2022-11-09 NOTE — OP NOTE
Preoperative Diagnosis: Lumbar spondylosis w/o myelopathy, chronic low back pain  Postoperative Diagnosis: Lumbar spondylosis w/o myelopathy, chronic low back pain  SEDATION: SEE SEDATION NOTE  BLOOD LOSS: NONE    Procedure Performed:  :Radiofrequency ablation of median branches at the Transverse processes of L4, L5 AND SACRAL ALA for L4/5 AND L5/S1 facet joints on Bilateral under fluoroscopic guidance with IV sedation. t    Procedure:    After starting an IV, the patient was taken to procedure room. The patient was placed in prone position and skin over the back was prepped and draped in sterile manner. Standard monitors were connected and vitals were monitored during the case and they remained stable during the procedure. A meaningful communication was kept up with the patient throughout the procedure. Then using fluoroscopy the junction of the transverse process of the target vertebra with the superior process of the facet joint was observed and the view was optimized. The skin and deep tissues were infiltrated with 2 ml of  1 % lidocaine. The RF canula with the 10 mm active tip was introduced through the skin wheal under fluoroscopy guidance such that the tip of the needle lies in the groove of the transverse process with the superior processes of the facet joint. Then a lateral and AP view of the lumbar spine was obtained to make sure the tip of needle is not in the neural foramen. Then electric impedence was checked to make sure it is acceptable. Then a sensory stimulus was applied at 50 Hz up to 0.5 volt and concordant pain symptoms were reproduced. Then a motor stimulus was applied at 2 Hz up to 2 volts or 3 x times the sensory stimulus and no motor stimulation was seen in lower extremities. Some multifidus stimulus was seen. Then after negative aspiration 1 ml of 4% lidocaine was injected through the needle at each level. The radiofrequency lesion was done at 85 degrees centigrade for 110 seconds. Patient's vital signs and neurological status remained stable throughout the procedure and post procedural period. The patient tolerated the procedure well and was discharged home in stable condition. SEDATION NOTE:    ASA CLASSIFICATION  3  MP   CLASSIFICATION  3    Moderate intravenous conscious sedation was supervised by Dr. Jyotsna Naranjo  The patient was independently monitored by a Registered Nurse assigned to the Procedure Room  Monitoring included automated blood pressure, continuous EKG, Capnography and continuous pulse oximetry. The detailed Conscious Record is permanently stored in the [employer].      The following is the conscious sedation record;  Start Time:  0953  End times:  1009  Duration:  16 MINUTES  MEDS GIVEN 2 MG VERSED AND 50 MCG FENTANYL

## 2022-11-14 DIAGNOSIS — M25.551 RIGHT HIP PAIN: Primary | ICD-10-CM

## 2022-11-16 ENCOUNTER — HOSPITAL ENCOUNTER (OUTPATIENT)
Age: 82
Setting detail: SPECIMEN
Discharge: HOME | End: 2022-11-16

## 2022-11-16 ENCOUNTER — HOSPITAL ENCOUNTER (OUTPATIENT)
Dept: MAMMOGRAPHY | Facility: CLINIC | Age: 82
Discharge: HOME | End: 2022-11-18
Payer: MEDICARE

## 2022-11-16 DIAGNOSIS — Z78.0 ASYMPTOMATIC MENOPAUSE: ICD-10-CM

## 2022-11-16 DIAGNOSIS — R89.9 ABNORMAL LABORATORY TEST: ICD-10-CM

## 2022-11-16 LAB — VITAMIN D 25-HYDROXY: 69.5 NG/ML

## 2022-11-16 PROCEDURE — 77080 DXA BONE DENSITY AXIAL: CPT

## 2022-11-17 DIAGNOSIS — M25.551 RIGHT HIP PAIN: ICD-10-CM

## 2022-11-18 DIAGNOSIS — M70.61 GREATER TROCHANTERIC BURSITIS OF RIGHT HIP: Primary | ICD-10-CM

## 2022-11-21 ENCOUNTER — OFFICE VISIT (OUTPATIENT)
Dept: PAIN MANAGEMENT | Age: 82
End: 2022-11-21
Payer: MEDICARE

## 2022-11-21 ENCOUNTER — OFFICE VISIT (OUTPATIENT)
Dept: ORTHOPEDIC SURGERY | Age: 82
End: 2022-11-21
Payer: MEDICARE

## 2022-11-21 VITALS
SYSTOLIC BLOOD PRESSURE: 134 MMHG | BODY MASS INDEX: 34.66 KG/M2 | HEIGHT: 65 IN | HEART RATE: 70 BPM | WEIGHT: 208 LBS | OXYGEN SATURATION: 95 % | DIASTOLIC BLOOD PRESSURE: 73 MMHG

## 2022-11-21 VITALS — BODY MASS INDEX: 34.66 KG/M2 | RESPIRATION RATE: 14 BRPM | WEIGHT: 208 LBS | HEIGHT: 65 IN

## 2022-11-21 DIAGNOSIS — M54.51 VERTEBROGENIC LOW BACK PAIN: Primary | ICD-10-CM

## 2022-11-21 DIAGNOSIS — M51.36 DDD (DEGENERATIVE DISC DISEASE), LUMBAR: ICD-10-CM

## 2022-11-21 DIAGNOSIS — M70.61 GREATER TROCHANTERIC BURSITIS OF RIGHT HIP: Primary | ICD-10-CM

## 2022-11-21 PROCEDURE — G8399 PT W/DXA RESULTS DOCUMENT: HCPCS | Performed by: NURSE PRACTITIONER

## 2022-11-21 PROCEDURE — 1090F PRES/ABSN URINE INCON ASSESS: CPT | Performed by: NURSE PRACTITIONER

## 2022-11-21 PROCEDURE — 1123F ACP DISCUSS/DSCN MKR DOCD: CPT | Performed by: NURSE PRACTITIONER

## 2022-11-21 PROCEDURE — 99213 OFFICE O/P EST LOW 20 MIN: CPT | Performed by: NURSE PRACTITIONER

## 2022-11-21 PROCEDURE — G8417 CALC BMI ABV UP PARAM F/U: HCPCS | Performed by: NURSE PRACTITIONER

## 2022-11-21 PROCEDURE — G8484 FLU IMMUNIZE NO ADMIN: HCPCS | Performed by: NURSE PRACTITIONER

## 2022-11-21 PROCEDURE — 20611 DRAIN/INJ JOINT/BURSA W/US: CPT | Performed by: PHYSICIAN ASSISTANT

## 2022-11-21 PROCEDURE — G8427 DOCREV CUR MEDS BY ELIG CLIN: HCPCS | Performed by: NURSE PRACTITIONER

## 2022-11-21 PROCEDURE — 1036F TOBACCO NON-USER: CPT | Performed by: NURSE PRACTITIONER

## 2022-11-21 RX ORDER — TRIAMCINOLONE ACETONIDE 40 MG/ML
40 INJECTION, SUSPENSION INTRA-ARTICULAR; INTRAMUSCULAR ONCE
Status: COMPLETED | OUTPATIENT
Start: 2022-11-21 | End: 2022-11-21

## 2022-11-21 RX ORDER — LIDOCAINE HYDROCHLORIDE 10 MG/ML
2 INJECTION, SOLUTION INFILTRATION; PERINEURAL ONCE
Status: COMPLETED | OUTPATIENT
Start: 2022-11-21 | End: 2022-11-21

## 2022-11-21 RX ADMIN — TRIAMCINOLONE ACETONIDE 40 MG: 40 INJECTION, SUSPENSION INTRA-ARTICULAR; INTRAMUSCULAR at 11:46

## 2022-11-21 RX ADMIN — LIDOCAINE HYDROCHLORIDE 2 ML: 10 INJECTION, SOLUTION INFILTRATION; PERINEURAL at 11:46

## 2022-11-21 ASSESSMENT — ENCOUNTER SYMPTOMS
CHEST TIGHTNESS: 0
VOMITING: 0
BACK PAIN: 1
CONSTIPATION: 0
SHORTNESS OF BREATH: 0
NAUSEA: 0
COUGH: 0
CONSTIPATION: 0
APNEA: 0
RESPIRATORY NEGATIVE: 1
ABDOMINAL DISTENTION: 0
DIARRHEA: 0
COLOR CHANGE: 0
VOMITING: 0
NAUSEA: 0
DIARRHEA: 0
ABDOMINAL PAIN: 0

## 2022-11-21 NOTE — PROGRESS NOTES
[address] [facility]TS MEDICINE  [facility]  [address] [place], [place] [id number]  Dept: [phone]  Dept Fax: [phone]        Ambulatory Follow Up      Subjective:   Tammy Garrido is a [de-identified]y.o. year old female who presents to our office today for routine followup regarding her   1. Greater trochanteric bursitis of right hip    . Chief Complaint   Patient presents with    Hip Pain     R Hip Pain       HPI Tammy Garrido  is a [de-identified] y.o.  female who presents today in follow for right hip pain. The patient was last seen on 10/21/2022 and underwent treatment in the form of right greater trochanteric bursa injection. 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 She recently had an ablation in her back that has helped significantly. Now, her hip is bothering her again. Review of Systems   Constitutional:  Positive for activity change. Negative for appetite change, fatigue and fever. Respiratory: Negative. Negative for apnea, cough, chest tightness and shortness of breath. Cardiovascular: Negative. Negative for chest pain, palpitations and leg swelling. Gastrointestinal:  Negative for abdominal distention, abdominal pain, constipation, diarrhea, nausea and vomiting. Genitourinary:  Negative for difficulty urinating, dysuria and hematuria. Musculoskeletal:  Positive for arthralgias and gait problem. Negative for joint swelling and myalgias. Skin:  Negative for color change and rash. Neurological:  Negative for dizziness, weakness, numbness and headaches. Psychiatric/Behavioral:  Positive for sleep disturbance. Objective :   Resp 14   Ht 5' 5\" (1.651 m)   Wt 208 lb (94.3 kg)   BMI 34.61 kg/m²  Body mass index is 34.61 kg/m².   General: Tammy Garrido is a [de-identified] y.o. female who is alert and oriented and sitting comfortably in our office. Orthopedics:     GENERAL: Alert and oriented X3 in no acute distress. SKIN: Intact without lesions or ulcerations. NEURO: Intact to sensory and motor testing. VASC: Capillary refill is less than 3 seconds. Right Hip      GEN: Alert and oriented X 3, in no acute distress. GAIT: The patient's gait was observed while entering the exam room and was noted to be non antalgic. The extremity is in anatomic alignment. SKIN: Intact without rashes, lesions, or ulcerations. No obvious deformity or swelling. NEURO: The patient responds to light touch throughout right LE. Patellar and Achilles reflexes are 2/4. VASC: The right LE is neurovascularly intact with 2/4 DP and 2/4 PT pulses. Brisk capillary refill. ROM: 90 degrees flexion, 30 degrees abduction, 20 degrees adduction, 20 degrees of internal rotation, 40 degrees of external rotation. MUSC: Strength is 5/5 flexion, abduction, internal rotation, external rotation. PALP: The patient is tender to palpation over the greater trochanter. TEST: Log roll is (-). No apparent leg length discrepancy. Negative Trendelenburg test. Negative Dalton's test. No labral clunks. No pain on compression. Radiology:   HIP/PELVIS X-RAY     AP and standing AP of the pelvis and supine AP and frog leg lateral of the right hip. Radiographs were obtained today and demonstrate mild joint space narrowing is within normal limits and visualized articular surfaces are intact. There is no evidence of fracture, dislocation or other significant abnormality. Enthesophytes are noted at bilateral greater trochanter. Impression: Mild arthritic changes of the right hip with greater trochanteric spurring    Procedure: Greater Trochanteric Bursa Injection     Procedure: Mendel Rodriguez agreed today for a Corticosteroid injection into the right greater trochanteric bursa. The patient was placed in the lateral position with the affected side up.   The point of the maximum tenderness was marked with the closed end of a click type pen. The skin was prepped with betadine in a sterile fashion. Utilizing a [employer] ultrasound unit with a variable frequency linear transducer and sterile technique a 3 cc solution containing 2cc of 1% lidocaine and 1 cc containing 40 mg of Kenalog was injected into the greater trochanteric bursa with a 22 gauge spinal needle. The wound was cleansed and a Band-Aid was placed. The patient tolerated the procedure without difficulty. Adverse reactions of the injection was discussed with the patient including signs of infection (increasing pain, redness, swelling) and the patient was instructed to call immediately with any of these symptoms. The images are stored on SD card in the machine until downloaded to the patient's chart. Assessment:      1. Greater trochanteric bursitis of right hip       Plan:      Treatment : I reviewed the X-ray with the patient. There are no significant changes to her x-rays than from her previous ones. We discussed the etiologies and natural histories of Greater Trochanteric Bursitis of the right hip. We also discussed her back pain that she sees pain management for and I hope she is feeling better with that. She also has joint pain in multiple areas that she states has gotten better after she was diagnosed with Dyllan-Barr. 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 We discussed the various treatment alternatives including anti-inflammatory medications, physical therapy, injections, further imaging studies and as a last result surgery. During today's visit, we discussed that the injections have worked in the past.  She has still having some pain from her back so as she does more things with pain management some her hip pain could get better also. She is looking to continue conservative treatments for the bursitis since she is losing sleep. Today the patient opted for a cortisone injection into the right greater trochanteric bursa to help reduce inflammation and pain. The injection site should never get red, hot, or swollen and if it does the patient will contact our office right away. The patient may experience a increase in soreness the first 24-48 hours due to a cortisone flair and can take anti-inflammatories for a short period of time to reduce that soreness. The patient should not submerge the injection site in water for a minimum of 24 hours to avoid infection. This means no lakes, pools, ponds, or hot tubs for 24 hours. If the patient is diabetic the injection may increase their blood sugar for up to one week. The patient can do this cortisone injection once every 3 months as needed. If the injections stop working and do not give the patient relief the patient should consider surgical interventions to produce long term relief. Patient will follow-up with me as needed. She will also continue to do her exercises that she learned at physical therapy. She will call if she has any questions or concerns. Follow up:Return in about 6 weeks (around 1/2/2023). Orders Placed This Encounter   Medications    triamcinolone acetonide (KENALOG-40) injection 40 mg    lidocaine 1 % injection 2 mL         No orders of the defined types were placed in this encounter. This note is created with the assistance of a speech recognition program.  While intending to generate a document that actually reflects the content of the visit, the document can still have some errors including those of syntax and sound a like substitutions which may escape proof reading. In such instances, actual meaning can be extrapolated by contextual diversion.      Electronically signed by Olivia Ramos PA-C on 11/23/2022 at 12:59 PM

## 2022-11-21 NOTE — PROGRESS NOTES
Chief Complaint   Patient presents with    Back Pain    Follow Up After Procedure     Radiofrequency ablation of median branches          PMH     history of chronic low back pain  Onset many years ago  Did complete physical therapy last year without much improvement  Had recent MRI lumbar spine Severe degenerative disc disease at L5-S1 level with near complete loss of disc height and type II Modic changes involving L5 and S1 endplates  Severe facet arthropathy at L4-5 and L5-S1  No previous lumbar spine surgical history      Here today to f/u after bilat Radiofrequency ablation of median branches at the Transverse processes of L4, L5 AND SACRAL ALA for L4/5 AND L5/S1 facet joints   and reports  80% relief of pain and improved activity tolerance        HPI:   Back Pain  This is a chronic problem. The current episode started yesterday. The problem occurs intermittently. The problem has been gradually improving since onset. The pain is present in the lumbar spine. The quality of the pain is described as aching. The pain does not radiate. The pain is at a severity of 2/10. The pain is mild. The pain is Worse during the day. The symptoms are aggravated by bending and position. Pertinent negatives include no fever, headaches or numbness. Risk factors include menopause, obesity, poor posture, sedentary lifestyle and lack of exercise. She has tried chiropractic manipulation, heat and home exercises for the symptoms. The treatment provided mild relief. Chief Complaint   Patient presents with    Back Pain    Follow Up After Procedure     Radiofrequency ablation of median branches        Dx:  S/P:Radiofrequency ablation of median branches       Outcome   Any improvement of activity?   No   Any side effects (appetite,leg cramping,facial fleshing): no   Increase of pain:  No  Pain score Today:  2  % of pain relief: 80  Pain diary (medial branch block): No    Hemoglobin A1C   Date Value Ref Range Status   01/19/2022 5.8 4.0 - 6.0 % Final                Past Medical History:   Diagnosis Date    Acid reflux     Anxiety 6/30/2014    ALICIA-7   09/09/19 : 4    Bilateral tinnitus 12/10/2019    Bronchitis, mucopurulent recurrent ([address] 7/23/2020    Diverticular disease 6/30/2014    Enthesopathy of hip region 12/10/2019    GERD (gastroesophageal reflux disease)     Hypercholesteremia 6/30/2014    Hypothyroid 6/30/2014    Laryngopharyngeal reflux 12/10/2019    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    Lung nodule     Meniere disease     right ear    Meniere's disease, right ear 12/10/2019    Morbidly obese ([address] 7/23/2020    Neuropathy     Osteopenia 6/30/2014    Parkinson disease ([facility] [address] 7/23/2020    Postmenopausal state 12/10/2019    Postnasal drip 7/11/2020    Prediabetes     RAD (reactive airway disease) 6/30/2014    Seasonal asthma 7/11/2020    Thyroid nodule 3/1/2016    Tremor     r hand    Vitamin D deficiency 6/30/2014       Past Surgical History:   Procedure Laterality Date    CHOLECYSTECTOMY      COLONOSCOPY      ENDOSCOPY, COLON, DIAGNOSTIC      ERCP  03/21/2014    EXCISION / BIOPSY SKIN LESION OF TRUNK Left 11/08/2017    EXCISION MASS LEFT POSTERIOR SHOULDER, LEFT ANTERIOR ARM performed by Foreign Barragan MD at [address] (CERVIX STATUS UNKNOWN)  01/03/2007    PAIN MANAGEMENT PROCEDURE      SKIN BIOPSY Left 11/08/2017    Excision Mass Left Posterior Shoulder, Left Anterior Arm       Allergies   Allergen Reactions    Aspirin Swelling     Hives.  anaphylaxis    Atorvastatin Other (See Comments)     myalgia    Crestor [Rosuvastatin]      Myalgia      Livalo [Pitavastatin] Other (See Comments)     Muscle pain    Seasonal     Simvastatin      Myalgia      Statins      Other reaction(s): Unknown    Welchol [Colesevelam Hcl] Other (See Comments)     Leg cramp         Current Outpatient Medications:     amitriptyline (ELAVIL) 10 MG tablet, TAKE ONE TABLET BY MOUTH DAILY AS NEEDED FOR SLEEP AND LEG PAIN FOR UP TO 30 DAYS, Disp: 90 tablet, Rfl: 2    levothyroxine (SYNTHROID) 75 MCG tablet, TAKE ONE TABLET BY MOUTH DAILY, Disp: 90 tablet, Rfl: 1    ondansetron (ZOFRAN-ODT) 4 MG disintegrating tablet, DISSOLVE ONE TABLET BY MOUTH EVERY 8 HOURS AS NEEDED, Disp: 20 tablet, Rfl: 3    metFORMIN (GLUCOPHAGE) 500 MG tablet, TAKE ONE TABLET BY MOUTH DAILY, Disp: 90 tablet, Rfl: 1    metoprolol succinate (TOPROL XL) 25 MG extended release tablet, Take 12.5 mg by mouth in the morning and at bedtime, Disp: , Rfl:     alclomethasone (ACLOVATE) 0.05 % cream, Apply topically 2 times daily Prn, Disp: , Rfl:     albuterol sulfate HFA (PROVENTIL HFA) 108 (90 Base) MCG/ACT inhaler, Inhale 2 puffs into the lungs every 6 hours as needed for Wheezing, Disp: 18 g, Rfl: 3    midodrine (PROAMATINE) 5 MG tablet, Take 1 tablet by mouth 2 times daily (with meals) (Patient taking differently: Take 2.5 mg by mouth 2 times daily (with meals)), Disp: 90 tablet, Rfl: 3    Evolocumab 140 MG/ML SOSY, Inject into the skin Next dose due 12/27/21, Disp: , Rfl:     famotidine (PEPCID) 20 MG tablet, as needed, Disp: , Rfl:     guaiFENesin (MUCINEX) 600 MG extended release tablet, Take 1 tablet by mouth 2 times daily (Patient taking differently: Take 600 mg by mouth 2 times daily as needed), Disp: 20 tablet, Rfl: 0    NONFORMULARY, Indications: Dietary fiber 1 tsp a day , Disp: , Rfl:     Cholecalciferol (VITAMIN D3) 2000 units CAPS, Take 1 capsule by mouth 2 times daily, Disp: 30 capsule, Rfl: 0    diltiazem (CARDIZEM CD) 120 MG extended release capsule, Take 1 capsule by mouth every evening, Disp: 30 capsule, Rfl: 3    fluticasone (FLONASE) 50 MCG/ACT nasal spray, PLACE 1 SPRAY IN EACH NOSTRIL DAILY FOR 15 DAYS, Disp: 1 Bottle, Rfl: 2    ketoconazole (NIZORAL) 2 % shampoo, APPLY  TO AFFECTED AREAS TOPICALLY DAILY THEN RINSE OFF AFTER 5 MINUTES, Disp: 120 mL, Rfl: 2    ezetimibe (ZETIA) 10 MG tablet, Take 10 mg by mouth daily, Disp: , Rfl: esomeprazole Magnesium (NEXIUM) 40 MG PACK, Take 40 mg by mouth daily. , Disp: , Rfl:     NONFORMULARY, Super b complex 3 times per week, Disp: , Rfl:     ascorbic acid (VITAMIN C) 500 MG tablet, Take 500 mg by mouth daily. , Disp: , Rfl:     chlorpheniramine (CHLOR-TRIMETON) 4 MG tablet, Take 12 mg by mouth daily , Disp: , Rfl:     Family History   Problem Relation Age of Onset    Cancer Mother 48        pineal gland/throat    Cancer Father 64        colon    Cancer Brother         lung    Other Brother         pulmonary embolus    Heart Disease Paternal Grandfather         myocarditis    Stroke Maternal Aunt     Cancer Maternal Uncle     Stroke Maternal Grandmother     Diabetes Brother     Heart Disease Paternal Uncle 67        mi    Heart Disease Brother 58        mi    High Blood Pressure Brother     Heart Disease Brother 79        stents for cad    High Blood Pressure Brother        Social History     Socioeconomic History    Marital status: Single     Spouse name: Not on file    Number of children: Not on file    Years of education: Not on file    Highest education level: Not on file   Occupational History    Not on file   Tobacco Use    Smoking status: Never    Smokeless tobacco: Never   Vaping Use    Vaping Use: Never used   Substance and Sexual Activity    Alcohol use: Yes     Comment: rare    Drug use: No    Sexual activity: Not on file   Other Topics Concern    Not on file   Social History Narrative    Not on file     Social Determinants of Health     Financial Resource Strain: Low Risk     Difficulty of Paying Living Expenses: Not hard at all   Food Insecurity: No Food Insecurity    Worried About Running Out of Food in the Last Year: Never true    Ran Out of Food in the Last Year: Never true   Transportation Needs: Not on file   Physical Activity: Insufficiently Active    Days of Exercise per Week: 4 days    Minutes of Exercise per Session: 30 min   Stress: Not on file   Social Connections: Not on file Intimate Partner Violence: Not on file   Housing Stability: Not on file       Review of Systems:  Review of Systems   Constitutional: Positive for malaise/fatigue. Negative for chills and fever. Musculoskeletal:  Positive for back pain. Negative for falls, muscle cramps, muscle weakness, neck pain and stiffness. Gastrointestinal:  Negative for constipation, diarrhea, nausea and vomiting. Neurological:  Negative for dizziness, headaches and numbness. Physical Exam:  /73   Pulse 70   Ht 5' 5\" (1.651 m)   Wt 208 lb (94.3 kg)   SpO2 95%   BMI 34.61 kg/m²     Physical Exam  Cardiovascular:      Rate and Rhythm: Normal rate. Pulmonary:      Effort: Pulmonary effort is normal.   Musculoskeletal:         General: Normal range of motion. Skin:     General: Skin is warm and dry. Neurological:      Mental Status: She is alert and oriented to person, place, and time. Assessment:  Problem List Items Addressed This Visit       DDD (degenerative disc disease), lumbar    Vertebrogenic low back pain - Primary          Treatment Plan:    Patient relates significant relief from recent intervention   Consider BVN if pain returns   Follow up appointment made for 4 months     I have reviewed the chief complaint and history of present illness (including ROS and PFSH) and vital documentation by my staff and I agree with their documentation and have added where applicable.

## 2022-11-23 ENCOUNTER — PATIENT MESSAGE (OUTPATIENT)
Dept: FAMILY MEDICINE CLINIC | Age: 82
End: 2022-11-23

## 2022-11-23 NOTE — TELEPHONE ENCOUNTER
From: Og Bowie  To: Dr. Ashley Hutson  Sent: 11/23/2022 10:06 AM EST  Subject: Do I need to do more follow up? When I last saw you for my wellness check up, you seemed to be (my observation and I could be wrong) wondering out loud if I had chronic veena bar virus, after I shared with you that I do seem to have a lot of fatigue. Do I need to do more follow up with Dr. Jo Said? Or ????? What brings me to this question is that in the past (from the beginning of Fall) I get a scratchy throat every now and then and some cough. I gargle and take mucinex and that seems to take care of it. This seems to happen shortly after some gathering or gatherings of folks. It doesn't happen with every gathering. Today and during the night I woke up with a scratchy throat, so I gargled and took tylenol. I was in a large gathering this past Sunday. Also I was in two doctors offices on Monday (pain shot for rt. hip bursitis, and follow up in Pain Management office for lower back oblation on Nov. 9th). When I woke up this morning, I seemed to have more of a cough plus chills. So I took some mucinex and more tylenol. And I am laying low today and resting to hopefully nip this in the bud. I have all my Covid shots and boosters and I got my flu shot on Nov. 15. I was advised by the pulmonologist to get my flu shot about now so it lasts me through Dec. Jeremy. Feb.  Any thoughts about all this? Many thanks! Erin He  [birth year]  Happy Thanksgiving to you and your family. And I do realize its holiday time in terms of your schedule.

## 2022-12-12 ENCOUNTER — HOSPITAL ENCOUNTER (OUTPATIENT)
Age: 82
Discharge: HOME | End: 2022-12-12
Payer: MEDICARE

## 2022-12-12 ENCOUNTER — OFFICE VISIT (OUTPATIENT)
Dept: INFECTIOUS DISEASES | Age: 82
End: 2022-12-12
Payer: MEDICARE

## 2022-12-12 VITALS
BODY MASS INDEX: 34.66 KG/M2 | TEMPERATURE: 97.2 F | WEIGHT: 208 LBS | DIASTOLIC BLOOD PRESSURE: 81 MMHG | SYSTOLIC BLOOD PRESSURE: 130 MMHG | HEIGHT: 65 IN

## 2022-12-12 DIAGNOSIS — B27.90 CHRONIC EBV INFECTION: ICD-10-CM

## 2022-12-12 DIAGNOSIS — R53.82 CHRONIC FATIGUE: Primary | ICD-10-CM

## 2022-12-12 LAB
ALBUMIN SERPL-MCNC: 4.1 G/DL (ref 3.5–5.2)
ALBUMIN/GLOBULIN RATIO: 1.5 (ref 1–2.5)
ALP BLD-CCNC: 86 U/L (ref 35–104)
ALT SERPL-CCNC: 21 U/L (ref 5–33)
ANION GAP SERPL CALCULATED.3IONS-SCNC: 12 MMOL/L (ref 9–17)
AST SERPL-CCNC: 17 U/L
BILIRUB SERPL-MCNC: 0.2 MG/DL (ref 0.3–1.2)
BUN BLDV-MCNC: 22 MG/DL (ref 8–23)
CALCIUM SERPL-MCNC: 10.1 MG/DL (ref 8.6–10.4)
CHLORIDE BLD-SCNC: 105 MMOL/L (ref 98–107)
CO2: 25 MMOL/L (ref 20–31)
CREAT SERPL-MCNC: 0.97 MG/DL (ref 0.5–0.9)
ESTIMATED AVERAGE GLUCOSE: 120 MG/DL
GFR SERPL CREATININE-BSD FRML MDRD: 59 ML/MIN/1.73M2
GLUCOSE BLD-MCNC: 106 MG/DL (ref 70–99)
HBA1C MFR BLD: 5.8 % (ref 4–6)
POTASSIUM SERPL-SCNC: 5.1 MMOL/L (ref 3.7–5.3)
PTH INTACT: 41.9 PG/ML (ref 14–72)
SODIUM BLD-SCNC: 142 MMOL/L (ref 135–144)
TOTAL PROTEIN: 6.8 G/DL (ref 6.4–8.3)
VITAMIN D 25-HYDROXY: 71.2 NG/ML

## 2022-12-12 PROCEDURE — 83036 HEMOGLOBIN GLYCOSYLATED A1C: CPT

## 2022-12-12 PROCEDURE — G8399 PT W/DXA RESULTS DOCUMENT: HCPCS | Performed by: INTERNAL MEDICINE

## 2022-12-12 PROCEDURE — 1090F PRES/ABSN URINE INCON ASSESS: CPT | Performed by: INTERNAL MEDICINE

## 2022-12-12 PROCEDURE — 1036F TOBACCO NON-USER: CPT | Performed by: INTERNAL MEDICINE

## 2022-12-12 PROCEDURE — 80053 COMPREHEN METABOLIC PANEL: CPT

## 2022-12-12 PROCEDURE — 36415 COLL VENOUS BLD VENIPUNCTURE: CPT

## 2022-12-12 PROCEDURE — G8417 CALC BMI ABV UP PARAM F/U: HCPCS | Performed by: INTERNAL MEDICINE

## 2022-12-12 PROCEDURE — 83970 ASSAY OF PARATHORMONE: CPT

## 2022-12-12 PROCEDURE — G8484 FLU IMMUNIZE NO ADMIN: HCPCS | Performed by: INTERNAL MEDICINE

## 2022-12-12 PROCEDURE — G8427 DOCREV CUR MEDS BY ELIG CLIN: HCPCS | Performed by: INTERNAL MEDICINE

## 2022-12-12 PROCEDURE — 1123F ACP DISCUSS/DSCN MKR DOCD: CPT | Performed by: INTERNAL MEDICINE

## 2022-12-12 PROCEDURE — 82306 VITAMIN D 25 HYDROXY: CPT

## 2022-12-12 PROCEDURE — 99214 OFFICE O/P EST MOD 30 MIN: CPT | Performed by: INTERNAL MEDICINE

## 2022-12-12 ASSESSMENT — ENCOUNTER SYMPTOMS
BACK PAIN: 1
COLOR CHANGE: 0
ABDOMINAL DISTENTION: 0
EYE DISCHARGE: 0
APNEA: 0

## 2022-12-12 NOTE — PROGRESS NOTES
[facility] - Initial Consult Note  Today's Date: 12/12/2022    Impression :   Prediabetic on metformin and A1C <6  Hypothyroid - on pills   Meniere disease  Osteoarthritis of the spine - spinal pain shot to the back 7/2022 and 8/2022  Chronic fatigue worse since 12/2021  VPBs - metoprolol  Elevated EBV titers, looks like old possibly chronic illness,  B12 low and replaced, corrected  Weight stable, added lately a little  Thyroid nodule - past 3  biopsy neg - size stable -FU w endocrine  [facility]- also on B complex vitamin,  folate normal  hypercalcemia  Takes vitamin D   Hyper calcemia  CaIonised-  5.46 ( 2014) -1.35 ( 8/8/2022)  LAZCANO 8/8/22 labs:  PTH I 36 NL- Phosphorus normal  Vitamin D 75 - 75   Lyme neg  - ESR 17 - beta 2 microglobin 2.5 -kappa lambda neg -  TSH FT3 and FT4 neg  ESR < 10  Recommendations     Keep the vitamin d 4000 iu daily, keep the B12, complex B  Stop Vitamin D   See me PRN  See endocrinology for the calcium - seems better - unclear  cause  Has a Sleep study pend due to fatigue though sleeps well-    12/12/22  Get EBV HHV6  HHV7  blood PCR mycoplasma IGM      Diagnosis Orders   1. Chronic fatigue  Dyllan-Barr Virus DNA, Quantitative      2. Chronic EBV infection            Return in about 4 weeks (around 1/9/2023). History of Present Illness:   Gloria Borja is a [de-identified]y.o.-year-old  female who presents with   Chief Complaint   Patient presents with    Fatigue     EBV     New pt - chronic fatigue and EBV -1/4/08  [id number] [id number] [phone] she would have URTI flu like last too long, had seen pulm for that. Then recover. Dec 2021 sore throat and tested neg for covid, admitted to [facility], ,[facility] for syncope and stress test neg, later found w hypothyroidism and a thyroid nodule. Also very weak legs and legs at the time, palpitations, and DC d home and better slightly aT HOME. Has been fully vaccinated and boosted for covid.     Saw neuro for the weak ,limbs, neuropathy in legs and arms are ok, but could not understand why she was weak to the limbs. MRI LS 5/2022 showed multi level osteo arthritis, sees pain management. 2 weeks ago started w fatigue and sore throat again and covid tested neg, lasted 4-5 days and Improved. When she has the fatigue, she becomes lot slower and needs a lot of rest, cant do her daily chores. When improves, she can function again but never back to normal.    No wt loss- no fever or sweats, but some chills. Appetite good. BEE and RF were neg. TSH normal but she was started on T4 for low thyroid  B12 475 normal, B6 normal  A1c neg    EBV VCA IGG >750, IGM <10, EA 66, NA 12 low, old disease not recovered and possibly active. Not sure if this is old w increased pushed titers from something else or real active.   No tx for that specifically but would like to get a LAZCANO for :  Look for lyme, ESR and beta 2 microglobin      Has GERD- gastroperesis, prediabetic, A1c was 6 then dropped on metformin - hysterectomy and GB out, [place]    CT chest 12/2021 neg, no LN    Lyme titers,ESR, beta 2 microglobin, cbc diff, kappa lambda, vitam D and  calcium phosphorus an if abnormal, get then PTH  If all neg, then nothing to treat  Looking for lyme or for lymphoma  Keep the vitamin d 4000 iu daily, keep the B12, complex B   See in 2 weeks      Visit 8/24/22  hypercalcemia  Takes vitamin D   Hyper calcemia  CaIonised-  5.46 ( 2014) -1.35 ( 8/8/2022)  LAZCANO 8/8/22 labs:  PTH I 36 NL- Phosphorus normal  Vitamin D 75 - 75   Lyme neg  - ESR 17 - beta 2 microglobin 2.5 -kappa lambda neg -  TSH FT3 and FT4 neg  ESR < 10  Still very tired and increased sleeping  - ox 3 and not feeling better  Exam neg  Sees endocrine in some time  See me PRN      Visit 12/12/22  Still very tired chills- no wt loss - R face flushing on off old - no fever   Never had a stroke  Endocrinologist ordered U test and PTH  Exam neg and no large LN -    Get EBV HHV6  HHV7  blood PCR      I have personally reviewed the past medical history, past surgical history, medications, social history, and family history, and I haveupdated the database accordingly.   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    Lung nodule     Meniere disease     right ear    Meniere's disease, right ear 12/10/2019    Morbidly obese ([address] 7/23/2020    Neuropathy     Osteopenia 6/30/2014    Parkinson disease ([address] 7/23/2020    Postmenopausal state 12/10/2019    Postnasal drip 7/11/2020    Prediabetes     RAD (reactive airway disease) 6/30/2014    Seasonal asthma 7/11/2020    Thyroid nodule 3/1/2016    Tremor     r hand    Vitamin D deficiency 6/30/2014       Past Surgical  History:     Past Surgical History:   Procedure Laterality Date    CHOLECYSTECTOMY      COLONOSCOPY      ENDOSCOPY, COLON, DIAGNOSTIC      ERCP  03/21/2014    EXCISION / BIOPSY SKIN LESION OF TRUNK Left 11/08/2017    EXCISION MASS LEFT POSTERIOR SHOULDER, LEFT ANTERIOR ARM performed by Zora Dudley MD at [id number] (CERVIX STATUS UNKNOWN)  01/03/2007    PAIN MANAGEMENT PROCEDURE      SKIN BIOPSY Left 11/08/2017    Excision Mass Left Posterior Shoulder, Left Anterior Arm       Medications:     Current Outpatient Medications:     amitriptyline (ELAVIL) 10 MG tablet, TAKE ONE TABLET BY MOUTH DAILY AS NEEDED FOR SLEEP AND LEG PAIN FOR UP TO 30 DAYS, Disp: 90 tablet, Rfl: 2    levothyroxine (SYNTHROID) 75 MCG tablet, TAKE ONE TABLET BY MOUTH DAILY, Disp: 90 tablet, Rfl: 1    ondansetron (ZOFRAN-ODT) 4 MG disintegrating tablet, DISSOLVE ONE TABLET BY MOUTH EVERY 8 HOURS AS NEEDED, Disp: 20 tablet, Rfl: 3    metFORMIN (GLUCOPHAGE) 500 MG tablet, TAKE ONE TABLET BY MOUTH DAILY, Disp: 90 tablet, Rfl: 1    metoprolol succinate (TOPROL XL) 25 MG extended release tablet, Take 12.5 mg by mouth in the morning and at bedtime, Disp: , Rfl:     alclomethasone (ACLOVATE) 0.05 % cream, Apply topically 2 times daily Prn, Disp: , Rfl:     albuterol sulfate HFA (PROVENTIL HFA) 108 (90 Base) MCG/ACT inhaler, Inhale 2 puffs into the lungs every 6 hours as needed for Wheezing, Disp: 18 g, Rfl: 3    midodrine (PROAMATINE) 5 MG tablet, Take 1 tablet by mouth 2 times daily (with meals) (Patient taking differently: Take 2.5 mg by mouth 2 times daily (with meals)), Disp: 90 tablet, Rfl: 3    Evolocumab 140 MG/ML SOSY, Inject into the skin Next dose due 12/27/21, Disp: , Rfl:     famotidine (PEPCID) 20 MG tablet, as needed, Disp: , Rfl:     guaiFENesin (MUCINEX) 600 MG extended release tablet, Take 1 tablet by mouth 2 times daily (Patient taking differently: Take 600 mg by mouth 2 times daily as needed), Disp: 20 tablet, Rfl: 0    NONFORMULARY, Indications: Dietary fiber 1 tsp a day , Disp: , Rfl:     Cholecalciferol (VITAMIN D3) 2000 units CAPS, Take 1 capsule by mouth 2 times daily, Disp: 30 capsule, Rfl: 0    diltiazem (CARDIZEM CD) 120 MG extended release capsule, Take 1 capsule by mouth every evening, Disp: 30 capsule, Rfl: 3    fluticasone (FLONASE) 50 MCG/ACT nasal spray, PLACE 1 SPRAY IN EACH NOSTRIL DAILY FOR 15 DAYS, Disp: 1 Bottle, Rfl: 2    ketoconazole (NIZORAL) 2 % shampoo, APPLY  TO AFFECTED AREAS TOPICALLY DAILY THEN RINSE OFF AFTER 5 MINUTES, Disp: 120 mL, Rfl: 2    ezetimibe (ZETIA) 10 MG tablet, Take 10 mg by mouth daily, Disp: , Rfl:     esomeprazole Magnesium (NEXIUM) 40 MG PACK, Take 40 mg by mouth daily. , Disp: , Rfl:     NONFORMULARY, Super b complex 3 times per week, Disp: , Rfl:     ascorbic acid (VITAMIN C) 500 MG tablet, Take 500 mg by mouth daily. , Disp: , Rfl:     chlorpheniramine (CHLOR-TRIMETON) 4 MG tablet, Take 12 mg by mouth daily , Disp: , Rfl:       Social History:     Social History     Socioeconomic History    Marital status: Single     Spouse name: Not on file    Number of children: Not on file    Years of education: Not on file    Highest education level: Not on file   Occupational History    Not on file   Tobacco Use    Smoking status: Never    Smokeless tobacco: Never   Vaping Use    Vaping Use: Never used   Substance and Sexual Activity    Alcohol use: Yes     Comment: rare    Drug use: No    Sexual activity: Not on file   Other Topics Concern    Not on file   Social History Narrative    Not on file     Social Determinants of Health     Financial Resource Strain: Low Risk     Difficulty of Paying Living Expenses: Not hard at all   Food Insecurity: No Food Insecurity    Worried About Running Out of Food in the Last Year: Never true    [address] [place] [address] Place of Food in the Last Year: Never true   Transportation Needs: Not on file   Physical Activity: Insufficiently Active    Days of Exercise per Week: 4 days    Minutes of Exercise per Session: 30 min   Stress: Not on file   Social Connections: Not on file   Intimate Partner Violence: Not on file   Housing Stability: Not on file       Family History:     Family History   Problem Relation Age of Onset    Cancer Mother 48        pineal gland/throat    Cancer Father 64        colon    Cancer Brother         lung    Other Brother         pulmonary embolus    Diabetes Brother     Heart Disease Brother 58        mi    High Blood Pressure Brother     Parkinson's Disease Brother     Heart Disease Brother 79        stents for cad    High Blood Pressure Brother     Stroke Maternal Aunt     Cancer Maternal Uncle     Heart Disease Paternal Uncle 67        mi    Stroke Maternal Grandmother     Heart Disease Paternal Grandfather         myocarditis        Allergies:   Aspirin, Atorvastatin, Crestor [rosuvastatin], Livalo [pitavastatin], Seasonal, Simvastatin, Statins, and Welchol [colesevelam hcl]     Review of Systems:   Review of Systems   Constitutional:  Positive for chills and fatigue. Negative for activity change. HENT:  Negative for congestion. Eyes:  Negative for discharge. Respiratory:  Negative for apnea. Cardiovascular:  Negative for chest pain. Gastrointestinal:  Negative for abdominal distention. Endocrine: Negative for polydipsia. Genitourinary:  Negative for dysuria. Musculoskeletal:  Positive for back pain. Negative for arthralgias. Skin:  Negative for color change. Allergic/Immunologic: Negative for immunocompromised state. Neurological:  Positive for weakness. Negative for dizziness, light-headedness, numbness and headaches. Hematological:  Negative for adenopathy. Psychiatric/Behavioral:  Negative for agitation. Physical Examination :   Blood pressure 130/81, temperature 97.2 °F (36.2 °C), height 5' 5\" (1.651 m), weight 208 lb (94.3 kg), not currently breastfeeding. Physical Exam  Constitutional:       Appearance: Normal appearance. She is not ill-appearing. HENT:      Head: Normocephalic and atraumatic. Nose: Nose normal. No congestion. Mouth/Throat:      Mouth: Mucous membranes are moist.   Eyes:      General: No scleral icterus. Pupils: Pupils are equal, round, and reactive to light. Cardiovascular:      Rate and Rhythm: Normal rate and regular rhythm. Heart sounds: Normal heart sounds. No murmur heard. Pulmonary:      Effort: No respiratory distress. Breath sounds: Normal breath sounds. No wheezing. Abdominal:      General: There is no distension. Palpations: Abdomen is soft. Tenderness: There is no abdominal tenderness. Genitourinary:     Comments: No neves  Musculoskeletal:         General: No swelling, tenderness, deformity or signs of injury. Cervical back: Neck supple.  No rigidity. Skin:     Coloration: Skin is not jaundiced or pale. Findings: No bruising or erythema. Neurological:      Mental Status: She is alert. Cranial Nerves: No cranial nerve deficit. Motor: No weakness. Psychiatric:         Mood and Affect: Mood normal.         Thought Content:  Thought content normal.         Medical Decision Making:   I have independently reviewed/ordered the following labs:    CBCwith Differential:   Lab Results   Component Value Date/Time    WBC 5.7 08/08/2022 09:15 AM    WBC 8.9 12/22/2021 01:58 AM    HGB 14.4 08/08/2022 09:15 AM    HGB 13.5 12/22/2021 01:58 AM    HCT 44.7 08/08/2022 09:15 AM    HCT 41.2 12/22/2021 01:58 AM     08/08/2022 09:15 AM     12/22/2021 01:58 AM    LYMPHOPCT 35 08/08/2022 09:15 AM    LYMPHOPCT 8 12/21/2021 02:51 PM    LYMPHOPCT 30.9 03/15/2019 02:55 PM    LYMPHOPCT 26.5 10/22/2018 09:31 AM    MONOPCT 6 08/08/2022 09:15 AM    MONOPCT 2 12/21/2021 02:51 PM    EOSPCT 2.2 03/15/2019 02:55 PM    EOSPCT 2.0 10/22/2018 09:31 AM     BMP:  Lab Results   Component Value Date/Time     12/22/2021 01:58 AM     12/21/2021 02:51 PM    K 4.0 12/22/2021 01:58 AM    K 4.4 12/21/2021 02:51 PM     12/22/2021 01:58 AM     12/21/2021 02:51 PM    CO2 24 12/22/2021 01:58 AM    CO2 21 12/21/2021 02:51 PM    BUN 20 12/22/2021 01:58 AM    BUN 25 12/21/2021 02:51 PM    CREATININE 0.61 12/22/2021 01:58 AM    CREATININE 0.80 12/21/2021 02:51 PM    MG 2.0 12/22/2021 01:58 AM    MG 2.0 03/15/2019 02:55 PM     Hepatic Function Panel:   Lab Results   Component Value Date/Time    PROT 6.5 12/21/2021 02:51 PM    PROT 7.2 05/04/2021 09:30 AM    LABALBU 4.3 12/21/2021 02:51 PM    LABALBU 4.2 05/04/2021 09:30 AM    BILIDIR <0.08 12/21/2021 02:51 PM    BILIDIR <0.2 10/22/2018 09:31 AM    IBILI Can not be calculated 12/21/2021 02:51 PM    IBILI 0.18 02/16/2017 10:05 AM    BILITOT 0.20 12/21/2021 02:51 PM    BILITOT 0.51 05/04/2021 09:30 AM BILITOT NEGATIVE 10/02/2015 11:19 AM    ALKPHOS 103 12/21/2021 02:51 PM    ALKPHOS 82 05/04/2021 09:30 AM    ALT 17 08/08/2022 09:15 AM    ALT 19 01/19/2022 10:11 AM    AST 19 08/08/2022 09:15 AM    AST 18 01/19/2022 10:11 AM     No results found for: RPR  No results found for: HIV  No results found for: [facility]  Lab Results   Component Value Date/Time    MUCUS DISREGARD RESULTS. CLERICAL ERROR. 07/31/2020 11:11 AM    PH 6.0 10/02/2015 11:19 AM    RBC 4.69 08/08/2022 09:15 AM    RBC 4.18 03/15/2019 02:55 PM    TRICHOMONAS DISREGARD RESULTS. CLERICAL ERROR. 07/31/2020 11:11 AM    WBC 5.7 08/08/2022 09:15 AM    YEAST DISREGARD RESULTS. CLERICAL ERROR. 07/31/2020 11:11 AM    TURBIDITY Clear 12/21/2021 04:15 PM     Lab Results   Component Value Date/Time    CREATININE 0.61 12/22/2021 01:58 AM    GLUCOSE 139 12/22/2021 01:58 AM    GLUCOSE 106 08/29/2019 02:05 PM   you for allowing us to participate in the care of this patient. Please call with questions. Josefina Ulloa MD  - Office: [phone]    Please note that this chart was generated using voice recognition Dragon dictation software. Although every effort was made to ensure the accuracy of this automated transcription, some errors in transcription mayhave occurred.

## 2022-12-14 ENCOUNTER — HOSPITAL ENCOUNTER (OUTPATIENT)
Age: 82
Setting detail: SPECIMEN
Discharge: HOME | End: 2022-12-14

## 2022-12-14 DIAGNOSIS — B27.90 CHRONIC EBV INFECTION: ICD-10-CM

## 2022-12-14 DIAGNOSIS — R53.82 CHRONIC FATIGUE: ICD-10-CM

## 2022-12-15 LAB
CALCIUM URINE: 13.5 MG/DL
CALCIUM, URINE: 254 MG/24 H (ref 20–275)
CREATININE URINE: 55.7 MG/DL
CREATININE, 24H UR: 1048 MG/24 H (ref 740–1570)
HOURS COLLECTED: 24 H
HOURS COLLECTED: 24 H
VOLUME: 1881 ML
VOLUME: 1881 ML

## 2022-12-16 LAB — MYCOPLASMA PNEUMONIAE IGM: 0.99

## 2022-12-18 LAB
EBV, QUANT. COPY/ML: <390 CPY/ML
EBV, QUANT. INTERP: NOT DETECTED
EBV, QUANT. LOG: <2.6 LOG
EBV, QUANT. SOURCE: NORMAL

## 2022-12-19 ENCOUNTER — APPOINTMENT (OUTPATIENT)
Dept: GENERAL RADIOLOGY | Facility: CLINIC | Age: 82
End: 2022-12-19
Payer: MEDICARE

## 2022-12-19 ENCOUNTER — HOSPITAL ENCOUNTER (EMERGENCY)
Facility: CLINIC | Age: 82
Discharge: HOME | End: 2022-12-19
Attending: EMERGENCY MEDICINE
Payer: MEDICARE

## 2022-12-19 VITALS
TEMPERATURE: 97.8 F | WEIGHT: 208 LBS | SYSTOLIC BLOOD PRESSURE: 131 MMHG | OXYGEN SATURATION: 97 % | BODY MASS INDEX: 34.66 KG/M2 | HEART RATE: 69 BPM | DIASTOLIC BLOOD PRESSURE: 68 MMHG | HEIGHT: 65 IN | RESPIRATION RATE: 14 BRPM

## 2022-12-19 DIAGNOSIS — K59.00 CONSTIPATION, UNSPECIFIED CONSTIPATION TYPE: Primary | ICD-10-CM

## 2022-12-19 PROCEDURE — 6370000000 HC RX 637 (ALT 250 FOR IP): Performed by: EMERGENCY MEDICINE

## 2022-12-19 PROCEDURE — 74022 RADEX COMPL AQT ABD SERIES: CPT

## 2022-12-19 PROCEDURE — 99283 EMERGENCY DEPT VISIT LOW MDM: CPT

## 2022-12-19 RX ORDER — METOCLOPRAMIDE HYDROCHLORIDE 5 MG/5ML
5 SOLUTION ORAL ONCE
Status: COMPLETED | OUTPATIENT
Start: 2022-12-19 | End: 2022-12-19

## 2022-12-19 RX ORDER — POLYETHYLENE GLYCOL 3350 17 G/17G
17 POWDER, FOR SOLUTION ORAL ONCE
Status: DISCONTINUED | OUTPATIENT
Start: 2022-12-19 | End: 2022-12-19 | Stop reason: HOSPADM

## 2022-12-19 RX ORDER — POLYETHYLENE GLYCOL 3350 17 G/17G
17 POWDER, FOR SOLUTION ORAL 2 TIMES DAILY
Qty: 238 G | Refills: 0 | Status: SHIPPED | OUTPATIENT
Start: 2022-12-19 | End: 2022-12-26

## 2022-12-19 RX ADMIN — METOCLOPRAMIDE HYDROCHLORIDE 5 MG: 5 SOLUTION ORAL at 07:15

## 2022-12-19 ASSESSMENT — PAIN DESCRIPTION - PAIN TYPE: TYPE: ACUTE PAIN

## 2022-12-19 ASSESSMENT — PAIN DESCRIPTION - FREQUENCY: FREQUENCY: INTERMITTENT

## 2022-12-19 ASSESSMENT — PAIN SCALES - GENERAL: PAINLEVEL_OUTOF10: 3

## 2022-12-19 ASSESSMENT — PAIN - FUNCTIONAL ASSESSMENT: PAIN_FUNCTIONAL_ASSESSMENT: 0-10

## 2022-12-19 NOTE — ED NOTES
Pt walked to bathroom. Instructions provided on use of enema. Milk and molasses enema instilled. Call light within reach.      Douglas Lucio RN  12/19/22 [id number]

## 2022-12-19 NOTE — ED NOTES
Pt reports he bloating is feeling better and she is comfortable going home at this time. Instructions on how to use the miralax provided. She verbalized understanding.       Triny Vazquez RN  12/19/22 3030

## 2022-12-19 NOTE — DISCHARGE INSTRUCTIONS
As we discussed, you have constipation. You do have some improvement with a small bowel movement. Please take MiraLAX twice a day for the next 3 days. If after that time you have not had a normal bowel movement, you need to follow-up with your primary care physician. If you have worsening nausea vomiting or abdominal pain you should return. Eat foods high in fiber, or start using fiber supplements. PLEASE RETURN TO THE EMERGENCY DEPARTMENT IMMEDIATELY for worsening symptoms, inability to have a bowel movement or if you develop any concerning symptoms such as: high fever not relieved by acetaminophen (Tylenol) and/or ibuprofen (Motrin / Advil), chills, shortness of breath, chest pain, feeling of your heart fluttering or racing, persistent nausea and/or vomiting, vomiting up blood, blood in your stool, numbness, loss of consciousness, weakness or tingling in the arms or legs or change in color of the extremities, changes in mental status, persistent headache, blurry vision, loss of bladder / bowel control, unable to follow up with your physician, or other any other care or concern.

## 2022-12-19 NOTE — ED PROVIDER NOTES
[facility]  [facility]  Phone: [facility] ED  EMERGENCY DEPARTMENT ENCOUNTER      Pt Name: Sabrina Ortega  MRN: [id number]  [place] 1940  Date of evaluation: 12/19/2022  Provider: Matthew Guillen DO    CHIEF COMPLAINT       Chief Complaint   Patient presents with    Constipation         HISTORY OF PRESENT ILLNESS   (Location/Symptom, Timing/Onset,Context/Setting, Quality, Duration, Modifying Factors, Severity)  Note limiting factors. Sabrina Ortega is a [de-identified] y.o. female who presents to the emergency department with constipation. Patient's evaluation started by previous physician. Nursing Notes were reviewed. REVIEW OF SYSTEMS    (2-9systems for level 4, 10 or more for level 5)     Review of Systems    Except asnoted above the remainder of the review of systems was reviewed and negative.        PAST MEDICAL HISTORY     Past Medical History:   Diagnosis Date    Acid reflux     Anxiety 6/30/2014    ALICIA-7   09/09/19 : 4    Bilateral tinnitus 12/10/2019    Bronchitis, mucopurulent recurrent ([address] 7/23/2020    Diverticular disease 6/30/2014    Enthesopathy of hip region 12/10/2019    GERD (gastroesophageal reflux disease)     Hypercholesteremia 6/30/2014    Hypothyroid 6/30/2014    Laryngopharyngeal reflux 12/10/2019    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    Lung nodule     Meniere disease     right ear    Meniere's disease, right ear 12/10/2019    Morbidly obese ([address] 7/23/2020    Neuropathy     Osteopenia 6/30/2014    Parkinson disease ([address] 7/23/2020    Postmenopausal state 12/10/2019    Postnasal drip 7/11/2020    Prediabetes     RAD (reactive airway disease) 6/30/2014    Seasonal asthma 7/11/2020    Thyroid nodule 3/1/2016    Tremor     r hand    Vitamin D deficiency 6/30/2014         SURGICAL HISTORY       Past Surgical History:   Procedure Laterality Date    CHOLECYSTECTOMY      COLONOSCOPY      ENDOSCOPY, COLON, DIAGNOSTIC      ERCP  03/21/2014    EXCISION / BIOPSY SKIN LESION OF TRUNK Left 11/08/2017    EXCISION MASS LEFT POSTERIOR SHOULDER, LEFT ANTERIOR ARM performed by Rosalinda Balderas MD at [address] (CERVIX STATUS UNKNOWN)  01/03/2007    PAIN MANAGEMENT PROCEDURE      SKIN BIOPSY Left 11/08/2017    Excision Mass Left Posterior Shoulder, Left Anterior Arm         CURRENT MEDICATIONS     Previous Medications    ALBUTEROL SULFATE HFA (PROVENTIL HFA) 108 (90 BASE) MCG/ACT INHALER    Inhale 2 puffs into the lungs every 6 hours as needed for Wheezing    ALCLOMETHASONE (ACLOVATE) 0.05 % CREAM    Apply topically 2 times daily Prn    AMITRIPTYLINE (ELAVIL) 10 MG TABLET    TAKE ONE TABLET BY MOUTH DAILY AS NEEDED FOR SLEEP AND LEG PAIN FOR UP TO 30 DAYS    ASCORBIC ACID (VITAMIN C) 500 MG TABLET    Take 500 mg by mouth daily. CHLORPHENIRAMINE (CHLOR-TRIMETON) 4 MG TABLET    Take 12 mg by mouth daily     CHOLECALCIFEROL (VITAMIN D3) 2000 UNITS CAPS    Take 1 capsule by mouth 2 times daily    DILTIAZEM (CARDIZEM CD) 120 MG EXTENDED RELEASE CAPSULE    Take 1 capsule by mouth every evening    ESOMEPRAZOLE MAGNESIUM (NEXIUM) 40 MG PACK    Take 40 mg by mouth daily.     EVOLOCUMAB 140 MG/ML SOSY    Inject into the skin Next dose due 12/27/21    EZETIMIBE (ZETIA) 10 MG TABLET    Take 10 mg by mouth daily    FAMOTIDINE (PEPCID) 20 MG TABLET    as needed    FLUTICASONE (FLONASE) 50 MCG/ACT NASAL SPRAY    PLACE 1 SPRAY IN EACH NOSTRIL DAILY FOR 15 DAYS    GUAIFENESIN (MUCINEX) 600 MG EXTENDED RELEASE TABLET    Take 1 tablet by mouth 2 times daily    KETOCONAZOLE (NIZORAL) 2 % SHAMPOO    APPLY  TO AFFECTED AREAS TOPICALLY DAILY THEN RINSE OFF AFTER 5 MINUTES    LEVOTHYROXINE (SYNTHROID) 75 MCG TABLET    TAKE ONE TABLET BY MOUTH DAILY    METFORMIN (GLUCOPHAGE) 500 MG TABLET    TAKE ONE TABLET BY MOUTH DAILY    METOPROLOL SUCCINATE (TOPROL XL) 25 MG EXTENDED RELEASE TABLET    Take 12.5 mg by mouth in the morning and at bedtime    MIDODRINE (PROAMATINE) 5 MG TABLET    Take 1 tablet by mouth 2 times daily (with meals)    NONFORMULARY    Super b complex 3 times per week    NONFORMULARY    Indications: Dietary fiber 1 tsp a day     ONDANSETRON (ZOFRAN-ODT) 4 MG DISINTEGRATING TABLET    DISSOLVE ONE TABLET BY MOUTH EVERY 8 HOURS AS NEEDED       ALLERGIES     Aspirin, Atorvastatin, Crestor [rosuvastatin], Livalo [pitavastatin], Seasonal, Simvastatin, Statins, and Welchol [colesevelam hcl]    FAMILY HISTORY       Family History   Problem Relation Age of Onset    Cancer Mother 48        pineal gland/throat    Cancer Father 64        colon    Cancer Brother         lung    Other Brother         pulmonary embolus    Diabetes Brother     Heart Disease Brother 58        mi    High Blood Pressure Brother     Parkinson's Disease Brother     Heart Disease Brother 79        stents for cad    High Blood Pressure Brother     Stroke Maternal Aunt     Cancer Maternal Uncle     Heart Disease Paternal Uncle 67        mi    Stroke Maternal Grandmother     Heart Disease Paternal Grandfather         myocarditis          SOCIAL HISTORY       Social History     Socioeconomic History    Marital status: Single   Tobacco Use    Smoking status: Never    Smokeless tobacco: Never   Vaping Use    Vaping Use: Never used   Substance and Sexual Activity    Alcohol use: Yes     Comment: rare    Drug use: No     Social Determinants of Health     Financial Resource Strain: Low Risk     Difficulty of Paying Living Expenses: Not hard at all   Food Insecurity: No Food Insecurity    Worried About Running Out of Food in the Last Year: Never true    Ran Out of Food in the Last Year: Never true   Physical Activity: Insufficiently Active    Days of Exercise per Week: 4 days    Minutes of Exercise per Session: 30 min       SCREENINGS    Kathy Coma Scale  Eye Opening: Spontaneous  Best Verbal Response: Oriented  Best Motor Response: Obeys commands  Mckenna Coma Scale Score: 15        PHYSICAL EXAM    (up to 7 for level 4, 8 or more for level 5)     ED Triage Vitals   BP Temp Temp Source Heart Rate Resp SpO2 Height Weight   12/19/22 0646 12/19/22 0650 12/19/22 0646 12/19/22 0646 12/19/22 0646 12/19/22 0646 12/19/22 0646 12/19/22 0646   131/68 97.8 °F (36.6 °C) Oral 69 14 97 % 5' 5\" (1.651 m) 208 lb (94.3 kg)       Physical Exam    EMERGENCY DEPARTMENT COURSE and DIFFERENTIAL DIAGNOSIS/MDM:   Vitals:    Vitals:    12/19/22 0646 12/19/22 0650   BP: 131/68    Pulse: 69    Resp: 14    Temp:  97.8 °F (36.6 °C)   TempSrc: Oral Oral   SpO2: 97%    Weight: 94.3 kg (208 lb)    Height: 5' 5\" (1.651 m)        Care taken over from Dr. Roby Lancaster. Patient presents with constipation and nausea. Did have a bowel movement after using a Dulcolax suppository but is still concerned she is constipated. .  Awaiting acute abdominal series x-rays. Patient does have moderate constipation. Patient given enema. Will reevaluate after bowel movement. Patient had a moderate bowel movement does feel improved. Discussed using MiraLAX twice per day for the next several days. Patient to follow-up with PCP. DIAGNOSTIC RESULTS     LABS:  Labs Reviewed - No data to display    All other labs were within normal range or not returned as of this dictation. RADIOLOGY:  XR ACUTE ABD SERIES CHEST 1 VW   Final Result   Moderate colonic stool burden in the ascending colon. No evidence of bowel   obstruction. No evidence of acute cardiopulmonary disease. PROCEDURES:  Unless otherwise noted below, none       FINAL IMPRESSION      1.  Constipation, unspecified constipation type          DISPOSITION/PLAN   DISPOSITION        PATIENT REFERRED TO:  Maximo Velásquez MD  [address] [place] [address],  [address]  [phone]    Schedule an appointment as soon as possible for a visit in 2 days      [facility]  [address] [place] [address]  [phone]  Go to   If symptoms worsen      DISCHARGE MEDICATIONS:  New Prescriptions    No medications on file          (Please note that portions of this note were completed with a voice recognition program.  Efforts were made to edit the dictations but occasionally words are mis-transcribed.)    Kim Orellana DO  Emergency Physician      Tara Deras DO  12/19/22 5113

## 2022-12-19 NOTE — ED PROVIDER NOTES
[facility]  [address] [facility]  Phone: [phone]      Pt Name: Lisandra Omalley  [id number]  [place] 1940  Date of evaluation: 12/19/2022      CHIEF COMPLAINT     Constipation      HISTORY OF PRESENT ILLNESS   Lisandra Omalley is a [de-identified] y.o. female with history of chronic constipation who presents for evaluation of constipation, abdominal bloating, and nausea. The patient reports that she has a long history of constipation and has been told that she has gastroparesis in the past.  She was previously on Linzess but felt like this was not helpful so she stopped taking it. She has seen multiple GI doctors and she states that her current GI doctor does not have her on any medications for her constipation. The patient states that starting last night she developed gradual onset, constant, progressive, abdominal cramping and bloating. She feels like food is backing up into her esophagus. She has been drinking ginger ale with some improvement. She took a Zofran and a Dulcolax suppository at 4 AM without any significant improvement. She states that she was able to pass a few small jonathan of stool and is passing flatus but still feels that she is constipated. The patient states that whenever she is this constipated it typically takes 3 days to resolve and she states that \"I do not have time to wait that long\". She is requesting a enema and a x-ray of her abdomen to rule out bowel obstruction. She denies any history of bowel obstruction. She has not tried a enema at home. She does not try calling her PCP or GI doctor. The patient denies any other provoking or palliating factors. She denies fever, chills, headache, vision changes, neck pain, back pain, chest pain, shortness of breath, abdominal injury, urinary symptoms, hematochezia, melena, focal weakness, numbness, tingling, recent injury or illness.     REVIEW OF SYSTEMS     Positive: Abdominal pain, nausea, constipation  Ten point review of systems was reviewed and is negative unless otherwise noted in the HPI    [address]    has a past medical history of Acid reflux, Anxiety, Bilateral tinnitus, Bronchitis, mucopurulent recurrent ([address] Diverticular disease, Enthesopathy of hip region, GERD (gastroesophageal reflux disease), Hypercholesteremia, Hypothyroid, Laryngopharyngeal reflux, Lateral femoral cutaneous neuropathy, Lung nodule, Meniere disease, Meniere's disease, right ear, Morbidly obese ([address] Neuropathy, Osteopenia, Parkinson disease ([address] Postmenopausal state, Postnasal drip, Prediabetes, RAD (reactive airway disease), Seasonal asthma, Thyroid nodule, Tremor, and Vitamin D deficiency. SURGICAL HISTORY      has a past surgical history that includes Cholecystectomy; Endoscopy, colon, diagnostic; Colonoscopy; ERCP (03/21/2014); Hysterectomy (01/03/2007); skin biopsy (Left, 11/08/2017); EXCISION / BIOPSY SKIN LESION OF TRUNK (Left, 11/08/2017); and Pain management procedure. CURRENT MEDICATIONS       Previous Medications    ALBUTEROL SULFATE HFA (PROVENTIL HFA) 108 (90 BASE) MCG/ACT INHALER    Inhale 2 puffs into the lungs every 6 hours as needed for Wheezing    ALCLOMETHASONE (ACLOVATE) 0.05 % CREAM    Apply topically 2 times daily Prn    AMITRIPTYLINE (ELAVIL) 10 MG TABLET    TAKE ONE TABLET BY MOUTH DAILY AS NEEDED FOR SLEEP AND LEG PAIN FOR UP TO 30 DAYS    ASCORBIC ACID (VITAMIN C) 500 MG TABLET    Take 500 mg by mouth daily. CHLORPHENIRAMINE (CHLOR-TRIMETON) 4 MG TABLET    Take 12 mg by mouth daily     CHOLECALCIFEROL (VITAMIN D3) 2000 UNITS CAPS    Take 1 capsule by mouth 2 times daily    DILTIAZEM (CARDIZEM CD) 120 MG EXTENDED RELEASE CAPSULE    Take 1 capsule by mouth every evening    ESOMEPRAZOLE MAGNESIUM (NEXIUM) 40 MG PACK    Take 40 mg by mouth daily.     EVOLOCUMAB 140 MG/ML SOSY    Inject into the skin Next dose due 12/27/21    EZETIMIBE (ZETIA) 10 MG TABLET    Take 10 mg by mouth daily    FAMOTIDINE (PEPCID) 20 MG TABLET    as needed    FLUTICASONE (FLONASE) 50 MCG/ACT NASAL SPRAY    PLACE 1 SPRAY IN EACH NOSTRIL DAILY FOR 15 DAYS    GUAIFENESIN (MUCINEX) 600 MG EXTENDED RELEASE TABLET    Take 1 tablet by mouth 2 times daily    KETOCONAZOLE (NIZORAL) 2 % SHAMPOO    APPLY  TO AFFECTED AREAS TOPICALLY DAILY THEN RINSE OFF AFTER 5 MINUTES    LEVOTHYROXINE (SYNTHROID) 75 MCG TABLET    TAKE ONE TABLET BY MOUTH DAILY    METFORMIN (GLUCOPHAGE) 500 MG TABLET    TAKE ONE TABLET BY MOUTH DAILY    METOPROLOL SUCCINATE (TOPROL XL) 25 MG EXTENDED RELEASE TABLET    Take 12.5 mg by mouth in the morning and at bedtime    MIDODRINE (PROAMATINE) 5 MG TABLET    Take 1 tablet by mouth 2 times daily (with meals)    NONFORMULARY    Super b complex 3 times per week    NONFORMULARY    Indications: Dietary fiber 1 tsp a day     ONDANSETRON (ZOFRAN-ODT) 4 MG DISINTEGRATING TABLET    DISSOLVE ONE TABLET BY MOUTH EVERY 8 HOURS AS NEEDED       ALLERGIES     is allergic to aspirin, atorvastatin, crestor [rosuvastatin], livalo [pitavastatin], seasonal, simvastatin, statins, and welchol [colesevelam hcl]. FAMILY HISTORY     She indicated that her mother is . She indicated that her father is . She indicated that one of her four brothers is . She indicated that the status of her maternal grandmother is unknown. She indicated that her paternal grandfather is . She indicated that the status of her maternal aunt is unknown. She indicated that the status of her maternal uncle is unknown. She indicated that the status of her paternal uncle is unknown.     family history includes Cancer in her brother and maternal uncle;  Cancer (age of onset: 48) in her mother; Cancer (age of onset: 64) in her father; Diabetes in her brother; Heart Disease in her paternal grandfather; Heart Disease (age of onset: 58) in her brother; Heart Disease (age of onset: 79) in her brother; Heart Disease (age of onset: 67) in her paternal uncle; High Blood Pressure in her brother and brother; Other in her brother; Parkinson's Disease in her brother; Stroke in her maternal aunt and maternal grandmother. SOCIAL HISTORY      reports that she has never smoked. She has never used smokeless tobacco. She reports current alcohol use. She reports that she does not use drugs. PHYSICAL EXAM     INITIAL VITALS:  height is 5' 5\" (1.651 m) and weight is 94.3 kg (208 lb). Her oral temperature is 97.8 °F (36.6 °C). Her blood pressure is 131/68 and her pulse is 69. Her respiration is 14 and oxygen saturation is 97%. CONSTITUTIONAL: no apparent distress, well appearing  SKIN: warm, dry, no jaundice, hives or petechiae  EYES: clear conjunctiva, non-icteric sclera  HENT: normocephalic, atraumatic, moist mucus membranes  NECK: Nontender and supple with no nuchal rigidity, full range of motion  PULMONARY: clear to auscultation without wheezes, rhonchi, or rales, normal excursion, no accessory muscle use and no stridor  CARDIOVASCULAR: regular rate, rhythm. Strong radial pulses with intact distal perfusion. Capillary refill <2 seconds. GASTROINTESTINAL: soft, minimal periumbilical abdominal tenderness to palpation, no pain or McBurney's point, negative Anglin sign, no pulsatile mass, non-distended, no palpable masses, no rebound or guarding   GENITOURINARY: No costovertebral angle tenderness to palpation  MUSCULOSKELETAL: No midline spinal tenderness, step off or deformity. Extremities are otherwise nontender to palpation and nonerythematous. Compartments soft. No peripheral edema. NEUROLOGIC: alert and oriented x 3, GCS 15, normal mentation and speech.  Moves all extremities x 4 without motor or sensory deficit, gait is stable without ataxia  PSYCHIATRIC: normal mood and affect, thought process is clear and linear    DIAGNOSTIC RESULTS     EKG:  None    RADIOLOGY:   pending    LABS:  No results found for this visit on 12/19/22. EMERGENCY DEPARTMENT COURSE:        The patient was given the following medications:  Orders Placed This Encounter   Medications    polyethylene glycol (GLYCOLAX) packet 17 g    metoclopramide (REGLAN) 10 MG/10ML solution 5 mg        Vitals:    Vitals:    12/19/22 0646 12/19/22 0650   BP: 131/68    Pulse: 69    Resp: 14    Temp:  97.8 °F (36.6 °C)   TempSrc: Oral Oral   SpO2: 97%    Weight: 94.3 kg (208 lb)    Height: 5' 5\" (1.651 m)      -------------------------  BP: 131/68, Temp: 97.8 °F (36.6 °C), Heart Rate: 69, Resp: 14    CONSULTS:  None    CRITICAL CARE:   None    PROCEDURES:  None    DIAGNOSIS/ MDM:   Og Bowie is a [de-identified] y.o. female who presents with constipation. Vital signs are stable. She has chronic constipation and is not currently on any maintenance medications. The patient demanded a x-ray of her abdomen. I explained that I did not feel this was necessary because she is still passing flatus and her abdomen is soft. I have low suspicion for bowel obstruction. She was insistent. X-ray acute abdominal series was ordered and is pending. I ordered MiraLAX, Reglan, and a milk molasses enema. The patient was signed out to Dr. Cornelio Rivera. FINAL IMPRESSION      1.  Constipation, unspecified constipation type          DISPOSITION/PLAN   DISPOSITION          PATIENT REFERRED TO:  Ashley Hutson MD  [address] [place] [address], [address]  [phone]    Schedule an appointment as soon as possible for a visit in 2 days      [facility]  [address] [place] [address],[facility] [address] [phone]  Go to   If symptoms worsen    DISCHARGE MEDICATIONS:  New Prescriptions    No medications on file       (Please note that portions of this note were completed with a voice recognitionprogram.  Efforts were made to edit the dictations but occasionally words are mis-transcribed.)    Bobby Rondon DO, [address] [facility]  Emergency Physician Attending          Marvin Corley Isabelle,   12/19/22 7383

## 2022-12-19 NOTE — ED TRIAGE NOTES
Ambulatory to bed 12 c/o constipation relating that her system is backed up to her throat. States she had taken Ducolax at 0400 and had a bowel movement consisting of just very small bits. States she had a similar episode 3 months ago.

## 2022-12-19 NOTE — ED NOTES
Reports she is able to get some stool out. She will attempt to sit on the toilet and try to get more out.       Triny Vazquez RN  12/19/22 4229

## 2022-12-21 ENCOUNTER — PATIENT MESSAGE (OUTPATIENT)
Dept: FAMILY MEDICINE CLINIC | Age: 82
End: 2022-12-21

## 2022-12-21 NOTE — TELEPHONE ENCOUNTER
From: Fadi Felipe  To: Dr. Tanvir Gill  Sent: 12/21/2022 1:54 PM EST  Subject: Timely Assistance please    Dr. [address].  Could you please help me get something for continuing constipation? I went to the ER on Monday morning. Their enema helped a bit. I'm on my 5th dose of recommended 6 doses of miralax. Nothing is helping for a complete bowel movement. I called Dr. Lavern Colon's office. They generally take two days to get back with you. I do have irritable bowel syndrome according to Dr. Maddie Freeman. Up till an appointment (maybe in November) ago I did not have  frequent constipation difficulties. At one time (4-5 years ago) he gave me some trial Linzess pills. I didn't seem to need them at that time. If I need something from a pharmacy, I'm trying to avoid not being able to get to the pharmacy because of the weather and holidays. Please help me in anyway you think best. Dr. Renato Meraz phone # is [phone] if you need to consult with him. Thank you so very much.   Bre Solitario  [birth year]

## 2023-01-16 RX ORDER — LINACLOTIDE 145 UG/1
CAPSULE, GELATIN COATED ORAL
Qty: 30 CAPSULE | Refills: 0 | Status: SHIPPED | OUTPATIENT
Start: 2023-01-16

## 2023-01-16 NOTE — TELEPHONE ENCOUNTER
Jere Wayne is calling to request a refill on the following medication(s):    Last Visit Date (If Applicable):  68/0/6221    Next Visit Date:    5/8/2023    Medication Request:  Requested Prescriptions     Pending Prescriptions Disp Refills    LINZESS 145 MCG capsule [Pharmacy Med Name: Rachel Dura 145 MCG CAPSULE] 30 capsule 0     Sig: TAKE ONE CAPSULE BY MOUTH EVERY MORNING (BEFORE BREAKFAST)

## 2023-01-23 ENCOUNTER — OFFICE VISIT (OUTPATIENT)
Dept: INFECTIOUS DISEASES | Age: 83
End: 2023-01-23
Payer: MEDICARE

## 2023-01-23 VITALS
SYSTOLIC BLOOD PRESSURE: 121 MMHG | HEART RATE: 77 BPM | BODY MASS INDEX: 35.65 KG/M2 | WEIGHT: 214 LBS | TEMPERATURE: 97.2 F | HEIGHT: 65 IN | DIASTOLIC BLOOD PRESSURE: 79 MMHG

## 2023-01-23 DIAGNOSIS — R53.82 CHRONIC FATIGUE, UNSPECIFIED: Primary | ICD-10-CM

## 2023-01-23 PROCEDURE — G8427 DOCREV CUR MEDS BY ELIG CLIN: HCPCS | Performed by: INTERNAL MEDICINE

## 2023-01-23 PROCEDURE — G8399 PT W/DXA RESULTS DOCUMENT: HCPCS | Performed by: INTERNAL MEDICINE

## 2023-01-23 PROCEDURE — 99214 OFFICE O/P EST MOD 30 MIN: CPT | Performed by: INTERNAL MEDICINE

## 2023-01-23 PROCEDURE — G8484 FLU IMMUNIZE NO ADMIN: HCPCS | Performed by: INTERNAL MEDICINE

## 2023-01-23 PROCEDURE — 1036F TOBACCO NON-USER: CPT | Performed by: INTERNAL MEDICINE

## 2023-01-23 PROCEDURE — 1090F PRES/ABSN URINE INCON ASSESS: CPT | Performed by: INTERNAL MEDICINE

## 2023-01-23 PROCEDURE — G8417 CALC BMI ABV UP PARAM F/U: HCPCS | Performed by: INTERNAL MEDICINE

## 2023-01-23 PROCEDURE — 1123F ACP DISCUSS/DSCN MKR DOCD: CPT | Performed by: INTERNAL MEDICINE

## 2023-01-23 ASSESSMENT — ENCOUNTER SYMPTOMS
PHOTOPHOBIA: 0
APNEA: 0
EYE DISCHARGE: 0
ABDOMINAL DISTENTION: 0
COLOR CHANGE: 0
BACK PAIN: 1
EYE REDNESS: 0

## 2023-01-23 NOTE — PROGRESS NOTES
[facility] - Initial Consult Note  Today's Date: 1/23/2023    Impression :   Prediabetic on metformin and A1C <6  Hypothyroid - on pills   Meniere disease  Osteoarthritis of the spine - spinal pain shot to the back 7/2022 and 8/2022  Chronic fatigue worse since 12/2021  VPBs - metoprolol  Elevated EBV titers, looks like old possibly chronic illness,  B12 low and replaced, corrected  Weight stable, added lately a little  Thyroid nodule - past 3  biopsy neg - size stable -FU w endocrine  [facility] nun- also on B complex vitamin,  folate normal  hypercalcemia  Takes vitamin D   Hyper calcemia  CaIonised-  5.46 ( 2014) -1.35 ( 8/8/2022)  LA[facility]O 8/8/22 labs:  PTH I 36 NL- Phosphorus normal  Vitamin D 75 - 75   Lyme neg  - ESR 17 - beta 2 microglobin 2.5 -kappa lambda neg -  TSH FT3 and FT4 neg  ESR < 10  Recommendations     No clear cause for the chronic fatigue at this point - AND see me PRN       Diagnosis Orders   1. Chronic fatigue, unspecified            Return if symptoms worsen or fail to improve. History of Present Illness:   Apryl Amin is a [de-identified]y.o.-year-old  female who presents with   Chief Complaint   Patient presents with    Frequent Infections     Follow up      New pt - chronic fatigue and EBV -[birth year]  [facility]  [id number] [id number] [phone] she would have URTI flu like last too long, had seen pulm for that. Then recover. Dec 2021 sore throat and tested neg for covid, admitted to [facility] for syncope and stress test neg, later found w hypothyroidism and a thyroid nodule. Also very weak legs and legs at the time, palpitations, and DC d home and better slightly aT HOME. Has been fully vaccinated and boosted for covid. Saw neuro for the weak ,limbs, neuropathy in legs and arms are ok, but could not understand why she was weak to the limbs. MRI LS 5/2022 showed multi level osteo arthritis, sees pain management.     2 weeks ago started w fatigue and sore throat again and covid tested neg, lasted 4-5 days and Improved. When she has the fatigue, she becomes lot slower and needs a lot of rest, cant do her daily chores. When improves, she can function again but never back to normal.    No wt loss- no fever or sweats, but some chills. Appetite good. BEE and RF were neg. TSH normal but she was started on T4 for low thyroid  B12 475 normal, B6 normal  A1c neg    EBV VCA IGG >750, IGM <10, EA 66, NA 12 low, old disease not recovered and possibly active. Not sure if this is old w increased pushed titers from something else or real active.   No tx for that specifically but would like to get a LA[facility]CANO for :  Look for lyme, ESR and beta 2 microglobin      Has GERD- gastroperesis, prediabetic, A1c was 6 then dropped on metformin - hysterectomy and GB out, [place]    CT chest 12/2021 neg, no LN    Lyme titers,ESR, beta 2 microglobin, cbc diff, kappa lambda, vitam D and  calcium phosphorus an if abnormal, get then PTH  If all neg, then nothing to treat  Looking for lyme or for lymphoma  Keep the vitamin d 4000 iu daily, keep the B12, complex B   See in 2 weeks      Visit 8/24/22  hypercalcemia  Takes vitamin D   Hyper calcemia  CaIonised-  5.46 ( 2014) -1.35 ( 8/8/2022)  LAZCANO 8/8/22 labs:  PTH I 36 NL- Phosphorus normal  Vitamin D 75 - 75   Lyme neg  - ESR 17 - beta 2 microglobin 2.5 -kappa lambda neg -  TSH FT3 and FT4 neg  ESR < 10  Still very tired and increased sleeping  - ox 3 and not feeling better  Exam neg  Sees endocrine in some time  See me PRN      Visit 12/12/22  Still very tired chills- no wt loss - R face flushing on off old - no fever   Never had a stroke  Endocrinologist ordered U test and PTH  Exam neg and no large LN -    Get EBV HHV6  HHV7  blood PCR      Visit 1/23/23  EBV  neg PCR -HHV6  neg - HHV7  blood PCR  neg -mycoplasma IGM neg -   Vitamin D 71  PTH 41 normal  A1C neg 5.8  Lyme neg and EBV very high early and IGG virid AB    Still fatigued  No + test of a value  Wakes up at night to the bathroom only  Sleeps well at night otherwise, 10 hrs    Exam neg- right lower abd pain but not tender and no hernia      No clear cause for the chronic fatigue at this point - AND see me PRN            I have personally reviewed the past medical history, past surgical history, medications, social history, and family history, and I haveupdated the database accordingly.   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    Lung nodule     Meniere disease     right ear    Meniere's disease, right ear 12/10/2019    Morbidly obese ([address] 7/23/2020    Neuropathy     Osteopenia 6/30/2014    Parkinson disease ([address] 7/23/2020    Postmenopausal state 12/10/2019    Postnasal drip 7/11/2020    Prediabetes     RAD (reactive airway disease) 6/30/2014    Seasonal asthma 7/11/2020    Thyroid nodule 3/1/2016    Tremor     r hand    Vitamin D deficiency 6/30/2014       Past Surgical  History:     Past Surgical History:   Procedure Laterality Date    CHOLECYSTECTOMY      COLONOSCOPY      ENDOSCOPY, COLON, DIAGNOSTIC      ERCP  03/21/2014    EXCISION / BIOPSY SKIN LESION OF TRUNK Left 11/08/2017    EXCISION MASS LEFT POSTERIOR SHOULDER, LEFT ANTERIOR ARM performed by Cristino Zhu MD at [address] ([address])  01/03/2007    PAIN MANAGEMENT PROCEDURE      SKIN BIOPSY Left 11/08/2017    Excision Mass Left Posterior Shoulder, Left Anterior Arm       Medications:     Current Outpatient Medications:     LINZESS 145 MCG capsule, TAKE ONE CAPSULE BY MOUTH EVERY MORNING (BEFORE BREAKFAST), Disp: 30 capsule, Rfl: 0    amitriptyline (ELAVIL) 10 MG tablet, TAKE ONE TABLET BY MOUTH DAILY AS NEEDED FOR SLEEP AND LEG PAIN FOR UP TO 30 DAYS, Disp: 90 tablet, Rfl: 2    levothyroxine (SYNTHROID) 75 MCG tablet, TAKE ONE TABLET BY MOUTH DAILY, Disp: 90 tablet, Rfl: 1    ondansetron (ZOFRAN-ODT) 4 MG disintegrating tablet, DISSOLVE ONE TABLET BY MOUTH EVERY 8 HOURS AS NEEDED, Disp: 20 tablet, Rfl: 3    metFORMIN (GLUCOPHAGE) 500 MG tablet, TAKE ONE TABLET BY MOUTH DAILY, Disp: 90 tablet, Rfl: 1    metoprolol succinate (TOPROL XL) 25 MG extended release tablet, Take 12.5 mg by mouth in the morning and at bedtime, Disp: , Rfl:     alclomethasone (ACLOVATE) 0.05 % cream, Apply topically 2 times daily Prn, Disp: , Rfl:     albuterol sulfate HFA (PROVENTIL HFA) 108 (90 Base) MCG/ACT inhaler, Inhale 2 puffs into the lungs every 6 hours as needed for Wheezing, Disp: 18 g, Rfl: 3    midodrine (PROAMATINE) 5 MG tablet, Take 1 tablet by mouth 2 times daily (with meals) (Patient taking differently: Take 2.5 mg by mouth 2 times daily (with meals)), Disp: 90 tablet, Rfl: 3    Evolocumab 140 MG/ML SOSY, Inject into the skin Next dose due 12/27/21, Disp: , Rfl:     famotidine (PEPCID) 20 MG tablet, as needed, Disp: , Rfl:     guaiFENesin (MUCINEX) 600 MG extended release tablet, Take 1 tablet by mouth 2 times daily (Patient taking differently: Take 600 mg by mouth 2 times daily as needed), Disp: 20 tablet, Rfl: 0    NONFORMULARY, Indications: Dietary fiber 1 tsp a day , Disp: , Rfl:     Cholecalciferol (VITAMIN D3) 2000 units CAPS, Take 1 capsule by mouth 2 times daily, Disp: 30 capsule, Rfl: 0    diltiazem (CARDIZEM CD) 120 MG extended release capsule, Take 1 capsule by mouth every evening, Disp: 30 capsule, Rfl: 3    fluticasone (FLONASE) 50 MCG/ACT nasal spray, PLACE 1 SPRAY IN EACH NOSTRIL DAILY FOR 15 DAYS, Disp: 1 Bottle, Rfl: 2    ketoconazole (NIZORAL) 2 % shampoo, APPLY  TO AFFECTED AREAS TOPICALLY DAILY THEN RINSE OFF AFTER 5 MINUTES, Disp: 120 mL, Rfl: 2    ezetimibe (ZETIA) 10 MG tablet, Take 10 mg by mouth daily, Disp: , Rfl:     esomeprazole Magnesium (NEXIUM) 40 MG PACK, Take 40 mg by mouth daily. , Disp: , Rfl:     NONFORMULARY, Super b complex 3 times per week, Disp: , Rfl:     ascorbic acid (VITAMIN C) 500 MG tablet, Take 500 mg by mouth daily. , Disp: , Rfl:     chlorpheniramine (CHLOR-TRIMETON) 4 MG tablet, Take 12 mg by mouth daily , Disp: , Rfl:       Social History:     Social History     Socioeconomic History    Marital status: Single     Spouse name: Not on file    Number of children: Not on file    Years of education: Not on file    Highest education level: Not on file   Occupational History    Not on file   Tobacco Use    Smoking status: Never    Smokeless tobacco: Never   Vaping Use    Vaping Use: Never used   Substance and Sexual Activity    Alcohol use: Yes     Comment: rare    Drug use: No    Sexual activity: Not on file   Other Topics Concern    Not on file   Social History Narrative    Not on file     Social Determinants of Health     Financial Resource Strain: Low Risk     Difficulty of Paying Living Expenses: Not hard at all   Food Insecurity: No Food Insecurity    Worried About Running Out of Food in the Last Year: Never true    Ran Out of Food in the Last Year: Never true   Transportation Needs: Not on file   Physical Activity: Insufficiently Active    Days of Exercise per Week: 4 days    Minutes of Exercise per Session: 30 min   Stress: Not on file   Social Connections: Not on file   Intimate Partner Violence: Not on file   Housing Stability: Not on file       Family History:     Family History   Problem Relation Age of Onset    Cancer Mother 48        pineal gland/throat    Cancer Father 64        colon    Cancer Brother         lung    Other Brother         pulmonary embolus    Diabetes Brother     Heart Disease Brother 58        mi    High Blood Pressure Brother Parkinson's Disease Brother     Heart Disease Brother 79        stents for cad    High Blood Pressure Brother     Stroke Maternal Aunt     Cancer Maternal Uncle     Heart Disease Paternal Uncle 67        mi    Stroke Maternal Grandmother     Heart Disease Paternal Grandfather         myocarditis        Allergies:   Aspirin, Atorvastatin, Crestor [rosuvastatin], Livalo [pitavastatin], Seasonal, Simvastatin, Statins, and Welchol [colesevelam hcl]     Review of Systems:   Review of Systems   Constitutional:  Positive for chills and fatigue. Negative for activity change. HENT:  Negative for congestion. Eyes:  Negative for photophobia, discharge and redness. Respiratory:  Negative for apnea. Cardiovascular:  Negative for chest pain. Gastrointestinal:  Negative for abdominal distention. Endocrine: Negative for polydipsia. Genitourinary:  Negative for dysuria. Musculoskeletal:  Positive for back pain. Negative for arthralgias. Skin:  Negative for color change. Allergic/Immunologic: Negative for immunocompromised state. Neurological:  Positive for weakness. Negative for dizziness, light-headedness, numbness and headaches. Hematological:  Negative for adenopathy. Psychiatric/Behavioral:  Negative for agitation. Physical Examination :   Blood pressure 121/79, pulse 77, temperature 97.2 °F (36.2 °C), height 5' 5\" (1.651 m), weight 214 lb (97.1 kg), not currently breastfeeding. Physical Exam  Constitutional:       Appearance: Normal appearance. She is not ill-appearing. HENT:      Head: Normocephalic and atraumatic. Nose: Nose normal. No congestion. Mouth/Throat:      Mouth: Mucous membranes are moist.   Eyes:      General: No scleral icterus. Pupils: Pupils are equal, round, and reactive to light. Cardiovascular:      Rate and Rhythm: Normal rate and regular rhythm. Heart sounds: Normal heart sounds. No murmur heard. Pulmonary:      Effort: No respiratory distress. Breath sounds: Normal breath sounds. No wheezing. Abdominal:      General: There is no distension. Palpations: Abdomen is soft. Tenderness: There is no abdominal tenderness. Genitourinary:     Comments: No neves  Musculoskeletal:         General: No swelling, tenderness, deformity or signs of injury. Cervical back: Neck supple. No rigidity. Right lower leg: No edema. Left lower leg: No edema. Skin:     Coloration: Skin is not jaundiced or pale. Findings: No bruising or erythema. Neurological:      Mental Status: She is alert. Cranial Nerves: No cranial nerve deficit. Motor: No weakness. Psychiatric:         Mood and Affect: Mood normal.         Thought Content:  Thought content normal.         Medical Decision Making:   I have independently reviewed/ordered the following labs:    CBCwith Differential:   Lab Results   Component Value Date/Time    WBC 5.7 08/08/2022 09:15 AM    WBC 8.9 12/22/2021 01:58 AM    HGB 14.4 08/08/2022 09:15 AM    HGB 13.5 12/22/2021 01:58 AM    HCT 44.7 08/08/2022 09:15 AM    HCT 41.2 12/22/2021 01:58 AM     08/08/2022 09:15 AM     12/22/2021 01:58 AM    LYMPHOPCT 35 08/08/2022 09:15 AM    LYMPHOPCT 8 12/21/2021 02:51 PM    LYMPHOPCT 30.9 03/15/2019 02:55 PM    LYMPHOPCT 26.5 10/22/2018 09:31 AM    MONOPCT 6 08/08/2022 09:15 AM    MONOPCT 2 12/21/2021 02:51 PM    EOSPCT 2.2 03/15/2019 02:55 PM    EOSPCT 2.0 10/22/2018 09:31 AM     BMP:  Lab Results   Component Value Date/Time     12/12/2022 01:43 PM     12/22/2021 01:58 AM    K 5.1 12/12/2022 01:43 PM    K 4.0 12/22/2021 01:58 AM     12/12/2022 01:43 PM     12/22/2021 01:58 AM    CO2 25 12/12/2022 01:43 PM    CO2 24 12/22/2021 01:58 AM    BUN 22 12/12/2022 01:43 PM    BUN 20 12/22/2021 01:58 AM    CREATININE 0.97 12/12/2022 01:43 PM    CREATININE 0.61 12/22/2021 01:58 AM    MG 2.0 12/22/2021 01:58 AM    MG 2.0 03/15/2019 02:55 PM     Hepatic Function Panel:   Lab Results   Component Value Date/Time    PROT 6.8 12/12/2022 01:43 PM    PROT 6.5 12/21/2021 02:51 PM    LABALBU 4.1 12/12/2022 01:43 PM    LABALBU 4.3 12/21/2021 02:51 PM    BILIDIR <0.08 12/21/2021 02:51 PM    BILIDIR <0.2 10/22/2018 09:31 AM    IBILI Can not be calculated 12/21/2021 02:51 PM    IBILI 0.18 02/16/2017 10:05 AM    BILITOT 0.2 12/12/2022 01:43 PM    BILITOT 0.20 12/21/2021 02:51 PM    BILITOT NEGATIVE 10/02/2015 11:19 AM    ALKPHOS 86 12/12/2022 01:43 PM    ALKPHOS 103 12/21/2021 02:51 PM    ALT 21 12/12/2022 01:43 PM    ALT 17 08/08/2022 09:15 AM    AST 17 12/12/2022 01:43 PM    AST 19 08/08/2022 09:15 AM     No results found for: RPR  No results found for: HIV  No results found for: [facility]  Lab Results   Component Value Date/Time    MUCUS DISREGARD RESULTS. CLERICAL ERROR. 07/31/2020 11:11 AM    PH 6.0 10/02/2015 11:19 AM    RBC 4.69 08/08/2022 09:15 AM    RBC 4.18 03/15/2019 02:55 PM    TRICHOMONAS DISREGARD RESULTS. CLERICAL ERROR. 07/31/2020 11:11 AM    WBC 5.7 08/08/2022 09:15 AM    YEAST DISREGARD RESULTS. CLERICAL ERROR. 07/31/2020 11:11 AM    TURBIDITY Clear 12/21/2021 04:15 PM     Lab Results   Component Value Date/Time    CREATININE 0.97 12/12/2022 01:43 PM    GLUCOSE 106 12/12/2022 01:43 PM    GLUCOSE 106 08/29/2019 02:05 PM   you for allowing us to participate in the care of this patient. Please call with questions. Jayme Valdez MD  - Office: [phone]    Please note that this chart was generated using voice recognition Dragon dictation software. Although every effort was made to ensure the accuracy of this automated transcription, some errors in transcription mayhave occurred.

## 2023-01-30 ENCOUNTER — PATIENT MESSAGE (OUTPATIENT)
Dept: FAMILY MEDICINE CLINIC | Age: 83
End: 2023-01-30

## 2023-01-30 ENCOUNTER — APPOINTMENT (OUTPATIENT)
Dept: GENERAL RADIOLOGY | Age: 83
End: 2023-01-30
Payer: MEDICARE

## 2023-01-30 ENCOUNTER — TELEPHONE (OUTPATIENT)
Dept: FAMILY MEDICINE CLINIC | Age: 83
End: 2023-01-30

## 2023-01-30 ENCOUNTER — HOSPITAL ENCOUNTER (EMERGENCY)
Age: 83
Discharge: HOME | End: 2023-01-30
Attending: EMERGENCY MEDICINE
Payer: MEDICARE

## 2023-01-30 ENCOUNTER — APPOINTMENT (OUTPATIENT)
Dept: CT IMAGING | Age: 83
End: 2023-01-30
Payer: MEDICARE

## 2023-01-30 VITALS
SYSTOLIC BLOOD PRESSURE: 131 MMHG | HEART RATE: 72 BPM | TEMPERATURE: 98 F | DIASTOLIC BLOOD PRESSURE: 79 MMHG | RESPIRATION RATE: 16 BRPM | OXYGEN SATURATION: 96 %

## 2023-01-30 DIAGNOSIS — H81.10 BENIGN PAROXYSMAL POSITIONAL VERTIGO, UNSPECIFIED LATERALITY: Primary | ICD-10-CM

## 2023-01-30 DIAGNOSIS — R42 DIZZINESS: Primary | ICD-10-CM

## 2023-01-30 LAB
ABSOLUTE EOS #: 0.08 K/UL (ref 0–0.44)
ABSOLUTE IMMATURE GRANULOCYTE: 0.02 K/UL (ref 0–0.3)
ABSOLUTE LYMPH #: 1.8 K/UL (ref 1.1–3.7)
ABSOLUTE MONO #: 0.42 K/UL (ref 0.1–1.2)
AMORPHOUS: ABNORMAL
ANION GAP SERPL CALCULATED.3IONS-SCNC: 10 MMOL/L (ref 9–17)
BASOPHILS # BLD: 1 % (ref 0–2)
BASOPHILS ABSOLUTE: 0.07 K/UL (ref 0–0.2)
BILIRUBIN URINE: NEGATIVE
BUN BLDV-MCNC: 13 MG/DL (ref 8–23)
BUN/CREAT BLD: 19 (ref 9–20)
CALCIUM SERPL-MCNC: 10.1 MG/DL (ref 8.6–10.4)
CHLORIDE BLD-SCNC: 105 MMOL/L (ref 98–107)
CO2: 25 MMOL/L (ref 20–31)
COLOR: YELLOW
CREAT SERPL-MCNC: 0.7 MG/DL (ref 0.5–0.9)
EOSINOPHILS RELATIVE PERCENT: 1 % (ref 1–4)
EPITHELIAL CELLS UA: ABNORMAL /HPF (ref 0–5)
GFR SERPL CREATININE-BSD FRML MDRD: >60 ML/MIN/1.73M2
GLUCOSE BLD-MCNC: 99 MG/DL (ref 70–99)
GLUCOSE URINE: NEGATIVE
HCT VFR BLD CALC: 41.8 % (ref 36.3–47.1)
HEMOGLOBIN: 13.4 G/DL (ref 11.9–15.1)
IMMATURE GRANULOCYTES: 0 %
KETONES, URINE: NEGATIVE
LEUKOCYTE ESTERASE, URINE: NEGATIVE
LYMPHOCYTES # BLD: 23 % (ref 24–43)
MAGNESIUM: 2 MG/DL (ref 1.6–2.6)
MCH RBC QN AUTO: 31.5 PG (ref 25.2–33.5)
MCHC RBC AUTO-ENTMCNC: 32.1 G/DL (ref 28.4–34.8)
MCV RBC AUTO: 98.4 FL (ref 82.6–102.9)
MONOCYTES # BLD: 5 % (ref 3–12)
MYOGLOBIN: 36 NG/ML (ref 25–58)
NITRITE, URINE: NEGATIVE
NRBC AUTOMATED: 0 PER 100 WBC
PDW BLD-RTO: 12.1 % (ref 11.8–14.4)
PH UA: 5.5 (ref 5–8)
PLATELET # BLD: 276 K/UL (ref 138–453)
PMV BLD AUTO: 9.6 FL (ref 8.1–13.5)
POTASSIUM SERPL-SCNC: 4.4 MMOL/L (ref 3.7–5.3)
PROTEIN UA: NEGATIVE
RBC # BLD: 4.25 M/UL (ref 3.95–5.11)
RBC UA: ABNORMAL /HPF (ref 0–2)
REASON FOR REJECTION: NORMAL
REASON FOR REJECTION: NORMAL
SEG NEUTROPHILS: 70 % (ref 36–65)
SEGMENTED NEUTROPHILS ABSOLUTE COUNT: 5.33 K/UL (ref 1.5–8.1)
SODIUM BLD-SCNC: 140 MMOL/L (ref 135–144)
SPECIFIC GRAVITY UA: 1.01 (ref 1–1.03)
TROPONIN, HIGH SENSITIVITY: 10 NG/L (ref 0–14)
TURBIDITY: CLEAR
URINE HGB: NEGATIVE
UROBILINOGEN, URINE: NORMAL
WBC # BLD: 7.7 K/UL (ref 3.5–11.3)
WBC UA: ABNORMAL /HPF (ref 0–5)
ZZ NTE CLEAN UP: ORDERED TEST: NORMAL
ZZ NTE CLEAN UP: ORDERED TEST: NORMAL
ZZ NTE WITH NAME CLEAN UP: SPECIMEN SOURCE: NORMAL
ZZ NTE WITH NAME CLEAN UP: SPECIMEN SOURCE: NORMAL

## 2023-01-30 PROCEDURE — 99285 EMERGENCY DEPT VISIT HI MDM: CPT

## 2023-01-30 PROCEDURE — 93005 ELECTROCARDIOGRAM TRACING: CPT | Performed by: EMERGENCY MEDICINE

## 2023-01-30 PROCEDURE — 2580000003 HC RX 258: Performed by: NURSE PRACTITIONER

## 2023-01-30 PROCEDURE — 84484 ASSAY OF TROPONIN QUANT: CPT

## 2023-01-30 PROCEDURE — 36415 COLL VENOUS BLD VENIPUNCTURE: CPT

## 2023-01-30 PROCEDURE — 6370000000 HC RX 637 (ALT 250 FOR IP): Performed by: NURSE PRACTITIONER

## 2023-01-30 PROCEDURE — 81001 URINALYSIS AUTO W/SCOPE: CPT

## 2023-01-30 PROCEDURE — 70450 CT HEAD/BRAIN W/O DYE: CPT

## 2023-01-30 PROCEDURE — 85025 COMPLETE CBC W/AUTO DIFF WBC: CPT

## 2023-01-30 PROCEDURE — 71045 X-RAY EXAM CHEST 1 VIEW: CPT

## 2023-01-30 PROCEDURE — 83735 ASSAY OF MAGNESIUM: CPT

## 2023-01-30 PROCEDURE — 80048 BASIC METABOLIC PNL TOTAL CA: CPT

## 2023-01-30 PROCEDURE — 83874 ASSAY OF MYOGLOBIN: CPT

## 2023-01-30 RX ORDER — MECLIZINE HCL 12.5 MG/1
12.5 TABLET ORAL ONCE
Status: COMPLETED | OUTPATIENT
Start: 2023-01-30 | End: 2023-01-30

## 2023-01-30 RX ORDER — MECLIZINE HCL 12.5 MG/1
12.5 TABLET ORAL 3 TIMES DAILY PRN
Qty: 30 TABLET | Refills: 0 | Status: SHIPPED | OUTPATIENT
Start: 2023-01-30 | End: 2023-02-09

## 2023-01-30 RX ORDER — 0.9 % SODIUM CHLORIDE 0.9 %
500 INTRAVENOUS SOLUTION INTRAVENOUS ONCE
Status: COMPLETED | OUTPATIENT
Start: 2023-01-30 | End: 2023-01-30

## 2023-01-30 RX ADMIN — MECLIZINE 12.5 MG: 12.5 TABLET ORAL at 17:08

## 2023-01-30 RX ADMIN — SODIUM CHLORIDE 500 ML: 9 INJECTION, SOLUTION INTRAVENOUS at 15:13

## 2023-01-30 ASSESSMENT — ENCOUNTER SYMPTOMS
DIARRHEA: 0
EYE PAIN: 0
VOMITING: 0
NAUSEA: 0
SHORTNESS OF BREATH: 0
COUGH: 0
SORE THROAT: 0
ABDOMINAL PAIN: 0
BACK PAIN: 0
PHOTOPHOBIA: 0
COLOR CHANGE: 0

## 2023-01-30 ASSESSMENT — PAIN - FUNCTIONAL ASSESSMENT: PAIN_FUNCTIONAL_ASSESSMENT: NONE - DENIES PAIN

## 2023-01-30 NOTE — ED PROVIDER NOTES
eMERGENCY dEPARTMENT eNCOUnter   Independent Attestation     Pt Name: Melanie Ponce  MRN: [id number]  [place] 1940  Date of evaluation: 1/30/23     Melanie Ponce is a [de-identified] y.o. female with CC: Dizziness (Onset last Sat hx of vertigo, took ativan Sat/Sun with some relief, vertigo worse today along with being clammy )        This visit was performed by both a physician and an APC. I performed all aspects of the MDM as documented.       The care is provided during an unprecedented national emergency due to the novel coronavirus, Bina Eastman MD  Attending Emergency Physician           Ned Contreras MD  01/30/23 [id number]

## 2023-01-30 NOTE — TELEPHONE ENCOUNTER
From: Alicia Reid  To: Dr. Arianne Perez  Sent: 1/30/2023 9:33 AM EST  Subject: Script please for physical therapy--epley maneuver    Dr. GOODE,  Can you please write an order for physical therapy and the epley manuever? My dizziness started on Saturday, took some medication then and yesterday. It is somewhat better. However what helps me the most is the epley manuever. I have BPPV.   Can you please fax the order to [phone]? That is the [facility] on [address].  Thank you so very much!  Alicia Reid  [phone]  [birth year]

## 2023-01-30 NOTE — ED PROVIDER NOTES
[facility] ED  eMERGENCY dEPARTMENT eNCOUnter      Pt Name: Srinath Dodd  MRN: [id number]  [place] 1940  Date of evaluation: 1/30/2023  Provider: VINNIE Vargas [id number]       Chief Complaint   Patient presents with    Dizziness     Onset last Sat hx of vertigo, took ativan Sat/Sun with some relief, vertigo worse today along with being clammy          HISTORY OF PRESENT ILLNESS  (Location/Symptom, Timing/Onset, Context/Setting, Quality, Duration, Modifying Factors, Severity.)   Srinath Dodd is a [de-identified] y.o. female who presents to the emergency department. C/o dizziness. Reports intermittent episodes for the past few days. Reports a spinning sensation. She has a hx of vertigo. She has been to physical therapy for this in the past. She came in to the ED today due to having a headache which is unusual for her. Denies fever, chills, injury, vision changes, weakness. Denies CP, SOB, cough. Denies emesis, diarrhea, abd pain, back pain, urinary sx. Rates her pain 0/10 at this time. Nursing Notes were reviewed.     ALLERGIES     Aspirin, Atorvastatin, Crestor [rosuvastatin], Livalo [pitavastatin], Seasonal, Simvastatin, Statins, and Welchol [colesevelam hcl]    CURRENT MEDICATIONS       Discharge Medication List as of 1/30/2023  6:13 PM        CONTINUE these medications which have NOT CHANGED    Details   LINZESS 145 MCG capsule TAKE ONE CAPSULE BY MOUTH EVERY MORNING (BEFORE BREAKFAST), Disp-30 capsule, R-0Normal      amitriptyline (ELAVIL) 10 MG tablet TAKE ONE TABLET BY MOUTH DAILY AS NEEDED FOR SLEEP AND LEG PAIN FOR UP TO 30 DAYS, Disp-90 tablet, R-2Normal      levothyroxine (SYNTHROID) 75 MCG tablet TAKE ONE TABLET BY MOUTH DAILY, Disp-90 tablet, R-1Normal      ondansetron (ZOFRAN-ODT) 4 MG disintegrating tablet DISSOLVE ONE TABLET BY MOUTH EVERY 8 HOURS AS NEEDED, Disp-20 tablet, R-3Normal      metFORMIN (GLUCOPHAGE) 500 MG tablet TAKE ONE TABLET BY MOUTH DAILY, Disp-90 tablet, R-1Normal      metoprolol succinate (TOPROL XL) 25 MG extended release tablet Take 12.5 mg by mouth in the morning and at bedtimeHistorical Med      alclomethasone (ACLOVATE) 0.05 % cream Apply topically 2 times daily Prn, Topical, 2 TIMES DAILY, Historical Med      albuterol sulfate HFA (PROVENTIL HFA) 108 (90 Base) MCG/ACT inhaler Inhale 2 puffs into the lungs every 6 hours as needed for Wheezing, Disp-18 g, R-3Normal      midodrine (PROAMATINE) 5 MG tablet Take 1 tablet by mouth 2 times daily (with meals), Disp-90 tablet, R-3Normal      Evolocumab 140 MG/ML SOSY Inject into the skin Next dose due 12/27/21Historical Med      famotidine (PEPCID) 20 MG tablet as neededHistorical Med      guaiFENesin (MUCINEX) 600 MG extended release tablet Take 1 tablet by mouth 2 times daily, Disp-20 tablet, R-0Print      !! NONFORMULARY Indications: Dietary fiber 1 tsp a day Historical Med      Cholecalciferol (VITAMIN D3) 2000 units CAPS Take 1 capsule by mouth 2 times daily, Disp-30 capsule, R-0Adjust Sig      diltiazem (CARDIZEM CD) 120 MG extended release capsule Take 1 capsule by mouth every evening, Disp-30 capsule, R-3Adjust Sig      fluticasone (FLONASE) 50 MCG/ACT nasal spray PLACE 1 SPRAY IN EACH NOSTRIL DAILY FOR 15 DAYS, Disp-1 Bottle, R-2Normal      ketoconazole (NIZORAL) 2 % shampoo APPLY  TO AFFECTED AREAS TOPICALLY DAILY THEN RINSE OFF AFTER 5 MINUTES, Disp-120 mL, R-2, Normal      ezetimibe (ZETIA) 10 MG tablet Take 10 mg by mouth dailyHistorical Med      esomeprazole Magnesium (NEXIUM) 40 MG PACK Take 40 mg by mouth daily. Historical Med      !! NONFORMULARY Super b complex 3 times per weekHistorical Med      ascorbic acid (VITAMIN C) 500 MG tablet Take 500 mg by mouth daily. Historical Med      chlorpheniramine (CHLOR-TRIMETON) 4 MG tablet Take 12 mg by mouth daily Historical Med       !! - Potential duplicate medications found. Please discuss with provider.           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     Meniere disease     right ear    Meniere's disease, right ear 12/10/2019    Morbidly obese ([address] 07/23/2020    Neuropathy     Osteopenia 06/30/2014    Parkinson disease ([address] 07/23/2020    Postmenopausal state 12/10/2019    Postnasal drip 07/11/2020    Prediabetes     RAD (reactive airway disease) 06/30/2014    Thyroid nodule 03/01/2016    Tremor     r hand    Vitamin D deficiency 06/30/2014       SURGICAL HISTORY           Procedure Laterality Date    CHOLECYSTECTOMY      COLONOSCOPY      ENDOSCOPY, COLON, DIAGNOSTIC      ERCP  03/21/2014    EXCISION / BIOPSY SKIN LESION OF TRUNK Left 11/08/2017    EXCISION MASS LEFT POSTERIOR SHOULDER, LEFT ANTERIOR ARM performed by Claire Kelley MD at [address] (CERVIX STATUS UNKNOWN)  01/03/2007    PAIN MANAGEMENT PROCEDURE      SKIN BIOPSY Left 11/08/2017    Excision Mass Left Posterior Shoulder, Left Anterior Arm         FAMILY HISTORY           Problem Relation Age of Onset    Cancer Mother 48        pineal gland/throat    Cancer Father 64        colon    Cancer Brother         lung    Other Brother         pulmonary embolus    Diabetes Brother     Heart Disease Brother 58        mi    High Blood Pressure Brother     Parkinson's Disease Brother     Heart Disease Brother 79        stents for cad    High Blood Pressure Brother     Stroke Maternal Aunt     Cancer Maternal Uncle     Heart Disease Paternal Uncle 67        mi    Stroke Maternal Grandmother     Heart Disease Paternal Grandfather myocarditis     Family Status   Relation Name Status    Mother      Father      Brother      Brother  (Not Specified)    Brother  (Not Specified)    Brother  (Not Specified)    MAunt  (Not Specified)    MUnc  (Not Specified)    PUnc  (Not Specified)    MGM  (Not Specified)    PGF          SOCIAL HISTORY      reports that she has never smoked. She has never used smokeless tobacco. She reports current alcohol use. She reports that she does not use drugs. REVIEW OF SYSTEMS    (2-9 systems for level 4, 10 or more for level 5)     Review of Systems   Constitutional:  Positive for diaphoresis. Negative for chills, fatigue and fever. HENT:  Negative for congestion and sore throat. Eyes:  Negative for photophobia, pain and visual disturbance. Respiratory:  Negative for cough and shortness of breath. Cardiovascular:  Negative for chest pain. Gastrointestinal:  Negative for abdominal pain, diarrhea, nausea and vomiting. Genitourinary:  Negative for difficulty urinating, dysuria, frequency, hematuria and urgency. Musculoskeletal:  Negative for arthralgias, back pain, myalgias and neck pain. Skin:  Negative for color change, rash and wound. Neurological:  Positive for dizziness and headaches. Negative for syncope, facial asymmetry, speech difficulty, weakness, light-headedness and numbness. Psychiatric/Behavioral:  Negative for confusion. Except as noted above the remainder of the review of systems was reviewed and negative. PHYSICAL EXAM    (up to 7 for level 4, 8 or more for level 5)     ED Triage Vitals [23 1340]   BP Temp Temp Source Heart Rate Resp SpO2 Height Weight   (!) 154/77 98 °F (36.7 °C) Oral 76 16 96 % -- --     Physical Exam  Vitals reviewed. Constitutional:       General: She is not in acute distress. Appearance: She is well-developed. She is not diaphoretic.    HENT:      Right Ear: External ear normal.      Left Ear: External ear normal.      Nose: Nose normal.      Mouth/Throat:      Mouth: Mucous membranes are moist.      Pharynx: Oropharynx is clear. Eyes:      General: No scleral icterus. Extraocular Movements: Extraocular movements intact. Conjunctiva/sclera: Conjunctivae normal.      Pupils: Pupils are equal, round, and reactive to light. Cardiovascular:      Rate and Rhythm: Normal rate and regular rhythm. Pulses: Normal pulses. Pulmonary:      Effort: Pulmonary effort is normal. No respiratory distress. Breath sounds: Normal breath sounds. No stridor. No wheezing or rales. Abdominal:      General: Bowel sounds are normal. There is no distension. Palpations: Abdomen is soft. Tenderness: There is no abdominal tenderness. There is no guarding. Musculoskeletal:      Cervical back: Neck supple. No rigidity. Comments: Moves extremities. Skin:     General: Skin is warm and dry. Capillary Refill: Capillary refill takes less than 2 seconds. Findings: No rash. Neurological:      General: No focal deficit present. Mental Status: She is alert and oriented to person, place, and time. GCS: GCS eye subscore is 4. GCS verbal subscore is 5. GCS motor subscore is 6. Cranial Nerves: Cranial nerves 2-12 are intact. Motor: Motor function is intact. Coordination: Coordination is intact.    Psychiatric:         Behavior: Behavior normal.        DIAGNOSTIC RESULTS     RADIOLOGY:   Non-plain film images such as CT, Ultrasound and MRI are read by the radiologist. Plain radiographic images are visualized and preliminarily interpreted by the emergency physician with the below findings:    Interpretation per the Radiologist below, if available at the time of this note:    CT HEAD WO CONTRAST    Result Date: 1/30/2023  EXAMINATION: CT OF THE HEAD WITHOUT CONTRAST  1/30/2023 3:22 pm TECHNIQUE: CT of the head was performed without the administration of intravenous contrast. Automated exposure control, iterative reconstruction, and/or weight based adjustment of the mA/kV was utilized to reduce the radiation dose to as low as reasonably achievable. COMPARISON: None. HISTORY: ORDERING SYSTEM PROVIDED HISTORY: dizziness, HA TECHNOLOGIST PROVIDED HISTORY: dizziness, HA Decision Support Exception - unselect if not a suspected or confirmed emergency medical condition->Emergency Medical Condition (MA) Reason for Exam: Dizziness and headache for 3 days FINDINGS: BRAIN/VENTRICLES: There is no acute intracranial hemorrhage, mass effect or midline shift. No abnormal extra-axial fluid collection. Cortical atrophy and chronic white matter changes in the brain and associated ventricular enlargement are again demonstrated. ORBITS: The visualized portion of the orbits demonstrate no acute abnormality. SINUSES: The visualized paranasal sinuses and mastoid air cells demonstrate no acute abnormality. SOFT TISSUES/SKULL:  No acute abnormality of the visualized skull or soft tissues. Chronic findings in the brain without acute CT abnormality identified. XR CHEST PORTABLE    Result Date: 1/30/2023  EXAMINATION: ONE XRAY VIEW OF THE CHEST 1/30/2023 2:44 pm COMPARISON: 12/19/2022 HISTORY: ORDERING SYSTEM PROVIDED HISTORY: weakness TECHNOLOGIST PROVIDED HISTORY: weakness Reason for Exam: dizziness FINDINGS: Heart size is within normal limits. No evidence of vascular congestion. There is mild bibasilar atelectasis. No focal airspace consolidation, pneumothorax, or pleural effusion. No free air beneath the diaphragm. No evidence of acute osseous abnormality. Mild bibasilar atelectasis. No evidence of acute process.          LABS:  Labs Reviewed   CBC WITH AUTO DIFFERENTIAL - Abnormal; Notable for the following components:       Result Value    Seg Neutrophils 70 (*)     Lymphocytes 23 (*)     All other components within normal limits   URINALYSIS WITH MICROSCOPIC - Abnormal; Notable for the following components:    Amorphous, UA 2+ (*)     All other components within normal limits   BASIC METABOLIC PANEL   MAGNESIUM   TROP/MYOGLOBIN   SPECIMEN REJECTION   SPECIMEN REJECTION       All other labs were within normal range or not returned as of this dictation. EMERGENCY DEPARTMENT COURSE and DIFFERENTIAL DIAGNOSIS/MDM:   Vitals:    Vitals:    01/30/23 1340 01/30/23 1508 01/30/23 1615   BP: (!) 154/77  131/79   Pulse: 76 74 72   Resp: 16 23 16   Temp: 98 °F (36.7 °C)     TempSrc: Oral     SpO2: 96% 95% 96%         MEDICATIONS GIVEN IN THE ED:  Medications   0.9 % sodium chloride bolus (0 mLs IntraVENous Stopped 1/30/23 1614)   meclizine (ANTIVERT) tablet 12.5 mg (12.5 mg Oral Given 1/30/23 1708)       CLINICAL DECISION MAKING:  The patient presented alert with a nontoxic appearance and was seen in conjunction with Dr. Christin Gomez. DDx include Vertigo, dizziness, HA. I will order labs including CBC, BMP, magnesium, urinalysis, troponin, myoglobin. A CT scan of her head and x-ray of her chest will be completed. The patient will be monitored closely. The patient was involved in her plan of care through shared decision making. The testing that was ordered was discussed with the patient. Any medications that may have been ordered were discussed with the patient. I have reviewed the patient's previous medical records using the electronic health record that we have available that were pertinent to today's visit. Labs and imaging were reviewed. Laboratory studies were unremarkable. Imaging was reviewed and reported by the radiologist. Results showed no acute findings; see above for reports. The patient reported improvement with the IV fluids and Antivert here. Findings were discussed with the patient. She felt comfortable being discharged home. She is waiting for a physical therapy referral from her pcp. Follow up with pcp for a recheck, further evaluation and treatment.  A prescription was provided for Antivert. Evaluation and treatment course in the ED, and plan of care upon discharge was discussed in length with the patient. Patient had no further questions prior to being discharged and was instructed to return to the ED for new or worsening symptoms. Care was provided during an unprecedented national emergency due to the novel coronavirus, Covid-19. FINAL IMPRESSION      1.  Dizziness            Problem List  Patient Active Problem List   Diagnosis Code    Hypothyroid E03.9    Hypercholesteremia E78.00    Anxiety F41.9    Vitamin D deficiency E55.9    RAD (reactive airway disease) J45.909    Diverticular disease K57.90    Tremor R25.1    Gastroparesis K31.84    Lung nodule < 6cm on CT [id number]    Lateral femoral cutaneous neuropathy G57.10    Thyroid nodule E04.1    Prediabetes R73.03    Bilateral tinnitus H93.13    Enthesopathy of hip region M76.899    Laryngopharyngeal reflux K21.9    Meniere's disease, right ear H81.01    Postmenopausal state Z78.0    Seasonal asthma J45.998    Postnasal drip R09.82    Parkinson disease ([address] G20    Bronchitis, mucopurulent recurrent ([address] J41.1    Morbidly obese (HCC) E66.01    Syncope and collapse R55    Syncope R55    Orthostatic syncope I95.1    DDD (degenerative disc disease), lumbar M51.36    Vertebrogenic low back pain M54.51    Lumbosacral spondylosis without myelopathy M47.817    Chronic systolic (congestive) heart failure I50.22         DISPOSITION/PLAN   DISPOSITION Decision To Discharge 01/30/2023 05:02:48 PM      PATIENT REFERRED TO:   Gutierrez Iraheta MD  [address] [place] [address]  [phone]    Schedule an appointment as soon as possible for a visit       [facility] ED  [address] [facility]  [phone]    If symptoms worsen, As needed    DISCHARGE MEDICATIONS:     Discharge Medication List as of 1/30/2023  6:13 PM        START taking these medications    Details   meclizine (ANTIVERT) 12.5 MG tablet Take 1 tablet by mouth 3 times daily as needed for Dizziness, Disp-30 tablet, R-0Normal                 (Please note that portions of this note were completed with a voice recognition program.  Efforts were made to edit the dictations but occasionally words are mis-transcribed.)    Panda Carrillo, [address] [facility], VINNIE - CNP  01/31/23 4661

## 2023-01-31 LAB
EKG ATRIAL RATE: 70 BPM
EKG P AXIS: 74 DEGREES
EKG P-R INTERVAL: 162 MS
EKG Q-T INTERVAL: 404 MS
EKG QRS DURATION: 84 MS
EKG QTC CALCULATION (BAZETT): 436 MS
EKG R AXIS: -18 DEGREES
EKG T AXIS: 46 DEGREES
EKG VENTRICULAR RATE: 70 BPM

## 2023-02-15 DIAGNOSIS — M51.36 DDD (DEGENERATIVE DISC DISEASE), LUMBAR: Primary | ICD-10-CM

## 2023-02-15 DIAGNOSIS — E55.9 VITAMIN D DEFICIENCY: ICD-10-CM

## 2023-02-23 ENCOUNTER — PATIENT MESSAGE (OUTPATIENT)
Dept: FAMILY MEDICINE CLINIC | Age: 83
End: 2023-02-23

## 2023-02-23 RX ORDER — LINACLOTIDE 145 UG/1
CAPSULE, GELATIN COATED ORAL
Qty: 30 CAPSULE | Refills: 0 | OUTPATIENT
Start: 2023-02-23

## 2023-02-24 NOTE — TELEPHONE ENCOUNTER
From: Melanie Ponce  To: Dr. Gutierrez Iraheta  Sent: 2/23/2023 9:07 AM EST  Subject: Prescription refill    Dr. Omar Grossman,  Please know that I requested a refill for Linzess through this system. The reason I'm requesting the refill of you is that I had to cancel my appt with Dr. Noy Tavera twice; once for bad weather and the other more recent time was because of Vertigo. I see Dr. Noy Tavera on March 24. Many thanks for your assistance.   Arianna Cameron  [birth year]

## 2023-03-08 ENCOUNTER — HOSPITAL ENCOUNTER (OUTPATIENT)
Dept: VASCULAR LAB | Age: 83
Discharge: HOME | End: 2023-03-08
Payer: MEDICARE

## 2023-03-08 DIAGNOSIS — I65.23 BILATERAL CAROTID ARTERY STENOSIS: ICD-10-CM

## 2023-03-08 PROCEDURE — 93880 EXTRACRANIAL BILAT STUDY: CPT

## 2023-03-20 ENCOUNTER — OFFICE VISIT (OUTPATIENT)
Dept: PAIN MANAGEMENT | Age: 83
End: 2023-03-20
Payer: MEDICARE

## 2023-03-20 DIAGNOSIS — M47.817 LUMBOSACRAL SPONDYLOSIS WITHOUT MYELOPATHY: Primary | ICD-10-CM

## 2023-03-20 DIAGNOSIS — M54.51 VERTEBROGENIC LOW BACK PAIN: ICD-10-CM

## 2023-03-20 DIAGNOSIS — M51.36 DDD (DEGENERATIVE DISC DISEASE), LUMBAR: ICD-10-CM

## 2023-03-20 PROCEDURE — 99213 OFFICE O/P EST LOW 20 MIN: CPT | Performed by: NURSE PRACTITIONER

## 2023-03-20 PROCEDURE — 1036F TOBACCO NON-USER: CPT | Performed by: NURSE PRACTITIONER

## 2023-03-20 PROCEDURE — 1090F PRES/ABSN URINE INCON ASSESS: CPT | Performed by: NURSE PRACTITIONER

## 2023-03-20 PROCEDURE — G8484 FLU IMMUNIZE NO ADMIN: HCPCS | Performed by: NURSE PRACTITIONER

## 2023-03-20 PROCEDURE — G8417 CALC BMI ABV UP PARAM F/U: HCPCS | Performed by: NURSE PRACTITIONER

## 2023-03-20 PROCEDURE — G8427 DOCREV CUR MEDS BY ELIG CLIN: HCPCS | Performed by: NURSE PRACTITIONER

## 2023-03-20 PROCEDURE — G8399 PT W/DXA RESULTS DOCUMENT: HCPCS | Performed by: NURSE PRACTITIONER

## 2023-03-20 PROCEDURE — 1123F ACP DISCUSS/DSCN MKR DOCD: CPT | Performed by: NURSE PRACTITIONER

## 2023-03-20 ASSESSMENT — ENCOUNTER SYMPTOMS
BACK PAIN: 1
COUGH: 0
SHORTNESS OF BREATH: 0
CONSTIPATION: 0

## 2023-03-20 NOTE — PROGRESS NOTES
for Wheezing, Disp: 18 g, Rfl: 3    midodrine (PROAMATINE) 5 MG tablet, Take 1 tablet by mouth 2 times daily (with meals) (Patient taking differently: Take 2.5 mg by mouth 2 times daily (with meals)), Disp: 90 tablet, Rfl: 3    Evolocumab 140 MG/ML SOSY, Inject into the skin Next dose due 12/27/21, Disp: , Rfl:     famotidine (PEPCID) 20 MG tablet, as needed, Disp: , Rfl:     guaiFENesin (MUCINEX) 600 MG extended release tablet, Take 1 tablet by mouth 2 times daily (Patient taking differently: Take 600 mg by mouth 2 times daily as needed), Disp: 20 tablet, Rfl: 0    NONFORMULARY, Indications: Dietary fiber 1 tsp a day , Disp: , Rfl:     Cholecalciferol (VITAMIN D3) 2000 units CAPS, Take 1 capsule by mouth 2 times daily, Disp: 30 capsule, Rfl: 0    diltiazem (CARDIZEM CD) 120 MG extended release capsule, Take 1 capsule by mouth every evening, Disp: 30 capsule, Rfl: 3    fluticasone (FLONASE) 50 MCG/ACT nasal spray, PLACE 1 SPRAY IN EACH NOSTRIL DAILY FOR 15 DAYS, Disp: 1 Bottle, Rfl: 2    ketoconazole (NIZORAL) 2 % shampoo, APPLY  TO AFFECTED AREAS TOPICALLY DAILY THEN RINSE OFF AFTER 5 MINUTES, Disp: 120 mL, Rfl: 2    ezetimibe (ZETIA) 10 MG tablet, Take 10 mg by mouth daily, Disp: , Rfl:     esomeprazole Magnesium (NEXIUM) 40 MG PACK, Take 40 mg by mouth daily. , Disp: , Rfl:     NONFORMULARY, Super b complex 3 times per week, Disp: , Rfl:     ascorbic acid (VITAMIN C) 500 MG tablet, Take 500 mg by mouth daily. , Disp: , Rfl:     chlorpheniramine (CHLOR-TRIMETON) 4 MG tablet, Take 12 mg by mouth daily , Disp: , Rfl:     Family History   Problem Relation Age of Onset    Cancer Mother 48        pineal gland/throat    Cancer Father 64        colon    Cancer Brother         lung    Other Brother         pulmonary embolus    Diabetes Brother     Heart Disease Brother 58        mi    High Blood Pressure Brother     Parkinson's Disease Brother     Heart Disease Brother 79        stents for cad    High Blood Pressure Brother

## 2023-03-27 ENCOUNTER — TRANSCRIBE ORDERS (OUTPATIENT)
Dept: ADMINISTRATIVE | Age: 83
End: 2023-03-27

## 2023-03-27 ENCOUNTER — HOSPITAL ENCOUNTER (OUTPATIENT)
Age: 83
Setting detail: SPECIMEN
Discharge: HOME | End: 2023-03-27

## 2023-03-27 DIAGNOSIS — Z87.19 HISTORY OF PANCREATITIS: Primary | ICD-10-CM

## 2023-03-27 LAB
ALT SERPL-CCNC: 22 U/L (ref 5–33)
AST SERPL-CCNC: 22 U/L
BUN SERPL-MCNC: 14 MG/DL (ref 8–23)
CHOLEST SERPL-MCNC: 160 MG/DL
CHOLESTEROL/HDL RATIO: 3.2
CREAT SERPL-MCNC: 0.82 MG/DL (ref 0.5–0.9)
GFR SERPL CREATININE-BSD FRML MDRD: >60 ML/MIN/1.73M2
HDLC SERPL-MCNC: 50 MG/DL
LDLC SERPL CALC-MCNC: 84 MG/DL (ref 0–130)
TRIGL SERPL-MCNC: 132 MG/DL

## 2023-04-03 RX ORDER — LINACLOTIDE 145 UG/1
CAPSULE, GELATIN COATED ORAL
Qty: 30 CAPSULE | Refills: 5 | Status: SHIPPED | OUTPATIENT
Start: 2023-04-03

## 2023-04-14 ENCOUNTER — HOSPITAL ENCOUNTER (OUTPATIENT)
Dept: MRI IMAGING | Facility: CLINIC | Age: 83
Discharge: HOME | End: 2023-04-16
Payer: MEDICARE

## 2023-04-14 DIAGNOSIS — Z87.19 HISTORY OF PANCREATITIS: ICD-10-CM

## 2023-04-14 PROCEDURE — 6360000004 HC RX CONTRAST MEDICATION: Performed by: INTERNAL MEDICINE

## 2023-04-14 PROCEDURE — A9579 GAD-BASE MR CONTRAST NOS,1ML: HCPCS | Performed by: INTERNAL MEDICINE

## 2023-04-14 PROCEDURE — 74183 MRI ABD W/O CNTR FLWD CNTR: CPT

## 2023-04-14 RX ADMIN — GADOTERIDOL 20 ML: 279.3 INJECTION, SOLUTION INTRAVENOUS at 15:10

## 2023-04-24 RX ORDER — LEVOTHYROXINE SODIUM 0.07 MG/1
TABLET ORAL
Qty: 90 TABLET | Refills: 1 | Status: SHIPPED | OUTPATIENT
Start: 2023-04-24

## 2023-04-25 PROBLEM — E66.01 SEVERE OBESITY (BMI 35.0-39.9) WITH COMORBIDITY (HCC): Status: ACTIVE | Noted: 2023-04-25

## 2023-04-28 ENCOUNTER — CARE COORDINATION (OUTPATIENT)
Dept: CARE COORDINATION | Age: 83
End: 2023-04-28

## 2023-05-01 ENCOUNTER — CARE COORDINATION (OUTPATIENT)
Dept: CARE COORDINATION | Age: 83
End: 2023-05-01

## 2023-05-23 ENCOUNTER — OFFICE VISIT (OUTPATIENT)
Dept: FAMILY MEDICINE CLINIC | Age: 83
End: 2023-05-23
Payer: MEDICARE

## 2023-05-23 VITALS
BODY MASS INDEX: 34.99 KG/M2 | DIASTOLIC BLOOD PRESSURE: 77 MMHG | HEIGHT: 65 IN | HEART RATE: 75 BPM | OXYGEN SATURATION: 97 % | TEMPERATURE: 97 F | WEIGHT: 210 LBS | SYSTOLIC BLOOD PRESSURE: 120 MMHG

## 2023-05-23 DIAGNOSIS — F41.9 ANXIETY: ICD-10-CM

## 2023-05-23 DIAGNOSIS — M25.562 ACUTE PAIN OF LEFT KNEE: Primary | ICD-10-CM

## 2023-05-23 DIAGNOSIS — G20 PARKINSON'S DISEASE (HCC): ICD-10-CM

## 2023-05-23 DIAGNOSIS — J41.1 BRONCHITIS, MUCOPURULENT RECURRENT (HCC): ICD-10-CM

## 2023-05-23 PROCEDURE — 3023F SPIROM DOC REV: CPT | Performed by: FAMILY MEDICINE

## 2023-05-23 PROCEDURE — G8427 DOCREV CUR MEDS BY ELIG CLIN: HCPCS | Performed by: FAMILY MEDICINE

## 2023-05-23 PROCEDURE — 1090F PRES/ABSN URINE INCON ASSESS: CPT | Performed by: FAMILY MEDICINE

## 2023-05-23 PROCEDURE — 99213 OFFICE O/P EST LOW 20 MIN: CPT | Performed by: FAMILY MEDICINE

## 2023-05-23 PROCEDURE — 1036F TOBACCO NON-USER: CPT | Performed by: FAMILY MEDICINE

## 2023-05-23 PROCEDURE — 1123F ACP DISCUSS/DSCN MKR DOCD: CPT | Performed by: FAMILY MEDICINE

## 2023-05-23 PROCEDURE — G8417 CALC BMI ABV UP PARAM F/U: HCPCS | Performed by: FAMILY MEDICINE

## 2023-05-23 PROCEDURE — G8399 PT W/DXA RESULTS DOCUMENT: HCPCS | Performed by: FAMILY MEDICINE

## 2023-05-23 RX ORDER — LORAZEPAM 0.5 MG/1
0.5 TABLET ORAL NIGHTLY
Qty: 30 TABLET | Refills: 0 | Status: SHIPPED | OUTPATIENT
Start: 2023-05-23 | End: 2023-06-22

## 2023-05-23 SDOH — ECONOMIC STABILITY: FOOD INSECURITY: WITHIN THE PAST 12 MONTHS, YOU WORRIED THAT YOUR FOOD WOULD RUN OUT BEFORE YOU GOT MONEY TO BUY MORE.: NEVER TRUE

## 2023-05-23 SDOH — ECONOMIC STABILITY: FOOD INSECURITY: WITHIN THE PAST 12 MONTHS, THE FOOD YOU BOUGHT JUST DIDN'T LAST AND YOU DIDN'T HAVE MONEY TO GET MORE.: NEVER TRUE

## 2023-05-23 SDOH — ECONOMIC STABILITY: HOUSING INSECURITY
IN THE LAST 12 MONTHS, WAS THERE A TIME WHEN YOU DID NOT HAVE A STEADY PLACE TO SLEEP OR SLEPT IN A SHELTER (INCLUDING NOW)?: NO

## 2023-05-23 SDOH — ECONOMIC STABILITY: INCOME INSECURITY: HOW HARD IS IT FOR YOU TO PAY FOR THE VERY BASICS LIKE FOOD, HOUSING, MEDICAL CARE, AND HEATING?: NOT HARD AT ALL

## 2023-05-23 ASSESSMENT — PATIENT HEALTH QUESTIONNAIRE - PHQ9
2. FEELING DOWN, DEPRESSED OR HOPELESS: 0
SUM OF ALL RESPONSES TO PHQ9 QUESTIONS 1 & 2: 0
SUM OF ALL RESPONSES TO PHQ QUESTIONS 1-9: 0
1. LITTLE INTEREST OR PLEASURE IN DOING THINGS: 0

## 2023-05-23 NOTE — PROGRESS NOTES
03/01/2016    Tremor     r hand    Vitamin D deficiency 06/30/2014      Past Surgical History:   Procedure Laterality Date    CHOLECYSTECTOMY      COLONOSCOPY      ENDOSCOPY, COLON, DIAGNOSTIC      ERCP  03/21/2014    EXCISION / BIOPSY SKIN LESION OF TRUNK Left 11/08/2017    EXCISION MASS LEFT POSTERIOR SHOULDER, LEFT ANTERIOR ARM performed by Madison Gerber MD at [address] (CERVIX STATUS UNKNOWN)  01/03/2007    PAIN MANAGEMENT PROCEDURE      SKIN BIOPSY Left 11/08/2017    Excision Mass Left Posterior Shoulder, Left Anterior Arm     Family History   Problem Relation Age of Onset    Cancer Mother 48        pineal gland/throat    Cancer Father 64        colon    Cancer Brother         lung    Other Brother         pulmonary embolus    Diabetes Brother     Heart Disease Brother 58        mi    High Blood Pressure Brother     Parkinson's Disease Brother     Heart Disease Brother 79        stents for cad    High Blood Pressure Brother     Stroke Maternal Aunt     Cancer Maternal Uncle     Heart Disease Paternal Uncle 67        mi    Stroke Maternal Grandmother     Heart Disease Paternal Grandfather         myocarditis     Current Outpatient Medications   Medication Sig Dispense Refill    LORazepam (ATIVAN) 0.5 MG tablet Take 1 tablet by mouth nightly for 30 days.  30 tablet 0    diclofenac sodium (VOLTAREN) 1 % GEL Apply 4 g topically 4 times daily 150 g 0    levothyroxine (SYNTHROID) 75 MCG tablet TAKE ONE TABLET BY MOUTH DAILY 90 tablet 1    LINZESS 145 MCG capsule TAKE ONE CAPSULE BY MOUTH EVERY MORNING BEFORE BREAKFAST 30 capsule 5    metFORMIN (GLUCOPHAGE) 500 MG tablet TAKE ONE TABLET BY MOUTH DAILY 90 tablet 1    amitriptyline (ELAVIL) 10 MG tablet TAKE ONE TABLET BY MOUTH DAILY AS NEEDED FOR SLEEP AND LEG PAIN FOR UP TO 30 DAYS 90 tablet 2    ondansetron (ZOFRAN-ODT) 4 MG disintegrating tablet DISSOLVE ONE TABLET BY MOUTH EVERY 8 HOURS AS NEEDED 20 tablet 3    metoprolol succinate

## 2023-05-24 ENCOUNTER — CARE COORDINATION (OUTPATIENT)
Dept: CARE COORDINATION | Age: 83
End: 2023-05-24

## 2023-05-24 DIAGNOSIS — I50.22 CHRONIC SYSTOLIC (CONGESTIVE) HEART FAILURE (HCC): Primary | ICD-10-CM

## 2023-05-24 NOTE — CARE COORDINATION
Remote Patient Kit Ordering Note      Date/Time:  5/24/2023 2:51 PM      [x] CCSS confirmed patient shipping address  [x] Patient will receive package over the next 2-4 business days. Someone 21 years or older must be present to sign for UPS delivery. [x] Patient to contact virtual installation-specific phone number listed in the patient instructions. [x] If the patient does not contact HRS within 24 hours, an [email or web address] [facility] will call the patient directly: If the patient does not answer, HRS will follow up with the clinical team notifying them about the unsuccessful attempt to contact the patient. HRS will make three call attempts to the patient. [x] ACM will contact patient once equipment is active to welcome them to the program.                                                         [] Hours of RPM monitoring - Monday-Friday [id number]                     All questions answered at this time. ACM made aware the RPM kit has been ordered. CCSS notified patient of RPM equipment order.

## 2023-05-24 NOTE — CARE COORDINATION
Ambulatory Care Coordination Note  2023    Patient Current Location:  Home: [id number]  [facility] [address]    ACM contacted the patient by telephone. Verified name and  with patient as identifiers. Provided introduction to self, and explanation of the ACM role. ACM: Kayli Fortune RN    Challenges to be reviewed by the provider   Additional needs identified to be addressed with provider: No  none               Method of communication with provider: none. Spoke with pt who said she has been doing well. Her pcp did refer her to ac for remote pt monitoring. She is agreeable to this. She has been having palpitations and did complete a holtor monitor waiting on her cardiologist to result note these. She did see Endo who did an Thyroid US she follows up with endo in November. Cardiology follow up in in October. 1) acp   2) sdoh   3) med review  4) rpm   5) cardiology follow up Oct  6) endo follow up Nov  7) ortho [place] for knee     Offered patient enrollment in the Remote Patient Monitoring (RPM) program for in-home monitoring: Yes, patient enrolled:     Remote Patient Monitoring Enrollment Note      Date/Time: 2023 2:34 PM    Offered patient enrollment in the [facility] Remote Patient Monitoring (RPM) program for in home monitoring for CHF. Patient accepted RPM services. Patient will be monitoring the following daily:  blood pressure reading, blood pressure heart rate, pulse ox reading, and weight    ACM reviewed the information below with patient:    Emergency Contact (name and contact number): Clair Pedersen    [x] A member from the care coordination team will reach out to notify the patient once the RPM kit is ordered. [x] Once the kit is delivered, the [facility] team will contact the patient after UPS deliver to assist with set up.             [x] Determined BP cuff size: regular (9.05\"-15.74\")      [x] Determined weight scale: regular (<330lbs)

## 2023-05-24 NOTE — PROGRESS NOTES
5/24/23 5:12 PM       Remote Patient Monitoring Treatment Plan    Received request from ACNANETTE/JOYCELYN Rowan RN  to order remote patient monitoring for in home monitoring of CHF and order completed. Patient will be monitoring blood pressure   pulse ox   weight. Patient will engage in Remote Patient Monitoring each day to develop the skills necessary for self management. RPM Care Team Responsibilities:   Alerts will be reviewed daily and addressed within 2-4 hours during operational hours (Monday -Friday 9 am-4 pm)  Alert response and intervention documented in patient medical record  Alert response escalated to PCP per protocol and documented in patient medical record  Patient monitored over approximately  days  Discharge from program based on self-management readiness    See care coordination encounters for additional details.      Federica Hernández DNP, FNP-C, Remote Patient Monitoring NP, () [phone]

## 2023-06-01 ENCOUNTER — TELEPHONE (OUTPATIENT)
Dept: ORTHOPEDIC SURGERY | Age: 83
End: 2023-06-01

## 2023-06-01 NOTE — TELEPHONE ENCOUNTER
Pt called in stating that her right knee has be causing her pain for 3 week snow and she is wanting to know if she can be seen sooner than her scheduled 06/13/23 appt . Pt states she did she her pcp and pt was told possible bakers cyst and that she would need to f/u with ortho.  Pt can be reached at [phone]

## 2023-06-02 NOTE — TELEPHONE ENCOUNTER
Called patient- her phone was cutting out really bad and she eventually hung up- I did try to tell her that Dr. Hatfield Shows will be out of the office next week and Shabnam Doty does not have any openings. I told her the 13th would be the earliest she could be seen. If she calls back I would like to know what she is doing for her pain so we can try to help her with anything that may be helpful leading up to that appointment.

## 2023-06-02 NOTE — TELEPHONE ENCOUNTER
Pt called back in to thank you for trying to get her an earlier appt time. For the pain she states she was using a generic form of Voltaren but she said it felt like it was causing her heartbeat to race so she stopped that. Now she keeps it elevated and ices it and takes generic tylenol at night and if it gets really bad during the day she will take some in the day time. States starting today is going to start wearing either a ace bandage or a cloth brace for support when she walks during the daytime.

## 2023-06-05 ENCOUNTER — CARE COORDINATION (OUTPATIENT)
Dept: CARE COORDINATION | Age: 83
End: 2023-06-05

## 2023-06-05 NOTE — CARE COORDINATION
Remote Patient Monitoring Welcome Note  Date/Time:  2023 2:53 PM  Patient Current Location: [facility]  Verified patients name and  as identifiers. Completed and confirmed the following:   Emergency Contact:   [x] Patient received all RPM equipment (tablet, scale, blood pressure device and cuff, and pulse oximeter)  Cuff Size: regular (9.05\"-15.74\")    Weight Scale:  regular (<330lbs)                    [x] Instructed patient keep box for use when returning equipment                                                          [x] Reviewed Patient Welcome Letter with patient                         [x] Reviewed expectations for patient and care team  Monitoring hours M-F 9-4pm  Completing monitoring by 12pm on  so that alerts can be responded to in the same day  Patient weighs self at same time every day (or after urinating and waking up)  Take blood pressure 1-2 hrs after medications   RPM team may have different phone area code (including [place], [place], [place] or [address])                              [x] Instructed patient to keep scale on flat surface                                                         [x] Instructed patient to keep tablet plugged in at all times                         [x] Instructed how to contact IT support ([phone])  [x] Provided Remote Patient Monitoring care  information               All questions answered at this time.

## 2023-06-05 NOTE — CARE COORDINATION
Ambulatory Care Coordination Note  2023    Patient Current Location: [facility]     ACM contacted the patient by telephone. Verified name and  with patient as identifiers. Provided introduction to self, and explanation of the ACM role. Challenges to be reviewed by the provider   Additional needs identified to be addressed with provider: No  none               Method of communication with provider: none. ACM: Melani Nicole, RN  Spoke with pt who said she is doing ok her knee has really bothering her she will see ortho . She did get the RPM equipment. She said she has been having some wheezing lately and using her inhaler. She feels this is related to seasonal allergies. She said this happens maybe every other day. She denies any sob with the wheezing. She does get a feeling like her Casandra Muzzy is in her throat\" at times she has the holitor monitor done showing \"extra heart beat\" she does have some sob with this feeling. She does see Dr Kiley Estrada next week when she has her EGD on Monday. She will follow up on this feeling if it continues after her EGD. 1) acp   2) sdoh   3) med review  4) rpm has   5) cardiology follow up Oct  6) endo follow up Nov  7) ortho  for knee    Offered patient enrollment in the Remote Patient Monitoring (RPM) program for in-home monitoring: Yes, patient already enrolled. Lab Results       None            Care Coordination Interventions    Referral from Primary Care Provider: No  Suggested Interventions and Community Resources          Goals Addressed                   This Visit's Progress     Conditions and Symptoms   On track     I will schedule office visits, as directed by my provider. I will keep my appointment or reschedule if I have to cancel. I will notify my provider of any barriers to my plan of care. I will follow my Zone Management tool to seek urgent or emergent care.     Barriers: none  Plan for overcoming my barriers: care coordination   Confidence:

## 2023-06-19 ENCOUNTER — PATIENT MESSAGE (OUTPATIENT)
Dept: FAMILY MEDICINE CLINIC | Age: 83
End: 2023-06-19

## 2023-06-20 NOTE — TELEPHONE ENCOUNTER
From: Shai Davis  To: Dr. Shamika Cook  Sent: 6/19/2023 11:09 AM EDT  Subject: ER Visit on 6/14/23    Dr. Loreto Duran.,   I went to the ER in the early hours of Wednesday, June 14. My discharge directions say I should make an appt with you and Dr. Darrin Galeas in 2 days. Obviously, it is past that time. My sense is that I don't need to make an appt with you as I was followed up by Richmond Kirk who calls me every week or so. We discussed my ER visit, on her last call. I went to the ER because of my heart pounding and an elevated (for me) blood pressure. The ER doc said my blood pressure was elevated because of anxiety and that I had recently used albuterol. I do have a call in to Dr. Darrin Galeas to see if he needs to see me and to see if I need a medication adustment, given the paspitations are increasing. So I trust I don't need to see you. Is that correct? Many thanks!   Marian Ulrich  [birth year]

## 2023-06-22 ENCOUNTER — CARE COORDINATION (OUTPATIENT)
Dept: CARE COORDINATION | Age: 83
End: 2023-06-22

## 2023-06-22 NOTE — CARE COORDINATION
Ambulatory Care Coordination Note  2023    Patient Current Location:  Home: [id number]  [place] [id number]     ACM contacted the patient by telephone. Verified name and  with patient as identifiers. Provided introduction to self, and explanation of the ACM role. Challenges to be reviewed by the provider   Additional needs identified to be addressed with provider: No  none               Method of communication with provider: none. ACM: Vasu Christine, RN  Spoke with pt who said she is doing ok at home. She said she has a history of veena barr and does suffer from fatigue at time and today she is feeling tired. 1) acp   2) sdoh done   3) med review  4) rpm has   5) cardiology follow up Oct  6) endo follow up Nov  7) ortho  for knee      Offered patient enrollment in the Remote Patient Monitoring (RPM) program for in-home monitoring: Yes, patient already enrolled. Lab Results       None            Care Coordination Interventions    Referral from Primary Care Provider: No  Suggested Interventions and Community Resources          Goals Addressed                   This Visit's Progress     Conditions and Symptoms   On track     I will schedule office visits, as directed by my provider. I will keep my appointment or reschedule if I have to cancel. I will notify my provider of any barriers to my plan of care. I will follow my Zone Management tool to seek urgent or emergent care.     Barriers: none  Plan for overcoming my barriers: care coordination   Confidence: 10/10  Anticipated Goal Completion Date: 10/24/23                Future Appointments   Date Time Provider Tez Gonzalez   10/17/2023  1:00 PM Fredderick Habermann, MD [facility]   2023 11:30 AM MD JOSUÉ Franco

## 2023-06-27 ENCOUNTER — TELEPHONE (OUTPATIENT)
Dept: PAIN MANAGEMENT | Age: 83
End: 2023-06-27

## 2023-07-03 ENCOUNTER — OFFICE VISIT (OUTPATIENT)
Dept: ORTHOPEDIC SURGERY | Age: 83
End: 2023-07-03
Payer: MEDICARE

## 2023-07-03 VITALS — BODY MASS INDEX: 34.15 KG/M2 | RESPIRATION RATE: 14 BRPM | HEIGHT: 65 IN

## 2023-07-03 DIAGNOSIS — M70.62 GREATER TROCHANTERIC BURSITIS OF LEFT HIP: Primary | ICD-10-CM

## 2023-07-03 DIAGNOSIS — M25.552 LEFT HIP PAIN: Primary | ICD-10-CM

## 2023-07-03 DIAGNOSIS — M54.16 LUMBAR RADICULOPATHY: ICD-10-CM

## 2023-07-03 PROCEDURE — 1036F TOBACCO NON-USER: CPT | Performed by: PHYSICIAN ASSISTANT

## 2023-07-03 PROCEDURE — G8427 DOCREV CUR MEDS BY ELIG CLIN: HCPCS | Performed by: PHYSICIAN ASSISTANT

## 2023-07-03 PROCEDURE — 1090F PRES/ABSN URINE INCON ASSESS: CPT | Performed by: PHYSICIAN ASSISTANT

## 2023-07-03 PROCEDURE — 1123F ACP DISCUSS/DSCN MKR DOCD: CPT | Performed by: PHYSICIAN ASSISTANT

## 2023-07-03 PROCEDURE — 99213 OFFICE O/P EST LOW 20 MIN: CPT | Performed by: PHYSICIAN ASSISTANT

## 2023-07-03 PROCEDURE — 20611 DRAIN/INJ JOINT/BURSA W/US: CPT | Performed by: PHYSICIAN ASSISTANT

## 2023-07-03 PROCEDURE — G8399 PT W/DXA RESULTS DOCUMENT: HCPCS | Performed by: PHYSICIAN ASSISTANT

## 2023-07-03 PROCEDURE — G8417 CALC BMI ABV UP PARAM F/U: HCPCS | Performed by: PHYSICIAN ASSISTANT

## 2023-07-03 RX ORDER — BUPIVACAINE HYDROCHLORIDE 2.5 MG/ML
2 INJECTION, SOLUTION INFILTRATION; PERINEURAL ONCE
Status: COMPLETED | OUTPATIENT
Start: 2023-07-03 | End: 2023-07-05

## 2023-07-03 RX ORDER — METHYLPREDNISOLONE ACETATE 80 MG/ML
80 INJECTION, SUSPENSION INTRA-ARTICULAR; INTRALESIONAL; INTRAMUSCULAR; SOFT TISSUE ONCE
Status: COMPLETED | OUTPATIENT
Start: 2023-07-03 | End: 2023-07-05

## 2023-07-03 ASSESSMENT — ENCOUNTER SYMPTOMS
ABDOMINAL PAIN: 0
ABDOMINAL DISTENTION: 0
SHORTNESS OF BREATH: 0
RESPIRATORY NEGATIVE: 1
COUGH: 0
APNEA: 0
NAUSEA: 0
CONSTIPATION: 0
COLOR CHANGE: 0
CHEST TIGHTNESS: 0
DIARRHEA: 0
VOMITING: 0

## 2023-07-03 NOTE — PROGRESS NOTES
trochanteric bursitis with some lumbar radiculopathies. We discussed the various treatment alternatives including anti-inflammatory medications, physical therapy, injections, further imaging studies and as a last result surgery. During today's visit, we had a long discussion that she does have some pes anserine bursitis but I do think some of the pain in her hip is also coming from her back. She really has a relatively negative hip joint exam.  She does have a long history of treatment with pain management and neurology regarding her back. I will leave the back to neurology and pain management. I do think is reasonable that we could try an injection into the left greater trochanteric bursa since she had does have pain on exam.  She can then see how much of her pain goes away and report to neurology and pain management. The patient has opted for a cortisone injection into the left greater trochanteric bursa to help reduce inflammation and pain. The injection site should never get red, hot, or swollen and if it does the patient will contact our office right away. The patient may experience a increase in soreness the first 24-48 hours due to a cortisone flair and can take anti-inflammatories for a short period of time to reduce that soreness. The patient should not submerge the injection site in water for a minimum of 24 hours to avoid infection. This means no lakes, pools, ponds, or hot tubs for 24 hours. If the patient is diabetic the injection may increase their blood sugar for up to one week. The patient can do this cortisone injection once every 4 months as needed. If the injections stop working and do not give the patient relief the patient should consider surgical interventions to produce long term relief. Patient knows all the exercises for bursitis from her right hip bursitis. She will start doing the stretches. She will keep track of what pain does go away with the injection and what does not.   The

## 2023-07-05 RX ADMIN — METHYLPREDNISOLONE ACETATE 80 MG: 80 INJECTION, SUSPENSION INTRA-ARTICULAR; INTRALESIONAL; INTRAMUSCULAR; SOFT TISSUE at 07:44

## 2023-07-05 RX ADMIN — BUPIVACAINE HYDROCHLORIDE 5 MG: 2.5 INJECTION, SOLUTION INFILTRATION; PERINEURAL at 07:44

## 2023-07-07 ENCOUNTER — CARE COORDINATION (OUTPATIENT)
Dept: CARE COORDINATION | Age: 83
End: 2023-07-07

## 2023-07-07 ENCOUNTER — OFFICE VISIT (OUTPATIENT)
Dept: PRIMARY CARE CLINIC | Age: 83
End: 2023-07-07

## 2023-07-07 VITALS
SYSTOLIC BLOOD PRESSURE: 150 MMHG | DIASTOLIC BLOOD PRESSURE: 81 MMHG | OXYGEN SATURATION: 97 % | HEART RATE: 69 BPM | TEMPERATURE: 96.1 F

## 2023-07-07 DIAGNOSIS — J06.9 ACUTE URI: Primary | ICD-10-CM

## 2023-07-07 RX ORDER — PREDNISONE 20 MG/1
40 TABLET ORAL DAILY
Qty: 10 TABLET | Refills: 0 | Status: SHIPPED | OUTPATIENT
Start: 2023-07-07 | End: 2023-07-12

## 2023-07-07 RX ORDER — AMOXICILLIN 500 MG/1
500 CAPSULE ORAL 3 TIMES DAILY
Qty: 21 CAPSULE | Refills: 0 | Status: SHIPPED | OUTPATIENT
Start: 2023-07-07 | End: 2023-07-14

## 2023-07-07 ASSESSMENT — ENCOUNTER SYMPTOMS
CHEST TIGHTNESS: 0
SHORTNESS OF BREATH: 0
WHEEZING: 0
SORE THROAT: 0
VOICE CHANGE: 0
EYE DISCHARGE: 0
SINUS PRESSURE: 0
EYE REDNESS: 0
COUGH: 1

## 2023-07-07 NOTE — CARE COORDINATION
Ambulatory Care Coordination Note  2023    Patient Current Location:  Home: [address] [place] [address] [id number]  [address] [facility]     ACM contacted the patient by telephone. Verified name and  with patient as identifiers. Provided introduction to self, and explanation of the ACM role. Challenges to be reviewed by the provider   Additional needs identified to be addressed with provider: No  none               Method of communication with provider: none. ACM: Fernandez Delaney, RN    Spoke with pt who said she is doing well at home ortho did do an injection this did help. She will see [facility] mgt aug 1st. She was to see neurology today but had to cancel this because she feels like she has sinus congestion. She will get musinex to see if that will help. She will go to the walk in clinic later today she is going to drug store to get covid test first    1) acp   2) sdoh done   3) med review  4) rpm has   5) cardiology follow up Oct  6) endo follow up Nov  7) ortho  for knee    Offered patient enrollment in the Remote Patient Monitoring (RPM) program for in-home monitoring: Yes, patient already enrolled. Lab Results       None            Care Coordination Interventions    Referral from Primary Care Provider: No  Suggested Interventions and Community Resources          Goals Addressed                   This Visit's Progress     Conditions and Symptoms   On track     I will schedule office visits, as directed by my provider. I will keep my appointment or reschedule if I have to cancel. I will notify my provider of any barriers to my plan of care. I will follow my Zone Management tool to seek urgent or emergent care.     Barriers: none  Plan for overcoming my barriers: care coordination   Confidence: 10/10  Anticipated Goal Completion Date: 10/24/23                Future Appointments   Date Time Provider [address]   2023  1:30 PM MD Bethanie Tatev [facility]   10/17/2023  1:00 PM Mel Veolz

## 2023-07-07 NOTE — PROGRESS NOTES
[facility]  [address] [place] [address] Road  [address] [place] [address]  Dept: [phone]  Dept Fax: [phone]     Richard Spann is a 82 y.o. female who presents to the urgent care today for her medicalconditions/complaints as noted below. Richard Spann is c/o of Congestion (Cough, x  1 day )    HPI:      Sinusitis  This is a new problem. The current episode started yesterday. The problem has been gradually worsening since onset. There has been no fever. Associated symptoms include congestion, coughing and ear pain (left). Pertinent negatives include no chills, headaches, neck pain, shortness of breath, sinus pressure, sneezing or sore throat. Treatments tried: otc tx. The treatment provided no relief.      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     Meniere disease     right ear    Meniere's disease, right ear 12/10/2019    Morbidly obese ([address]) 07/23/2020    Neuropathy     Osteopenia 06/30/2014    Parkinson disease ([address]) 07/23/2020    Postmenopausal state 12/10/2019    Postnasal drip 07/11/2020    Prediabetes     RAD (reactive airway disease) 06/30/2014    Thyroid nodule 03/01/2016    Tremor     r hand    Vitamin D deficiency 06/30/2014           Current Outpatient Medications   Medication Sig Dispense Refill    amoxicillin (AMOXIL) 500 MG capsule Take 1 capsule by mouth 3 times daily for 7 days 21 capsule 0    predniSONE (DELTASONE) 20 MG tablet Take 2 tablets by mouth daily for 5 days 10 tablet 0    metoprolol tartrate

## 2023-07-11 ENCOUNTER — HOSPITAL ENCOUNTER (OUTPATIENT)
Age: 83
Setting detail: SPECIMEN
Discharge: HOME | End: 2023-07-11

## 2023-07-11 LAB
ALBUMIN SERPL-MCNC: 4.4 G/DL (ref 3.5–5.2)
ALBUMIN/GLOB SERPL: 1.6 {RATIO} (ref 1–2.5)
ALP SERPL-CCNC: 86 U/L (ref 35–104)
ALT SERPL-CCNC: 27 U/L (ref 5–33)
ANION GAP SERPL CALCULATED.3IONS-SCNC: 10 MMOL/L (ref 9–17)
AST SERPL-CCNC: 23 U/L
BILIRUB SERPL-MCNC: 0.3 MG/DL (ref 0.3–1.2)
BUN SERPL-MCNC: 17 MG/DL (ref 8–23)
CALCIUM SERPL-MCNC: 9.6 MG/DL (ref 8.6–10.4)
CHLORIDE SERPL-SCNC: 106 MMOL/L (ref 98–107)
CO2 SERPL-SCNC: 25 MMOL/L (ref 20–31)
CREAT SERPL-MCNC: 0.8 MG/DL (ref 0.5–0.9)
GFR SERPL CREATININE-BSD FRML MDRD: >60 ML/MIN/1.73M2
GLUCOSE SERPL-MCNC: 122 MG/DL (ref 70–99)
POTASSIUM SERPL-SCNC: 4.8 MMOL/L (ref 3.7–5.3)
PROT SERPL-MCNC: 7.1 G/DL (ref 6.4–8.3)
SODIUM SERPL-SCNC: 141 MMOL/L (ref 135–144)
T3FREE SERPL-MCNC: 2.3 PG/ML (ref 2.02–4.43)
T4 FREE SERPL-MCNC: 1.2 NG/DL (ref 0.9–1.7)
TSH SERPL DL<=0.05 MIU/L-ACNC: 3.34 UIU/ML (ref 0.3–5)

## 2023-07-12 LAB
EST. AVERAGE GLUCOSE BLD GHB EST-MCNC: 114 MG/DL
HBA1C MFR BLD: 5.6 % (ref 4–6)

## 2023-07-21 ENCOUNTER — CARE COORDINATION (OUTPATIENT)
Dept: CARE COORDINATION | Age: 83
End: 2023-07-21

## 2023-07-21 NOTE — CARE COORDINATION
Ambulatory Care Coordination Note  2023    Patient Current Location:  Home: [address] [place] [address] [address] [place] [id number]     ACM contacted the patient by telephone. Verified name and  with patient as identifiers. Provided introduction to self, and explanation of the ACM role. Challenges to be reviewed by the provider   Additional needs identified to be addressed with provider: No  none               Method of communication with provider: none. ACM: Darek Oneil, RN  Spoke with pt who said she is doing better at home. She did reschedule her apt with neurology for Oct 3.     1) acp   2) sdoh done   3) med review  4) rpm has   5) cardiology follow up Oct  6) endo follow up Nov  7) ortho  for knee  8) neuro apt oct 3     Offered patient enrollment in the Remote Patient Monitoring (RPM) program for in-home monitoring: Yes, patient already enrolled. Lab Results       None            Care Coordination Interventions    Referral from Primary Care Provider: No  Suggested Interventions and Community Resources          Goals Addressed                   This Visit's Progress     Conditions and Symptoms   On track     I will schedule office visits, as directed by my provider. I will keep my appointment or reschedule if I have to cancel. I will notify my provider of any barriers to my plan of care. I will follow my Zone Management tool to seek urgent or emergent care.     Barriers: none  Plan for overcoming my barriers: care coordination   Confidence: 10/10  Anticipated Goal Completion Date: 10/24/23                Future Appointments   Date Time Provider [address] [place] [address]   2023  1:30 PM Velma Montgomery MD Syl[facility]   10/3/2023  2:40 PM Eluterio Dancer, MD Neuro [facility] Neurology -   10/17/2023  1:00 PM Jaziel Moralez MD [facility]   2023 11:30 AM MD JOSUÉ Olivo [facility]

## 2023-08-01 ENCOUNTER — OFFICE VISIT (OUTPATIENT)
Dept: PAIN MANAGEMENT | Age: 83
End: 2023-08-01
Payer: MEDICARE

## 2023-08-01 ENCOUNTER — OFFICE VISIT (OUTPATIENT)
Dept: ORTHOPEDIC SURGERY | Age: 83
End: 2023-08-01

## 2023-08-01 VITALS — WEIGHT: 208.6 LBS | OXYGEN SATURATION: 98 % | RESPIRATION RATE: 16 BRPM | BODY MASS INDEX: 34.75 KG/M2 | HEIGHT: 65 IN

## 2023-08-01 VITALS
WEIGHT: 204 LBS | HEART RATE: 70 BPM | BODY MASS INDEX: 33.99 KG/M2 | HEIGHT: 65 IN | OXYGEN SATURATION: 96 % | SYSTOLIC BLOOD PRESSURE: 125 MMHG | DIASTOLIC BLOOD PRESSURE: 81 MMHG

## 2023-08-01 DIAGNOSIS — G89.29 CHRONIC RADICULAR LUMBAR PAIN: ICD-10-CM

## 2023-08-01 DIAGNOSIS — M22.41 CHONDROMALACIA, PATELLA, RIGHT: ICD-10-CM

## 2023-08-01 DIAGNOSIS — M25.461 KNEE EFFUSION, RIGHT: Primary | ICD-10-CM

## 2023-08-01 DIAGNOSIS — M54.51 VERTEBROGENIC LOW BACK PAIN: ICD-10-CM

## 2023-08-01 DIAGNOSIS — M54.16 CHRONIC RADICULAR LUMBAR PAIN: ICD-10-CM

## 2023-08-01 DIAGNOSIS — M47.817 LUMBOSACRAL SPONDYLOSIS WITHOUT MYELOPATHY: Primary | ICD-10-CM

## 2023-08-01 PROCEDURE — G8427 DOCREV CUR MEDS BY ELIG CLIN: HCPCS | Performed by: ANESTHESIOLOGY

## 2023-08-01 PROCEDURE — 99213 OFFICE O/P EST LOW 20 MIN: CPT | Performed by: ANESTHESIOLOGY

## 2023-08-01 PROCEDURE — 1090F PRES/ABSN URINE INCON ASSESS: CPT | Performed by: ANESTHESIOLOGY

## 2023-08-01 PROCEDURE — 1123F ACP DISCUSS/DSCN MKR DOCD: CPT | Performed by: ANESTHESIOLOGY

## 2023-08-01 PROCEDURE — G8399 PT W/DXA RESULTS DOCUMENT: HCPCS | Performed by: ANESTHESIOLOGY

## 2023-08-01 PROCEDURE — G8417 CALC BMI ABV UP PARAM F/U: HCPCS | Performed by: ANESTHESIOLOGY

## 2023-08-01 PROCEDURE — 1036F TOBACCO NON-USER: CPT | Performed by: ANESTHESIOLOGY

## 2023-08-01 ASSESSMENT — ENCOUNTER SYMPTOMS
GASTROINTESTINAL NEGATIVE: 1
BACK PAIN: 1
RESPIRATORY NEGATIVE: 1

## 2023-08-01 NOTE — PROGRESS NOTES
lidocaine  was injected. There was no resistance to the injection. Thereafter the skin was prepped with betadine in a sterile fashion once again and the joint was aspirated with a 60cc syringe. After aspirating the joint, 17 cc's of yellow tinged synovial fluid was removed from the knee. The wound was then cleansed and a band-aid was placed over the superior lateral portal site. The patient tolerated the procedure without difficulty. Adverse reactions to the aspiration were discussed with the patient including signs of infection (increasing pain, redness, swelling) and the patient was instructed to call immediately if experiencing any of these symptoms. Durolane Injection    Location: right Knee  Procedure: Zac Ruby agreed for the Durolane injection into the right knee. The patient was placed in the supine position on the exam table. The junction of the superior portion of the patella and the lateral portion of the patella was identified and marked. The skin was prepped with betadine in a sterile fashion. Utilizing [employer] ultrasound unit with a variable frequency linear transducer for precise placement and sterile technique, 60 mg/3 mL was injected by the superior lateral approach. There was no resistance to injection. The wound was cleansed and a Band-Aid was placed. The patient tolerated the procedure without difficulty. Adverse reactions of the injection was discussed with the patient including signs of infection (increasing pain, redness, swelling) and the patient was instructed to call immediately with any of these symptoms. Ultrasound images of the injection site were recorded throughout the procedure and are saved on the SD card which is stored in the GE ultrasound unit. All images were downloaded and stored in patient's chart for permanent record. Assessment:      1. Knee effusion, right    2.  Chondromalacia, patella, right       Plan:   We discussed the fact that there is a possibility that this

## 2023-08-01 NOTE — PROGRESS NOTES
145 MCG capsule TAKE ONE CAPSULE BY MOUTH EVERY MORNING BEFORE BREAKFAST 30 capsule 5    metFORMIN (GLUCOPHAGE) 500 MG tablet TAKE ONE TABLET BY MOUTH DAILY 90 tablet 1    amitriptyline (ELAVIL) 10 MG tablet TAKE ONE TABLET BY MOUTH DAILY AS NEEDED FOR SLEEP AND LEG PAIN FOR UP TO 30 DAYS 90 tablet 2    ondansetron (ZOFRAN-ODT) 4 MG disintegrating tablet DISSOLVE ONE TABLET BY MOUTH EVERY 8 HOURS AS NEEDED 20 tablet 3    alclomethasone (ACLOVATE) 0.05 % cream Apply topically 2 times daily Prn      albuterol sulfate HFA (PROVENTIL HFA) 108 (90 Base) MCG/ACT inhaler Inhale 2 puffs into the lungs every 6 hours as needed for Wheezing 18 g 3    Evolocumab 140 MG/ML SOSY Inject into the skin Next dose due 12/27/21      famotidine (PEPCID) 20 MG tablet as needed      guaiFENesin (MUCINEX) 600 MG extended release tablet Take 1 tablet by mouth 2 times daily (Patient taking differently: Take 1 tablet by mouth 2 times daily as needed) 20 tablet 0    NONFORMULARY Indications: Dietary fiber 1 tsp a day       Cholecalciferol (VITAMIN D3) 2000 units CAPS Take 1 capsule by mouth 2 times daily (Patient taking differently: Take 1 capsule by mouth daily) 30 capsule 0    fluticasone (FLONASE) 50 MCG/ACT nasal spray PLACE 1 SPRAY IN EACH NOSTRIL DAILY FOR 15 DAYS 1 Bottle 2    ketoconazole (NIZORAL) 2 % shampoo APPLY  TO AFFECTED AREAS TOPICALLY DAILY THEN RINSE OFF AFTER 5 MINUTES 120 mL 2    esomeprazole Magnesium (NEXIUM) 40 MG PACK Take 1 packet by mouth daily      NONFORMULARY Super b complex 3 times per week      ascorbic acid (VITAMIN C) 500 MG tablet Take 1 tablet by mouth daily      chlorpheniramine (CHLOR-TRIMETON) 4 MG tablet Take 3 tablets by mouth daily       No current facility-administered medications for this visit.      Facility-Administered Medications Ordered in Other Visits   Medication Dose Route Frequency Provider Last Rate Last Admin    0.9 % sodium chloride infusion   IntraVENous Continuous Andreas Davis

## 2023-08-02 ENCOUNTER — HOSPITAL ENCOUNTER (OUTPATIENT)
Age: 83
Setting detail: SPECIMEN
Discharge: HOME | End: 2023-08-02

## 2023-08-02 DIAGNOSIS — M25.461 KNEE EFFUSION, RIGHT: ICD-10-CM

## 2023-08-02 LAB
CRP SERPL HS-MCNC: 4.4 MG/L (ref 0–5)
ERYTHROCYTE [SEDIMENTATION RATE] IN BLOOD BY PHOTOMETRIC METHOD: 7 MM/HR (ref 0–30)
URATE SERPL-MCNC: 5.4 MG/DL (ref 2.4–5.7)

## 2023-08-02 RX ORDER — LIDOCAINE HYDROCHLORIDE 10 MG/ML
4 INJECTION, SOLUTION INFILTRATION; PERINEURAL ONCE
Status: COMPLETED | OUTPATIENT
Start: 2023-08-02 | End: 2023-08-02

## 2023-08-02 RX ADMIN — LIDOCAINE HYDROCHLORIDE 4 ML: 10 INJECTION, SOLUTION INFILTRATION; PERINEURAL at 08:40

## 2023-08-02 ASSESSMENT — ENCOUNTER SYMPTOMS
APNEA: 0
RESPIRATORY NEGATIVE: 1
CHEST TIGHTNESS: 0
COLOR CHANGE: 0
CONSTIPATION: 0
ABDOMINAL PAIN: 0
VOMITING: 0
ABDOMINAL DISTENTION: 0
DIARRHEA: 0
SHORTNESS OF BREATH: 0
NAUSEA: 0
COUGH: 0

## 2023-08-04 ENCOUNTER — CARE COORDINATION (OUTPATIENT)
Dept: CARE COORDINATION | Age: 83
End: 2023-08-04

## 2023-08-04 VITALS
HEART RATE: 83 BPM | WEIGHT: 205.8 LBS | DIASTOLIC BLOOD PRESSURE: 86 MMHG | BODY MASS INDEX: 34.25 KG/M2 | OXYGEN SATURATION: 95 % | SYSTOLIC BLOOD PRESSURE: 128 MMHG

## 2023-08-04 NOTE — CARE COORDINATION
Ambulatory Care Coordination Note  2023    Patient Current Location:  Home: [address] [place] [address] [id number]  [address] [facility]     ACM contacted the patient by telephone. Verified name and  with patient as identifiers. Provided introduction to self, and explanation of the ACM role. Challenges to be reviewed by the provider   Additional needs identified to be addressed with provider: No  none               Method of communication with provider: none. ACM: Jose Gamboa, RN  Spoke with pt who said she is doing ok at home. She did have a knee aspiration for her knee pain. She did see pain mgt who may be doing a back injections. 1) acp   2) sdoh done   3) med review  4) rpm has   5) cardiology follow up Oct  6) endo follow up Nov  7) ortho  for knee  8) neuro apt oct 3     Offered patient enrollment in the Remote Patient Monitoring (RPM) program for in-home monitoring: Yes, patient already enrolled. Lab Results       None            Care Coordination Interventions    Referral from Primary Care Provider: No  Suggested Interventions and Community Resources          Goals Addressed                   This Visit's Progress     Conditions and Symptoms   On track     I will schedule office visits, as directed by my provider. I will keep my appointment or reschedule if I have to cancel. I will notify my provider of any barriers to my plan of care. I will follow my Zone Management tool to seek urgent or emergent care.     Barriers: none  Plan for overcoming my barriers: care coordination   Confidence: 10/10  Anticipated Goal Completion Date: 10/24/23                Future Appointments   Date Time Provider [address]   10/3/2023  2:40 PM Irena Jefferson MD Neuro Spec Neurology -   10/17/2023  1:00 PM MD SUSY Gomes [facility]   2023 11:30 AM MD JOSUÉ Hodges

## 2023-08-16 ENCOUNTER — CARE COORDINATION (OUTPATIENT)
Dept: CARE COORDINATION | Age: 83
End: 2023-08-16

## 2023-08-16 NOTE — CARE COORDINATION
ACM received call from Slava Dowling, regarding inability do complete RPM monitoring this morning, due to being out of town, will most likely be back home tomorrow  ACM will notify RPM team.

## 2023-08-18 ENCOUNTER — CARE COORDINATION (OUTPATIENT)
Dept: CARE COORDINATION | Age: 83
End: 2023-08-18

## 2023-08-18 NOTE — CARE COORDINATION
Ambulatory Care Coordination Note  2023    Patient Current Location:  Home: [address] [place] [address] #[facility] [phone]     ACM contacted the patient by telephone. Verified name and  with patient as identifiers. Provided introduction to self, and explanation of the ACM role. Challenges to be reviewed by the provider   Additional needs identified to be addressed with provider: No  none               Method of communication with provider: none. ACM: Sherry Horn, RN spoke with pt who said she has been very busy taking care of her brother with cancer. She said pain mgt will do a procedure she needs to call and set this up. She will follow up with her GI Dr Ernestina Ochoa she would like to see a [facility] GI moving forward. 1) acp will bring in documents   2) sdoh done   3) med review  4) rpm has   5) cardiology follow up Oct  6) endo follow up Nov  7) ortho  for knee  8) neuro apt oct 3       Offered patient enrollment in the Remote Patient Monitoring (RPM) program for in-home monitoring: Yes, patient already enrolled. Lab Results       None            Care Coordination Interventions    Referral from Primary Care Provider: No  Suggested Interventions and Community Resources          Goals Addressed                   This Visit's Progress     Conditions and Symptoms   On track     I will schedule office visits, as directed by my provider. I will keep my appointment or reschedule if I have to cancel. I will notify my provider of any barriers to my plan of care. I will follow my Zone Management tool to seek urgent or emergent care.     Barriers: none  Plan for overcoming my barriers: care coordination   Confidence: 10/10  Anticipated Goal Completion Date: 10/24/23                Future Appointments   Date Time Provider [address] [place] [address]   10/3/2023  2:40 PM Rajani Vegas MD Neuro Spec Neurology -   10/17/2023  1:00 PM Breanna Potter MD AFL TCC SY[facility] AFL SING[facility] C   2023 11:30 AM David Curran,

## 2023-08-18 NOTE — ACP (ADVANCE CARE PLANNING)
Advance Care Planning   Healthcare Decision Maker:    Primary Decision Maker: Devin Cardenas  [place] - [phone]    Click here to complete Healthcare Decision Makers including selection of the Healthcare Decision Maker Relationship (ie \"Primary\"). Today we documented Decision Maker(s). The patient will provide ACP documents.

## 2023-08-25 ENCOUNTER — CARE COORDINATION (OUTPATIENT)
Dept: CARE COORDINATION | Age: 83
End: 2023-08-25

## 2023-08-25 DIAGNOSIS — M25.562 ACUTE PAIN OF LEFT KNEE: ICD-10-CM

## 2023-08-25 NOTE — CARE COORDINATION
Ambulatory Care Coordination Note  2023    Patient Current Location:  Home: [address] [place] [address] [address]  [address] [place] [phone]     ACM contacted the patient by telephone. Verified name and  with patient as identifiers. Provided introduction to self, and explanation of the ACM role. Challenges to be reviewed by the provider   Additional needs identified to be addressed with provider: No  none               Method of communication with provider: none. ACM: Tessa Dee RN  Spoke with pt who said she did not make her apt with pain mgt yet she will call and set this up for October. She will see neuro oct 3 and cardiology oct 17th she denies any other needs at this time   1) acp will bring in documents   2) sdoh done   3) med review  4) rpm has   5) cardiology follow up Oct  6) endo follow up Nov  7) ortho  for knee  8) neuro apt oct 3     Offered patient enrollment in the Remote Patient Monitoring (RPM) program for in-home monitoring: Yes, patient already enrolled. Lab Results       None            Care Coordination Interventions    Referral from Primary Care Provider: No  Suggested Interventions and Community Resources          Goals Addressed                   This Visit's Progress     Conditions and Symptoms   On track     I will schedule office visits, as directed by my provider. I will keep my appointment or reschedule if I have to cancel. I will notify my provider of any barriers to my plan of care. I will follow my Zone Management tool to seek urgent or emergent care.     Barriers: none  Plan for overcoming my barriers: care coordination   Confidence: 10/10  Anticipated Goal Completion Date: 10/24/23                Future Appointments   Date Time Provider [address]   10/3/2023  2:40 PM Alessandra Lemus MD Neuro Spec Neurology -   10/17/2023  1:00 PM Viji Malave MD AFL TCC [facility] AFL SING[facility] C   2023 11:30 AM Leti Driver MD SYLV FAM MED Sonja McDermitt

## 2023-08-31 ENCOUNTER — PATIENT MESSAGE (OUTPATIENT)
Dept: FAMILY MEDICINE CLINIC | Age: 83
End: 2023-08-31

## 2023-08-31 RX ORDER — VALACYCLOVIR HYDROCHLORIDE 1 G/1
1000 TABLET, FILM COATED ORAL 3 TIMES DAILY
Qty: 21 TABLET | Refills: 0 | Status: SHIPPED | OUTPATIENT
Start: 2023-08-31 | End: 2023-09-07

## 2023-08-31 NOTE — TELEPHONE ENCOUNTER
From: Alysa Spring  To: Dr. Jesus Alberto Simon  Sent: 8/31/2023 9:22 AM EDT  Subject: Need a medication please    Could you please fill a script for me for Valacyclovir? In the past, (now about 3 to 4 years ago) Dr. Downey Mom would prescribe V. for me when I would get cold sores on my lip. I currently have had the same cold sore on the inside of my lower lip for about 6 days. Many thanks.   [facility]  51-

## 2023-09-06 ENCOUNTER — OFFICE VISIT (OUTPATIENT)
Dept: ORTHOPEDIC SURGERY | Age: 83
End: 2023-09-06
Payer: MEDICARE

## 2023-09-06 ENCOUNTER — HOSPITAL ENCOUNTER (OUTPATIENT)
Age: 83
Setting detail: SPECIMEN
Discharge: HOME | End: 2023-09-06

## 2023-09-06 VITALS — WEIGHT: 206 LBS | HEIGHT: 65 IN | RESPIRATION RATE: 16 BRPM | BODY MASS INDEX: 34.32 KG/M2 | OXYGEN SATURATION: 98 %

## 2023-09-06 DIAGNOSIS — M25.461 KNEE EFFUSION, RIGHT: Primary | ICD-10-CM

## 2023-09-06 DIAGNOSIS — M25.461 KNEE EFFUSION, RIGHT: ICD-10-CM

## 2023-09-06 DIAGNOSIS — M22.41 CHONDROMALACIA, PATELLA, RIGHT: ICD-10-CM

## 2023-09-06 PROCEDURE — 99213 OFFICE O/P EST LOW 20 MIN: CPT | Performed by: PHYSICIAN ASSISTANT

## 2023-09-06 PROCEDURE — 20611 DRAIN/INJ JOINT/BURSA W/US: CPT | Performed by: PHYSICIAN ASSISTANT

## 2023-09-06 PROCEDURE — 1036F TOBACCO NON-USER: CPT | Performed by: PHYSICIAN ASSISTANT

## 2023-09-06 PROCEDURE — 1123F ACP DISCUSS/DSCN MKR DOCD: CPT | Performed by: PHYSICIAN ASSISTANT

## 2023-09-06 PROCEDURE — 1090F PRES/ABSN URINE INCON ASSESS: CPT | Performed by: PHYSICIAN ASSISTANT

## 2023-09-06 PROCEDURE — G8399 PT W/DXA RESULTS DOCUMENT: HCPCS | Performed by: PHYSICIAN ASSISTANT

## 2023-09-06 PROCEDURE — G8417 CALC BMI ABV UP PARAM F/U: HCPCS | Performed by: PHYSICIAN ASSISTANT

## 2023-09-06 PROCEDURE — G8427 DOCREV CUR MEDS BY ELIG CLIN: HCPCS | Performed by: PHYSICIAN ASSISTANT

## 2023-09-06 RX ORDER — LIDOCAINE HYDROCHLORIDE 10 MG/ML
4 INJECTION, SOLUTION INFILTRATION; PERINEURAL ONCE
Status: COMPLETED | OUTPATIENT
Start: 2023-09-06 | End: 2023-09-06

## 2023-09-06 RX ADMIN — LIDOCAINE HYDROCHLORIDE 4 ML: 10 INJECTION, SOLUTION INFILTRATION; PERINEURAL at 13:36

## 2023-09-06 ASSESSMENT — ENCOUNTER SYMPTOMS
COLOR CHANGE: 0
COUGH: 0
ABDOMINAL PAIN: 0
SHORTNESS OF BREATH: 0
CHEST TIGHTNESS: 0
NAUSEA: 0
CONSTIPATION: 0
RESPIRATORY NEGATIVE: 1
DIARRHEA: 0
APNEA: 0
ABDOMINAL DISTENTION: 0
VOMITING: 0

## 2023-09-06 NOTE — PROGRESS NOTES
1000 [facility]  908 [facility] [address] [address]  Dept: [phone]  Dept Fax: [phone]        Ambulatory Follow Up      Subjective:   Serena Durand is a 82y.o. year old female who presents to our office today for routine followup regarding her   1. Knee effusion, right    2. Chondromalacia, patella, right    . Chief Complaint   Patient presents with    Knee Pain     Right Knee pain       HPI Serena Durand  is a 82 y.o.  female who presents today in follow for right knee pain. The patient was last seen on 8/1/2023 and underwent treatment in the form of joint aspiration and a Durolane injection. The patient did have a cortisone injection on 6/13/2023 and had 100% relief until late July. I also ordered some lab work including a CRP, sed rate and uric acid at that visit. Patient states that her knee swelled up again and about a week ago. She does have minimal pain but she feels it is due to the swelling. She states she has posterior knee pain that travels up the back of her leg at times. Review of Systems   Constitutional:  Positive for activity change. Negative for appetite change, fatigue and fever. Respiratory: Negative. Negative for apnea, cough, chest tightness and shortness of breath. Cardiovascular: Negative. Negative for chest pain, palpitations and leg swelling. Gastrointestinal:  Negative for abdominal distention, abdominal pain, constipation, diarrhea, nausea and vomiting. Genitourinary:  Negative for difficulty urinating, dysuria and hematuria. Musculoskeletal:  Positive for arthralgias, gait problem and joint swelling. Negative for myalgias. Skin:  Negative for color change and rash. Neurological:  Negative for dizziness, weakness, numbness and headaches. Psychiatric/Behavioral:  Negative for sleep disturbance.         Objective :   Resp 16   Ht 5' 5\"

## 2023-09-07 LAB
BODY FLD TYPE: NORMAL
CRYSTALS FLD MICRO: NEGATIVE
LYMPHOCYTES NFR FLD: 12 %
NEUTROPHILS NFR FLD: 5 %
PATH INTERP FLD-IMP: NORMAL
RBC # FLD: <3000 CELLS/UL
SPECIMEN TYPE: NORMAL
UNIDENT CELLS NFR FLD: NORMAL %
WBC # FLD: 147 CELLS/UL

## 2023-09-08 ENCOUNTER — CARE COORDINATION (OUTPATIENT)
Dept: CARE COORDINATION | Age: 83
End: 2023-09-08

## 2023-09-08 NOTE — CARE COORDINATION
Ambulatory Care Coordination Note  2023    Patient Current Location:  Home: [address] [place] [address] #[facility]  [address] [place] [phone]     ACM contacted the patient by telephone. Verified name and  with patient as identifiers. Provided introduction to self, and explanation of the ACM role. Challenges to be reviewed by the provider   Additional needs identified to be addressed with provider: No  none               Method of communication with provider: none. ACM: Jason Almanzar, RN  Spoke with pt who said she is doing ok at home she did see ortho and her knee aspirated. She is waiting to hear back from ortho for next steps. GI had her on Lisness and is now trialing her on Ibserela. She would like to start PT but has been helping her brother and her nephew as they are getting treatment for their cancers. Will follow up on her next week and likely gradaute from RPM. She is going out of town Monday and Tuesday taking her nephew to surgery. 1) acp will bring in documents   2) sdoh done   3) med review  4) [facility] has   5) cardiology follow up Oct  6) endo follow up Nov  7) ortho may need MRI   8) neuro apt oct 3     Offered patient enrollment in the Remote Patient Monitoring (RPM) program for in-home monitoring: Yes, patient already enrolled. Lab Results       None                 Goals Addressed                   This Visit's Progress     Conditions and Symptoms   On track     I will schedule office visits, as directed by my provider. I will keep my appointment or reschedule if I have to cancel. I will notify my provider of any barriers to my plan of care. I will follow my Zone Management tool to seek urgent or emergent care.     Barriers: none  Plan for overcoming my barriers: care coordination   Confidence: 10/10  Anticipated Goal Completion Date: 10/24/23                Future Appointments   Date Time Provider [address]   10/3/2023  2:40 PM Wiley Goodrich MD Neuro Spec Neurology -   10/11/2023 10:00 AM

## 2023-09-10 LAB
MICROORGANISM SPEC CULT: ABNORMAL
MICROORGANISM/AGENT SPEC: ABNORMAL
MICROORGANISM/AGENT SPEC: ABNORMAL
SPECIMEN DESCRIPTION: ABNORMAL

## 2023-09-11 ENCOUNTER — TELEPHONE (OUTPATIENT)
Dept: ORTHOPEDIC SURGERY | Age: 83
End: 2023-09-11

## 2023-09-11 NOTE — TELEPHONE ENCOUNTER
----- Message from Rafaela Thomas PA-C sent at 9/11/2023  7:58 AM EDT -----  All labs are normal. No infection or gout. Ok to order MRI of knee if patient is willing to consider surgical intervention.

## 2023-09-11 NOTE — TELEPHONE ENCOUNTER
Pt did return the call from our office - and I did relay her [facility] remarks/ regarding labs.   Pt left VMM again at 133pm  apparently saw the results in [facility] chart - stated one value was noted as \"abnormal\" and now has questions regarding results  Can [facility] or someone call the pt to explain or go over in more detail  Respectfully/bb

## 2023-09-15 ENCOUNTER — CARE COORDINATION (OUTPATIENT)
Dept: CARE COORDINATION | Age: 83
End: 2023-09-15

## 2023-09-15 DIAGNOSIS — I50.22 CHRONIC SYSTOLIC (CONGESTIVE) HEART FAILURE (HCC): Primary | ICD-10-CM

## 2023-09-15 NOTE — PROGRESS NOTES
Remote Patient Order Discontinued    Received request from Hiram Fong RN  to discontinue order for remote patient monitoring of CHF and order completed.

## 2023-09-15 NOTE — CARE COORDINATION
Per Tari Vallejo, the patient will like to drop off RPM kit to [employer] location. Writer was unable to reach the patient, but left a message on her voicemail, stating that [employer] will be contacted to create a return label  for her. This will be mailed to her. Once she received the return label , she can then drop off the kit to any [employer] location.

## 2023-09-15 NOTE — CARE COORDINATION
Ambulatory Care Coordination Note  9/15/2023    Patient Current Location:  Home: [address] [place] [address] #[facility]  [address] [place] [phone]     ACM contacted the patient by telephone. Verified name and  with patient as identifiers. Provided introduction to self, and explanation of the ACM role. Challenges to be reviewed by the provider   Additional needs identified to be addressed with provider: No  none               Method of communication with provider: none. ACM: Marylen Donalds, RN  Spoke with pt who said she is doing ok at home. She did get a call back from ortho on her results from knee aspiration. The next step is MRI and possible surgery. She is agreeable to graduation from [facility]. Her vs have been stable   1) acp will bring in documents   2) sdoh done   3) med review  4) rpm has   5) cardiology follow up Oct  6) endo follow up Nov  7) ortho may need MRI   8) neuro apt oct 3     Offered patient enrollment in the Remote Patient Monitoring (RPM) program for in-home monitoring: Yes, patient already enrolled. Lab Results       None                 Goals Addressed                   This Visit's Progress     Conditions and Symptoms   On track     I will schedule office visits, as directed by my provider. I will keep my appointment or reschedule if I have to cancel. I will notify my provider of any barriers to my plan of care. I will follow my Zone Management tool to seek urgent or emergent care.     Barriers: none  Plan for overcoming my barriers: care coordination   Confidence: 10/10  Anticipated Goal Completion Date: 10/24/23                Future Appointments   Date Time Provider [address] [place] [address]   10/3/2023  2:40 PM Jensen Stinson MD Neuro Spec Neurology -   10/11/2023 10:00 AM MD MAYE ColemanV MAUM PN [facility]   10/17/2023  1:00 PM Romero Warner MD AFL TCC SYLV AFL SINGH C   10/24/2023 11:30 AM MD Josué Coleman Pain M[facility]OLPP   2023 11:30 AM MD JOSUÉ Bhatia FAM MED Shlomo Pals

## 2023-09-15 NOTE — CARE COORDINATION
Remote Patient Monitoring Graduation      Date/Time:  9/15/2023 9:08 AM  Patient Current Location: Home: [address] [place] [phone]  Patient has graduated from the Remote Patient Monitoring program on 9/15/2023. RPM goals have been met at this time. Patient has been provided instruction on process to return RPM equipment and RPM has been deactivated. Patient has ACM's contact information for any further questions, concerns, or needs.

## 2023-09-29 ENCOUNTER — CARE COORDINATION (OUTPATIENT)
Dept: CARE COORDINATION | Age: 83
End: 2023-09-29

## 2023-09-29 NOTE — CARE COORDINATION
Ambulatory Care Coordination Note  2023    Patient Current Location:  Home: [address] [place] [address] [address]  [address] [place] [phone]     ACM contacted the patient by telephone. Verified name and  with patient as identifiers. Provided introduction to self, and explanation of the ACM role. Challenges to be reviewed by the provider   Additional needs identified to be addressed with provider: No  none               Method of communication with provider: none. ACM: Ana Toure, RN  Spoke with pt who said she is doing ok at home she is complaining of right leg pain hip and knee she will be seeing neurology next week she will see ortho endo of October. She also is following up with pain mgt. 1) acp will bring in documents   2) sdoh done   3) med review  4) rpm has   5) cardiology follow up Oct  6) endo follow up Nov  7) ortho may need MRI   8) neuro apt oct 3       Offered patient enrollment in the Remote Patient Monitoring (RPM) program for in-home monitoring: NA graduated . Lab Results       None                 Goals Addressed                   This Visit's Progress     Conditions and Symptoms   On track     I will schedule office visits, as directed by my provider. I will keep my appointment or reschedule if I have to cancel. I will notify my provider of any barriers to my plan of care. I will follow my Zone Management tool to seek urgent or emergent care.     Barriers: none  Plan for overcoming my barriers: care coordination   Confidence: 10/10  Anticipated Goal Completion Date: 10/24/23                Future Appointments   Date Time Provider [address]   10/3/2023  2:40 PM Judy Jenkins MD Neuro Spec Neurology -   10/11/2023 10:00 AM Eamon Stark MD STV MAUM PN [facility]   10/18/2023  1:30 PM Khushbu Fruits, DO AFL TCC SYLV AFL SINGH C   10/24/2023 11:30 AM Eamon Stark MD Sylv Pain [facility]   10/30/2023  1:45 PM Kirstin Gilliland PA-C Sports Med Rudolph Combs   2023  1:45 PM Jaxson

## 2023-10-03 ENCOUNTER — OFFICE VISIT (OUTPATIENT)
Dept: NEUROLOGY | Age: 83
End: 2023-10-03
Payer: MEDICARE

## 2023-10-03 VITALS
BODY MASS INDEX: 34.66 KG/M2 | DIASTOLIC BLOOD PRESSURE: 77 MMHG | HEIGHT: 65 IN | WEIGHT: 208 LBS | SYSTOLIC BLOOD PRESSURE: 120 MMHG | HEART RATE: 76 BPM

## 2023-10-03 DIAGNOSIS — G25.0 BENIGN ESSENTIAL TREMOR: ICD-10-CM

## 2023-10-03 DIAGNOSIS — R20.2 PARESTHESIAS: ICD-10-CM

## 2023-10-03 DIAGNOSIS — G57.12 NEUROPATHY OF LEFT LATERAL FEMORAL CUTANEOUS NERVE: Primary | ICD-10-CM

## 2023-10-03 PROCEDURE — 99204 OFFICE O/P NEW MOD 45 MIN: CPT | Performed by: STUDENT IN AN ORGANIZED HEALTH CARE EDUCATION/TRAINING PROGRAM

## 2023-10-03 PROCEDURE — G8427 DOCREV CUR MEDS BY ELIG CLIN: HCPCS | Performed by: STUDENT IN AN ORGANIZED HEALTH CARE EDUCATION/TRAINING PROGRAM

## 2023-10-03 PROCEDURE — G8484 FLU IMMUNIZE NO ADMIN: HCPCS | Performed by: STUDENT IN AN ORGANIZED HEALTH CARE EDUCATION/TRAINING PROGRAM

## 2023-10-03 PROCEDURE — 1036F TOBACCO NON-USER: CPT | Performed by: STUDENT IN AN ORGANIZED HEALTH CARE EDUCATION/TRAINING PROGRAM

## 2023-10-03 PROCEDURE — G8399 PT W/DXA RESULTS DOCUMENT: HCPCS | Performed by: STUDENT IN AN ORGANIZED HEALTH CARE EDUCATION/TRAINING PROGRAM

## 2023-10-03 PROCEDURE — 1123F ACP DISCUSS/DSCN MKR DOCD: CPT | Performed by: STUDENT IN AN ORGANIZED HEALTH CARE EDUCATION/TRAINING PROGRAM

## 2023-10-03 PROCEDURE — 1090F PRES/ABSN URINE INCON ASSESS: CPT | Performed by: STUDENT IN AN ORGANIZED HEALTH CARE EDUCATION/TRAINING PROGRAM

## 2023-10-03 PROCEDURE — G8417 CALC BMI ABV UP PARAM F/U: HCPCS | Performed by: STUDENT IN AN ORGANIZED HEALTH CARE EDUCATION/TRAINING PROGRAM

## 2023-10-03 RX ORDER — TENAPANOR HYDROCHLORIDE 53.2 MG/1
50 TABLET ORAL PRN
COMMUNITY
Start: 2023-09-29

## 2023-10-03 RX ORDER — GABAPENTIN 100 MG/1
100 CAPSULE ORAL NIGHTLY
Qty: 30 CAPSULE | Refills: 0 | Status: SHIPPED | OUTPATIENT
Start: 2023-10-03 | End: 2024-03-31

## 2023-10-03 NOTE — PROGRESS NOTES
MOUTH EVERY MORNING BEFORE BREAKFAST 30 capsule 5    metFORMIN (GLUCOPHAGE) 500 MG tablet TAKE ONE TABLET BY MOUTH DAILY 90 tablet 1    amitriptyline (ELAVIL) 10 MG tablet TAKE ONE TABLET BY MOUTH DAILY AS NEEDED FOR SLEEP AND LEG PAIN FOR UP TO 30 DAYS 90 tablet 2    ondansetron (ZOFRAN-ODT) 4 MG disintegrating tablet DISSOLVE ONE TABLET BY MOUTH EVERY 8 HOURS AS NEEDED 20 tablet 3    alclomethasone (ACLOVATE) 0.05 % cream Apply topically 2 times daily Prn      albuterol sulfate HFA (PROVENTIL HFA) 108 (90 Base) MCG/ACT inhaler Inhale 2 puffs into the lungs every 6 hours as needed for Wheezing 18 g 3    Evolocumab 140 MG/ML SOSY Inject into the skin Next dose due 12/27/21      famotidine (PEPCID) 20 MG tablet as needed      guaiFENesin (MUCINEX) 600 MG extended release tablet Take 1 tablet by mouth 2 times daily (Patient taking differently: Take 1 tablet by mouth 2 times daily as needed) 20 tablet 0    NONFORMULARY Indications: Dietary fiber 1 tsp a day       Cholecalciferol (VITAMIN D3) 2000 units CAPS Take 1 capsule by mouth 2 times daily (Patient taking differently: Take 1 capsule by mouth daily) 30 capsule 0    fluticasone (FLONASE) 50 MCG/ACT nasal spray PLACE 1 SPRAY IN EACH NOSTRIL DAILY FOR 15 DAYS 1 Bottle 2    ketoconazole (NIZORAL) 2 % shampoo APPLY  TO AFFECTED AREAS TOPICALLY DAILY THEN RINSE OFF AFTER 5 MINUTES 120 mL 2    esomeprazole Magnesium (NEXIUM) 40 MG PACK Take 1 packet by mouth daily      NONFORMULARY Super b complex 3 times per week      ascorbic acid (VITAMIN C) 500 MG tablet Take 1 tablet by mouth daily      chlorpheniramine (CHLOR-TRIMETON) 4 MG tablet Take 3 tablets by mouth daily       No current facility-administered medications for this visit.      Facility-Administered Medications Ordered in Other Visits   Medication Dose Route Frequency Provider Last Rate Last Admin    0.9 % sodium chloride infusion   IntraVENous Continuous Zeferino Whatley  mL/hr at 02/19/14 0827 New Bag at

## 2023-10-04 ASSESSMENT — ENCOUNTER SYMPTOMS
EYES NEGATIVE: 1
GASTROINTESTINAL NEGATIVE: 1
BACK PAIN: 1
RESPIRATORY NEGATIVE: 1

## 2023-10-11 ENCOUNTER — HOSPITAL ENCOUNTER (OUTPATIENT)
Dept: PAIN MANAGEMENT | Facility: CLINIC | Age: 83
Discharge: HOME | End: 2023-10-11
Payer: MEDICARE

## 2023-10-11 VITALS
WEIGHT: 208 LBS | OXYGEN SATURATION: 93 % | DIASTOLIC BLOOD PRESSURE: 79 MMHG | HEIGHT: 65 IN | BODY MASS INDEX: 34.66 KG/M2 | SYSTOLIC BLOOD PRESSURE: 153 MMHG | HEART RATE: 54 BPM | RESPIRATION RATE: 14 BRPM | TEMPERATURE: 97 F

## 2023-10-11 DIAGNOSIS — M47.817 LUMBOSACRAL SPONDYLOSIS WITHOUT MYELOPATHY: Primary | ICD-10-CM

## 2023-10-11 DIAGNOSIS — R52 PAIN MANAGEMENT: ICD-10-CM

## 2023-10-11 DIAGNOSIS — M48.062 SPINAL STENOSIS OF LUMBAR REGION WITH NEUROGENIC CLAUDICATION: Chronic | ICD-10-CM

## 2023-10-11 DIAGNOSIS — M51.36 DDD (DEGENERATIVE DISC DISEASE), LUMBAR: ICD-10-CM

## 2023-10-11 LAB — GLUCOSE BLD-MCNC: 114 MG/DL (ref 65–105)

## 2023-10-11 PROCEDURE — 2500000003 HC RX 250 WO HCPCS: Performed by: ANESTHESIOLOGY

## 2023-10-11 PROCEDURE — 6360000002 HC RX W HCPCS: Performed by: ANESTHESIOLOGY

## 2023-10-11 PROCEDURE — 6360000004 HC RX CONTRAST MEDICATION: Performed by: ANESTHESIOLOGY

## 2023-10-11 PROCEDURE — 82947 ASSAY GLUCOSE BLOOD QUANT: CPT

## 2023-10-11 PROCEDURE — 62323 NJX INTERLAMINAR LMBR/SAC: CPT | Performed by: ANESTHESIOLOGY

## 2023-10-11 PROCEDURE — 62323 NJX INTERLAMINAR LMBR/SAC: CPT

## 2023-10-11 RX ORDER — MAGNESIUM GLUCONATE 27 MG(500)
500 TABLET ORAL DAILY
COMMUNITY

## 2023-10-11 RX ORDER — FENTANYL CITRATE 50 UG/ML
INJECTION, SOLUTION INTRAMUSCULAR; INTRAVENOUS
Status: COMPLETED | OUTPATIENT
Start: 2023-10-11 | End: 2023-10-11

## 2023-10-11 RX ORDER — LIDOCAINE HYDROCHLORIDE 10 MG/ML
INJECTION, SOLUTION EPIDURAL; INFILTRATION; INTRACAUDAL; PERINEURAL
Status: COMPLETED | OUTPATIENT
Start: 2023-10-11 | End: 2023-10-11

## 2023-10-11 RX ORDER — DEXAMETHASONE SODIUM PHOSPHATE 10 MG/ML
INJECTION, SOLUTION INTRAMUSCULAR; INTRAVENOUS
Status: COMPLETED | OUTPATIENT
Start: 2023-10-11 | End: 2023-10-11

## 2023-10-11 RX ORDER — MIDAZOLAM HYDROCHLORIDE 2 MG/2ML
INJECTION, SOLUTION INTRAMUSCULAR; INTRAVENOUS
Status: COMPLETED | OUTPATIENT
Start: 2023-10-11 | End: 2023-10-11

## 2023-10-11 RX ADMIN — FENTANYL CITRATE 50 MCG: 50 INJECTION, SOLUTION INTRAMUSCULAR; INTRAVENOUS at 10:01

## 2023-10-11 RX ADMIN — MIDAZOLAM HYDROCHLORIDE 1 MG: 1 INJECTION, SOLUTION INTRAMUSCULAR; INTRAVENOUS at 10:01

## 2023-10-11 RX ADMIN — LIDOCAINE HYDROCHLORIDE 5 ML: 10 INJECTION, SOLUTION EPIDURAL; INFILTRATION; INTRACAUDAL at 10:01

## 2023-10-11 RX ADMIN — DEXAMETHASONE SODIUM PHOSPHATE 10 MG: 10 INJECTION, SOLUTION INTRAMUSCULAR; INTRAVENOUS at 10:02

## 2023-10-11 RX ADMIN — IOHEXOL 3 ML: 180 INJECTION INTRAVENOUS at 10:01

## 2023-10-11 ASSESSMENT — PAIN - FUNCTIONAL ASSESSMENT
PAIN_FUNCTIONAL_ASSESSMENT: PREVENTS OR INTERFERES SOME ACTIVE ACTIVITIES AND ADLS
PAIN_FUNCTIONAL_ASSESSMENT: NONE - DENIES PAIN
PAIN_FUNCTIONAL_ASSESSMENT: 0-10

## 2023-10-11 ASSESSMENT — PAIN DESCRIPTION - DESCRIPTORS: DESCRIPTORS: ACHING;THROBBING;BURNING;SHARP

## 2023-10-11 NOTE — H&P
Pain Pre-Op H&P Note    Fortunato Mark MD    HPI: Aparna Contreras  presents with     axial lower back pain  Reports radiation of pain down both legs  Associated intermittent leg numbness  No changes in bladder or bowel control  Pain aggravated with activity particularly walking  Denies any loss of bladder or bowel control    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     Meniere disease     right ear    Meniere's disease, right ear 12/10/2019    Morbidly obese ([address]) 07/23/2020    Neuropathy     Osteopenia 06/30/2014    Parkinson disease 07/23/2020    Postmenopausal state 12/10/2019    Postnasal drip 07/11/2020    Prediabetes     RAD (reactive airway disease) 06/30/2014    Thyroid nodule 03/01/2016    Tremor     r hand    Vitamin D deficiency 06/30/2014       Past Surgical History:   Procedure Laterality Date    CHOLECYSTECTOMY      COLONOSCOPY      ENDOSCOPY, COLON, DIAGNOSTIC      ERCP  03/21/2014    EXCISION / BIOPSY SKIN LESION OF TRUNK Left 11/08/2017    EXCISION MASS LEFT POSTERIOR SHOULDER, LEFT ANTERIOR ARM performed by Julisa Escudero MD at [address] [facility] (CERVIX STATUS UNKNOWN)  01/03/2007    PAIN MANAGEMENT PROCEDURE      SKIN BIOPSY Left 11/08/2017    Excision Mass Left Posterior Shoulder, Left Anterior Arm       Family History   Problem Relation Age of Onset    Cancer Mother 48        pineal gland/throat    Cancer Father 64        colon    Cancer Brother         lung    Other Brother         pulmonary embolus    Diabetes Brother     Heart Disease Brother

## 2023-10-11 NOTE — OP NOTE
Patient Name: Wilbur Albright   YOB: 1940  Room/Bed: Room/bed info not found  Medical Record Number: [id number]  Date: 10/11/2023       Sedation/ Anesthesia Plan:   intravenous sedation   as needed. Medications Planned:   midazolam (Versed) / Fentanyl  Intravenously  as needed. Preoperative Diagnosis:    1. Lumbosacral spondylosis without myelopathy    2. DDD (degenerative disc disease), lumbar    3. Spinal stenosis of lumbar region with neurogenic claudication        Postoperative Diagnosis:    1. Lumbosacral spondylosis without myelopathy    2. DDD (degenerative disc disease), lumbar    3. Spinal stenosis of lumbar region with neurogenic claudication        Procedure Performed:  Lumbar epidural steroid injection under fluoroscopy guidance    Blood Loss: None    Procedure: The Patient was seen in the preop area, chart was reviewed, informed consent was obtained. Patient was taken to procedure room and was placed in prone position. Vital signs were monitored through out the  Procedure. A time out was completed. The skin over the back was prepped and draped in sterile manner. The target point was marked at The interlaminar space at L5/S1 . Skin and deep tissues were anesthetized with 1 % lidocaine. A 20-gauge Tuohy epidural needlele was advanced  under fluoroscopy guidance in AP view. Epidural space was identified using RAIMUNDO technique. Position ws confirmed in Lateral view. Then after negative aspiration contrast dye Omnipaque-180 was injected with live fluoroscopy in AP views that showed  spread of the contrast in the epidural space  and no vascular runoff or intrathecal spread. Finally 5 ml of treatment solution containing 4 ml of PF NS and 1 ml of DEXAMETHASONE 10 mg / ml was injected. The needle was removed and a Band-Aid was placed over the needle  insertion site. The patient's vital signs remained stable and the patient tolerated the procedure well.       Electronically signed

## 2023-10-13 ENCOUNTER — CARE COORDINATION (OUTPATIENT)
Dept: CARE COORDINATION | Age: 83
End: 2023-10-13

## 2023-10-16 ENCOUNTER — CARE COORDINATION (OUTPATIENT)
Dept: CARE COORDINATION | Age: 83
End: 2023-10-16

## 2023-10-17 ENCOUNTER — CARE COORDINATION (OUTPATIENT)
Dept: CARE COORDINATION | Age: 83
End: 2023-10-17

## 2023-10-17 RX ORDER — FOLIC ACID 0.8 MG
500 TABLET ORAL NIGHTLY
COMMUNITY

## 2023-10-17 NOTE — CARE COORDINATION
Ambulatory Care Coordination Note  10/17/2023    Patient Current Location:  Home: [address] [place] [address] #[facility] [phone]     ACM contacted the patient by telephone. Verified name and  with patient as identifiers. Provided introduction to self, and explanation of the ACM role. Challenges to be reviewed by the provider   Additional needs identified to be addressed with provider: No  none               Method of communication with provider: none. ACM: Owens Peabody, RN  Spoke with pt who said she is titrating her ibs meds. She did have her back injection done. She will follow up with pain mgt 10/24. This was her third injection. she did see neurology who is having her take magnesium at  and feels like this is helping. She did try gabapentin but felt that did not help she is doing to stick with the mag she does have a routine fu with cardiology tomorrow   Will graduate from care coordination she does have acm phone number to call if she has any care coordination needs at this time     1) acp will bring in documents   2) sdoh done   3) med review  4) rpm has   5) cardiology follow up Oct  6) endo follow up Nov  7) ortho may need MRI   8) neuro apt oct 3       Offered patient enrollment in the Remote Patient Monitoring (RPM) program for in-home monitoring: NA. Lab Results       None                 Goals Addressed                   This Visit's Progress     Conditions and Symptoms   On track     I will schedule office visits, as directed by my provider. I will keep my appointment or reschedule if I have to cancel. I will notify my provider of any barriers to my plan of care. I will follow my Zone Management tool to seek urgent or emergent care.     Barriers: none  Plan for overcoming my barriers: care coordination   Confidence: 10/10  Anticipated Goal Completion Date: 10/24/23                Future Appointments   Date Time Provider [address]   10/18/2023  1:30 PM Jacques Lamar DO AFL

## 2023-10-24 ENCOUNTER — OFFICE VISIT (OUTPATIENT)
Dept: PAIN MANAGEMENT | Age: 83
End: 2023-10-24
Payer: MEDICARE

## 2023-10-24 VITALS — HEIGHT: 65 IN | WEIGHT: 206 LBS | OXYGEN SATURATION: 96 % | BODY MASS INDEX: 34.32 KG/M2 | HEART RATE: 66 BPM

## 2023-10-24 DIAGNOSIS — M47.817 LUMBOSACRAL SPONDYLOSIS WITHOUT MYELOPATHY: Primary | ICD-10-CM

## 2023-10-24 PROCEDURE — 1090F PRES/ABSN URINE INCON ASSESS: CPT | Performed by: ANESTHESIOLOGY

## 2023-10-24 PROCEDURE — G8484 FLU IMMUNIZE NO ADMIN: HCPCS | Performed by: ANESTHESIOLOGY

## 2023-10-24 PROCEDURE — 1036F TOBACCO NON-USER: CPT | Performed by: ANESTHESIOLOGY

## 2023-10-24 PROCEDURE — 1123F ACP DISCUSS/DSCN MKR DOCD: CPT | Performed by: ANESTHESIOLOGY

## 2023-10-24 PROCEDURE — G8427 DOCREV CUR MEDS BY ELIG CLIN: HCPCS | Performed by: ANESTHESIOLOGY

## 2023-10-24 PROCEDURE — 99213 OFFICE O/P EST LOW 20 MIN: CPT | Performed by: ANESTHESIOLOGY

## 2023-10-24 PROCEDURE — G8417 CALC BMI ABV UP PARAM F/U: HCPCS | Performed by: ANESTHESIOLOGY

## 2023-10-24 PROCEDURE — G8399 PT W/DXA RESULTS DOCUMENT: HCPCS | Performed by: ANESTHESIOLOGY

## 2023-10-24 ASSESSMENT — ENCOUNTER SYMPTOMS
BACK PAIN: 1
VOMITING: 0
CONSTIPATION: 0
WHEEZING: 0
SHORTNESS OF BREATH: 0
NAUSEA: 0
CHEST TIGHTNESS: 0
DIARRHEA: 0
COUGH: 1

## 2023-10-24 NOTE — PROGRESS NOTES
The patient is a 82 y. o. Non- / non  female. Chief Complaint   Patient presents with    Back Pain    Follow Up After Procedure     Lumbar epidural steroid injection under fluoroscopy guidance        Back Pain  Pertinent negatives include no fever, numbness or weakness. This is a pleasant 46-year-old female with history of chronic low back pain  Was last seen for flareup of sciatica had epidural injection  Today reports significant improvement in leg pain    Index pain is back pain located in the lumbosacral area across midline affecting both side describes as aching nagging stiffness  She had lumbar medial branch nerve radiofrequency ablation at bilateral L4-5 and L5-S1 facet in October last year  Patient report significant improvement more than 80% pain relief with improved activity tolerance and range of motion, relief lasted for almost 1 year  Pain is now returning back to its baseline  Similar to presentation of previous axial lower back pain  Clinical presentation suggest facet mediated pain  Will recommend for repeat lumbar radiofrequency ablation      S/P: Lumbar epidural steroid injection under fluoroscopy guidance      Outcome   Any improvement of activity?   No   Any side effects (appetite,leg cramping,facial fleshing): facial flushing   Increase of pain:  Yes  Pain score Today:  6  % of pain relief:40%  Pain diary (medial branch block): No    Hemoglobin A1C   Date Value Ref Range Status   07/11/2023 5.6 4.0 - 6.0 % Final           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

## 2023-11-06 ENCOUNTER — PATIENT MESSAGE (OUTPATIENT)
Dept: FAMILY MEDICINE CLINIC | Age: 83
End: 2023-11-06

## 2023-11-06 DIAGNOSIS — F41.9 ANXIETY: ICD-10-CM

## 2023-11-07 ENCOUNTER — TELEPHONE (OUTPATIENT)
Dept: PAIN MANAGEMENT | Age: 83
End: 2023-11-07

## 2023-11-07 NOTE — TELEPHONE ENCOUNTER
From: Dawson Blackwell  To: Dr. Sage Moran  Sent: 11/6/2023 7:46 PM EST  Subject: Refill a prescription    Dr. Anuradha Jones,  Can you please refill a prescription for me? Strangely it is not on my med list and you have filled it for me a couple of times. The med is Lorazepam (Ativan) . 05 (which is the current dosage)  It was last filled 5/23/23. Also since an emergency room visit, it was suggested that I take 1.0  I don't get this filled very often. Because of taking 1.0 I take two of the .05 pills.   Many thanks,  Merissa Howard  [birth year]

## 2023-11-07 NOTE — TELEPHONE ENCOUNTER
Patient would like to schedule a procedure with Dr. Earl, unable to reach office, please call [phone] [facility]/sc

## 2023-11-08 RX ORDER — LORAZEPAM 0.5 MG/1
0.5 TABLET ORAL NIGHTLY
Qty: 30 TABLET | Refills: 0 | Status: SHIPPED | OUTPATIENT
Start: 2023-11-08 | End: 2023-12-08

## 2023-11-13 ENCOUNTER — APPOINTMENT (OUTPATIENT)
Dept: GENERAL RADIOLOGY | Facility: CLINIC | Age: 83
End: 2023-11-13
Payer: MEDICARE

## 2023-11-13 ENCOUNTER — HOSPITAL ENCOUNTER (EMERGENCY)
Facility: CLINIC | Age: 83
Discharge: HOME | End: 2023-11-13
Attending: EMERGENCY MEDICINE
Payer: MEDICARE

## 2023-11-13 ENCOUNTER — APPOINTMENT (OUTPATIENT)
Dept: CT IMAGING | Facility: CLINIC | Age: 83
End: 2023-11-13
Payer: MEDICARE

## 2023-11-13 VITALS
BODY MASS INDEX: 34.66 KG/M2 | TEMPERATURE: 98.6 F | SYSTOLIC BLOOD PRESSURE: 141 MMHG | DIASTOLIC BLOOD PRESSURE: 97 MMHG | HEART RATE: 76 BPM | OXYGEN SATURATION: 96 % | WEIGHT: 208 LBS | HEIGHT: 65 IN | RESPIRATION RATE: 16 BRPM

## 2023-11-13 DIAGNOSIS — R11.2 NAUSEA VOMITING AND DIARRHEA: ICD-10-CM

## 2023-11-13 DIAGNOSIS — R19.7 NAUSEA VOMITING AND DIARRHEA: ICD-10-CM

## 2023-11-13 DIAGNOSIS — R10.13 EPIGASTRIC PAIN: Primary | ICD-10-CM

## 2023-11-13 LAB
ALBUMIN SERPL-MCNC: 4.3 G/DL (ref 3.5–5.2)
ALBUMIN/GLOB SERPL: 1.7 {RATIO} (ref 1–2.5)
ALP SERPL-CCNC: 71 U/L (ref 35–104)
ALT SERPL-CCNC: 40 U/L (ref 5–33)
ANION GAP SERPL CALCULATED.3IONS-SCNC: 10 MMOL/L (ref 9–17)
AST SERPL-CCNC: 36 U/L
BASOPHILS # BLD: 0 K/UL (ref 0–0.2)
BASOPHILS NFR BLD: 1 % (ref 0–2)
BILIRUB SERPL-MCNC: 0.3 MG/DL (ref 0.3–1.2)
BILIRUB UR QL STRIP: NEGATIVE
BUN SERPL-MCNC: 14 MG/DL (ref 8–23)
CALCIUM SERPL-MCNC: 10 MG/DL (ref 8.6–10.4)
CHLORIDE SERPL-SCNC: 105 MMOL/L (ref 98–107)
CLARITY UR: CLEAR
CO2 SERPL-SCNC: 25 MMOL/L (ref 20–31)
COLOR UR: YELLOW
COMMENT: NORMAL
CREAT SERPL-MCNC: 0.7 MG/DL (ref 0.5–0.9)
EOSINOPHIL # BLD: 0.1 K/UL (ref 0–0.4)
EOSINOPHILS RELATIVE PERCENT: 1 % (ref 1–4)
ERYTHROCYTE [DISTWIDTH] IN BLOOD BY AUTOMATED COUNT: 12.6 % (ref 12.5–15.4)
GFR SERPL CREATININE-BSD FRML MDRD: >60 ML/MIN/1.73M2
GLUCOSE SERPL-MCNC: 113 MG/DL (ref 70–99)
GLUCOSE UR STRIP-MCNC: NEGATIVE MG/DL
HCT VFR BLD AUTO: 38.9 % (ref 36–46)
HGB BLD-MCNC: 13 G/DL (ref 12–16)
HGB UR QL STRIP.AUTO: NEGATIVE
KETONES UR STRIP-MCNC: NEGATIVE MG/DL
LEUKOCYTE ESTERASE UR QL STRIP: NEGATIVE
LIPASE SERPL-CCNC: 23 U/L (ref 13–60)
LYMPHOCYTES NFR BLD: 1.8 K/UL (ref 1–4.8)
LYMPHOCYTES RELATIVE PERCENT: 29 % (ref 24–44)
MCH RBC QN AUTO: 32.1 PG (ref 26–34)
MCHC RBC AUTO-ENTMCNC: 33.5 G/DL (ref 31–37)
MCV RBC AUTO: 96.1 FL (ref 80–100)
MONOCYTES NFR BLD: 0.5 K/UL (ref 0.1–1.2)
MONOCYTES NFR BLD: 8 % (ref 2–11)
NEUTROPHILS NFR BLD: 61 % (ref 36–66)
NEUTS SEG NFR BLD: 4 K/UL (ref 1.8–7.7)
NITRITE UR QL STRIP: NEGATIVE
PH UR STRIP: 5.5 [PH] (ref 5–8)
PLATELET # BLD AUTO: 246 K/UL (ref 140–450)
PMV BLD AUTO: 7.8 FL (ref 6–12)
POTASSIUM SERPL-SCNC: 5 MMOL/L (ref 3.7–5.3)
PROT SERPL-MCNC: 6.8 G/DL (ref 6.4–8.3)
PROT UR STRIP-MCNC: NEGATIVE MG/DL
RBC # BLD AUTO: 4.05 M/UL (ref 4–5.2)
SODIUM SERPL-SCNC: 140 MMOL/L (ref 135–144)
SP GR UR STRIP: 1.01 (ref 1–1.03)
TROPONIN I SERPL HS-MCNC: 9 NG/L (ref 0–14)
UROBILINOGEN UR STRIP-ACNC: NORMAL EU/DL (ref 0–1)
WBC OTHER # BLD: 6.4 K/UL (ref 3.5–11)

## 2023-11-13 PROCEDURE — 81003 URINALYSIS AUTO W/O SCOPE: CPT

## 2023-11-13 PROCEDURE — 83690 ASSAY OF LIPASE: CPT

## 2023-11-13 PROCEDURE — 2580000003 HC RX 258: Performed by: REGISTERED NURSE

## 2023-11-13 PROCEDURE — 80053 COMPREHEN METABOLIC PANEL: CPT

## 2023-11-13 PROCEDURE — 6370000000 HC RX 637 (ALT 250 FOR IP): Performed by: REGISTERED NURSE

## 2023-11-13 PROCEDURE — 85025 COMPLETE CBC W/AUTO DIFF WBC: CPT

## 2023-11-13 PROCEDURE — 96361 HYDRATE IV INFUSION ADD-ON: CPT

## 2023-11-13 PROCEDURE — 36415 COLL VENOUS BLD VENIPUNCTURE: CPT

## 2023-11-13 PROCEDURE — 71045 X-RAY EXAM CHEST 1 VIEW: CPT

## 2023-11-13 PROCEDURE — 96360 HYDRATION IV INFUSION INIT: CPT

## 2023-11-13 PROCEDURE — 93005 ELECTROCARDIOGRAM TRACING: CPT | Performed by: REGISTERED NURSE

## 2023-11-13 PROCEDURE — 74176 CT ABD & PELVIS W/O CONTRAST: CPT

## 2023-11-13 PROCEDURE — 99285 EMERGENCY DEPT VISIT HI MDM: CPT

## 2023-11-13 PROCEDURE — 84484 ASSAY OF TROPONIN QUANT: CPT

## 2023-11-13 RX ORDER — LEVOTHYROXINE SODIUM 0.07 MG/1
75 TABLET ORAL DAILY
Qty: 90 TABLET | Refills: 1 | Status: SHIPPED | OUTPATIENT
Start: 2023-11-13

## 2023-11-13 RX ORDER — LEVOTHYROXINE SODIUM 0.07 MG/1
TABLET ORAL
Qty: 90 TABLET | Refills: 1 | Status: SHIPPED | OUTPATIENT
Start: 2023-11-13 | End: 2023-11-30

## 2023-11-13 RX ORDER — DICYCLOMINE HYDROCHLORIDE 10 MG/1
10 CAPSULE ORAL ONCE
Status: COMPLETED | OUTPATIENT
Start: 2023-11-13 | End: 2023-11-13

## 2023-11-13 RX ORDER — ONDANSETRON 4 MG/1
TABLET, ORALLY DISINTEGRATING ORAL
Qty: 20 TABLET | Refills: 3 | Status: SHIPPED | OUTPATIENT
Start: 2023-11-13

## 2023-11-13 RX ORDER — 0.9 % SODIUM CHLORIDE 0.9 %
500 INTRAVENOUS SOLUTION INTRAVENOUS ONCE
Status: COMPLETED | OUTPATIENT
Start: 2023-11-13 | End: 2023-11-13

## 2023-11-13 RX ADMIN — DICYCLOMINE HYDROCHLORIDE 10 MG: 10 CAPSULE ORAL at 13:35

## 2023-11-13 RX ADMIN — SODIUM CHLORIDE 500 ML: 9 INJECTION, SOLUTION INTRAVENOUS at 13:35

## 2023-11-13 ASSESSMENT — ENCOUNTER SYMPTOMS
BACK PAIN: 0
NAUSEA: 1
COUGH: 0
ABDOMINAL PAIN: 1
VOMITING: 1
SHORTNESS OF BREATH: 0
BLOOD IN STOOL: 0
DIARRHEA: 1

## 2023-11-13 ASSESSMENT — PAIN DESCRIPTION - LOCATION
LOCATION: ABDOMEN
LOCATION: ABDOMEN

## 2023-11-13 ASSESSMENT — PAIN DESCRIPTION - ORIENTATION: ORIENTATION: LEFT;RIGHT

## 2023-11-13 ASSESSMENT — PAIN SCALES - GENERAL
PAINLEVEL_OUTOF10: 4
PAINLEVEL_OUTOF10: 4

## 2023-11-13 ASSESSMENT — PAIN - FUNCTIONAL ASSESSMENT: PAIN_FUNCTIONAL_ASSESSMENT: 0-10

## 2023-11-13 ASSESSMENT — PAIN DESCRIPTION - DESCRIPTORS: DESCRIPTORS: CRAMPING

## 2023-11-13 NOTE — ED PROVIDER NOTES
emergency department. The importance of appropriate follow up was also discussed. I have reviewed the disposition diagnosis with the patient and or their family/guardian. I have answered their questions and given discharge instructions. They voiced understanding of these instructions and did not have any further questions or complaints. CONSULTS:    None    CRITICAL CARE:     None    PROCEDURES:    None    FINAL IMPRESSION      1. Epigastric pain    2.  Nausea vomiting and diarrhea          DISPOSITION/PLAN   DISPOSITION Decision To Discharge 11/13/2023 02:41:35 PM      Condition on Disposition    Improved    PATIENT REFERRED TO:  Andres Christine MD  [address] [place] [address]  [phone]    Call in 1 day      [facility]  [phone]    If symptoms worsen      DISCHARGE MEDICATIONS:  New Prescriptions    No medications on file       (Please note that portions of this note were completed with a voice recognition program.  Efforts were made to edit the dictations but occasionally words are mis-transcribed.)    Trisha Patel DO, DO  Attending Emergency Physician       Trisha Patel DO  11/13/23 [id number]
DISPOSITION / PLAN     CONDITION ON DISPOSITION:   Stable for discharge.      PATIENT REFERRED TO:  Teofilo Garza MD  [address] [facility] [address]  [phone]    Call in 1 day      [facility]  [facility]  [phone]    If symptoms worsen      DISCHARGE MEDICATIONS:  New Prescriptions    No medications on file       VINNIE Wong - [address] [facility]   Emergency Medicine Nurse Practitioner    (Please note that portions of this note were completed with a voice recognition program.  Efforts were made to edit the dictations but occasionally words aremis-transcribed.)       VINNIE Wong - CNP  11/13/23 [id number]

## 2023-11-14 ENCOUNTER — PATIENT MESSAGE (OUTPATIENT)
Dept: FAMILY MEDICINE CLINIC | Age: 83
End: 2023-11-14

## 2023-11-14 LAB
EKG ATRIAL RATE: 67 BPM
EKG P AXIS: 56 DEGREES
EKG P-R INTERVAL: 164 MS
EKG Q-T INTERVAL: 410 MS
EKG QRS DURATION: 84 MS
EKG QTC CALCULATION (BAZETT): 433 MS
EKG R AXIS: -23 DEGREES
EKG T AXIS: 45 DEGREES
EKG VENTRICULAR RATE: 67 BPM

## 2023-11-14 RX ORDER — ONDANSETRON 4 MG/1
TABLET, ORALLY DISINTEGRATING ORAL
Qty: 20 TABLET | Refills: 3 | Status: SHIPPED | OUTPATIENT
Start: 2023-11-14

## 2023-11-14 NOTE — TELEPHONE ENCOUNTER
From: Charlene Mcintosh  To: Dr. Teofilo Garza  Sent: 11/14/2023 9:06 AM EST  Subject: ER follow up    Dr. Kayli Bryan,  Last week I had a bad case of food poising with a lot of vomiting and diarrhea. I went to the ER with digestive issues that resulted. My discharge papers say to see you within 1 day. I would think I would need to see Dr. Jay Jay Ortega, my GI doc. Do you agree?   Many thanks,  Cornell Waldrop  [birth year]

## 2023-11-14 NOTE — TELEPHONE ENCOUNTER
Margarita Matthews is calling to request a refill on the following medication(s):    Last Visit Date (If Applicable):  Visit date not found    Next Visit Date:    Visit date not found    Medication Request:  Requested Prescriptions     Pending Prescriptions Disp Refills    ondansetron (ZOFRAN-ODT) 4 MG disintegrating tablet [Pharmacy Med Name: ONDANSETRON ODT 4 MG TABLET] 20 tablet 3     Sig: DISSOLVE ONE TABLET BY MOUTH EVERY 8 HOURS AS NEEDED

## 2023-11-24 DIAGNOSIS — M70.61 GREATER TROCHANTERIC BURSITIS OF RIGHT HIP: Primary | ICD-10-CM

## 2023-11-30 ENCOUNTER — OFFICE VISIT (OUTPATIENT)
Dept: FAMILY MEDICINE CLINIC | Age: 83
End: 2023-11-30
Payer: MEDICARE

## 2023-11-30 VITALS
TEMPERATURE: 97.2 F | HEIGHT: 65 IN | OXYGEN SATURATION: 98 % | BODY MASS INDEX: 33.89 KG/M2 | HEART RATE: 77 BPM | DIASTOLIC BLOOD PRESSURE: 78 MMHG | SYSTOLIC BLOOD PRESSURE: 127 MMHG | WEIGHT: 203.4 LBS

## 2023-11-30 DIAGNOSIS — Z00.00 MEDICARE ANNUAL WELLNESS VISIT, SUBSEQUENT: Primary | ICD-10-CM

## 2023-11-30 DIAGNOSIS — F41.9 ANXIETY: ICD-10-CM

## 2023-11-30 DIAGNOSIS — D23.4 BENIGN NEOPLASM OF SCALP: ICD-10-CM

## 2023-11-30 PROCEDURE — G8417 CALC BMI ABV UP PARAM F/U: HCPCS | Performed by: FAMILY MEDICINE

## 2023-11-30 PROCEDURE — G0439 PPPS, SUBSEQ VISIT: HCPCS | Performed by: FAMILY MEDICINE

## 2023-11-30 PROCEDURE — 99213 OFFICE O/P EST LOW 20 MIN: CPT | Performed by: FAMILY MEDICINE

## 2023-11-30 PROCEDURE — 1036F TOBACCO NON-USER: CPT | Performed by: FAMILY MEDICINE

## 2023-11-30 PROCEDURE — G8427 DOCREV CUR MEDS BY ELIG CLIN: HCPCS | Performed by: FAMILY MEDICINE

## 2023-11-30 PROCEDURE — G8484 FLU IMMUNIZE NO ADMIN: HCPCS | Performed by: FAMILY MEDICINE

## 2023-11-30 PROCEDURE — G8399 PT W/DXA RESULTS DOCUMENT: HCPCS | Performed by: FAMILY MEDICINE

## 2023-11-30 PROCEDURE — 1123F ACP DISCUSS/DSCN MKR DOCD: CPT | Performed by: FAMILY MEDICINE

## 2023-11-30 PROCEDURE — 1090F PRES/ABSN URINE INCON ASSESS: CPT | Performed by: FAMILY MEDICINE

## 2023-11-30 RX ORDER — LORAZEPAM 1 MG/1
1 TABLET ORAL DAILY PRN
Qty: 30 TABLET | Refills: 0 | Status: SHIPPED | OUTPATIENT
Start: 2023-11-30 | End: 2023-12-30

## 2023-11-30 ASSESSMENT — LIFESTYLE VARIABLES: HOW OFTEN DO YOU HAVE A DRINK CONTAINING ALCOHOL: NEVER

## 2023-11-30 NOTE — PROGRESS NOTES
Medicare Annual Wellness Visit    Mark Mascorro is here for Medicare AWV    Assessment & Plan   Medicare annual wellness visit, subsequent  Anxiety  -     LORazepam (ATIVAN) 1 MG tablet; Take 1 tablet by mouth daily as needed for Anxiety for up to 30 days. Max Daily Amount: 1 mg, Disp-30 tablet, R-0Normal  Benign neoplasm of scalp  -     XR SKULL (MIN 4 VIEWS); Future    The patient will continue to follow-up with the specialist.  Tracy Izaguirre called in due to increased stress. Await results of the x-ray. If the x-ray does not show any results will refer to a surgeon for further recommendation. Call or return to clinic prn if these symptoms worsen or fail to improve as anticipated. I have reviewed the instructions with the patient, answering all questions to her satisfaction. Recommendations for Preventive Services Due: see orders and patient instructions/AVS.  Recommended screening schedule for the next 5-10 years is provided to the patient in written form: see Patient Instructions/AVS.     No follow-ups on file. Subjective   The following acute and/or chronic problems were also addressed today:  Doing well - FU with spec including cardio, pulmo, endo, neuro. GI. Ortho. Needs add script for lorazepam - a lot of stress with her fam - brother is having surgery. Chronic fatigue form veena barr virus   IBS - FU with GI. Patient also states that she has a lump at her mid upper forehead. Is getting bigger. CT was done last year which did not show any abnormalities at the skull area. Patient's complete Health Risk Assessment and screening values have been reviewed and are found in Flowsheets. The following problems were reviewed today and where indicated follow up appointments were made and/or referrals ordered.     Positive Risk Factor Screenings with Interventions:                 Weight and Activity:  Physical Activity: Insufficiently Active (11/30/2023)    Exercise Vital Sign     Days of

## 2023-11-30 NOTE — PATIENT INSTRUCTIONS
Learning About Vision Tests  What are vision tests? The four most common vision tests are visual acuity tests, refraction, visual field tests, and color vision tests. Visual acuity (sharpness) tests  These tests are used: To see if you need glasses or contact lenses. To monitor an eye problem. To check an eye injury. Visual acuity tests are done as part of routine exams. You may also have this test when you get your 's license or apply for some types of jobs. Visual field tests  These tests are used: To check for vision loss in any area of your range of vision. To screen for certain eye diseases. To look for nerve damage after a stroke, head injury, or other problem that could reduce blood flow to the brain. Refraction and color tests  A refraction test is done to find the right prescription for glasses and contact lenses. A color vision test is done to check for color blindness. Color vision is often tested as part of a routine exam. You may also have this test when you apply for a job where recognizing different colors is important, such as , electronics, or the [employer]. How are vision tests done? Visual acuity test   You cover one eye at a time. You read aloud from a wall chart across the room. You read aloud from a small card that you hold in your hand. Refraction   You look into a special device. The device puts lenses of different strengths in front of each eye to see how strong your glasses or contact lenses need to be. Visual field tests   Your doctor may have you look through special machines. Or your doctor may simply have you stare straight ahead while they move a finger into and out of your field of vision. Color vision test   You look at pieces of printed test patterns in various colors. You say what number or symbol you see. Your doctor may have you trace the number or symbol using a pointer. How do these tests feel?   There is very little chance of

## 2023-12-04 ENCOUNTER — OFFICE VISIT (OUTPATIENT)
Dept: ORTHOPEDIC SURGERY | Age: 83
End: 2023-12-04
Payer: MEDICARE

## 2023-12-04 VITALS — HEIGHT: 65 IN | OXYGEN SATURATION: 98 % | WEIGHT: 203.4 LBS | BODY MASS INDEX: 33.89 KG/M2 | RESPIRATION RATE: 16 BRPM

## 2023-12-04 DIAGNOSIS — M70.61 GREATER TROCHANTERIC BURSITIS OF RIGHT HIP: Primary | ICD-10-CM

## 2023-12-04 PROCEDURE — 99213 OFFICE O/P EST LOW 20 MIN: CPT | Performed by: PHYSICIAN ASSISTANT

## 2023-12-04 PROCEDURE — G8417 CALC BMI ABV UP PARAM F/U: HCPCS | Performed by: PHYSICIAN ASSISTANT

## 2023-12-04 PROCEDURE — G8399 PT W/DXA RESULTS DOCUMENT: HCPCS | Performed by: PHYSICIAN ASSISTANT

## 2023-12-04 PROCEDURE — 1123F ACP DISCUSS/DSCN MKR DOCD: CPT | Performed by: PHYSICIAN ASSISTANT

## 2023-12-04 PROCEDURE — 20611 DRAIN/INJ JOINT/BURSA W/US: CPT | Performed by: PHYSICIAN ASSISTANT

## 2023-12-04 PROCEDURE — 1036F TOBACCO NON-USER: CPT | Performed by: PHYSICIAN ASSISTANT

## 2023-12-04 PROCEDURE — G8484 FLU IMMUNIZE NO ADMIN: HCPCS | Performed by: PHYSICIAN ASSISTANT

## 2023-12-04 PROCEDURE — 1090F PRES/ABSN URINE INCON ASSESS: CPT | Performed by: PHYSICIAN ASSISTANT

## 2023-12-04 PROCEDURE — G8427 DOCREV CUR MEDS BY ELIG CLIN: HCPCS | Performed by: PHYSICIAN ASSISTANT

## 2023-12-04 RX ORDER — LIDOCAINE HYDROCHLORIDE 10 MG/ML
2 INJECTION, SOLUTION INFILTRATION; PERINEURAL ONCE
Status: COMPLETED | OUTPATIENT
Start: 2023-12-04 | End: 2023-12-04

## 2023-12-04 RX ORDER — METHYLPREDNISOLONE ACETATE 80 MG/ML
80 INJECTION, SUSPENSION INTRA-ARTICULAR; INTRALESIONAL; INTRAMUSCULAR; SOFT TISSUE ONCE
Status: COMPLETED | OUTPATIENT
Start: 2023-12-04 | End: 2023-12-04

## 2023-12-04 RX ADMIN — METHYLPREDNISOLONE ACETATE 80 MG: 80 INJECTION, SUSPENSION INTRA-ARTICULAR; INTRALESIONAL; INTRAMUSCULAR; SOFT TISSUE at 15:56

## 2023-12-04 RX ADMIN — LIDOCAINE HYDROCHLORIDE 2 ML: 10 INJECTION, SOLUTION INFILTRATION; PERINEURAL at 15:56

## 2023-12-04 ASSESSMENT — ENCOUNTER SYMPTOMS
APNEA: 0
CONSTIPATION: 0
VOMITING: 0
DIARRHEA: 0
SHORTNESS OF BREATH: 0
RESPIRATORY NEGATIVE: 1
COUGH: 0
CHEST TIGHTNESS: 0
ABDOMINAL PAIN: 0
ABDOMINAL DISTENTION: 0
COLOR CHANGE: 0
NAUSEA: 0

## 2023-12-13 ENCOUNTER — PATIENT MESSAGE (OUTPATIENT)
Dept: FAMILY MEDICINE CLINIC | Age: 83
End: 2023-12-13

## 2023-12-14 RX ORDER — AZITHROMYCIN 250 MG/1
250 TABLET, FILM COATED ORAL SEE ADMIN INSTRUCTIONS
Qty: 6 TABLET | Refills: 0 | Status: SHIPPED | OUTPATIENT
Start: 2023-12-14 | End: 2023-12-19

## 2023-12-14 NOTE — TELEPHONE ENCOUNTER
From: Hay Donohue  To: Dr. Rhonda Link  Sent: 12/13/2023 7:22 PM EST  Subject: Prescription please    DR. Lane Keene.,  Could you please prescribe some medicine for me? I woke up with a scratchy throat, and some congestion. At times I have a runny nose. Also fatigue and off and on again chills. I would be most grateful for some medicine, so that this doesn't get any worse. It's been my history that I do better if I can nip this in the beginning. Currently I am taking tylenol, gargling, and pill form mucinex.   Many thanks for considering my request.  Soo Javed  10[birth year]

## 2024-01-30 ENCOUNTER — PATIENT MESSAGE (OUTPATIENT)
Dept: FAMILY MEDICINE CLINIC | Age: 84
End: 2024-01-30

## 2024-01-31 ENCOUNTER — OFFICE VISIT (OUTPATIENT)
Dept: ORTHOPEDIC SURGERY | Age: 84
End: 2024-01-31

## 2024-01-31 VITALS — BODY MASS INDEX: 34.99 KG/M2 | HEIGHT: 65 IN | RESPIRATION RATE: 15 BRPM | WEIGHT: 210 LBS | OXYGEN SATURATION: 100 %

## 2024-01-31 DIAGNOSIS — M25.461 KNEE EFFUSION, RIGHT: Primary | ICD-10-CM

## 2024-01-31 RX ORDER — AMITRIPTYLINE HYDROCHLORIDE 10 MG/1
10 TABLET, FILM COATED ORAL NIGHTLY PRN
Qty: 30 TABLET | Refills: 2 | Status: SHIPPED | OUTPATIENT
Start: 2024-01-31

## 2024-01-31 NOTE — TELEPHONE ENCOUNTER
From: Alicia Reid  To: Dr. Arianne Perez  Sent: 1/30/2024 7:26 PM EST  Subject: Refill a prescription    Dr. OLVERA,  Could you please refill a prescription for me?  It's Amityptiline (sp?) for leg nerve pain. It's on my med list I haven't taken it in awhile,  and it became outdated.    Thank you very much,  Alicia Reid  [birth year]

## 2024-02-01 ASSESSMENT — ENCOUNTER SYMPTOMS
ROS SKIN COMMENTS: NEGATIVE FOR RASH
ABDOMINAL PAIN: 0
SHORTNESS OF BREATH: 0
EYE DISCHARGE: 0

## 2024-02-01 NOTE — PROGRESS NOTES
[facility] ORTHOPEDICS AND SPORTS MEDICINE  [address]  [facility] [place] [address]  Dept: [phone]  Dept Fax: [phone]        Ambulatory Follow Up      Subjective:   Alicia Reid is a 83 y.o. year old female who presents to our office today for routine followup regarding her   1. Knee effusion, right    .    Chief Complaint   Patient presents with    Knee Pain     Right knee pain       HPI  Alicia Reid is an 83-year-old female who presents the office today for recheck of her right knee.  She was last seen by Ginger Bob on 9/6/2023 and had an aspiration of her knee at that time.  She notes now that the fluid is completely gone and she feels much better.  She had pain previously along the medial aspect of her knee that is now resolved.    Review of Systems   Constitutional:  Positive for activity change. Negative for fever.   HENT:  Negative for dental problem.    Eyes:  Negative for discharge.   Respiratory:  Negative for shortness of breath.    Cardiovascular:  Negative for chest pain.   Gastrointestinal:  Negative for abdominal pain.   Genitourinary: Negative.    Musculoskeletal:  Positive for arthralgias.   Skin:         Negative for rash   Neurological:  Positive for weakness.   Psychiatric/Behavioral:  Negative for confusion.        I have reviewed the CC, HPI, ROS, PMH, FHX, Social History, and if not present in this note, I have reviewed in the patient's chart.   I agree with the documentation provided by other staff and have reviewed their documentation prior to providing my signature indicating agreement.    Objective :   Resp 15   Ht 1.651 m (5' 5\")   Wt 95.3 kg (210 lb)   SpO2 100%   BMI 34.95 kg/m²  Body mass index is 34.95 kg/m².  General: Alicia Reid is a 83 y.o. female who is alert and oriented and sitting comfortably in our office.  Ortho Exam  MS: Patient ambulates without antalgia or short

## 2024-02-06 ENCOUNTER — HOSPITAL ENCOUNTER (OUTPATIENT)
Dept: GENERAL RADIOLOGY | Facility: CLINIC | Age: 84
Discharge: HOME | End: 2024-02-08
Payer: MEDICARE

## 2024-02-06 DIAGNOSIS — D23.4 BENIGN NEOPLASM OF SCALP: ICD-10-CM

## 2024-02-06 PROCEDURE — 70260 X-RAY EXAM OF SKULL: CPT

## 2024-02-08 ENCOUNTER — TRANSCRIBE ORDERS (OUTPATIENT)
Dept: ADMINISTRATIVE | Age: 84
End: 2024-02-08

## 2024-02-08 DIAGNOSIS — R74.8 ELEVATED LIVER ENZYMES: Primary | ICD-10-CM

## 2024-02-13 ENCOUNTER — OFFICE VISIT (OUTPATIENT)
Dept: PAIN MANAGEMENT | Age: 84
End: 2024-02-13
Payer: MEDICARE

## 2024-02-13 VITALS — HEIGHT: 65 IN | HEART RATE: 84 BPM | WEIGHT: 210 LBS | BODY MASS INDEX: 34.99 KG/M2 | OXYGEN SATURATION: 96 %

## 2024-02-13 DIAGNOSIS — M47.817 LUMBOSACRAL SPONDYLOSIS WITHOUT MYELOPATHY: Primary | ICD-10-CM

## 2024-02-13 PROCEDURE — G8399 PT W/DXA RESULTS DOCUMENT: HCPCS | Performed by: ANESTHESIOLOGY

## 2024-02-13 PROCEDURE — 1036F TOBACCO NON-USER: CPT | Performed by: ANESTHESIOLOGY

## 2024-02-13 PROCEDURE — 1090F PRES/ABSN URINE INCON ASSESS: CPT | Performed by: ANESTHESIOLOGY

## 2024-02-13 PROCEDURE — G8417 CALC BMI ABV UP PARAM F/U: HCPCS | Performed by: ANESTHESIOLOGY

## 2024-02-13 PROCEDURE — 1123F ACP DISCUSS/DSCN MKR DOCD: CPT | Performed by: ANESTHESIOLOGY

## 2024-02-13 PROCEDURE — 99213 OFFICE O/P EST LOW 20 MIN: CPT | Performed by: ANESTHESIOLOGY

## 2024-02-13 PROCEDURE — G8427 DOCREV CUR MEDS BY ELIG CLIN: HCPCS | Performed by: ANESTHESIOLOGY

## 2024-02-13 PROCEDURE — G8484 FLU IMMUNIZE NO ADMIN: HCPCS | Performed by: ANESTHESIOLOGY

## 2024-02-13 NOTE — H&P (VIEW-ONLY)
mouth at bedtime      IBSRELA 50 MG TABS 50 mg as needed      REPATHA SURECLICK 140 MG/ML SOAJ INJECT ONE PEN INJECTOR UNDER THE SKIN EVERY 4-6 WEEKS AS DIRECTED 2 mL 2    diclofenac sodium (VOLTAREN) 1 % GEL Apply 4 g topically 4 times daily (Patient not taking: Reported on 11/30/2023) 150 g 0    ezetimibe (ZETIA) 10 MG tablet TAKE ONE TABLET BY MOUTH DAILY 90 tablet 3    dilTIAZem (CARDIZEM CD) 120 MG extended release capsule TAKE ONE CAPSULE BY MOUTH EVERY EVENING 90 capsule 3    metoprolol tartrate (LOPRESSOR) 25 MG tablet TAKE 1/2 TABLET BY MOUTH TWICE A DAY 90 tablet 3    midodrine (PROAMATINE) 5 MG tablet TAKE ONE TABLET BY MOUTH TWICE A  tablet 2    metFORMIN (GLUCOPHAGE) 500 MG tablet TAKE ONE TABLET BY MOUTH DAILY 90 tablet 1    amitriptyline (ELAVIL) 10 MG tablet TAKE ONE TABLET BY MOUTH DAILY AS NEEDED FOR SLEEP AND LEG PAIN FOR UP TO 30 DAYS 90 tablet 2    alclomethasone (ACLOVATE) 0.05 % cream Apply topically 2 times daily Prn      albuterol sulfate HFA (PROVENTIL HFA) 108 (90 Base) MCG/ACT inhaler Inhale 2 puffs into the lungs every 6 hours as needed for Wheezing 18 g 3    guaiFENesin (MUCINEX) 600 MG extended release tablet Take 1 tablet by mouth 2 times daily 20 tablet 0    NONFORMULARY Indications: Dietary fiber 1 tsp a day       Cholecalciferol (VITAMIN D3) 2000 units CAPS Take 1 capsule by mouth 2 times daily (Patient taking differently: Take 1 capsule by mouth daily) 30 capsule 0    fluticasone (FLONASE) 50 MCG/ACT nasal spray PLACE 1 SPRAY IN EACH NOSTRIL DAILY FOR 15 DAYS 1 Bottle 2    ketoconazole (NIZORAL) 2 % shampoo APPLY  TO AFFECTED AREAS TOPICALLY DAILY THEN RINSE OFF AFTER 5 MINUTES (Patient taking differently: as needed) 120 mL 2    esomeprazole Magnesium (NEXIUM) 40 MG PACK Take 1 packet by mouth daily (Patient not taking: Reported on 11/30/2023)      NONFORMULARY Super b complex 3 times per week      ascorbic acid (VITAMIN C) 500 MG tablet Take 1 tablet by mouth daily

## 2024-02-13 NOTE — PROGRESS NOTES
The patient is a 83 y.o.Non- / non  female.    Chief Complaint   Patient presents with    Back Pain     Ins denial procedure      This is a pleasant 82-year-old female with history of chronic low back pain  Index pain is back pain located in the lumbosacral area across midline affecting both side describes as aching nagging stiffness  She had lumbar medial branch nerve radiofrequency ablation at bilateral L4-5 and L5-S1 facet in October 2022  Patient report significant improvement more than 80% pain relief with improved activity tolerance and range of motion, relief lasted for almost 1 year  Pain is now returning back to its baseline  Similar to presentation of previous axial lower back pain  Clinical presentation suggest facet mediated pain  Will recommend for repeat lumbar radiofrequency ablation at bilateral L4-L5 and L5-S1 facet with fluoroscopy guidance    Oswestry disability index completed showing moderate disability associated with pain      Patient is here today for: back pain   Pain level: 8/10  Character: shooting stabbing  Radiating: yes bilateral legs  Weakness or numbness: no  Aggravating Factors: sitting positions  Alleviating Factors: chiropractor   Constant or intermitting: constant  Bladder/bowel loss: no      Past Medical History:   Diagnosis Date    Acid reflux     Anxiety 06/30/2014    ALICIA-7   09/09/19 : 4    Bilateral tinnitus 12/10/2019    Bronchitis, mucopurulent recurrent (HCC) 07/23/2020    Diverticular disease 06/30/2014    Enthesopathy of hip region 12/10/2019    GERD (gastroesophageal reflux disease)     Hypercholesteremia 06/30/2014    Hypothyroid 06/30/2014    Laryngopharyngeal reflux 12/10/2019    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

## 2024-02-16 ENCOUNTER — HOSPITAL ENCOUNTER (OUTPATIENT)
Age: 84
Setting detail: SPECIMEN
Discharge: HOME | End: 2024-02-16

## 2024-02-16 DIAGNOSIS — R55 SYNCOPE AND COLLAPSE: ICD-10-CM

## 2024-02-16 DIAGNOSIS — I95.1 AUTONOMIC ORTHOSTATIC HYPOTENSION: ICD-10-CM

## 2024-02-16 DIAGNOSIS — R00.2 PALPITATIONS: ICD-10-CM

## 2024-02-16 DIAGNOSIS — Z13.1 DIABETES MELLITUS SCREENING: ICD-10-CM

## 2024-02-16 DIAGNOSIS — E78.00 PURE HYPERCHOLESTEROLEMIA: ICD-10-CM

## 2024-02-16 DIAGNOSIS — R55 VASOVAGAL SYNCOPE: ICD-10-CM

## 2024-02-16 DIAGNOSIS — E66.01 CLASS 2 SEVERE OBESITY DUE TO EXCESS CALORIES WITH SERIOUS COMORBIDITY IN ADULT, UNSPECIFIED BMI (HCC): ICD-10-CM

## 2024-02-16 DIAGNOSIS — F41.9 ANXIETY: ICD-10-CM

## 2024-02-16 DIAGNOSIS — E78.9 DISEASE OF LIPID METABOLISM: ICD-10-CM

## 2024-02-16 LAB
25(OH)D3 SERPL-MCNC: 49.2 NG/ML
ALBUMIN SERPL-MCNC: 4.2 G/DL (ref 3.5–5.2)
ALBUMIN SERPL-MCNC: 4.3 G/DL (ref 3.5–5.2)
ALBUMIN SERPL-MCNC: 4.3 G/DL (ref 3.5–5.2)
ALBUMIN/GLOB SERPL: 1 {RATIO} (ref 1–2.5)
ALBUMIN/GLOB SERPL: 1.4 {RATIO} (ref 1–2.5)
ALBUMIN/GLOB SERPL: 2 {RATIO} (ref 1–2.5)
ALP SERPL-CCNC: 81 U/L (ref 35–104)
ALP SERPL-CCNC: 81 U/L (ref 35–104)
ALP SERPL-CCNC: 85 U/L (ref 35–104)
ALT SERPL-CCNC: 23 U/L (ref 5–33)
ALT SERPL-CCNC: 26 U/L (ref 10–35)
ALT SERPL-CCNC: 26 U/L (ref 10–35)
ANION GAP SERPL CALCULATED.3IONS-SCNC: 10 MMOL/L (ref 9–16)
ANION GAP SERPL CALCULATED.3IONS-SCNC: 10 MMOL/L (ref 9–17)
AST SERPL-CCNC: 22 U/L
AST SERPL-CCNC: 24 U/L (ref 10–35)
AST SERPL-CCNC: 25 U/L (ref 10–35)
BILIRUB DIRECT SERPL-MCNC: <0.2 MG/DL (ref 0–0.3)
BILIRUB INDIRECT SERPL-MCNC: 0.2 MG/DL (ref 0–1)
BILIRUB SERPL-MCNC: 0.3 MG/DL (ref 0.3–1.2)
BILIRUB SERPL-MCNC: 0.3 MG/DL (ref 0–1.2)
BILIRUB SERPL-MCNC: 0.3 MG/DL (ref 0–1.2)
BUN SERPL-MCNC: 17 MG/DL (ref 8–23)
BUN SERPL-MCNC: 18 MG/DL (ref 8–23)
CALCIUM SERPL-MCNC: 9.8 MG/DL (ref 8.6–10.4)
CALCIUM SERPL-MCNC: 9.8 MG/DL (ref 8.6–10.4)
CHLORIDE SERPL-SCNC: 103 MMOL/L (ref 98–107)
CHLORIDE SERPL-SCNC: 105 MMOL/L (ref 98–107)
CHOLEST SERPL-MCNC: 159 MG/DL (ref 0–199)
CHOLESTEROL/HDL RATIO: 3
CO2 SERPL-SCNC: 25 MMOL/L (ref 20–31)
CO2 SERPL-SCNC: 25 MMOL/L (ref 20–31)
CREAT SERPL-MCNC: 0.8 MG/DL (ref 0.5–0.9)
CREAT SERPL-MCNC: 0.9 MG/DL (ref 0.5–0.9)
EST. AVERAGE GLUCOSE BLD GHB EST-MCNC: 114 MG/DL
EST. AVERAGE GLUCOSE BLD GHB EST-MCNC: 117 MG/DL
GFR SERPL CREATININE-BSD FRML MDRD: >60 ML/MIN/1.73M2
GFR SERPL CREATININE-BSD FRML MDRD: >60 ML/MIN/1.73M2
GLOBULIN SER CALC-MCNC: 2.9 G/DL
GLUCOSE SERPL-MCNC: 117 MG/DL (ref 70–99)
GLUCOSE SERPL-MCNC: 117 MG/DL (ref 74–99)
HBA1C MFR BLD: 5.6 % (ref 4–6)
HBV SURFACE AB SERPL IA-ACNC: 20.79 MIU/ML
HBV SURFACE AG SERPL QL IA: NONREACTIVE
HCV AB SERPL QL IA: NONREACTIVE
HDLC SERPL-MCNC: 50 MG/DL
INSULIN COMMENT: NORMAL
INSULIN REFERENCE RANGE:: NORMAL
INSULIN: 43.7 MU/L
LDLC SERPL CALC-MCNC: 82 MG/DL (ref 0–100)
POTASSIUM SERPL-SCNC: 4.1 MMOL/L (ref 3.7–5.3)
POTASSIUM SERPL-SCNC: 4.3 MMOL/L (ref 3.7–5.3)
PROT SERPL-MCNC: 7 G/DL (ref 6.6–8.7)
PROT SERPL-MCNC: 7.2 G/DL (ref 6.6–8.7)
PROT SERPL-MCNC: 7.3 G/DL (ref 6.4–8.3)
SODIUM SERPL-SCNC: 138 MMOL/L (ref 135–144)
SODIUM SERPL-SCNC: 140 MMOL/L (ref 136–145)
T3FREE SERPL-MCNC: 2.66 PG/ML (ref 2.02–4.43)
T4 FREE SERPL-MCNC: 1.2 NG/DL (ref 0.9–1.7)
TRIGL SERPL-MCNC: 136 MG/DL
TSH SERPL DL<=0.05 MIU/L-ACNC: 3.25 UIU/ML (ref 0.3–5)
VLDLC SERPL CALC-MCNC: 27 MG/DL

## 2024-02-17 LAB — HAV AB SERPL IA-ACNC: NONREACTIVE

## 2024-02-18 LAB
APO B100 SERPL-MCNC: 82 MG/DL (ref 60–117)
INSULIN FREE: 27 UIU/ML (ref 3–25)
INSULIN: 35 UIU/ML (ref 3–25)
LPA SERPL-MCNC: <6 MG/DL

## 2024-02-26 ENCOUNTER — OFFICE VISIT (OUTPATIENT)
Dept: NEUROLOGY | Age: 84
End: 2024-02-26
Payer: MEDICARE

## 2024-02-26 VITALS
HEIGHT: 65 IN | SYSTOLIC BLOOD PRESSURE: 119 MMHG | DIASTOLIC BLOOD PRESSURE: 64 MMHG | BODY MASS INDEX: 35.39 KG/M2 | HEART RATE: 68 BPM | WEIGHT: 212.4 LBS

## 2024-02-26 DIAGNOSIS — G25.0 BENIGN ESSENTIAL TREMOR: ICD-10-CM

## 2024-02-26 DIAGNOSIS — R20.2 PARESTHESIAS: ICD-10-CM

## 2024-02-26 DIAGNOSIS — G62.9 NEUROPATHY: ICD-10-CM

## 2024-02-26 DIAGNOSIS — G57.12 NEUROPATHY OF LEFT LATERAL FEMORAL CUTANEOUS NERVE: Primary | ICD-10-CM

## 2024-02-26 PROCEDURE — 1123F ACP DISCUSS/DSCN MKR DOCD: CPT | Performed by: STUDENT IN AN ORGANIZED HEALTH CARE EDUCATION/TRAINING PROGRAM

## 2024-02-26 PROCEDURE — G8484 FLU IMMUNIZE NO ADMIN: HCPCS | Performed by: STUDENT IN AN ORGANIZED HEALTH CARE EDUCATION/TRAINING PROGRAM

## 2024-02-26 PROCEDURE — G8417 CALC BMI ABV UP PARAM F/U: HCPCS | Performed by: STUDENT IN AN ORGANIZED HEALTH CARE EDUCATION/TRAINING PROGRAM

## 2024-02-26 PROCEDURE — 99214 OFFICE O/P EST MOD 30 MIN: CPT | Performed by: STUDENT IN AN ORGANIZED HEALTH CARE EDUCATION/TRAINING PROGRAM

## 2024-02-26 PROCEDURE — G8427 DOCREV CUR MEDS BY ELIG CLIN: HCPCS | Performed by: STUDENT IN AN ORGANIZED HEALTH CARE EDUCATION/TRAINING PROGRAM

## 2024-02-26 PROCEDURE — G8399 PT W/DXA RESULTS DOCUMENT: HCPCS | Performed by: STUDENT IN AN ORGANIZED HEALTH CARE EDUCATION/TRAINING PROGRAM

## 2024-02-26 PROCEDURE — 1090F PRES/ABSN URINE INCON ASSESS: CPT | Performed by: STUDENT IN AN ORGANIZED HEALTH CARE EDUCATION/TRAINING PROGRAM

## 2024-02-26 PROCEDURE — 1036F TOBACCO NON-USER: CPT | Performed by: STUDENT IN AN ORGANIZED HEALTH CARE EDUCATION/TRAINING PROGRAM

## 2024-02-26 ASSESSMENT — ENCOUNTER SYMPTOMS
GASTROINTESTINAL NEGATIVE: 1
BACK PAIN: 1
EYES NEGATIVE: 1
RESPIRATORY NEGATIVE: 1

## 2024-02-26 NOTE — PROGRESS NOTES
XII - tongue midline without atrophy or fasciculation      Motor function  Normal muscle bulk and tone; strength 5/5 on all 4 extremities, no pronator drift      Sensory function Patchy sensory loss to pinprick in lower extremities      Cerebellar Intact fine motor movement.mild tremor of left hand that is postural      Reflex function DTR 2+ on bilateral UE and LE, symmetric.       Gait                   normal base and arm swing        ASSESSMENT / PLAN:   Alicia Reid is a 83 y.o. female that established care with neurology for paresthesias and hand tremor.    Plan:  Paresthesias likely secondary to lumbar radiculopathy. EMG was normal.   Continue  gabapentin 100 mg QHS for paresthesias, can up titrate if tolerated  Continue to follow up with orthopedics and pain management    Essential Tremor of hand:  Not bothersome to patient, will continue to monitor  No PD symptoms     Gwendolyn Hicks MD   Neurology & Sleep Medicine  [facility]

## 2024-02-29 ENCOUNTER — HOSPITAL ENCOUNTER (OUTPATIENT)
Dept: ULTRASOUND IMAGING | Age: 84
Discharge: HOME | End: 2024-03-02
Attending: INTERNAL MEDICINE
Payer: MEDICARE

## 2024-02-29 DIAGNOSIS — R74.8 ABNORMAL LIVER ENZYMES: ICD-10-CM

## 2024-02-29 PROCEDURE — 76981 USE PARENCHYMA: CPT

## 2024-03-12 RX ORDER — GABAPENTIN 100 MG/1
100 CAPSULE ORAL NIGHTLY
Qty: 30 CAPSULE | Refills: 5 | Status: SHIPPED | OUTPATIENT
Start: 2024-03-12 | End: 2024-09-08

## 2024-03-12 NOTE — TELEPHONE ENCOUNTER
Pharmacy requesting refill of Gabapentin 100mg.      Medication active on med list yes      Date of last Rx: 10/3/2023 with 0 refills          verified by ANA MENCHACA      Date of last appointment 2/26/2024    Next Visit Date:  4/26/2024

## 2024-03-13 ENCOUNTER — HOSPITAL ENCOUNTER (OUTPATIENT)
Dept: PAIN MANAGEMENT | Facility: CLINIC | Age: 84
Discharge: HOME | End: 2024-03-13
Payer: MEDICARE

## 2024-03-13 VITALS
DIASTOLIC BLOOD PRESSURE: 65 MMHG | BODY MASS INDEX: 35.32 KG/M2 | HEART RATE: 60 BPM | HEIGHT: 65 IN | WEIGHT: 212 LBS | RESPIRATION RATE: 14 BRPM | TEMPERATURE: 97.8 F | OXYGEN SATURATION: 93 % | SYSTOLIC BLOOD PRESSURE: 141 MMHG

## 2024-03-13 DIAGNOSIS — M47.817 LUMBOSACRAL SPONDYLOSIS WITHOUT MYELOPATHY: Primary | ICD-10-CM

## 2024-03-13 DIAGNOSIS — R52 PAIN MANAGEMENT: ICD-10-CM

## 2024-03-13 LAB — GLUCOSE BLD-MCNC: 116 MG/DL (ref 65–105)

## 2024-03-13 PROCEDURE — 99152 MOD SED SAME PHYS/QHP 5/>YRS: CPT | Performed by: ANESTHESIOLOGY

## 2024-03-13 PROCEDURE — 64636 DESTROY L/S FACET JNT ADDL: CPT

## 2024-03-13 PROCEDURE — 82947 ASSAY GLUCOSE BLOOD QUANT: CPT

## 2024-03-13 PROCEDURE — 2500000003 HC RX 250 WO HCPCS: Performed by: ANESTHESIOLOGY

## 2024-03-13 PROCEDURE — 6360000002 HC RX W HCPCS: Performed by: ANESTHESIOLOGY

## 2024-03-13 PROCEDURE — 64635 DESTROY LUMB/SAC FACET JNT: CPT | Performed by: ANESTHESIOLOGY

## 2024-03-13 PROCEDURE — 64635 DESTROY LUMB/SAC FACET JNT: CPT

## 2024-03-13 PROCEDURE — 64636 DESTROY L/S FACET JNT ADDL: CPT | Performed by: ANESTHESIOLOGY

## 2024-03-13 RX ORDER — LIDOCAINE HYDROCHLORIDE 40 MG/ML
INJECTION, SOLUTION RETROBULBAR; TOPICAL
Status: COMPLETED | OUTPATIENT
Start: 2024-03-13 | End: 2024-03-13

## 2024-03-13 RX ORDER — FENTANYL CITRATE 50 UG/ML
INJECTION, SOLUTION INTRAMUSCULAR; INTRAVENOUS
Status: COMPLETED | OUTPATIENT
Start: 2024-03-13 | End: 2024-03-13

## 2024-03-13 RX ORDER — MIDAZOLAM HYDROCHLORIDE 2 MG/2ML
INJECTION, SOLUTION INTRAMUSCULAR; INTRAVENOUS
Status: COMPLETED | OUTPATIENT
Start: 2024-03-13 | End: 2024-03-13

## 2024-03-13 RX ORDER — LIDOCAINE HYDROCHLORIDE 10 MG/ML
INJECTION, SOLUTION EPIDURAL; INFILTRATION; INTRACAUDAL; PERINEURAL
Status: COMPLETED | OUTPATIENT
Start: 2024-03-13 | End: 2024-03-13

## 2024-03-13 RX ADMIN — LIDOCAINE HYDROCHLORIDE 8 ML: 10 INJECTION, SOLUTION EPIDURAL; INFILTRATION; INTRACAUDAL at 09:45

## 2024-03-13 RX ADMIN — FENTANYL CITRATE 50 MCG: 50 INJECTION, SOLUTION INTRAMUSCULAR; INTRAVENOUS at 09:44

## 2024-03-13 RX ADMIN — LIDOCAINE HYDROCHLORIDE 5 ML: 40 SOLUTION RETROBULBAR; TOPICAL at 09:49

## 2024-03-13 RX ADMIN — MIDAZOLAM HYDROCHLORIDE 1 MG: 1 INJECTION, SOLUTION INTRAMUSCULAR; INTRAVENOUS at 09:44

## 2024-03-13 ASSESSMENT — PAIN - FUNCTIONAL ASSESSMENT
PAIN_FUNCTIONAL_ASSESSMENT: 0-10
PAIN_FUNCTIONAL_ASSESSMENT: PREVENTS OR INTERFERES SOME ACTIVE ACTIVITIES AND ADLS
PAIN_FUNCTIONAL_ASSESSMENT: NONE - DENIES PAIN

## 2024-03-13 ASSESSMENT — PAIN DESCRIPTION - DESCRIPTORS: DESCRIPTORS: SHOOTING;NUMBNESS

## 2024-03-13 NOTE — OP NOTE
Preoperative Diagnosis: Lumbar spondylosis w/o myelopathy, chronic low back pain  Postoperative Diagnosis: Lumbar spondylosis w/o myelopathy, chronic low back pain  SEDATION: SEE SEDATION NOTE  BLOOD LOSS: NONE    Procedure Performed:  :Radiofrequency ablation of median branches at the Transverse processes of L4, L5 and sacral ala for L4/5 and L5/S1 facet joints on Bilateral under fluoroscopic guidance with IV sedation    Procedure:    After starting an IV, the patient was taken to procedure room.  The patient was placed in prone position and skin over the back was prepped and draped in sterile manner.    Standard monitors were connected and vitals were monitored during the case and they remained stable during the procedure.    A meaningful communication was kept up with the patient throughout the procedure.    Then using fluoroscopy the junction of the transverse process of the target vertebra with the superior process of the facet joint was observed and the view was optimized.  The skin and deep tissues were infiltrated with 2 ml of  1 % lidocaine. The RF canula with the 10 mm active tip was introduced through the skin wheal under fluoroscopy guidance such that the tip of the needle lies in the groove of the transverse process with the superior processes of the facet joint.  Then a lateral and AP view of the lumbar spine was obtained to make sure the tip of needle is not in the neural foramen.  Then electric impedence was checked to make sure it is acceptable. Then a sensory stimulus was applied at 50 Hz up to 0.5 volt and concordant pain symptoms were reproduced. Then a motor stimulus was applied at 2 Hz up to 2 volts or 3 x times the sensory stimulus and no motor stimulation was seen in lower extremities.  Some multifidus stimulus was seen.  Then after negative aspiration 1 ml of 4% lidocaine was injected through the needle at each level.The radiofrequency lesion was done at 85 degrees centigrade for 110

## 2024-03-13 NOTE — INTERVAL H&P NOTE
Update History & Physical    The patient's History and Physical of February 13, 2024 was reviewed with the patient and I examined the patient. There was no change. The surgical site was confirmed by the patient and me.     Plan: The risks, benefits, expected outcome, and alternative to the recommended procedure have been discussed with the patient. Patient understands and wants to proceed with the procedure.     ASA 3  MP 3      Electronically signed by Jericho Earl MD on 3/13/2024 at 9:26 AM

## 2024-03-13 NOTE — DISCHARGE INSTRUCTIONS
Discharge Instructions following Sedation or Anesthesia:  You have  received  a sedative/anesthetic therefore, you should not consume any alcoholic beverages for minimum of 12 hours.  Do not drive or operate machinery for 24 hours.  Do not sign legal documents for 24 hours.  Dizziness, drowsiness, and unsteadiness may occur.  Rest when need to.  Increase diet as tolerated.  Keep up on fluids if diet allows.      General Instructions:  Do not take a tub bath for 72 hours after procedure (this includes hot tubs and swimming pools).  You may shower, but avoid hot water to injection site.   Avoid strenuous activity TODAY especially if you experience dizziness.   Remove band-aid the next day.  Wash off any residual iodine   Do not use heat, heating pad, or any other heating device over the injection site for 3 days after the procedure.  If you experience pain after your procedure, you may continue with your current pain medication as prescribed.  (DO NOT INCREASE YOUR PAIN MEDICATION WITHOUT TALKING TO DOCTOR)  Soreness and pain at injection site is common, may use ice to reduce soreness.    Call [facility] at [phone] if you experience:   Fever, chills or temperature over 100    Vomiting, Headache, persistent stiff neck, nausea, blurred vision   Difficulty in urinating or unable to urinate with 8 hours   Increase in weakness, numbness or loss of function   Increased redness, swelling or drainage at the injection site

## 2024-03-26 ENCOUNTER — OFFICE VISIT (OUTPATIENT)
Dept: PAIN MANAGEMENT | Age: 84
End: 2024-03-26
Payer: MEDICARE

## 2024-03-26 VITALS — BODY MASS INDEX: 35.28 KG/M2 | OXYGEN SATURATION: 97 % | HEART RATE: 62 BPM | WEIGHT: 212 LBS

## 2024-03-26 DIAGNOSIS — R29.818 NEUROGENIC CLAUDICATION: ICD-10-CM

## 2024-03-26 DIAGNOSIS — M54.51 VERTEBROGENIC LOW BACK PAIN: ICD-10-CM

## 2024-03-26 DIAGNOSIS — M47.817 LUMBOSACRAL SPONDYLOSIS WITHOUT MYELOPATHY: Primary | ICD-10-CM

## 2024-03-26 PROCEDURE — 99214 OFFICE O/P EST MOD 30 MIN: CPT | Performed by: ANESTHESIOLOGY

## 2024-03-26 PROCEDURE — G8399 PT W/DXA RESULTS DOCUMENT: HCPCS | Performed by: ANESTHESIOLOGY

## 2024-03-26 PROCEDURE — 1123F ACP DISCUSS/DSCN MKR DOCD: CPT | Performed by: ANESTHESIOLOGY

## 2024-03-26 PROCEDURE — 1090F PRES/ABSN URINE INCON ASSESS: CPT | Performed by: ANESTHESIOLOGY

## 2024-03-26 PROCEDURE — G8417 CALC BMI ABV UP PARAM F/U: HCPCS | Performed by: ANESTHESIOLOGY

## 2024-03-26 PROCEDURE — G8484 FLU IMMUNIZE NO ADMIN: HCPCS | Performed by: ANESTHESIOLOGY

## 2024-03-26 PROCEDURE — G8427 DOCREV CUR MEDS BY ELIG CLIN: HCPCS | Performed by: ANESTHESIOLOGY

## 2024-03-26 PROCEDURE — 1036F TOBACCO NON-USER: CPT | Performed by: ANESTHESIOLOGY

## 2024-03-26 ASSESSMENT — ENCOUNTER SYMPTOMS
ALLERGIC/IMMUNOLOGIC NEGATIVE: 1
BACK PAIN: 1
CHEST TIGHTNESS: 0
SORE THROAT: 0
EYES NEGATIVE: 1
GASTROINTESTINAL NEGATIVE: 1
SHORTNESS OF BREATH: 0

## 2024-03-26 NOTE — PROGRESS NOTES
The patient is a 83 y.o.Non- / non  female.    Chief Complaint   Patient presents with    Back Pain    Follow Up After Procedure        HPI  82-year-old female with history of chronic low back pain    Majority of her pain is in the lower back  Pain is chronic going on for several years  Aggravates with excessive activity  She has done multiple courses of physical therapy in past  No previous lumbar spine surgical history  Patient had recent MRI lumbar spine that showed degenerative disc changes and endplate degeneration with Modic type II changes involving L4-L5 and S1 vertebrae along with facet arthropathy    Today here for follow-up after lumbar facet radiofrequency ablation report overall 50% improvement in axial back pain but is still she feels that her back pain despite of improvement is not to the level that she can resume her normal activity and wish to continue with further interventional procedure to build on the good outcome    Her pain is midline in the lumbar area that aggravates with flexion    She also reports some leg pain that comes with prolonged standing and walking    Discussed Intracept procedure for basivertebral nerve ablation at L4-L5 and S1 level  Patient is very interested in it  Information material provided  Will submit for prior authorization    Oswestry disability index shows moderate disability associated with the pain      Hemoglobin A1C   Date Value Ref Range Status   02/16/2024 5.6 4.0 - 6.0 % Final   02/16/2024 5.7 4.0 - 6.0 % Final         Past Medical History:   Diagnosis Date    Acid reflux     Anxiety 06/30/2014    ALICIA-7   09/09/19 : 4    Bilateral tinnitus 12/10/2019    Bronchitis, mucopurulent recurrent (HCC) 07/23/2020    Diverticular disease 06/30/2014    Enthesopathy of hip region 12/10/2019    GERD (gastroesophageal reflux disease)     Hypercholesteremia 06/30/2014    Hypothyroid 06/30/2014    Laryngopharyngeal reflux 12/10/2019    Lateral femoral cutaneous

## 2024-04-15 ENCOUNTER — HOSPITAL ENCOUNTER (OUTPATIENT)
Dept: PREADMISSION TESTING | Age: 84
Discharge: HOME | End: 2024-04-19
Payer: MEDICARE

## 2024-04-15 VITALS
SYSTOLIC BLOOD PRESSURE: 133 MMHG | HEART RATE: 66 BPM | OXYGEN SATURATION: 95 % | HEIGHT: 65 IN | WEIGHT: 216 LBS | TEMPERATURE: 98.1 F | BODY MASS INDEX: 35.99 KG/M2 | RESPIRATION RATE: 16 BRPM | DIASTOLIC BLOOD PRESSURE: 67 MMHG

## 2024-04-15 LAB
ANION GAP SERPL CALCULATED.3IONS-SCNC: 11 MMOL/L (ref 9–17)
BASOPHILS # BLD: 0.2 K/UL (ref 0–0.2)
BASOPHILS NFR BLD: 2 % (ref 0–2)
BUN SERPL-MCNC: 18 MG/DL (ref 8–23)
CALCIUM SERPL-MCNC: 10 MG/DL (ref 8.6–10.4)
CHLORIDE SERPL-SCNC: 107 MMOL/L (ref 98–107)
CO2 SERPL-SCNC: 26 MMOL/L (ref 20–31)
CREAT SERPL-MCNC: 0.8 MG/DL (ref 0.5–0.9)
EOSINOPHIL # BLD: 0.2 K/UL (ref 0–0.4)
EOSINOPHILS RELATIVE PERCENT: 2 % (ref 0–4)
ERYTHROCYTE [DISTWIDTH] IN BLOOD BY AUTOMATED COUNT: 12.6 % (ref 11.5–14.9)
GFR SERPL CREATININE-BSD FRML MDRD: 74 ML/MIN/1.73M2
GLUCOSE SERPL-MCNC: 107 MG/DL (ref 70–99)
HCT VFR BLD AUTO: 40.9 % (ref 36–46)
HGB BLD-MCNC: 13.5 G/DL (ref 12–16)
LYMPHOCYTES NFR BLD: 2.3 K/UL (ref 1–4.8)
LYMPHOCYTES RELATIVE PERCENT: 34 % (ref 24–44)
MCH RBC QN AUTO: 31.7 PG (ref 26–34)
MCHC RBC AUTO-ENTMCNC: 32.9 G/DL (ref 31–37)
MCV RBC AUTO: 96.1 FL (ref 80–100)
MONOCYTES NFR BLD: 0.4 K/UL (ref 0.1–1.3)
MONOCYTES NFR BLD: 6 % (ref 1–7)
NEUTROPHILS NFR BLD: 56 % (ref 36–66)
NEUTS SEG NFR BLD: 3.6 K/UL (ref 1.3–9.1)
PLATELET # BLD AUTO: 269 K/UL (ref 150–450)
PMV BLD AUTO: 8 FL (ref 6–12)
POTASSIUM SERPL-SCNC: 5.2 MMOL/L (ref 3.7–5.3)
RBC # BLD AUTO: 4.26 M/UL (ref 4–5.2)
SODIUM SERPL-SCNC: 144 MMOL/L (ref 135–144)
WBC OTHER # BLD: 6.6 K/UL (ref 3.5–11)

## 2024-04-15 PROCEDURE — 80048 BASIC METABOLIC PNL TOTAL CA: CPT

## 2024-04-15 PROCEDURE — APPSS45 APP SPLIT SHARED TIME 31-45 MINUTES: Performed by: NURSE PRACTITIONER

## 2024-04-15 PROCEDURE — 85025 COMPLETE CBC W/AUTO DIFF WBC: CPT

## 2024-04-15 PROCEDURE — 36415 COLL VENOUS BLD VENIPUNCTURE: CPT

## 2024-04-15 RX ORDER — ACETAMINOPHEN 500 MG
1000 TABLET ORAL EVERY 6 HOURS PRN
COMMUNITY

## 2024-04-15 ASSESSMENT — PAIN DESCRIPTION - PAIN TYPE: TYPE: ACUTE PAIN

## 2024-04-15 ASSESSMENT — ENCOUNTER SYMPTOMS
COUGH: 1
TROUBLE SWALLOWING: 0
VOMITING: 0
NAUSEA: 1
SHORTNESS OF BREATH: 1
ABDOMINAL PAIN: 0
APNEA: 0
CONSTIPATION: 0
DIARRHEA: 0

## 2024-04-15 ASSESSMENT — PAIN DESCRIPTION - ORIENTATION: ORIENTATION: LOWER;RIGHT;LEFT

## 2024-04-15 ASSESSMENT — PAIN DESCRIPTION - DESCRIPTORS: DESCRIPTORS: ACHING

## 2024-04-15 ASSESSMENT — PAIN DESCRIPTION - LOCATION: LOCATION: BACK;LEG

## 2024-04-15 ASSESSMENT — PAIN SCALES - GENERAL: PAINLEVEL_OUTOF10: 7

## 2024-04-15 NOTE — H&P (VIEW-ONLY)
Weakness present.   Psychiatric:         Mood and Affect: Mood normal.         Behavior: Behavior normal.         LAB REVIEW     Lab Results   Component Value Date    WBC 6.6 04/15/2024    HGB 13.5 04/15/2024    HCT 40.9 04/15/2024    MCV 96.1 04/15/2024     04/15/2024    LYMPHOPCT 34 04/15/2024    RBC 4.26 04/15/2024    MCH 31.7 04/15/2024    MCHC 32.9 04/15/2024    RDW 12.6 04/15/2024     Lab Results   Component Value Date/Time     04/15/2024 01:24 PM    K 5.2 04/15/2024 01:24 PM     04/15/2024 01:24 PM    CO2 26 04/15/2024 01:24 PM    BUN 18 04/15/2024 01:24 PM    CREATININE 0.8 04/15/2024 01:24 PM    GLUCOSE 107 04/15/2024 01:24 PM    GLUCOSE 106 08/29/2019 02:05 PM    CALCIUM 10.0 04/15/2024 01:24 PM    LABGLOM 74 04/15/2024 01:24 PM          EKG DATE: 11/23/2023   Normal sinus rhythm  Minimal voltage criteria for LVH, may be normal variant  Septal infarct , age undetermined  Abnormal ECG  When compared with ECG of 14-JUN-2023 01:52,  Septal infarct is now Present  SURGERY / PROVISIONAL DIAGNOSES:      BASIVERTEBRAL NERVE NERVE RADIOFREQUENCY ABLATION INTRACEPT PROCEDURE at L4/L5 AND S1     Lumbosacral spondylosis without myelopathy [M47.817]    Vertebrogenic low back pain [M54.51]    Patient Active Problem List    Diagnosis Date Noted    Chronic systolic (congestive) heart failure 08/30/2022    DDD (degenerative disc disease), lumbar 06/28/2022    Vertebrogenic low back pain 06/28/2022    Lumbosacral spondylosis without myelopathy 06/28/2022    Spinal stenosis of lumbar region with neurogenic claudication 10/11/2023    Severe obesity (BMI 35.0-39.9) with comorbidity (HCC) 04/25/2023    Orthostatic syncope 12/23/2021    Syncope and collapse 12/21/2021    Syncope 12/21/2021    Bronchitis, mucopurulent recurrent (HCC) 07/23/2020    Morbidly obese (HCC) 07/23/2020    Seasonal asthma 07/11/2020    Postnasal drip 07/11/2020    Bilateral tinnitus 12/10/2019    Enthesopathy of hip region 12/10/2019

## 2024-04-15 NOTE — DISCHARGE INSTRUCTIONS
Pre-op Instructions For Out-Patient Surgery    Medication Instructions:  Please stop herbs and any supplements now (includes vitamins and minerals).    Please contact your surgeon and prescribing physician for pre-op instructions for any blood thinners.    If you have inhalers/aerosol treatments at home, please use them the morning of your surgery and bring the inhalers with you to the hospital.    Please take the following medications the morning of your surgery with a sip of water:    inhaler,  metoprolol,  midodrine, levothyroxine, nexium    Surgery Instructions:  After midnight before surgery:  Do not eat or drink anything, including water, mints, gum, and hard candy.  You may brush your teeth without swallowing.  No smoking, chewing tobacco, or street drugs.    Please shower or bathe before surgery.  If you were given Surgical Scrub Chlorhexidine Gluconate Liquid (CHG), please shower the night before and the morning of your surgery following the detailed instructions you received during your pre-admission visit.     Please do not wear any cologne, lotion, powder, deodorant, jewelry, piercings, perfume, makeup, nail polish, hair accessories, or hair spray on the day of surgery.  Wear loose comfortable clothing.    Leave your valuables at home but bring a payment source for any after-surgery prescriptions you plan to fill at [facility].  Bring a storage case for any glasses/contacts.    An adult who is responsible for you MUST drive you home and should be with you for the first 24 hours after surgery.     If having out-patient knee and foot surgeries, please arrange for planned crutches, walker, or wheelchair before arriving to the hospital.    The Day of Surgery:  Arrive at [facility] Entrance at the time directed by your surgeon and check in at the desk.     If you have a living will or healthcare power of , please bring a copy.    You will be

## 2024-04-15 NOTE — H&P
None    Years of education: None    Highest education level: None   Tobacco Use    Smoking status: Never     Passive exposure: Never    Smokeless tobacco: Never    Tobacco comments:     I am a non-smoker and have been all my life.  I have been exposed to second hand smoke.   Vaping Use    Vaping Use: Never used   Substance and Sexual Activity    Alcohol use: Not Currently     Comment: I have an occasional glass of wine, mixed drink, liqueur    Drug use: No    Sexual activity: Not Currently     Comment: none     Social Determinants of Health     Financial Resource Strain: Low Risk  (5/23/2023)    Overall Financial Resource Strain (CARDIA)     Difficulty of Paying Living Expenses: Not hard at all   Transportation Needs: No Transportation Needs (6/15/2023)    PRAPARE - Transportation     Lack of Transportation (Medical): No     Lack of Transportation (Non-Medical): No   Physical Activity: Insufficiently Active (11/30/2023)    Exercise Vital Sign     Days of Exercise per Week: 2 days     Minutes of Exercise per Session: 30 min   Social Connections: Moderately Isolated (6/15/2023)    Social Connection and Isolation Panel [NHANES]     Frequency of Communication with Friends and Family: More than three times a week     Frequency of Social Gatherings with Friends and Family: More than three times a week     Attends [employer] Services: More than 4 times per year     Active Member of Clubs or Organizations: No     Attends Club or Organization Meetings: Never     Marital Status: Never    Housing Stability: Low Risk  (6/15/2023)    Housing Stability Vital Sign     Unable to Pay for Housing in the Last Year: No     Number of Places Lived in the Last Year: 1     Unstable Housing in the Last Year: No       REVIEW OF SYSTEMS      Allergies   Allergen Reactions    Aspirin Swelling     Hives. anaphylaxis    Atorvastatin Other (See Comments)     myalgia    Crestor [Rosuvastatin]      Myalgia      Livalo [Pitavastatin] Other

## 2024-04-24 ENCOUNTER — ANESTHESIA EVENT (OUTPATIENT)
Dept: OPERATING ROOM | Age: 84
End: 2024-04-24
Payer: MEDICARE

## 2024-04-24 NOTE — PRE-PROCEDURE INSTRUCTIONS
Nothing to eat after midnight. Y  Are you taking any blood thinners? When was the last day?N  Make sure to use Hibiclens prior to surgery.Y  Remove any jewelry and body piercings.Y  Do you wear glasses? If so, please bring a case to store them in.  Are you having any Covid symptoms?N  Do you have any new rashes, infections, etc. that we should be aware of?N  Do you have a ride home the day of surgery? It cannot be a cab or medical transportation.Y  Verify surgery time and what time to arrive at hospital.[id number]

## 2024-04-25 ENCOUNTER — HOSPITAL ENCOUNTER (OUTPATIENT)
Age: 84
Setting detail: OUTPATIENT SURGERY
Discharge: HOME | End: 2024-04-25
Attending: ANESTHESIOLOGY | Admitting: ANESTHESIOLOGY
Payer: MEDICARE

## 2024-04-25 ENCOUNTER — ANESTHESIA (OUTPATIENT)
Dept: OPERATING ROOM | Age: 84
End: 2024-04-25
Payer: MEDICARE

## 2024-04-25 ENCOUNTER — APPOINTMENT (OUTPATIENT)
Dept: GENERAL RADIOLOGY | Age: 84
End: 2024-04-25
Attending: ANESTHESIOLOGY
Payer: MEDICARE

## 2024-04-25 VITALS
SYSTOLIC BLOOD PRESSURE: 147 MMHG | WEIGHT: 210 LBS | DIASTOLIC BLOOD PRESSURE: 69 MMHG | RESPIRATION RATE: 14 BRPM | BODY MASS INDEX: 34.99 KG/M2 | HEART RATE: 58 BPM | OXYGEN SATURATION: 93 % | HEIGHT: 65 IN | TEMPERATURE: 97.1 F

## 2024-04-25 DIAGNOSIS — G89.18 POST-OP PAIN: Primary | ICD-10-CM

## 2024-04-25 LAB — GLUCOSE BLD-MCNC: 97 MG/DL (ref 65–105)

## 2024-04-25 PROCEDURE — 82947 ASSAY GLUCOSE BLOOD QUANT: CPT

## 2024-04-25 PROCEDURE — 2709999900 HC NON-CHARGEABLE SUPPLY: Performed by: ANESTHESIOLOGY

## 2024-04-25 PROCEDURE — 7100000001 HC PACU RECOVERY - ADDTL 15 MIN: Performed by: ANESTHESIOLOGY

## 2024-04-25 PROCEDURE — 2500000003 HC RX 250 WO HCPCS: Performed by: ANESTHESIOLOGY

## 2024-04-25 PROCEDURE — 7100000030 HC ASPR PHASE II RECOVERY - FIRST 15 MIN: Performed by: ANESTHESIOLOGY

## 2024-04-25 PROCEDURE — 7100000010 HC PHASE II RECOVERY - FIRST 15 MIN: Performed by: ANESTHESIOLOGY

## 2024-04-25 PROCEDURE — 3700000000 HC ANESTHESIA ATTENDED CARE: Performed by: ANESTHESIOLOGY

## 2024-04-25 PROCEDURE — C1889 IMPLANT/INSERT DEVICE, NOC: HCPCS | Performed by: ANESTHESIOLOGY

## 2024-04-25 PROCEDURE — 3600000002 HC SURGERY LEVEL 2 BASE: Performed by: ANESTHESIOLOGY

## 2024-04-25 PROCEDURE — 7100000011 HC PHASE II RECOVERY - ADDTL 15 MIN: Performed by: ANESTHESIOLOGY

## 2024-04-25 PROCEDURE — 64628 TRML DSTRJ IOS BVN 1ST 2 L/S: CPT | Performed by: ANESTHESIOLOGY

## 2024-04-25 PROCEDURE — 2500000003 HC RX 250 WO HCPCS: Performed by: NURSE ANESTHETIST, CERTIFIED REGISTERED

## 2024-04-25 PROCEDURE — 2580000003 HC RX 258: Performed by: ANESTHESIOLOGY

## 2024-04-25 PROCEDURE — 3700000001 HC ADD 15 MINUTES (ANESTHESIA): Performed by: ANESTHESIOLOGY

## 2024-04-25 PROCEDURE — 7100000031 HC ASPR PHASE II RECOVERY - ADDTL 15 MIN: Performed by: ANESTHESIOLOGY

## 2024-04-25 PROCEDURE — 64629 TRML DSTRJ IOS BVN EA ADDL: CPT | Performed by: ANESTHESIOLOGY

## 2024-04-25 PROCEDURE — 6360000002 HC RX W HCPCS: Performed by: ANESTHESIOLOGY

## 2024-04-25 PROCEDURE — 2720000010 HC SURG SUPPLY STERILE: Performed by: ANESTHESIOLOGY

## 2024-04-25 PROCEDURE — 6370000000 HC RX 637 (ALT 250 FOR IP): Performed by: ANESTHESIOLOGY

## 2024-04-25 PROCEDURE — 3600000012 HC SURGERY LEVEL 2 ADDTL 15MIN: Performed by: ANESTHESIOLOGY

## 2024-04-25 PROCEDURE — 6360000002 HC RX W HCPCS: Performed by: NURSE ANESTHETIST, CERTIFIED REGISTERED

## 2024-04-25 PROCEDURE — 7100000000 HC PACU RECOVERY - FIRST 15 MIN: Performed by: ANESTHESIOLOGY

## 2024-04-25 RX ORDER — SODIUM CHLORIDE 0.9 % (FLUSH) 0.9 %
5-40 SYRINGE (ML) INJECTION PRN
Status: DISCONTINUED | OUTPATIENT
Start: 2024-04-25 | End: 2024-04-25 | Stop reason: HOSPADM

## 2024-04-25 RX ORDER — SODIUM CHLORIDE, SODIUM LACTATE, POTASSIUM CHLORIDE, CALCIUM CHLORIDE 600; 310; 30; 20 MG/100ML; MG/100ML; MG/100ML; MG/100ML
INJECTION, SOLUTION INTRAVENOUS CONTINUOUS
Status: DISCONTINUED | OUTPATIENT
Start: 2024-04-25 | End: 2024-04-25 | Stop reason: HOSPADM

## 2024-04-25 RX ORDER — FENTANYL CITRATE 0.05 MG/ML
25 INJECTION, SOLUTION INTRAMUSCULAR; INTRAVENOUS EVERY 5 MIN PRN
Status: DISCONTINUED | OUTPATIENT
Start: 2024-04-25 | End: 2024-04-25 | Stop reason: HOSPADM

## 2024-04-25 RX ORDER — NALOXONE HYDROCHLORIDE 0.4 MG/ML
INJECTION, SOLUTION INTRAMUSCULAR; INTRAVENOUS; SUBCUTANEOUS PRN
Status: DISCONTINUED | OUTPATIENT
Start: 2024-04-25 | End: 2024-04-25 | Stop reason: HOSPADM

## 2024-04-25 RX ORDER — MIDAZOLAM HYDROCHLORIDE 1 MG/ML
INJECTION INTRAMUSCULAR; INTRAVENOUS PRN
Status: DISCONTINUED | OUTPATIENT
Start: 2024-04-25 | End: 2024-04-25 | Stop reason: SDUPTHER

## 2024-04-25 RX ORDER — PROPOFOL 10 MG/ML
INJECTION, EMULSION INTRAVENOUS CONTINUOUS PRN
Status: DISCONTINUED | OUTPATIENT
Start: 2024-04-25 | End: 2024-04-25 | Stop reason: SDUPTHER

## 2024-04-25 RX ORDER — GABAPENTIN 100 MG/1
100 CAPSULE ORAL ONCE
Status: COMPLETED | OUTPATIENT
Start: 2024-04-25 | End: 2024-04-25

## 2024-04-25 RX ORDER — ONDANSETRON 2 MG/ML
4 INJECTION INTRAMUSCULAR; INTRAVENOUS
Status: DISCONTINUED | OUTPATIENT
Start: 2024-04-25 | End: 2024-04-25 | Stop reason: HOSPADM

## 2024-04-25 RX ORDER — SODIUM CHLORIDE 9 MG/ML
INJECTION, SOLUTION INTRAVENOUS PRN
Status: DISCONTINUED | OUTPATIENT
Start: 2024-04-25 | End: 2024-04-25 | Stop reason: HOSPADM

## 2024-04-25 RX ORDER — ACETAMINOPHEN 500 MG
1000 TABLET ORAL ONCE
Status: COMPLETED | OUTPATIENT
Start: 2024-04-25 | End: 2024-04-25

## 2024-04-25 RX ORDER — DIPHENHYDRAMINE HYDROCHLORIDE 50 MG/ML
12.5 INJECTION INTRAMUSCULAR; INTRAVENOUS
Status: DISCONTINUED | OUTPATIENT
Start: 2024-04-25 | End: 2024-04-25 | Stop reason: HOSPADM

## 2024-04-25 RX ORDER — HYDRALAZINE HYDROCHLORIDE 20 MG/ML
10 INJECTION INTRAMUSCULAR; INTRAVENOUS
Status: DISCONTINUED | OUTPATIENT
Start: 2024-04-25 | End: 2024-04-25 | Stop reason: HOSPADM

## 2024-04-25 RX ORDER — SODIUM CHLORIDE 0.9 % (FLUSH) 0.9 %
5-40 SYRINGE (ML) INJECTION EVERY 12 HOURS SCHEDULED
Status: DISCONTINUED | OUTPATIENT
Start: 2024-04-25 | End: 2024-04-25 | Stop reason: HOSPADM

## 2024-04-25 RX ORDER — HYDROCODONE BITARTRATE AND ACETAMINOPHEN 5; 325 MG/1; MG/1
1 TABLET ORAL EVERY 6 HOURS PRN
Status: DISCONTINUED | OUTPATIENT
Start: 2024-04-25 | End: 2024-04-25 | Stop reason: HOSPADM

## 2024-04-25 RX ORDER — BUPIVACAINE HYDROCHLORIDE AND EPINEPHRINE 5; 5 MG/ML; UG/ML
INJECTION, SOLUTION EPIDURAL; INTRACAUDAL; PERINEURAL PRN
Status: DISCONTINUED | OUTPATIENT
Start: 2024-04-25 | End: 2024-04-25 | Stop reason: ALTCHOICE

## 2024-04-25 RX ORDER — LABETALOL HYDROCHLORIDE 5 MG/ML
10 INJECTION, SOLUTION INTRAVENOUS
Status: DISCONTINUED | OUTPATIENT
Start: 2024-04-25 | End: 2024-04-25 | Stop reason: HOSPADM

## 2024-04-25 RX ORDER — PROPOFOL 10 MG/ML
INJECTION, EMULSION INTRAVENOUS PRN
Status: DISCONTINUED | OUTPATIENT
Start: 2024-04-25 | End: 2024-04-25 | Stop reason: SDUPTHER

## 2024-04-25 RX ORDER — METOCLOPRAMIDE HYDROCHLORIDE 5 MG/ML
10 INJECTION INTRAMUSCULAR; INTRAVENOUS
Status: DISCONTINUED | OUTPATIENT
Start: 2024-04-25 | End: 2024-04-25 | Stop reason: HOSPADM

## 2024-04-25 RX ORDER — ONDANSETRON 2 MG/ML
INJECTION INTRAMUSCULAR; INTRAVENOUS PRN
Status: DISCONTINUED | OUTPATIENT
Start: 2024-04-25 | End: 2024-04-25 | Stop reason: SDUPTHER

## 2024-04-25 RX ORDER — HYDROCODONE BITARTRATE AND ACETAMINOPHEN 5; 325 MG/1; MG/1
1 TABLET ORAL 2 TIMES DAILY PRN
Qty: 6 TABLET | Refills: 0 | Status: SHIPPED | OUTPATIENT
Start: 2024-04-25 | End: 2024-04-28

## 2024-04-25 RX ORDER — LIDOCAINE HYDROCHLORIDE 20 MG/ML
INJECTION, SOLUTION EPIDURAL; INFILTRATION; INTRACAUDAL; PERINEURAL PRN
Status: DISCONTINUED | OUTPATIENT
Start: 2024-04-25 | End: 2024-04-25 | Stop reason: SDUPTHER

## 2024-04-25 RX ORDER — DEXAMETHASONE SODIUM PHOSPHATE 4 MG/ML
INJECTION, SOLUTION INTRA-ARTICULAR; INTRALESIONAL; INTRAMUSCULAR; INTRAVENOUS; SOFT TISSUE PRN
Status: DISCONTINUED | OUTPATIENT
Start: 2024-04-25 | End: 2024-04-25 | Stop reason: SDUPTHER

## 2024-04-25 RX ORDER — LIDOCAINE HYDROCHLORIDE 10 MG/ML
1 INJECTION, SOLUTION EPIDURAL; INFILTRATION; INTRACAUDAL; PERINEURAL
Status: COMPLETED | OUTPATIENT
Start: 2024-04-25 | End: 2024-04-25

## 2024-04-25 RX ORDER — FENTANYL CITRATE 50 UG/ML
INJECTION, SOLUTION INTRAMUSCULAR; INTRAVENOUS PRN
Status: DISCONTINUED | OUTPATIENT
Start: 2024-04-25 | End: 2024-04-25 | Stop reason: SDUPTHER

## 2024-04-25 RX ADMIN — LIDOCAINE HYDROCHLORIDE 100 MG: 20 INJECTION, SOLUTION EPIDURAL; INFILTRATION; INTRACAUDAL; PERINEURAL at 08:05

## 2024-04-25 RX ADMIN — ACETAMINOPHEN 1000 MG: 500 TABLET ORAL at 06:56

## 2024-04-25 RX ADMIN — PROPOFOL 50 MG: 10 INJECTION, EMULSION INTRAVENOUS at 08:06

## 2024-04-25 RX ADMIN — DEXAMETHASONE SODIUM PHOSPHATE 8 MG: 4 INJECTION, SOLUTION INTRAMUSCULAR; INTRAVENOUS at 08:18

## 2024-04-25 RX ADMIN — ONDANSETRON 4 MG: 2 INJECTION INTRAMUSCULAR; INTRAVENOUS at 08:21

## 2024-04-25 RX ADMIN — GABAPENTIN 100 MG: 100 CAPSULE ORAL at 06:57

## 2024-04-25 RX ADMIN — FENTANYL CITRATE 25 MCG: 50 INJECTION, SOLUTION INTRAMUSCULAR; INTRAVENOUS at 08:05

## 2024-04-25 RX ADMIN — FENTANYL CITRATE 25 MCG: 50 INJECTION, SOLUTION INTRAMUSCULAR; INTRAVENOUS at 08:22

## 2024-04-25 RX ADMIN — FENTANYL CITRATE 25 MCG: 50 INJECTION, SOLUTION INTRAMUSCULAR; INTRAVENOUS at 08:49

## 2024-04-25 RX ADMIN — LIDOCAINE HYDROCHLORIDE 1 ML: 10 INJECTION, SOLUTION EPIDURAL; INFILTRATION; INTRACAUDAL; PERINEURAL at 07:07

## 2024-04-25 RX ADMIN — Medication 2000 MG: at 08:10

## 2024-04-25 RX ADMIN — SODIUM CHLORIDE, POTASSIUM CHLORIDE, SODIUM LACTATE AND CALCIUM CHLORIDE: 600; 310; 30; 20 INJECTION, SOLUTION INTRAVENOUS at 07:07

## 2024-04-25 RX ADMIN — MIDAZOLAM 2 MG: 1 INJECTION INTRAMUSCULAR; INTRAVENOUS at 08:05

## 2024-04-25 RX ADMIN — PROPOFOL 100 MCG/KG/MIN: 10 INJECTION, EMULSION INTRAVENOUS at 08:06

## 2024-04-25 ASSESSMENT — PAIN - FUNCTIONAL ASSESSMENT
PAIN_FUNCTIONAL_ASSESSMENT: NONE - DENIES PAIN
PAIN_FUNCTIONAL_ASSESSMENT: 0-10

## 2024-04-25 ASSESSMENT — PAIN DESCRIPTION - DESCRIPTORS: DESCRIPTORS: NUMBNESS;TINGLING

## 2024-04-25 ASSESSMENT — PAIN SCALES - GENERAL
PAINLEVEL_OUTOF10: 0
PAINLEVEL_OUTOF10: 0

## 2024-04-25 ASSESSMENT — ENCOUNTER SYMPTOMS: SHORTNESS OF BREATH: 0

## 2024-04-25 NOTE — INTERVAL H&P NOTE
Update History & Physical    The patient's History and Physical of April 15, 2024 was reviewed with the patient and I examined the patient. There was no change. The surgical site was confirmed by the patient and me. Pt undergoing for BASIVERTEBRAL NERVE NERVE RADIOFREQUENCY ABLATION INTRACEPT PROCEDURE at L4/L5 AND S1 per Dr. Earl.   Pt denies fever/chills, chest pain or SOB   Pt Npo since the past midnight, pt took her am medication today with sip of water  Pt denies hx of MRSA infection   Pt denies hx of blood clots  Pt denies taking blood thinner medication   Physical exam remains unchanged including cardiac and pulmonary assessment  See nursing flow sheet for vital sings    Lab Results   Component Value Date    WBC 6.6 04/15/2024    HGB 13.5 04/15/2024    HCT 40.9 04/15/2024    MCV 96.1 04/15/2024     04/15/2024     Lab Results   Component Value Date/Time     04/15/2024 01:24 PM    K 5.2 04/15/2024 01:24 PM     04/15/2024 01:24 PM    CO2 26 04/15/2024 01:24 PM    BUN 18 04/15/2024 01:24 PM    CREATININE 0.8 04/15/2024 01:24 PM    GLUCOSE 107 04/15/2024 01:24 PM    GLUCOSE 106 08/29/2019 02:05 PM    CALCIUM 10.0 04/15/2024 01:24 PM    LABGLOM 74 04/15/2024 01:24 PM          Electronically signed by VINNIE Mccartney CNP on 4/25/2024 at 6:10 AM

## 2024-04-25 NOTE — DISCHARGE INSTRUCTIONS
To achieve optimal recovery and results,   follow these instructions carefully.     What to Expect After the Procedure      _    You should be discharged and able to walk on the same day of the procedure.   _    As with any procedure, you may feel sore afterwards. If you feel extreme discomfort or pain, contact your physician immediately.       Medication      Tell your physician about any prescribed or over-the-counter medications you are currently taking.  Continue taking them as directed by your physician.  If you feel soreness or minor discomfort, you may take the prescribe pain medication directed      Activity Restrictions & Resumption      Avoid strenuous exercise and heavy lifting for 7 days.  Walking is the best exercise, and daily walking is strongly recommended.  Gradually increase the length and distance of your walks. Start inside your home, then progress to out and around your home, as tolerated, and progress to your neighborhood or shopping center.  Try to limit long car rides (greater than 1 hour) for a few weeks, or as tolerated.  Do not drive while on pain medications.  Start Home exercise program /physical therapy / Rehab as planned 1 week post op for physical conditioning and core strengthening.      Incision Care      Remove the outer dressings 3 days after the procedure.  Adhesive strips will cover the incision(s) and should begin to fall off within 7-10 days after the procedure. If they have not fallen off after 14 days, you may remove them.  You may begin to shower 3 days after the procedure.   Do not sit in the bath.  Do not rub the incision.  Do not apply lotion, medication, or cream to the incision.              Post-Procedure Office Visits      __  If your follow-up appointments have not been scheduled, please contact STAFF at the phone number below within 24 hours after your procedure to schedule your appointments.  [facility] PAIN MEDICINE  [address]  [address]

## 2024-04-25 NOTE — OP NOTE
Operative Note      Patient: Alicia Reid  YOB: 1940  MRN: [id number]    Date of Procedure: 4/25/2024    Pre-Op Diagnosis Codes:     * Vertebrogenic low back pain [M54.51]    Post-Op Diagnosis: Same       Procedure(s):  BASIVERTEBRAL NERVE ABLATION L4, L5, S1    Surgeon(s):  Jericho Earl MD    Assistant:   * No surgical staff found *    Anesthesia: General    Estimated Blood Loss (mL): Minimal    Complications: None    Specimens:   * No specimens in log *    Implants:  * No implants in log *      Drains: * No LDAs found *    Findings:  Infection Present At Time Of Surgery (PATOS) (choose all levels that have infection present):  No infection present  Other Findings: N/A    Detailed Description of Procedure:   Pre-op Diagnosis:   Vertebrogenic low back pain,   Modic changes L4, L5 AND S1    Post-op Diagnosis:   Vertebrogenic low back pain,   Modic changes L4, L5 AND S1    Procedure: Basivertebral nerve (BVN) ablation- Intracept Procedure L4, L5 AND S1      Physician/Surgeon: JERICHO EARL MD  Anesthesia: MAC    ANTIBIOTICS: IV 2 GM ANCEF    Indications for Procedure:   Chronic axial lumbar spinal pain onset several years ago progressively worsened affecting quality of life, refractory to different modalities including therapy medication management and different spine injections  MRI imaging showed Modic changes involving L4, L5 AND S1 lumbar vertebrae  Clinical examination and imaging concordant with vertebrogenic pain    Procedure Time Out: Patient ID confirmed, correct procedure to be performed, correct site and/or side for procedure as per marked location and correct medication(s), including antibiotic to be used for the procedure    Description of Procedure:     After receiving anesthesia in the supine position, the patient was placed prone on the operating room table and all pressure points were appropriately padded. The back was sterilely prepped and draped.     L4 LEVEL:  The C-arm was

## 2024-04-25 NOTE — ANESTHESIA POSTPROCEDURE EVALUATION
Department of Anesthesiology  Postprocedure Note    Patient: Alicia Reid  MRN: [id number]  YOB: 1940  Date of evaluation: 4/25/2024    Procedure Summary       Date: 04/25/24 Room / Location: [address]    Anesthesia Start: 0756 Anesthesia Stop: 0910    Procedure: BASIVERTEBRAL NERVE ABLATION L4, L5, S1 (Back) Diagnosis:       Vertebrogenic low back pain      (Vertebrogenic low back pain [M54.51])    Surgeons: Jericho Earl MD Responsible Provider: Quiana Kaur MD    Anesthesia Type: TIVA ASA Status: 2            Anesthesia Type: TIVA    Milagros Phase I: Milagros Score: 10    Milagros Phase II: Milagros Score: 10    Anesthesia Post Evaluation    Comments: POST- ANESTHESIA EVALUATION       Pt Name: Alicia Reid  MRN: [id number]  YOB: 1940  Date of evaluation: 4/25/2024  Time:  10:54 AM      BP (!) 147/69   Pulse 58   Temp 97.1 °F (36.2 °C) (Infrared)   Resp 14   Ht 1.651 m (5' 5\")   Wt 95.3 kg (210 lb)   SpO2 93%   BMI 34.95 kg/m²      Consciousness Level  Awake  Cardiopulmonary Status  Stable  Pain Adequately Treated YES  Nausea / Vomiting  NO  Adequate Hydration  YES  Anesthesia Related Complications NONE      Electronically signed by Quiana Kaur MD on 4/25/2024 at 10:54 AM      No notable events documented.

## 2024-04-25 NOTE — ANESTHESIA PRE PROCEDURE
GFRAA >60 12/22/2021 01:58 AM    AGRATIO 2.0 02/16/2024 08:51 AM    AGRATIO 1.0 02/16/2024 08:51 AM    AGRATIO 1.4 02/16/2024 08:51 AM    LABGLOM 74 04/15/2024 01:24 PM    GLUCOSE 107 04/15/2024 01:24 PM    GLUCOSE 106 08/29/2019 02:05 PM    PROT 7.0 02/16/2024 08:51 AM    PROT 7.2 02/16/2024 08:51 AM    PROT 7.3 02/16/2024 08:51 AM    CALCIUM 10.0 04/15/2024 01:24 PM    BILITOT 0.3 02/16/2024 08:51 AM    BILITOT 0.3 02/16/2024 08:51 AM    BILITOT 0.3 02/16/2024 08:51 AM    BILITOT NEGATIVE 10/02/2015 11:19 AM    ALKPHOS 81 02/16/2024 08:51 AM    ALKPHOS 81 02/16/2024 08:51 AM    ALKPHOS 85 02/16/2024 08:51 AM    AST 25 02/16/2024 08:51 AM    AST 24 02/16/2024 08:51 AM    AST 22 02/16/2024 08:51 AM    ALT 26 02/16/2024 08:51 AM    ALT 26 02/16/2024 08:51 AM    ALT 23 02/16/2024 08:51 AM       POC Tests: No results for input(s): \"POCGLU\", \"POCNA\", \"POCK\", \"POCCL\", \"POCBUN\", \"POCHEMO\", \"POCHCT\" in the last 72 hours.    Coags:   Lab Results   Component Value Date/Time    PROTIME 10.6 07/31/2020 11:00 AM    INR 1.0 07/31/2020 11:00 AM    APTT 24.7 02/15/2019 04:28 PM       HCG (If Applicable): No results found for: \"PREGTESTUR\", \"PREGSERUM\", \"HCG\", \"HCGQUANT\"     ABGs: No results found for: \"PHART\", \"PO2ART\", \"CHL7QCI\", \"PBJ3YHM\", \"BEART\", \"Z9MFETQM\"     Type & Screen (If Applicable):  No results found for: \"LABABO\", \"LABRH\"    Drug/Infectious Status (If Applicable):  Lab Results   Component Value Date/Time    HEPCAB NONREACTIVE 02/16/2024 08:51 AM       COVID-19 Screening (If Applicable):   Lab Results   Component Value Date/Time    COVID19 Not Detected 12/21/2021 04:15 PM           Anesthesia Evaluation  Patient summary reviewed and Nursing notes reviewed   no history of anesthetic complications:   Airway: Mallampati: II  TM distance: >3 FB   Neck ROM: full  Mouth opening: > = 3 FB   Dental: normal exam         Pulmonary:normal exam  breath sounds clear to auscultation  (+)           asthma:     (-) shortness of

## 2024-05-19 NOTE — PROGRESS NOTES
[facility] ORTHOPEDICS AND SPORTS MEDICINE  [address]  [place] [address]  Dept: [phone]  Dept Fax: [phone]        Ambulatory Follow Up      Subjective:   Alicia Reid is a 83 y.o. year old female who presents to our office today for routine followup regarding her   1. Knee effusion, right    2. Chronic pain of right knee    .    Chief Complaint   Patient presents with    Knee Pain     Right-inj req Li 9/6/23       Date of Injury: no known injury.    HPI Alicia Reid  is a 83 y.o.   female who presents today in follow for her right knee pain and swelling.  She last had a corticosteroid injection on 6/13/23. She last had a Durolane injection on 08/01/23. The patient last had a knee aspiration on 9/6/23 and this provided the patient with significant relief.  Patient states that she noticed an increase in swelling throughout the right knee over the past week.  She states that she has a localized area of swelling along the medial aspect of the knee and that the area of swelling is tender to touch.  She also has tenderness along the inferior medial aspect of the knee joint line.  She states that the pain is not worse with walking or activity.  She denies any redness, warmth, fevers, or chills.  She has not been taking any medication for pain.  She was last evaluated for the knee on 1/31/2024 with Dr. Jaxson Gamboa DO and it was discussed at that visit that an MRI of the right knee could be ordered due to her chronic pain and swelling.  An MRI of the knee has not been done.    Review of Systems   Constitutional:  Negative for chills and fever.         Objective :   There were no vitals taken for this visit. There is no height or weight on file to calculate BMI.  General: Alicia Reid is a 83 y.o. female who is alert and oriented and sitting comfortably in our office.  Ortho Exam  MS: The patient's gait was

## 2024-05-20 ENCOUNTER — OFFICE VISIT (OUTPATIENT)
Dept: ORTHOPEDIC SURGERY | Age: 84
End: 2024-05-20
Payer: MEDICARE

## 2024-05-20 ENCOUNTER — OFFICE VISIT (OUTPATIENT)
Dept: FAMILY MEDICINE CLINIC | Age: 84
End: 2024-05-20
Payer: MEDICARE

## 2024-05-20 ENCOUNTER — HOSPITAL ENCOUNTER (OUTPATIENT)
Age: 84
Setting detail: SPECIMEN
Discharge: HOME | End: 2024-05-20

## 2024-05-20 VITALS — HEIGHT: 65 IN | WEIGHT: 216.3 LBS | RESPIRATION RATE: 16 BRPM | OXYGEN SATURATION: 98 % | BODY MASS INDEX: 36.04 KG/M2

## 2024-05-20 VITALS
WEIGHT: 214 LBS | TEMPERATURE: 97 F | HEART RATE: 65 BPM | OXYGEN SATURATION: 98 % | DIASTOLIC BLOOD PRESSURE: 76 MMHG | SYSTOLIC BLOOD PRESSURE: 122 MMHG | BODY MASS INDEX: 35.61 KG/M2

## 2024-05-20 DIAGNOSIS — M25.561 CHRONIC PAIN OF RIGHT KNEE: ICD-10-CM

## 2024-05-20 DIAGNOSIS — I50.22 CHRONIC SYSTOLIC (CONGESTIVE) HEART FAILURE (HCC): ICD-10-CM

## 2024-05-20 DIAGNOSIS — M25.461 KNEE EFFUSION, RIGHT: Primary | ICD-10-CM

## 2024-05-20 DIAGNOSIS — R40.0 DAYTIME SLEEPINESS: ICD-10-CM

## 2024-05-20 DIAGNOSIS — G47.9 SLEEP DISORDER, UNSPECIFIED: ICD-10-CM

## 2024-05-20 DIAGNOSIS — J41.1 BRONCHITIS, MUCOPURULENT RECURRENT (HCC): ICD-10-CM

## 2024-05-20 DIAGNOSIS — G89.29 CHRONIC PAIN OF RIGHT KNEE: ICD-10-CM

## 2024-05-20 DIAGNOSIS — R40.0 DAYTIME SLEEPINESS: Primary | ICD-10-CM

## 2024-05-20 DIAGNOSIS — R91.1 LUNG NODULE: ICD-10-CM

## 2024-05-20 DIAGNOSIS — R25.1 TREMOR: ICD-10-CM

## 2024-05-20 LAB
FOLATE SERPL-MCNC: >20 NG/ML (ref 4.8–24.2)
VIT B12 SERPL-MCNC: 1108 PG/ML (ref 232–1245)

## 2024-05-20 PROCEDURE — G8417 CALC BMI ABV UP PARAM F/U: HCPCS | Performed by: FAMILY MEDICINE

## 2024-05-20 PROCEDURE — 3023F SPIROM DOC REV: CPT | Performed by: FAMILY MEDICINE

## 2024-05-20 PROCEDURE — G8417 CALC BMI ABV UP PARAM F/U: HCPCS

## 2024-05-20 PROCEDURE — G8427 DOCREV CUR MEDS BY ELIG CLIN: HCPCS

## 2024-05-20 PROCEDURE — 99214 OFFICE O/P EST MOD 30 MIN: CPT | Performed by: FAMILY MEDICINE

## 2024-05-20 PROCEDURE — 1090F PRES/ABSN URINE INCON ASSESS: CPT

## 2024-05-20 PROCEDURE — G8399 PT W/DXA RESULTS DOCUMENT: HCPCS | Performed by: FAMILY MEDICINE

## 2024-05-20 PROCEDURE — 20610 DRAIN/INJ JOINT/BURSA W/O US: CPT

## 2024-05-20 PROCEDURE — 1090F PRES/ABSN URINE INCON ASSESS: CPT | Performed by: FAMILY MEDICINE

## 2024-05-20 PROCEDURE — 99213 OFFICE O/P EST LOW 20 MIN: CPT

## 2024-05-20 PROCEDURE — 1036F TOBACCO NON-USER: CPT | Performed by: FAMILY MEDICINE

## 2024-05-20 PROCEDURE — G8399 PT W/DXA RESULTS DOCUMENT: HCPCS

## 2024-05-20 PROCEDURE — 1036F TOBACCO NON-USER: CPT

## 2024-05-20 PROCEDURE — 1123F ACP DISCUSS/DSCN MKR DOCD: CPT

## 2024-05-20 PROCEDURE — 1123F ACP DISCUSS/DSCN MKR DOCD: CPT | Performed by: FAMILY MEDICINE

## 2024-05-20 PROCEDURE — G8427 DOCREV CUR MEDS BY ELIG CLIN: HCPCS | Performed by: FAMILY MEDICINE

## 2024-05-20 RX ORDER — LIDOCAINE HYDROCHLORIDE 10 MG/ML
3 INJECTION, SOLUTION INFILTRATION; PERINEURAL ONCE
Status: COMPLETED | OUTPATIENT
Start: 2024-05-20 | End: 2024-05-20

## 2024-05-20 RX ORDER — METHYLPREDNISOLONE ACETATE 80 MG/ML
80 INJECTION, SUSPENSION INTRA-ARTICULAR; INTRALESIONAL; INTRAMUSCULAR; SOFT TISSUE ONCE
Status: COMPLETED | OUTPATIENT
Start: 2024-05-20 | End: 2024-05-20

## 2024-05-20 RX ORDER — BUPIVACAINE HYDROCHLORIDE 2.5 MG/ML
2 INJECTION, SOLUTION INFILTRATION; PERINEURAL ONCE
Status: COMPLETED | OUTPATIENT
Start: 2024-05-20 | End: 2024-05-20

## 2024-05-20 RX ADMIN — BUPIVACAINE HYDROCHLORIDE 5 MG: 2.5 INJECTION, SOLUTION INFILTRATION; PERINEURAL at 16:02

## 2024-05-20 RX ADMIN — METHYLPREDNISOLONE ACETATE 80 MG: 80 INJECTION, SUSPENSION INTRA-ARTICULAR; INTRALESIONAL; INTRAMUSCULAR; SOFT TISSUE at 16:02

## 2024-05-20 RX ADMIN — LIDOCAINE HYDROCHLORIDE 3 ML: 10 INJECTION, SOLUTION INFILTRATION; PERINEURAL at 16:01

## 2024-05-20 ASSESSMENT — PATIENT HEALTH QUESTIONNAIRE - PHQ9
SUM OF ALL RESPONSES TO PHQ QUESTIONS 1-9: 0
SUM OF ALL RESPONSES TO PHQ QUESTIONS 1-9: 0
SUM OF ALL RESPONSES TO PHQ9 QUESTIONS 1 & 2: 0
SUM OF ALL RESPONSES TO PHQ QUESTIONS 1-9: 0
SUM OF ALL RESPONSES TO PHQ QUESTIONS 1-9: 0
2. FEELING DOWN, DEPRESSED OR HOPELESS: NOT AT ALL
1. LITTLE INTEREST OR PLEASURE IN DOING THINGS: NOT AT ALL

## 2024-05-20 NOTE — PROGRESS NOTES
MG tablet TAKE 1 TABLET BY MOUTH TWICE A  tablet 2    gabapentin (NEURONTIN) 100 MG capsule Take 1 capsule by mouth nightly for 180 days. 30 capsule 5    levothyroxine (SYNTHROID) 75 MCG tablet Take 1 tablet by mouth daily 90 tablet 1    Magnesium 500 MG CAPS Take 500 mg by mouth at bedtime      IBSRELA 50 MG TABS Take 50 mg by mouth as needed (for IBS- constipation)      diclofenac sodium (VOLTAREN) 1 % GEL Apply 4 g topically 4 times daily 150 g 0    ezetimibe (ZETIA) 10 MG tablet TAKE ONE TABLET BY MOUTH DAILY 90 tablet 3    dilTIAZem (CARDIZEM CD) 120 MG extended release capsule TAKE ONE CAPSULE BY MOUTH EVERY EVENING 90 capsule 3    metoprolol tartrate (LOPRESSOR) 25 MG tablet TAKE 1/2 TABLET BY MOUTH TWICE A DAY 90 tablet 3    metFORMIN (GLUCOPHAGE) 500 MG tablet TAKE ONE TABLET BY MOUTH DAILY 90 tablet 1    amitriptyline (ELAVIL) 10 MG tablet TAKE ONE TABLET BY MOUTH DAILY AS NEEDED FOR SLEEP AND LEG PAIN FOR UP TO 30 DAYS (Patient taking differently: Take 1 tablet by mouth nightly as needed TAKE ONE TABLET BY MOUTH DAILY AS NEEDED FOR SLEEP AND LEG PAIN FOR UP TO 30 DAYS  Pt takes for nerve pain) 90 tablet 2    alclomethasone (ACLOVATE) 0.05 % cream Apply topically 2 times daily Prn      albuterol sulfate HFA (PROVENTIL HFA) 108 (90 Base) MCG/ACT inhaler Inhale 2 puffs into the lungs every 6 hours as needed for Wheezing 18 g 3    NONFORMULARY Indications: Dietary fiber 1 tsp a day       Cholecalciferol (VITAMIN D3) 2000 units CAPS Take 1 capsule by mouth 2 times daily 30 capsule 0    esomeprazole Magnesium (NEXIUM) 40 MG PACK Take 1 packet by mouth daily      NONFORMULARY Take 1 tablet by mouth daily Super b complex      ascorbic acid (VITAMIN C) 500 MG tablet Take 1 tablet by mouth daily      chlorpheniramine (CHLOR-TRIMETON) 4 MG tablet Take 3 tablets by mouth daily       No current facility-administered medications for this visit.     Facility-Administered Medications Ordered in Other Visits

## 2024-05-20 NOTE — PATIENT INSTRUCTIONS
Thank you for choosing [facility].  We know you have options when it comes to your healthcare; we appreciate that you chose us. Our goal is to provide exceptional  service and world class care to every patient.  You will be receiving a survey via email or text message asking for your feedback.  Please take a few minutes to share your thoughts about your recent visit. Your comments helps us understand what we do well and ways we can improve.  Thank you in advance for your valuable feedback.

## 2024-05-21 ENCOUNTER — OFFICE VISIT (OUTPATIENT)
Dept: PAIN MANAGEMENT | Age: 84
End: 2024-05-21
Payer: MEDICARE

## 2024-05-21 VITALS — HEART RATE: 67 BPM | HEIGHT: 65 IN | BODY MASS INDEX: 35.65 KG/M2 | WEIGHT: 214 LBS | OXYGEN SATURATION: 96 %

## 2024-05-21 DIAGNOSIS — M54.51 VERTEBROGENIC LOW BACK PAIN: ICD-10-CM

## 2024-05-21 DIAGNOSIS — M47.817 LUMBOSACRAL SPONDYLOSIS WITHOUT MYELOPATHY: Primary | ICD-10-CM

## 2024-05-21 PROCEDURE — G8417 CALC BMI ABV UP PARAM F/U: HCPCS | Performed by: ANESTHESIOLOGY

## 2024-05-21 PROCEDURE — 1090F PRES/ABSN URINE INCON ASSESS: CPT | Performed by: ANESTHESIOLOGY

## 2024-05-21 PROCEDURE — 1123F ACP DISCUSS/DSCN MKR DOCD: CPT | Performed by: ANESTHESIOLOGY

## 2024-05-21 PROCEDURE — G8399 PT W/DXA RESULTS DOCUMENT: HCPCS | Performed by: ANESTHESIOLOGY

## 2024-05-21 PROCEDURE — 99212 OFFICE O/P EST SF 10 MIN: CPT | Performed by: ANESTHESIOLOGY

## 2024-05-21 PROCEDURE — G8427 DOCREV CUR MEDS BY ELIG CLIN: HCPCS | Performed by: ANESTHESIOLOGY

## 2024-05-21 PROCEDURE — 1036F TOBACCO NON-USER: CPT | Performed by: ANESTHESIOLOGY

## 2024-05-21 ASSESSMENT — ENCOUNTER SYMPTOMS
BACK PAIN: 1
GASTROINTESTINAL NEGATIVE: 1

## 2024-05-21 NOTE — PROGRESS NOTES
The patient is a 83 y.o.Non- / non  female.    Chief Complaint   Patient presents with    Back Pain    Follow Up After Procedure     Intracept-BASIVERTEBRAL NERVE ABLATION L4, L5, S1          S/P:Intracept-BASIVERTEBRAL NERVE ABLATION L4, L5, S1       Outcome   Any improvement of activity?  Yes   Any side effects (appetite,leg cramping,facial fleshing):no   Increase of pain:  No  Pain score Today:  2  % of pain relief:80%  Pain diary (medial branch block): No    Hemoglobin A1C   Date Value Ref Range Status   02/16/2024 5.6 4.0 - 6.0 % Final   02/16/2024 5.7 4.0 - 6.0 % Final        Past Medical History:   Diagnosis Date    Acid reflux     Anesthesia complication     after gallbladder surgery- pt c/o dizziness and trouble waking up    Anxiety 06/30/2014    ALICIA-7   09/09/19 : 4    Bilateral tinnitus 12/10/2019    Bronchitis, mucopurulent recurrent (HCC) 07/23/2020    Diverticular disease 06/30/2014    Enthesopathy of hip region 12/10/2019    GERD (gastroesophageal reflux disease)     History of Dyllan-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 07/11/2020    Prediabetes     RAD (reactive airway disease) 06/30/2014    Thyroid nodule 03/01/2016    Tremor     r hand    Vitamin D deficiency 06/30/2014        Past Surgical History:   Procedure Laterality Date    CHOLECYSTECTOMY      COLONOSCOPY      ENDOSCOPY, COLON, DIAGNOSTIC      ERCP  03/21/2014    EXCISION / BIOPSY SKIN LESION OF TRUNK Left 11/08/2017    EXCISION MASS LEFT POSTERIOR SHOULDER, LEFT ANTERIOR ARM performed by George Alvarado MD at

## 2024-05-28 ENCOUNTER — PATIENT MESSAGE (OUTPATIENT)
Dept: FAMILY MEDICINE CLINIC | Age: 84
End: 2024-05-28

## 2024-05-28 DIAGNOSIS — Z12.31 ENCOUNTER FOR SCREENING MAMMOGRAM FOR MALIGNANT NEOPLASM OF BREAST: Primary | ICD-10-CM

## 2024-05-28 NOTE — TELEPHONE ENCOUNTER
From: Alicia Reid  To: Dr. Arianne Perez  Sent: 5/28/2024 9:00 AM EDT  Subject: Mammogram Order request    Dr. Perez  I need to have my annual mammogram (missed last year). I do have an appt. for July 9 (11:15 am) at [facility]. Usually I just have the routine screening. The tech at [facility] said I didn't need an order, but I wanted to make sure that I touched base with you re the order.  Many THANKS!  Alicia Reid  1940

## 2024-05-29 ENCOUNTER — HOSPITAL ENCOUNTER (OUTPATIENT)
Dept: NUCLEAR MEDICINE | Age: 84
Discharge: HOME | End: 2024-05-31
Payer: MEDICARE

## 2024-05-29 ENCOUNTER — HOSPITAL ENCOUNTER (OUTPATIENT)
Age: 84
Discharge: HOME | End: 2024-05-31
Payer: MEDICARE

## 2024-05-29 VITALS — RESPIRATION RATE: 18 BRPM | SYSTOLIC BLOOD PRESSURE: 152 MMHG | HEART RATE: 69 BPM | DIASTOLIC BLOOD PRESSURE: 85 MMHG

## 2024-05-29 DIAGNOSIS — I20.9 ANGINA PECTORIS (HCC): ICD-10-CM

## 2024-05-29 LAB
NUC STRESS EJECTION FRACTION: 70 %
STRESS BASELINE DIAS BP: 85 MMHG
STRESS BASELINE HR: 71 BPM
STRESS BASELINE ST DEPRESSION: 0 MM
STRESS BASELINE SYS BP: 152 MMHG
STRESS ESTIMATED WORKLOAD: 1 METS
STRESS EXERCISE DUR MIN: 1 MIN
STRESS EXERCISE DUR SEC: 0 SEC
STRESS PEAK DIAS BP: 85 MMHG
STRESS PEAK SYS BP: 152 MMHG
STRESS PERCENT HR ACHIEVED: 70 %
STRESS POST PEAK HR: 97 BPM
STRESS RATE PRESSURE PRODUCT: NORMAL BPM*MMHG
STRESS ST DEPRESSION: 0 MM
STRESS TARGET HR: 139 BPM
TID: 0.81

## 2024-05-29 PROCEDURE — A9500 TC99M SESTAMIBI: HCPCS | Performed by: NURSE PRACTITIONER

## 2024-05-29 PROCEDURE — 78452 HT MUSCLE IMAGE SPECT MULT: CPT

## 2024-05-29 PROCEDURE — 93017 CV STRESS TEST TRACING ONLY: CPT

## 2024-05-29 PROCEDURE — 2580000003 HC RX 258: Performed by: NURSE PRACTITIONER

## 2024-05-29 PROCEDURE — 3430000000 HC RX DIAGNOSTIC RADIOPHARMACEUTICAL: Performed by: NURSE PRACTITIONER

## 2024-05-29 PROCEDURE — 6360000002 HC RX W HCPCS: Performed by: NURSE PRACTITIONER

## 2024-05-29 RX ORDER — SODIUM CHLORIDE 9 MG/ML
500 INJECTION, SOLUTION INTRAVENOUS CONTINUOUS PRN
Status: ACTIVE | OUTPATIENT
Start: 2024-05-29 | End: 2024-05-29

## 2024-05-29 RX ORDER — ATROPINE SULFATE 0.1 MG/ML
0.5 INJECTION INTRAVENOUS EVERY 5 MIN PRN
Status: ACTIVE | OUTPATIENT
Start: 2024-05-29 | End: 2024-05-29

## 2024-05-29 RX ORDER — TETRAKIS(2-METHOXYISOBUTYLISOCYANIDE)COPPER(I) TETRAFLUOROBORATE 1 MG/ML
48.4 INJECTION, POWDER, LYOPHILIZED, FOR SOLUTION INTRAVENOUS
Status: COMPLETED | OUTPATIENT
Start: 2024-05-29 | End: 2024-05-29

## 2024-05-29 RX ORDER — TETRAKIS(2-METHOXYISOBUTYLISOCYANIDE)COPPER(I) TETRAFLUOROBORATE 1 MG/ML
17 INJECTION, POWDER, LYOPHILIZED, FOR SOLUTION INTRAVENOUS
Status: COMPLETED | OUTPATIENT
Start: 2024-05-29 | End: 2024-05-29

## 2024-05-29 RX ORDER — NITROGLYCERIN 0.4 MG/1
0.4 TABLET SUBLINGUAL EVERY 5 MIN PRN
Status: ACTIVE | OUTPATIENT
Start: 2024-05-29 | End: 2024-05-29

## 2024-05-29 RX ORDER — AMINOPHYLLINE 25 MG/ML
50 INJECTION, SOLUTION INTRAVENOUS PRN
Status: ACTIVE | OUTPATIENT
Start: 2024-05-29 | End: 2024-05-29

## 2024-05-29 RX ORDER — METOPROLOL TARTRATE 1 MG/ML
5 INJECTION, SOLUTION INTRAVENOUS EVERY 5 MIN PRN
Status: ACTIVE | OUTPATIENT
Start: 2024-05-29 | End: 2024-05-29

## 2024-05-29 RX ORDER — SODIUM CHLORIDE 0.9 % (FLUSH) 0.9 %
5-40 SYRINGE (ML) INJECTION PRN
Status: ACTIVE | OUTPATIENT
Start: 2024-05-29 | End: 2024-05-29

## 2024-05-29 RX ORDER — REGADENOSON 0.08 MG/ML
0.4 INJECTION, SOLUTION INTRAVENOUS
Status: COMPLETED | OUTPATIENT
Start: 2024-05-29 | End: 2024-05-29

## 2024-05-29 RX ADMIN — Medication 48.4 MILLICURIE: at 08:12

## 2024-05-29 RX ADMIN — Medication 17 MILLICURIE: at 07:20

## 2024-05-29 RX ADMIN — SODIUM CHLORIDE, PRESERVATIVE FREE 10 ML: 5 INJECTION INTRAVENOUS at 08:10

## 2024-05-29 RX ADMIN — REGADENOSON 0.4 MG: 0.08 INJECTION, SOLUTION INTRAVENOUS at 08:10

## 2024-05-29 NOTE — FLOWSHEET NOTE
Pt tolerated lexiscan without experiencing side effect, recovered on cart and taken to [facility] for further testing in nad

## 2024-06-04 ENCOUNTER — HOSPITAL ENCOUNTER (OUTPATIENT)
Dept: CT IMAGING | Facility: CLINIC | Age: 84
Discharge: HOME | End: 2024-06-06
Payer: MEDICARE

## 2024-06-04 DIAGNOSIS — R91.1 LUNG NODULE: ICD-10-CM

## 2024-06-04 PROCEDURE — 71250 CT THORAX DX C-: CPT

## 2024-06-12 ENCOUNTER — HOSPITAL ENCOUNTER (OUTPATIENT)
Dept: MRI IMAGING | Facility: CLINIC | Age: 84
Discharge: HOME | End: 2024-06-14
Payer: MEDICARE

## 2024-06-12 DIAGNOSIS — G89.29 CHRONIC PAIN OF RIGHT KNEE: ICD-10-CM

## 2024-06-12 DIAGNOSIS — M25.561 CHRONIC PAIN OF RIGHT KNEE: ICD-10-CM

## 2024-06-12 DIAGNOSIS — M25.461 KNEE EFFUSION, RIGHT: ICD-10-CM

## 2024-06-12 LAB — CREAT BLD-MCNC: 0.9 MG/DL (ref 0.6–1.4)

## 2024-06-12 PROCEDURE — 73721 MRI JNT OF LWR EXTRE W/O DYE: CPT

## 2024-06-12 PROCEDURE — 82565 ASSAY OF CREATININE: CPT

## 2024-06-12 RX ORDER — SODIUM CHLORIDE 0.9 % (FLUSH) 0.9 %
10 SYRINGE (ML) INJECTION PRN
Status: DISCONTINUED | OUTPATIENT
Start: 2024-06-12 | End: 2024-06-15 | Stop reason: HOSPADM

## 2024-06-12 RX ORDER — 0.9 % SODIUM CHLORIDE 0.9 %
30 INTRAVENOUS SOLUTION INTRAVENOUS ONCE
Status: DISCONTINUED | OUTPATIENT
Start: 2024-06-12 | End: 2024-06-15 | Stop reason: HOSPADM

## 2024-06-19 ENCOUNTER — HOSPITAL ENCOUNTER (OUTPATIENT)
Dept: SLEEP CENTER | Age: 84
Discharge: HOME | End: 2024-06-21
Payer: MEDICARE

## 2024-06-19 DIAGNOSIS — G47.9 SLEEP DISORDER, UNSPECIFIED: ICD-10-CM

## 2024-06-19 DIAGNOSIS — R40.0 DAYTIME SLEEPINESS: ICD-10-CM

## 2024-06-19 PROCEDURE — 95810 POLYSOM 6/> YRS 4/> PARAM: CPT

## 2024-06-21 ENCOUNTER — TELEPHONE (OUTPATIENT)
Dept: ORTHOPEDIC SURGERY | Age: 84
End: 2024-06-21

## 2024-06-21 NOTE — TELEPHONE ENCOUNTER
----- Message from Joselyn Mary sent at 6/20/2024 11:27 AM EDT -----  Can you call this patient and schedule with Melita for MRI Review  ----- Message -----  From: Melita Castillo PA-C  Sent: 6/18/2024   5:01 PM EDT  To: Joselyn Mary    Can you please call the patient and see when she would like to follow up for her MRI results? Thank you.

## 2024-06-21 NOTE — TELEPHONE ENCOUNTER
Called patient was unable to LM for patient.    Patient was called, LVM for patient to call office.  Written by Gwendolyn Lee MA on 6/20/2024  4:19 PM EDT

## 2024-06-25 ENCOUNTER — OFFICE VISIT (OUTPATIENT)
Dept: ORTHOPEDIC SURGERY | Age: 84
End: 2024-06-25
Payer: MEDICARE

## 2024-06-25 VITALS — HEIGHT: 65 IN | OXYGEN SATURATION: 99 % | RESPIRATION RATE: 17 BRPM | WEIGHT: 210 LBS | BODY MASS INDEX: 34.99 KG/M2

## 2024-06-25 DIAGNOSIS — G89.29 CHRONIC PAIN OF RIGHT KNEE: ICD-10-CM

## 2024-06-25 DIAGNOSIS — M25.461 KNEE EFFUSION, RIGHT: Primary | ICD-10-CM

## 2024-06-25 DIAGNOSIS — M25.561 CHRONIC PAIN OF RIGHT KNEE: ICD-10-CM

## 2024-06-25 PROCEDURE — 1036F TOBACCO NON-USER: CPT

## 2024-06-25 PROCEDURE — G8399 PT W/DXA RESULTS DOCUMENT: HCPCS

## 2024-06-25 PROCEDURE — 1123F ACP DISCUSS/DSCN MKR DOCD: CPT

## 2024-06-25 PROCEDURE — 99213 OFFICE O/P EST LOW 20 MIN: CPT

## 2024-06-25 PROCEDURE — 1090F PRES/ABSN URINE INCON ASSESS: CPT

## 2024-06-25 PROCEDURE — G8427 DOCREV CUR MEDS BY ELIG CLIN: HCPCS

## 2024-06-25 PROCEDURE — G8417 CALC BMI ABV UP PARAM F/U: HCPCS

## 2024-06-25 RX ORDER — LEVOTHYROXINE SODIUM 88 UG/1
TABLET ORAL
COMMUNITY
Start: 2024-05-28

## 2024-06-25 ASSESSMENT — ENCOUNTER SYMPTOMS
VOMITING: 0
NAUSEA: 0
COLOR CHANGE: 0

## 2024-06-25 NOTE — TELEPHONE ENCOUNTER
Alicia Reid is calling to request a refill on the following medication(s):    Last Visit Date (If Applicable):  5/20/2024    Next Visit Date:    Visit date not found    Medication Request:  Requested Prescriptions     Pending Prescriptions Disp Refills    metFORMIN (GLUCOPHAGE) 500 MG tablet 90 tablet 1     Sig: Take 1 tablet by mouth daily

## 2024-06-25 NOTE — PROGRESS NOTES
[facility] ORTHOPEDICS AND SPORTS MEDICINE  [address]  [facility] [address]  Dept: [phone]  Dept Fax: [phone]        Ambulatory Follow Up      Subjective:   Alicia Reid is a 83 y.o. year old female who presents to our office today for routine followup regarding her   1. Knee effusion, right    2. Chronic pain of right knee    .    Chief Complaint   Patient presents with    Knee Pain     Right knee pain- LI 9/6/23-MRI Review       Date of Injury: no known injuries.     HPI Alicia Reid  is a 83 y.o.  female who presents today in follow for her MRI review of the right knee.  The patient was last seen on 5/20/2024 and underwent treatment in the form of right knee aspiration and corticosteroid injection.  The patient notes 100% improvement with the previous treatment.  She states that she is no longer having any pain.  She states that she still has an area of swelling along the medial aspect of the thigh, but states that the area of swelling is not painful.  She denies any redness, warmth, fevers, or chills.    Review of Systems   Constitutional:  Negative for chills and fever.   Gastrointestinal:  Negative for nausea and vomiting.   Skin:  Negative for color change and rash.   Neurological:  Negative for weakness and numbness.         Objective :   Resp 17   Ht 1.651 m (5' 5\")   Wt 95.3 kg (210 lb)   SpO2 99%   BMI 34.95 kg/m²  Body mass index is 34.95 kg/m².  General: Alicia Reid is a 83 y.o. female who is alert and oriented and sitting comfortably in our office.  Ortho Exam  MS: The patient's gait was examined in the room and noted to be not antalgic. She walks without a cane or walker. Examination of the knee demonstrates that there is no redness or warmth.  There is no tenderness to palpation along the medial or lateral joint lines. There is no knee effusion. Range of motion of the knee includes 0-125.

## 2024-06-28 PROBLEM — G47.9 SLEEP DISORDER, UNSPECIFIED: Status: ACTIVE | Noted: 2024-06-28

## 2024-06-28 PROBLEM — R40.0 DAYTIME SLEEPINESS: Status: ACTIVE | Noted: 2024-06-28

## 2024-06-28 LAB — STATUS: NORMAL

## 2024-07-09 ENCOUNTER — HOSPITAL ENCOUNTER (OUTPATIENT)
Dept: MAMMOGRAPHY | Age: 84
Discharge: HOME | End: 2024-07-11
Payer: MEDICARE

## 2024-07-09 VITALS — BODY MASS INDEX: 34.99 KG/M2 | WEIGHT: 210 LBS | HEIGHT: 65 IN

## 2024-07-09 DIAGNOSIS — Z12.31 ENCOUNTER FOR SCREENING MAMMOGRAM FOR MALIGNANT NEOPLASM OF BREAST: ICD-10-CM

## 2024-07-09 PROCEDURE — 77063 BREAST TOMOSYNTHESIS BI: CPT

## 2024-07-10 ENCOUNTER — OFFICE VISIT (OUTPATIENT)
Dept: ORTHOPEDIC SURGERY | Age: 84
End: 2024-07-10
Payer: MEDICARE

## 2024-07-10 VITALS — WEIGHT: 210 LBS | RESPIRATION RATE: 16 BRPM | BODY MASS INDEX: 34.99 KG/M2 | OXYGEN SATURATION: 98 % | HEIGHT: 65 IN

## 2024-07-10 DIAGNOSIS — R26.9 GAIT DIFFICULTY: ICD-10-CM

## 2024-07-10 DIAGNOSIS — M70.61 GREATER TROCHANTERIC BURSITIS OF RIGHT HIP: Primary | ICD-10-CM

## 2024-07-10 DIAGNOSIS — R29.898 BILATERAL LEG WEAKNESS: ICD-10-CM

## 2024-07-10 PROCEDURE — 1090F PRES/ABSN URINE INCON ASSESS: CPT | Performed by: PHYSICIAN ASSISTANT

## 2024-07-10 PROCEDURE — 1036F TOBACCO NON-USER: CPT | Performed by: PHYSICIAN ASSISTANT

## 2024-07-10 PROCEDURE — 99213 OFFICE O/P EST LOW 20 MIN: CPT | Performed by: PHYSICIAN ASSISTANT

## 2024-07-10 PROCEDURE — G8399 PT W/DXA RESULTS DOCUMENT: HCPCS | Performed by: PHYSICIAN ASSISTANT

## 2024-07-10 PROCEDURE — 1123F ACP DISCUSS/DSCN MKR DOCD: CPT | Performed by: PHYSICIAN ASSISTANT

## 2024-07-10 PROCEDURE — G8427 DOCREV CUR MEDS BY ELIG CLIN: HCPCS | Performed by: PHYSICIAN ASSISTANT

## 2024-07-10 PROCEDURE — 20611 DRAIN/INJ JOINT/BURSA W/US: CPT | Performed by: PHYSICIAN ASSISTANT

## 2024-07-10 PROCEDURE — G8417 CALC BMI ABV UP PARAM F/U: HCPCS | Performed by: PHYSICIAN ASSISTANT

## 2024-07-10 RX ORDER — LIDOCAINE HYDROCHLORIDE 10 MG/ML
2 INJECTION, SOLUTION INFILTRATION; PERINEURAL ONCE
Status: COMPLETED | OUTPATIENT
Start: 2024-07-10 | End: 2024-07-10

## 2024-07-10 RX ORDER — METHYLPREDNISOLONE ACETATE 80 MG/ML
80 INJECTION, SUSPENSION INTRA-ARTICULAR; INTRALESIONAL; INTRAMUSCULAR; SOFT TISSUE ONCE
Status: COMPLETED | OUTPATIENT
Start: 2024-07-10 | End: 2024-07-10

## 2024-07-10 RX ADMIN — LIDOCAINE HYDROCHLORIDE 2 ML: 10 INJECTION, SOLUTION INFILTRATION; PERINEURAL at 14:34

## 2024-07-10 RX ADMIN — METHYLPREDNISOLONE ACETATE 80 MG: 80 INJECTION, SUSPENSION INTRA-ARTICULAR; INTRALESIONAL; INTRAMUSCULAR; SOFT TISSUE at 14:34

## 2024-07-10 ASSESSMENT — ENCOUNTER SYMPTOMS
ABDOMINAL DISTENTION: 0
DIARRHEA: 0
SHORTNESS OF BREATH: 0
GASTROINTESTINAL NEGATIVE: 1
CHEST TIGHTNESS: 0
COUGH: 0
RESPIRATORY NEGATIVE: 1
ABDOMINAL PAIN: 0
VOMITING: 0
COLOR CHANGE: 0
NAUSEA: 0
CONSTIPATION: 0
APNEA: 0

## 2024-07-10 NOTE — PROGRESS NOTES
like to return to physical therapy.  I think that is reasonable.  Cortisone injections into the greater trochanteric bursa have worked really well for her in the past and she would like to continue with that today.  Today patient opted for a cortisone injection into the right greater trochanteric bursa to help reduce inflammation and pain. The injection site should never get red, hot, or swollen and if it does the patient will contact our office right away. The patient may experience a increase in soreness the first 24-48 hours due to a cortisone flair and can take anti-inflammatories for a short period of time to reduce that soreness. The patient should not submerge the injection site in water for a minimum of 24 hours to avoid infection. This means no lakes, pools, ponds, or hot tubs for 24 hours. If the patient is diabetic the injection may increase their blood sugar for up to one week. The patient can do this cortisone injection once every 4 months as needed. If the injections stop working and do not give the patient relief the patient should consider surgical interventions to produce long term relief.  The patient will return to physical therapy for range of motion and strengthening.  The patient can follow-up with me as needed for her right hip pain.  She will call if she has any questions or concerns.     Follow up:Return if symptoms worsen or fail to improve.          Orders Placed This Encounter   Medications    lidocaine 1 % injection 2 mL    methylPREDNISolone acetate (DEPO-MEDROL) injection 80 mg         Orders Placed This Encounter   Procedures    XR HIP 1 VW W PELVIS RIGHT     Standing Status:   Future     Number of Occurrences:   1     Standing Expiration Date:   8/10/2024     Scheduling Instructions:      Weight bearing            Will perform at scheduled appointment time    External Referral To Physical Therapy     Referral Priority:   Routine     Referral Type:   Eval and Treat     Referral

## 2024-08-07 ENCOUNTER — HOSPITAL ENCOUNTER (EMERGENCY)
Facility: CLINIC | Age: 84
Discharge: HOME | End: 2024-08-07
Attending: SPECIALIST
Payer: MEDICARE

## 2024-08-07 VITALS
BODY MASS INDEX: 34.49 KG/M2 | HEART RATE: 88 BPM | RESPIRATION RATE: 18 BRPM | TEMPERATURE: 98 F | DIASTOLIC BLOOD PRESSURE: 95 MMHG | HEIGHT: 65 IN | OXYGEN SATURATION: 96 % | WEIGHT: 207 LBS | SYSTOLIC BLOOD PRESSURE: 143 MMHG

## 2024-08-07 DIAGNOSIS — M79.605 LEFT LEG PAIN: Primary | ICD-10-CM

## 2024-08-07 LAB
EKG ATRIAL RATE: 57 BPM
EKG P AXIS: 57 DEGREES
EKG P-R INTERVAL: 168 MS
EKG Q-T INTERVAL: 428 MS
EKG QRS DURATION: 84 MS
EKG QTC CALCULATION (BAZETT): 416 MS
EKG R AXIS: -24 DEGREES
EKG T AXIS: 13 DEGREES
EKG VENTRICULAR RATE: 57 BPM

## 2024-08-07 PROCEDURE — 99283 EMERGENCY DEPT VISIT LOW MDM: CPT

## 2024-08-07 PROCEDURE — 93005 ELECTROCARDIOGRAM TRACING: CPT | Performed by: SPECIALIST

## 2024-08-07 ASSESSMENT — ENCOUNTER SYMPTOMS
ABDOMINAL PAIN: 0
SORE THROAT: 0
SHORTNESS OF BREATH: 0
COUGH: 0
NAUSEA: 0

## 2024-08-07 ASSESSMENT — PAIN - FUNCTIONAL ASSESSMENT: PAIN_FUNCTIONAL_ASSESSMENT: NONE - DENIES PAIN

## 2024-08-07 NOTE — DISCHARGE INSTRUCTIONS
Please understand that at this time there is no evidence for a more serious underlying process, but that early in the process of an illness or injury, an emergency department workup can be falsely reassuring.  You should contact your family doctor within the next 48 hours for a follow up appointment    THANK YOU!!!    From [facility] and [facility]    On behalf of the Emergency Department staff at [facility], I would like to thank you for giving us the opportunity to address your health care needs and concerns.    We hope that during your visit, our service was delivered in a professional and caring manner. Please keep [facility] in mind as we walk with you down the path to your own personal wellness.     Please expect an automated text message or email from us so we can ask a few questions about your health and progress. Based on your answers, a clinician may call you back to offer help and instructions.    Please understand that early in the process of an illness or injury, an emergency department workup can be falsely reassuring.  If you notice any worsening, changing or persistent symptoms please call your family doctor or return to the ER immediately.     Tell us how we did during your visit at [email or web address]   and let us know about your experience

## 2024-08-07 NOTE — ED NOTES
Pt presents to ED ambulatory a&o x4. Pt states she woke up around 0300 with L shin pain. Pt states she is worried for a blood clot. No redness, warmth, or swelling noted. Pt states she walked around a bit and the pain has subsided since, and she also took tylenol. Pt also reports that she has hx of sciatica to that leg. Pt states \"the leg started to tingle and the last time something was tingly I passed out.\"

## 2024-08-07 NOTE — ED PROVIDER NOTES
[facility]  [address] [facility] [address]  Phone: [phone]      Pt Name: Alicia Reid  MRN: [id number]  Birthdate 1940  Date of evaluation: 8/7/2024      CHIEF COMPLAINT       Chief Complaint   Patient presents with    Leg Pain     L shin pain         HISTORY OF PRESENT ILLNESS    Alicia Reid is a 83 y.o. female who presents   Chief Complaint   Patient presents with    Leg Pain     L shin pain   .    83-year-old female patient presents to the emergency department for evaluation of left leg pain on the anterior aspect which started at 3 AM this morning and lasted for about 10 minutes and then resolved.  Patient states it felt like charley horse and she got out of the bed and walked it out and also took Tylenol and felt better.  She became anxious and started having tingling sensation in the hands and the arms which lasted for about 15 minutes and then resolved.  Upon arrival, patient denies any leg pain or shin pain and denies any tingling or numbness in any of the extremities.  She has not noticed any redness or swelling in the left lower leg.  There is no calf pain or tenderness.  She denies any chest pain, shortness of breath, palpitations or diaphoresis.  She denies any recent long travels or prolonged immobilization and no history of DVT or PE in the past.  Patient has had nuclear stress test with myocardial perfusion on May 29, 2024 and the study was negative for myocardial ischemia.  Findings suggest low risk for cardiac events.  Post-rest ejection fraction was 70%.    REVIEW OF SYSTEMS     Review of Systems   Constitutional:  Negative for chills and fever.   HENT:  Negative for congestion and sore throat.    Respiratory:  Negative for cough and shortness of breath.    Cardiovascular:  Negative for chest pain, palpitations and leg swelling.   Gastrointestinal:  Negative for abdominal pain and nausea.   Genitourinary:  Negative for dysuria and hematuria.   Musculoskeletal:

## 2024-08-13 ENCOUNTER — OFFICE VISIT (OUTPATIENT)
Dept: PAIN MANAGEMENT | Age: 84
End: 2024-08-13
Payer: MEDICARE

## 2024-08-13 VITALS — WEIGHT: 207 LBS | OXYGEN SATURATION: 96 % | HEART RATE: 88 BPM | BODY MASS INDEX: 34.49 KG/M2 | HEIGHT: 65 IN

## 2024-08-13 DIAGNOSIS — M47.817 LUMBOSACRAL SPONDYLOSIS WITHOUT MYELOPATHY: Primary | ICD-10-CM

## 2024-08-13 DIAGNOSIS — G57.12 NEUROPATHY OF LEFT LATERAL FEMORAL CUTANEOUS NERVE: ICD-10-CM

## 2024-08-13 PROCEDURE — 99213 OFFICE O/P EST LOW 20 MIN: CPT | Performed by: ANESTHESIOLOGY

## 2024-08-13 PROCEDURE — 1090F PRES/ABSN URINE INCON ASSESS: CPT | Performed by: ANESTHESIOLOGY

## 2024-08-13 PROCEDURE — 1123F ACP DISCUSS/DSCN MKR DOCD: CPT | Performed by: ANESTHESIOLOGY

## 2024-08-13 PROCEDURE — G8417 CALC BMI ABV UP PARAM F/U: HCPCS | Performed by: ANESTHESIOLOGY

## 2024-08-13 PROCEDURE — G8427 DOCREV CUR MEDS BY ELIG CLIN: HCPCS | Performed by: ANESTHESIOLOGY

## 2024-08-13 PROCEDURE — G8399 PT W/DXA RESULTS DOCUMENT: HCPCS | Performed by: ANESTHESIOLOGY

## 2024-08-13 PROCEDURE — 1036F TOBACCO NON-USER: CPT | Performed by: ANESTHESIOLOGY

## 2024-08-13 ASSESSMENT — ENCOUNTER SYMPTOMS
BACK PAIN: 1
NAUSEA: 0
COUGH: 0
DIARRHEA: 0
VOMITING: 0
CONSTIPATION: 0

## 2024-08-13 NOTE — PROGRESS NOTES
Lumbosacral spondylosis without myelopathy    2. 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     No orders of the defined types were placed in this encounter.           Electronically signed by Jericho Earl MD on 8/13/2024 at 11:57 AM

## 2024-08-26 ENCOUNTER — HOSPITAL ENCOUNTER (OUTPATIENT)
Age: 84
Setting detail: SPECIMEN
Discharge: HOME | End: 2024-08-26

## 2024-08-26 LAB
T3FREE SERPL-MCNC: 2.9 PG/ML (ref 2–4.4)
T4 FREE SERPL-MCNC: 1.4 NG/DL (ref 0.92–1.68)
TSH SERPL DL<=0.05 MIU/L-ACNC: 0.74 UIU/ML (ref 0.27–4.2)

## 2024-09-06 ENCOUNTER — TRANSCRIBE ORDERS (OUTPATIENT)
Dept: ADMINISTRATIVE | Age: 84
End: 2024-09-06

## 2024-09-06 DIAGNOSIS — R14.2 BELCHING: ICD-10-CM

## 2024-09-06 DIAGNOSIS — K31.84 GASTROPARESIS: Primary | ICD-10-CM

## 2024-09-06 DIAGNOSIS — K21.9 GASTROESOPHAGEAL REFLUX DISEASE WITHOUT ESOPHAGITIS: Primary | ICD-10-CM

## 2024-09-06 DIAGNOSIS — K31.84 GASTROPARESIS: ICD-10-CM

## 2024-09-19 ENCOUNTER — HOSPITAL ENCOUNTER (OUTPATIENT)
Dept: NUCLEAR MEDICINE | Age: 84
Discharge: HOME | End: 2024-09-21
Attending: INTERNAL MEDICINE
Payer: MEDICARE

## 2024-09-19 DIAGNOSIS — R14.2 BELCHING: ICD-10-CM

## 2024-09-19 DIAGNOSIS — K21.9 GASTROESOPHAGEAL REFLUX DISEASE WITHOUT ESOPHAGITIS: ICD-10-CM

## 2024-09-19 DIAGNOSIS — K31.84 GASTROPARESIS: ICD-10-CM

## 2024-09-19 PROCEDURE — 78264 GASTRIC EMPTYING IMG STUDY: CPT

## 2024-09-19 PROCEDURE — A9541 TC99M SULFUR COLLOID: HCPCS | Performed by: INTERNAL MEDICINE

## 2024-09-19 PROCEDURE — 3430000000 HC RX DIAGNOSTIC RADIOPHARMACEUTICAL: Performed by: INTERNAL MEDICINE

## 2024-09-19 RX ADMIN — Medication 2.6 MILLICURIE: at 10:08

## 2024-09-25 ENCOUNTER — HOSPITAL ENCOUNTER (OUTPATIENT)
Age: 84
Setting detail: SPECIMEN
Discharge: HOME | End: 2024-09-25

## 2024-09-25 ENCOUNTER — TELEPHONE (OUTPATIENT)
Dept: FAMILY MEDICINE CLINIC | Age: 84
End: 2024-09-25

## 2024-09-25 ENCOUNTER — OFFICE VISIT (OUTPATIENT)
Dept: NEUROLOGY | Age: 84
End: 2024-09-25
Payer: MEDICARE

## 2024-09-25 VITALS
BODY MASS INDEX: 34.82 KG/M2 | HEIGHT: 65 IN | HEART RATE: 84 BPM | WEIGHT: 209 LBS | DIASTOLIC BLOOD PRESSURE: 88 MMHG | SYSTOLIC BLOOD PRESSURE: 135 MMHG

## 2024-09-25 DIAGNOSIS — R20.0 NUMBNESS: ICD-10-CM

## 2024-09-25 DIAGNOSIS — R20.0 NUMBNESS: Primary | ICD-10-CM

## 2024-09-25 LAB
CRP SERPL HS-MCNC: <3 MG/L (ref 0–5)
ERYTHROCYTE [SEDIMENTATION RATE] IN BLOOD BY PHOTOMETRIC METHOD: 21 MM/HR (ref 0–30)

## 2024-09-25 PROCEDURE — G8399 PT W/DXA RESULTS DOCUMENT: HCPCS | Performed by: PSYCHIATRY & NEUROLOGY

## 2024-09-25 PROCEDURE — 1123F ACP DISCUSS/DSCN MKR DOCD: CPT | Performed by: PSYCHIATRY & NEUROLOGY

## 2024-09-25 PROCEDURE — G8427 DOCREV CUR MEDS BY ELIG CLIN: HCPCS | Performed by: PSYCHIATRY & NEUROLOGY

## 2024-09-25 PROCEDURE — G8417 CALC BMI ABV UP PARAM F/U: HCPCS | Performed by: PSYCHIATRY & NEUROLOGY

## 2024-09-25 PROCEDURE — 1036F TOBACCO NON-USER: CPT | Performed by: PSYCHIATRY & NEUROLOGY

## 2024-09-25 PROCEDURE — 1090F PRES/ABSN URINE INCON ASSESS: CPT | Performed by: PSYCHIATRY & NEUROLOGY

## 2024-09-25 PROCEDURE — 99215 OFFICE O/P EST HI 40 MIN: CPT | Performed by: PSYCHIATRY & NEUROLOGY

## 2024-09-25 RX ORDER — DULOXETIN HYDROCHLORIDE 30 MG/1
30 CAPSULE, DELAYED RELEASE ORAL DAILY
Qty: 30 CAPSULE | Refills: 3 | Status: SHIPPED | OUTPATIENT
Start: 2024-09-25

## 2024-10-02 ENCOUNTER — PATIENT MESSAGE (OUTPATIENT)
Dept: FAMILY MEDICINE CLINIC | Age: 84
End: 2024-10-02

## 2024-10-03 ENCOUNTER — HOSPITAL ENCOUNTER (OUTPATIENT)
Dept: NUCLEAR MEDICINE | Age: 84
Discharge: HOME | End: 2024-10-05
Payer: MEDICARE

## 2024-10-03 DIAGNOSIS — R20.0 NUMBNESS: ICD-10-CM

## 2024-10-03 PROCEDURE — 6370000000 HC RX 637 (ALT 250 FOR IP): Performed by: PSYCHIATRY & NEUROLOGY

## 2024-10-03 PROCEDURE — A9584 IODINE I-123 IOFLUPANE: HCPCS | Performed by: PSYCHIATRY & NEUROLOGY

## 2024-10-03 PROCEDURE — 3430000000 HC RX DIAGNOSTIC RADIOPHARMACEUTICAL: Performed by: PSYCHIATRY & NEUROLOGY

## 2024-10-03 PROCEDURE — 78803 RP LOCLZJ TUM SPECT 1 AREA: CPT

## 2024-10-03 RX ORDER — POTASSIUM IODIDE 65 MG/ML
2 SOLUTION ORAL ONCE
Status: COMPLETED | OUTPATIENT
Start: 2024-10-03 | End: 2024-10-03

## 2024-10-03 RX ADMIN — POTASSIUM IODIDE 130 MG: 65 SOLUTION ORAL at 09:25

## 2024-10-03 RX ADMIN — IOFLUPANE I-123 5 MILLICURIE: 2 INJECTION, SOLUTION INTRAVENOUS at 10:00

## 2024-10-15 ENCOUNTER — HOSPITAL ENCOUNTER (OUTPATIENT)
Dept: MRI IMAGING | Facility: CLINIC | Age: 84
Discharge: HOME | End: 2024-10-17
Attending: PSYCHIATRY & NEUROLOGY
Payer: MEDICARE

## 2024-10-15 DIAGNOSIS — R20.0 NUMBNESS: ICD-10-CM

## 2024-10-15 PROCEDURE — 72148 MRI LUMBAR SPINE W/O DYE: CPT

## 2024-10-24 ENCOUNTER — TELEPHONE (OUTPATIENT)
Dept: NEUROLOGY | Age: 84
End: 2024-10-24

## 2024-10-24 NOTE — TELEPHONE ENCOUNTER
Alicia called into office stating that the tremor in her right hand has increased. She is asking if this can be a side effect of the Cymbalta.    She is also asking for her MRI and brain scan results.    Please advise.

## 2024-10-29 NOTE — TELEPHONE ENCOUNTER
DaTscan is normal.  MR lumbar spine shows some disc changes expected for age.  Tremor is not a typical side effect from Cymbalta..  If patient is agreeable, we can increase dose of Neurontin to 200 mg PO QHS to help with tremor.  Thank you

## 2024-10-30 NOTE — TELEPHONE ENCOUNTER
Patient has weaned herself off the Cymbalta. She said she was also having other symptoms she didn't tell us about like blurred vision (mild), slight headache, and nausea on the Cymbalta. She said it started about a week - two weeks after Cymbalta. She called the pharmacist and they told her since she had only been on it a month she could just stop. She has not taken the Cymbalta since Sunday. She said all the symptoms she had are gone, and the tremor is slightly better. She wants to give it 30 days to see how she does. If the tremor gets worse or doesn't continue to improve she would be willing to try the increase in gabapentin then.     Did advise patient if she has these symptoms of blurred vision, headache with that, etc. And its during our office hours to please call us so we can provide guidance on that or she can always go to the ER to be evaluated with those symptoms.

## 2024-11-01 ENCOUNTER — OFFICE VISIT (OUTPATIENT)
Dept: PAIN MANAGEMENT | Age: 84
End: 2024-11-01

## 2024-11-01 VITALS — WEIGHT: 209 LBS | BODY MASS INDEX: 34.82 KG/M2 | HEIGHT: 65 IN

## 2024-11-01 DIAGNOSIS — M48.061 LUMBAR FORAMINAL STENOSIS: Primary | ICD-10-CM

## 2024-11-01 DIAGNOSIS — M47.817 LUMBOSACRAL SPONDYLOSIS WITHOUT MYELOPATHY: ICD-10-CM

## 2024-11-01 DIAGNOSIS — R29.818 NEUROGENIC CLAUDICATION: ICD-10-CM

## 2024-11-01 RX ORDER — ONDANSETRON 4 MG/1
4 TABLET, ORALLY DISINTEGRATING ORAL EVERY 8 HOURS PRN
COMMUNITY
Start: 2024-08-19

## 2024-11-01 ASSESSMENT — ENCOUNTER SYMPTOMS
CHEST TIGHTNESS: 0
SORE THROAT: 0
BACK PAIN: 1
DIARRHEA: 0
RESPIRATORY NEGATIVE: 1
CONSTIPATION: 1
SHORTNESS OF BREATH: 0
NAUSEA: 0
VOMITING: 0

## 2024-11-01 NOTE — PROGRESS NOTES
The patient is a 84 y.o.Non- / non  female.    Chief Complaint   Patient presents with    Back Pain    Index pain today is lower back pain located in the lower lumbar area with radiation down both legs left more than right over hip down the leg up to the ankle  Aggravates with standing and walking alleviates with rest  Associated with numbness and paresthesia  No changes in bladder or bowel control  Have done multiple courses of physical therapy  Had patient recent MRI  Report was reviewed  Images were reviewed independently  Finding discussed with patient her L4-L5 level posterior disc bulge with foraminal narrowing left more than right  Patient had epidural injection for similar flareup of radicular pain last year that provided more than 3 months relief by more than 50% with improved activity tolerance  Discussed repeating lumbar epidural injection  Patient is interested  If this fails may consider for Vertiflex procedure  Information material program provided    Patient is here today for: back pain  Pain level: 8  Character: achy  Radiating: both legs  Weakness or numbness: numbness in upper left thigh  Aggravating Factors: movement  Alleviating Factors: heat  Constant or intermitting: constant  Bladder/bowel loss: no             Past Medical History:   Diagnosis Date    Acid reflux     Anesthesia complication     after gallbladder surgery- pt c/o dizziness and trouble waking up    Anxiety 06/30/2014    ALICIA-7   09/09/19 : 4    Bilateral tinnitus 12/10/2019    Bronchitis, mucopurulent recurrent (HCC) 07/23/2020    Diverticular disease 06/30/2014    Enthesopathy of hip region 12/10/2019    GERD (gastroesophageal reflux disease)     History of Dyllan-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

## 2024-11-07 ENCOUNTER — OFFICE VISIT (OUTPATIENT)
Dept: PRIMARY CARE CLINIC | Age: 84
End: 2024-11-07

## 2024-11-07 ENCOUNTER — HOSPITAL ENCOUNTER (OUTPATIENT)
Age: 84
Setting detail: SPECIMEN
Discharge: HOME | End: 2024-11-07

## 2024-11-07 VITALS
DIASTOLIC BLOOD PRESSURE: 82 MMHG | HEART RATE: 76 BPM | OXYGEN SATURATION: 96 % | HEIGHT: 65 IN | BODY MASS INDEX: 34.82 KG/M2 | TEMPERATURE: 97.8 F | SYSTOLIC BLOOD PRESSURE: 124 MMHG | WEIGHT: 209 LBS

## 2024-11-07 DIAGNOSIS — R39.9 UTI SYMPTOMS: Primary | ICD-10-CM

## 2024-11-07 DIAGNOSIS — R39.9 UTI SYMPTOMS: ICD-10-CM

## 2024-11-07 LAB
BILIRUBIN, POC: NORMAL
BLOOD URINE, POC: NORMAL
CLARITY, POC: CLEAR
COLOR, POC: NORMAL
GLUCOSE URINE, POC: NEGATIVE MG/DL
KETONES, POC: NORMAL MG/DL
LEUKOCYTE EST, POC: NEGATIVE
NITRITE, POC: NEGATIVE
PH, POC: 5.5
PROTEIN, POC: 30 MG/DL
SPECIFIC GRAVITY, POC: 1.02
UROBILINOGEN, POC: 0.2 MG/DL

## 2024-11-07 RX ORDER — CEPHALEXIN 500 MG/1
500 CAPSULE ORAL 2 TIMES DAILY
Qty: 14 CAPSULE | Refills: 0 | Status: SHIPPED | OUTPATIENT
Start: 2024-11-07 | End: 2024-11-14

## 2024-11-07 ASSESSMENT — ENCOUNTER SYMPTOMS
NAUSEA: 0
ABDOMINAL PAIN: 0
COUGH: 0
CHEST TIGHTNESS: 0
CONSTIPATION: 0
DIARRHEA: 1
RESPIRATORY NEGATIVE: 1
VOMITING: 0
SHORTNESS OF BREATH: 0
WHEEZING: 0

## 2024-11-07 NOTE — PROGRESS NOTES
Breath sounds: Normal breath sounds. No wheezing.   Abdominal:      General: Abdomen is flat. Bowel sounds are normal. There is no distension.      Palpations: Abdomen is soft.      Tenderness: There is no abdominal tenderness. There is no right CVA tenderness or left CVA tenderness.   Skin:     General: Skin is warm and dry.   Neurological:      Mental Status: She is alert.       /82   Pulse 76   Temp 97.8 °F (36.6 °C) (Tympanic)   Ht 1.651 m (5' 5\")   Wt 94.8 kg (209 lb)   SpO2 96%   BMI 34.78 kg/m²     Results for orders placed or performed in visit on 11/07/24   POCT Urinalysis Dipstick no Micro   Result Value Ref Range    Color, UA Elizabeth     Clarity, UA Clear     Glucose, UA POC Negative mg/dL    Bilirubin, UA Small     Ketones, UA Trace mg/dL    Spec Grav, UA 1.020     Blood, UA POC Trace     pH, UA 5.5     Protein, UA POC 30 mg/dL    Urobilinogen, UA 0.2 mg/dL    Leukocytes, UA Negative     Nitrite, UA Negative      Assessment:   Assessment & Plan    Diagnosis Orders   1. UTI symptoms  POCT Urinalysis Dipstick no Micro    Culture, Urine    cephALEXin (KEFLEX) 500 MG capsule        Plan:      Patient instructed to complete entire antibiotic course.  Specimen sent for a culture. Possible treatment alteration based on the results.  Increase fluid intake.  Educational materials regarding UTI and low FODMAP diet given on AVS.  Patient agreeable to treatment plan.  Follow up if symptoms do not improve/worsen.    Orders Placed This Encounter   Medications    cephALEXin (KEFLEX) 500 MG capsule     Sig: Take 1 capsule by mouth 2 times daily for 7 days     Dispense:  14 capsule     Refill:  0        Patient given educational materials - see patient instructions.Discussed use, benefit, and side effects of prescribed medications.  All patientquestions answered.  Pt voiced understanding.    Electronically signed by VINNIE Baez CNP on 11/7/2024at 1:47 PM

## 2024-11-08 LAB
MICROORGANISM SPEC CULT: ABNORMAL
SERVICE CMNT-IMP: ABNORMAL
SPECIMEN DESCRIPTION: ABNORMAL

## 2024-11-11 LAB
MICROORGANISM SPEC CULT: ABNORMAL
SERVICE CMNT-IMP: ABNORMAL
SPECIMEN DESCRIPTION: ABNORMAL

## 2024-11-11 RX ORDER — CLOTRIMAZOLE AND BETAMETHASONE DIPROPIONATE 10; .64 MG/G; MG/G
CREAM TOPICAL
Qty: 45 G | Refills: 0 | Status: SHIPPED | OUTPATIENT
Start: 2024-11-11

## 2024-11-12 ENCOUNTER — TELEPHONE (OUTPATIENT)
Dept: PRIMARY CARE CLINIC | Age: 84
End: 2024-11-12

## 2024-11-18 ENCOUNTER — OFFICE VISIT (OUTPATIENT)
Dept: PRIMARY CARE CLINIC | Age: 84
End: 2024-11-18
Payer: MEDICARE

## 2024-11-18 ENCOUNTER — HOSPITAL ENCOUNTER (OUTPATIENT)
Age: 84
Setting detail: SPECIMEN
Discharge: HOME | End: 2024-11-18

## 2024-11-18 VITALS
DIASTOLIC BLOOD PRESSURE: 79 MMHG | TEMPERATURE: 98.5 F | OXYGEN SATURATION: 96 % | BODY MASS INDEX: 34.82 KG/M2 | WEIGHT: 209 LBS | HEART RATE: 57 BPM | HEIGHT: 65 IN | SYSTOLIC BLOOD PRESSURE: 124 MMHG

## 2024-11-18 DIAGNOSIS — R30.9 PAIN WITH URINATION: ICD-10-CM

## 2024-11-18 DIAGNOSIS — R39.9 UTI SYMPTOMS: Primary | ICD-10-CM

## 2024-11-18 LAB
BILIRUBIN, POC: NEGATIVE
BLOOD URINE, POC: NEGATIVE
CLARITY, POC: CLEAR
COLOR, POC: YELLOW
GLUCOSE URINE, POC: NEGATIVE MG/DL
KETONES, POC: NEGATIVE MG/DL
LEUKOCYTE EST, POC: NEGATIVE
NITRITE, POC: NEGATIVE
PH, POC: 5.5
PROTEIN, POC: NEGATIVE MG/DL
SPECIFIC GRAVITY, POC: 1.01
UROBILINOGEN, POC: 0.2 MG/DL

## 2024-11-18 PROCEDURE — 99214 OFFICE O/P EST MOD 30 MIN: CPT

## 2024-11-18 PROCEDURE — G8427 DOCREV CUR MEDS BY ELIG CLIN: HCPCS

## 2024-11-18 PROCEDURE — G8484 FLU IMMUNIZE NO ADMIN: HCPCS

## 2024-11-18 PROCEDURE — G8417 CALC BMI ABV UP PARAM F/U: HCPCS

## 2024-11-18 PROCEDURE — 1123F ACP DISCUSS/DSCN MKR DOCD: CPT

## 2024-11-18 PROCEDURE — 1090F PRES/ABSN URINE INCON ASSESS: CPT

## 2024-11-18 PROCEDURE — 1160F RVW MEDS BY RX/DR IN RCRD: CPT

## 2024-11-18 PROCEDURE — 1159F MED LIST DOCD IN RCRD: CPT

## 2024-11-18 PROCEDURE — 1036F TOBACCO NON-USER: CPT

## 2024-11-18 PROCEDURE — 81002 URINALYSIS NONAUTO W/O SCOPE: CPT

## 2024-11-18 PROCEDURE — G8399 PT W/DXA RESULTS DOCUMENT: HCPCS

## 2024-11-18 RX ORDER — CIPROFLOXACIN 500 MG/1
500 TABLET, FILM COATED ORAL 2 TIMES DAILY
Qty: 14 TABLET | Refills: 0 | Status: SHIPPED | OUTPATIENT
Start: 2024-11-18 | End: 2024-11-25

## 2024-11-18 SDOH — ECONOMIC STABILITY: FOOD INSECURITY: WITHIN THE PAST 12 MONTHS, THE FOOD YOU BOUGHT JUST DIDN'T LAST AND YOU DIDN'T HAVE MONEY TO GET MORE.: NEVER TRUE

## 2024-11-18 SDOH — ECONOMIC STABILITY: FOOD INSECURITY: WITHIN THE PAST 12 MONTHS, YOU WORRIED THAT YOUR FOOD WOULD RUN OUT BEFORE YOU GOT MONEY TO BUY MORE.: NEVER TRUE

## 2024-11-18 SDOH — ECONOMIC STABILITY: INCOME INSECURITY: HOW HARD IS IT FOR YOU TO PAY FOR THE VERY BASICS LIKE FOOD, HOUSING, MEDICAL CARE, AND HEATING?: NOT HARD AT ALL

## 2024-11-18 ASSESSMENT — ENCOUNTER SYMPTOMS
BLOOD IN STOOL: 0
PHOTOPHOBIA: 0
EYE ITCHING: 0
RESPIRATORY NEGATIVE: 1
EYE PAIN: 0
COUGH: 0
CHOKING: 0
RECTAL PAIN: 0
ANAL BLEEDING: 0
VOICE CHANGE: 0
COLOR CHANGE: 0
SORE THROAT: 0
BACK PAIN: 1
RHINORRHEA: 0
EYE DISCHARGE: 0
TROUBLE SWALLOWING: 0
EYE REDNESS: 0
EYES NEGATIVE: 1
ABDOMINAL PAIN: 0
CONSTIPATION: 0
CHEST TIGHTNESS: 0
VOMITING: 0
GASTROINTESTINAL NEGATIVE: 1
FACIAL SWELLING: 0
WHEEZING: 0
SHORTNESS OF BREATH: 0
STRIDOR: 0
NAUSEA: 0
SINUS PRESSURE: 0
APNEA: 0
DIARRHEA: 0
SINUS PAIN: 0
ABDOMINAL DISTENTION: 0

## 2024-11-18 NOTE — PROGRESS NOTES
[facility] WALK IN [facility]ARE  [address]  [place] [address]    [facility]  [address]  [facility] [address]  Dept: [phone]     Alicia Reid is a 84 y.o. female Established patient, who presents to the walk-in clinic today with conditions/complaints as noted below:    Chief Complaint   Patient presents with    Vaginal Pain     Vaginal pinching and left side back pain was treated for uti on 11/07 sx returned on Saturday          HPI:     Vaginal Pain  The patient's pertinent negatives include no genital itching, genital lesions, genital odor, genital rash, missed menses, pelvic pain, vaginal bleeding or vaginal discharge. This is a new problem. Episode onset: 11/7. The problem occurs constantly. The problem has been unchanged. The pain is moderate. The problem affects both sides. She is not pregnant. Associated symptoms include back pain. Pertinent negatives include no abdominal pain, anorexia, chills, constipation, diarrhea, discolored urine, dysuria, fever, flank pain, frequency, headaches, hematuria, joint pain, joint swelling, nausea, painful intercourse, rash, sore throat, urgency or vomiting. Nothing aggravates the symptoms. She has tried antibiotics for the symptoms. The treatment provided no relief.     Feels like she has vaginal pinching, left sided back pain      Past Medical History:   Diagnosis Date    Acid reflux     Anesthesia complication     after gallbladder surgery- pt c/o dizziness and trouble waking up    Anxiety 06/30/2014    ALICIA-7   09/09/19 : 4    Bilateral tinnitus 12/10/2019    Bronchitis, mucopurulent recurrent (HCC) 07/23/2020    Diverticular disease 06/30/2014    Enthesopathy of hip region 12/10/2019    GERD (gastroesophageal reflux disease)     History of Dyllan-Barr virus infection 12/2021    fatigue, chills, sore throat,

## 2024-11-19 ENCOUNTER — TELEPHONE (OUTPATIENT)
Dept: FAMILY MEDICINE CLINIC | Age: 84
End: 2024-11-19

## 2024-11-19 LAB
CANDIDA SPECIES: NEGATIVE
GARDNERELLA VAGINALIS: NEGATIVE
MICROORGANISM SPEC CULT: NORMAL
SERVICE CMNT-IMP: NORMAL
SOURCE: NORMAL
SPECIMEN DESCRIPTION: NORMAL
TRICHOMONAS: NEGATIVE

## 2024-11-19 RX ORDER — NITROFURANTOIN 25; 75 MG/1; MG/1
100 CAPSULE ORAL 2 TIMES DAILY
Qty: 14 CAPSULE | Refills: 0 | Status: SHIPPED | OUTPATIENT
Start: 2024-11-19 | End: 2024-11-26

## 2024-11-19 NOTE — TELEPHONE ENCOUNTER
Per her last urine culture she could be resistant to ciprofloxacin.  I did call in a different antibiotic to her pharmacy.  Thank you.

## 2024-11-19 NOTE — TELEPHONE ENCOUNTER
Patient went to urgent care on, 11/7/24 for uti sx was give cephalexin, then still felt sx so she went to urgent care again, yesterday and was given ciprofloxacin and she took one last night and one this morning but she was reading the side effects and it stated that the medication effects tendons. Is requesting a second opinion. Please advise.

## 2024-11-27 ENCOUNTER — APPOINTMENT (OUTPATIENT)
Dept: CT IMAGING | Facility: CLINIC | Age: 84
End: 2024-11-27
Payer: MEDICARE

## 2024-11-27 ENCOUNTER — HOSPITAL ENCOUNTER (EMERGENCY)
Facility: CLINIC | Age: 84
Discharge: HOME | End: 2024-11-27
Attending: EMERGENCY MEDICINE
Payer: MEDICARE

## 2024-11-27 VITALS
HEART RATE: 78 BPM | WEIGHT: 209 LBS | BODY MASS INDEX: 34.82 KG/M2 | OXYGEN SATURATION: 96 % | SYSTOLIC BLOOD PRESSURE: 166 MMHG | TEMPERATURE: 98.1 F | RESPIRATION RATE: 16 BRPM | HEIGHT: 65 IN | DIASTOLIC BLOOD PRESSURE: 82 MMHG

## 2024-11-27 DIAGNOSIS — N89.8 VAGINAL ITCHING: Primary | ICD-10-CM

## 2024-11-27 LAB
ALBUMIN SERPL-MCNC: 4.4 G/DL (ref 3.5–5.2)
ALBUMIN/GLOB SERPL: 1.4 {RATIO} (ref 1–2.5)
ALP SERPL-CCNC: 83 U/L (ref 35–104)
ALT SERPL-CCNC: 21 U/L (ref 5–33)
ANION GAP SERPL CALCULATED.3IONS-SCNC: 9 MMOL/L (ref 9–17)
AST SERPL-CCNC: 25 U/L
BACTERIA URNS QL MICRO: NORMAL
BASOPHILS # BLD: 0.1 K/UL (ref 0–0.2)
BASOPHILS NFR BLD: 1 % (ref 0–2)
BILIRUB DIRECT SERPL-MCNC: <0.1 MG/DL
BILIRUB INDIRECT SERPL-MCNC: ABNORMAL MG/DL (ref 0–1)
BILIRUB SERPL-MCNC: 0.2 MG/DL (ref 0.3–1.2)
BILIRUB UR QL STRIP: NEGATIVE
BUN SERPL-MCNC: 15 MG/DL (ref 8–23)
CALCIUM SERPL-MCNC: 10.3 MG/DL (ref 8.6–10.4)
CANDIDA SPECIES: NEGATIVE
CASTS #/AREA URNS LPF: NORMAL /LPF (ref 0–2)
CASTS #/AREA URNS LPF: NORMAL /LPF (ref 0–2)
CHLORIDE SERPL-SCNC: 102 MMOL/L (ref 98–107)
CLARITY UR: CLEAR
CO2 SERPL-SCNC: 26 MMOL/L (ref 20–31)
COLOR UR: YELLOW
CREAT SERPL-MCNC: 0.7 MG/DL (ref 0.5–0.9)
EOSINOPHIL # BLD: 0.2 K/UL (ref 0–0.4)
EOSINOPHILS RELATIVE PERCENT: 2 % (ref 1–4)
EPI CELLS #/AREA URNS HPF: NORMAL /HPF (ref 0–5)
ERYTHROCYTE [DISTWIDTH] IN BLOOD BY AUTOMATED COUNT: 12.3 % (ref 12.5–15.4)
GARDNERELLA VAGINALIS: NEGATIVE
GFR, ESTIMATED: 86 ML/MIN/1.73M2
GLUCOSE SERPL-MCNC: 99 MG/DL (ref 70–99)
GLUCOSE UR STRIP-MCNC: NEGATIVE MG/DL
HCT VFR BLD AUTO: 39.8 % (ref 36–46)
HGB BLD-MCNC: 13.4 G/DL (ref 12–16)
HGB UR QL STRIP.AUTO: NEGATIVE
KETONES UR STRIP-MCNC: NEGATIVE MG/DL
LEUKOCYTE ESTERASE UR QL STRIP: NEGATIVE
LIPASE SERPL-CCNC: 33 U/L (ref 13–60)
LYMPHOCYTES NFR BLD: 2.1 K/UL (ref 1–4.8)
LYMPHOCYTES RELATIVE PERCENT: 31 % (ref 24–44)
MCH RBC QN AUTO: 31.7 PG (ref 26–34)
MCHC RBC AUTO-ENTMCNC: 33.6 G/DL (ref 31–37)
MCV RBC AUTO: 94.2 FL (ref 80–100)
MONOCYTES NFR BLD: 0.6 K/UL (ref 0.1–1.2)
MONOCYTES NFR BLD: 8 % (ref 2–11)
NEUTROPHILS NFR BLD: 58 % (ref 36–66)
NEUTS SEG NFR BLD: 4 K/UL (ref 1.8–7.7)
NITRITE UR QL STRIP: NEGATIVE
PH UR STRIP: 5.5 [PH] (ref 5–8)
PLATELET # BLD AUTO: 257 K/UL (ref 140–450)
PMV BLD AUTO: 7.6 FL (ref 6–12)
POTASSIUM SERPL-SCNC: 4.2 MMOL/L (ref 3.7–5.3)
PROT SERPL-MCNC: 7.5 G/DL (ref 6.4–8.3)
PROT UR STRIP-MCNC: NEGATIVE MG/DL
RBC # BLD AUTO: 4.23 M/UL (ref 4–5.2)
RBC #/AREA URNS HPF: NORMAL /HPF (ref 0–2)
SODIUM SERPL-SCNC: 137 MMOL/L (ref 135–144)
SOURCE: NORMAL
SP GR UR STRIP: 1.01 (ref 1–1.03)
TRICHOMONAS: NEGATIVE
UROBILINOGEN UR STRIP-ACNC: NORMAL EU/DL (ref 0–1)
WBC #/AREA URNS HPF: NORMAL /HPF (ref 0–5)
WBC OTHER # BLD: 6.9 K/UL (ref 3.5–11)

## 2024-11-27 PROCEDURE — 85025 COMPLETE CBC W/AUTO DIFF WBC: CPT

## 2024-11-27 PROCEDURE — 87510 GARDNER VAG DNA DIR PROBE: CPT

## 2024-11-27 PROCEDURE — 87086 URINE CULTURE/COLONY COUNT: CPT

## 2024-11-27 PROCEDURE — 80048 BASIC METABOLIC PNL TOTAL CA: CPT

## 2024-11-27 PROCEDURE — 99284 EMERGENCY DEPT VISIT MOD MDM: CPT

## 2024-11-27 PROCEDURE — 87660 TRICHOMONAS VAGIN DIR PROBE: CPT

## 2024-11-27 PROCEDURE — 83690 ASSAY OF LIPASE: CPT

## 2024-11-27 PROCEDURE — 74176 CT ABD & PELVIS W/O CONTRAST: CPT

## 2024-11-27 PROCEDURE — 80076 HEPATIC FUNCTION PANEL: CPT

## 2024-11-27 PROCEDURE — 87480 CANDIDA DNA DIR PROBE: CPT

## 2024-11-27 PROCEDURE — 81001 URINALYSIS AUTO W/SCOPE: CPT

## 2024-11-27 PROCEDURE — 6370000000 HC RX 637 (ALT 250 FOR IP)

## 2024-11-27 RX ORDER — ACETAMINOPHEN 500 MG
1000 TABLET ORAL ONCE
Status: COMPLETED | OUTPATIENT
Start: 2024-11-27 | End: 2024-11-27

## 2024-11-27 RX ADMIN — ACETAMINOPHEN 1000 MG: 500 TABLET ORAL at 17:36

## 2024-11-27 ASSESSMENT — ENCOUNTER SYMPTOMS
VOMITING: 0
BACK PAIN: 1
ABDOMINAL PAIN: 1
NAUSEA: 0
COLOR CHANGE: 0
SHORTNESS OF BREATH: 0
DIARRHEA: 1
CONSTIPATION: 0

## 2024-11-27 ASSESSMENT — LIFESTYLE VARIABLES
HOW MANY STANDARD DRINKS CONTAINING ALCOHOL DO YOU HAVE ON A TYPICAL DAY: PATIENT DOES NOT DRINK
HOW OFTEN DO YOU HAVE A DRINK CONTAINING ALCOHOL: NEVER

## 2024-11-27 NOTE — ED PROVIDER NOTES
[facility]                      EMERGENCY DEPARTMENT VISIT               ED ATTENDING ATTESTATION     I performed a history and physical examination of the patient and discussed management with the resident. I reviewed the resident’s note and agree with the documented findings and plan of care. Any areas of disagreement are noted on the chart. I was personally present for the key portions of any procedures. I have documented in the chart those procedures where I was not present during the key portions. I have reviewed the emergency nurses triage note. I agree with the chief complaint, past medical history, past surgical history, allergies, medications, social and family history as documented unless otherwise noted below. For Physician Assistant/ Nurse Practitioner cases/documentation I have personally evaluated this patient and have completed at least one if not all key elements of the E/M (history, physical exam, and MDM). Additional findings are as noted.    Vitals:    11/27/24 1532   BP: (!) 166/82   Pulse: 78   Resp: 16   Temp: 98.1 °F (36.7 °C)   TempSrc: Oral   SpO2: 96%   Weight: 94.8 kg (209 lb)   Height: 1.651 m (5' 5\")       MDM:  This is a very pleasant 84-year-old female who presents emergency department with urinary discomfort, vaginal itching, flank pain.  She was initially seen at urgent care for similar symptoms on 11/18, was prescribed Cipro.  However after reading the directions and doing some research on her own she stopped taking it after 1 dose and did see that her urine culture ended up coming back negative.  She is on a cephalosporin for cellulitis to her right upper extremity.  Not the most recent culture but the culture before did come back positive for E. coli which was resistant to nichole quinolones, however it was only a small amount of colonies so question true infection versus with colonization.  Patient states that she had a hysterectomy back in 2007, unsure 
grandmother is unknown. She indicated that her paternal grandfather is . She indicated that the status of her maternal aunt is unknown. She indicated that the status of her maternal uncle is unknown. She indicated that the status of her paternal uncle is unknown.     family history includes Cancer in her brother and maternal uncle; Cancer (age of onset: 53) in her mother; Cancer (age of onset: 56) in her father; Diabetes in her brother; Heart Disease in her paternal grandfather; Heart Disease (age of onset: 62) in her brother; Heart Disease (age of onset: 67) in her brother; Heart Disease (age of onset: 72) in her paternal uncle; High Blood Pressure in her brother and brother; Other in her brother; Parkinson's Disease in her brother; Stroke in her maternal aunt and maternal grandmother.    SOCIAL HISTORY      reports that she has never smoked. She has never been exposed to tobacco smoke. She has never used smokeless tobacco. She reports that she does not currently use alcohol. She reports that she does not use drugs.    PHYSICAL EXAM     INITIAL VITALS:  height is 1.651 m (5' 5\") and weight is 94.8 kg (209 lb). Her oral temperature is 98.1 °F (36.7 °C). Her blood pressure is 166/82 (abnormal) and her pulse is 78. Her respiration is 16 and oxygen saturation is 96%.      Physical Exam  Constitutional:       General: She is not in acute distress.     Appearance: She is not ill-appearing or toxic-appearing.   Eyes:      Extraocular Movements: Extraocular movements intact.   Cardiovascular:      Rate and Rhythm: Normal rate and regular rhythm.   Pulmonary:      Effort: Pulmonary effort is normal. No respiratory distress.   Abdominal:      Tenderness: There is left CVA tenderness. There is no right CVA tenderness.   Musculoskeletal:      Cervical back: Normal range of motion.   Neurological:      Mental Status: She is alert and oriented to person, place, and time.   Psychiatric:         Mood and Affect: Mood

## 2024-11-27 NOTE — DISCHARGE INSTRUCTIONS
SUMMARY OF YOUR VISIT    Today you were seen for vaginal itching, burning with urination, and back pain. Lab testing done was grossly normal without evidence of Urinary tract infection or kidney stone. We also imaged your abdomen that showed no acute abnormality. We are going to give you the number for local OBGYN for follow up.    Please continue to take your home medication as previously prescribed, I have made no changes to your home medications.        You can return to our or another Emergency Department as needed or for worsening symptoms of chest pain, shortness of breath, high fevers not relieved by acetaminophen (Tylenol) and/or ibuprofen (Motrin / Advil), chills, feeling of your heart fluttering or racing, persistent nausea and/or vomiting, vomiting up blood, blood in your stool, loss of consciousness, numbness, weakness or tingling in the arms or legs or change in color of the extremities, changes in mental status, persistent headache, blurry vision, loss of bladder / bowel control, if you are unable to follow up with your physician, or other any other care or concern.    Thank You!    On behalf of the Emergency Department staff and team, I would like to thank you for allowing us the opportunity to participate in your health care and evaluation today.

## 2024-11-28 LAB
MICROORGANISM SPEC CULT: NO GROWTH
SPECIMEN DESCRIPTION: NORMAL

## 2024-11-29 ENCOUNTER — PATIENT MESSAGE (OUTPATIENT)
Dept: FAMILY MEDICINE CLINIC | Age: 84
End: 2024-11-29

## 2024-12-01 RX ORDER — CONJUGATED ESTROGENS 0.62 MG/G
0.5 CREAM VAGINAL DAILY
Qty: 60 G | Refills: 0 | Status: SHIPPED | OUTPATIENT
Start: 2024-12-01

## 2024-12-03 ENCOUNTER — TELEPHONE (OUTPATIENT)
Dept: ORTHOPEDIC SURGERY | Age: 84
End: 2024-12-03

## 2024-12-03 ENCOUNTER — HOSPITAL ENCOUNTER (OUTPATIENT)
Age: 84
Setting detail: SPECIMEN
Discharge: HOME | End: 2024-12-03

## 2024-12-03 LAB
EST. AVERAGE GLUCOSE BLD GHB EST-MCNC: 120 MG/DL
HBA1C MFR BLD: 5.8 % (ref 4–6)
T4 FREE SERPL-MCNC: 1.3 NG/DL (ref 0.9–1.7)
TSH SERPL DL<=0.05 MIU/L-ACNC: 0.75 UIU/ML (ref 0.27–4.2)

## 2024-12-03 NOTE — TELEPHONE ENCOUNTER
Called to let the patient know that she can wait 1-3 weeks to have a greater Troch hip injection.

## 2024-12-03 NOTE — TELEPHONE ENCOUNTER
Patient inquired as to when she could get another injection in her R hip since she is having an injection in her lower back tomorrow at [facility] with Dr Lind. Please advise. Her call back number is [phone]. Thank you.

## 2024-12-04 ENCOUNTER — HOSPITAL ENCOUNTER (OUTPATIENT)
Dept: PAIN MANAGEMENT | Facility: CLINIC | Age: 84
Discharge: HOME | End: 2024-12-04
Payer: MEDICARE

## 2024-12-04 VITALS
TEMPERATURE: 97.2 F | HEART RATE: 65 BPM | OXYGEN SATURATION: 92 % | SYSTOLIC BLOOD PRESSURE: 142 MMHG | RESPIRATION RATE: 9 BRPM | DIASTOLIC BLOOD PRESSURE: 75 MMHG

## 2024-12-04 DIAGNOSIS — R52 PAIN MANAGEMENT: ICD-10-CM

## 2024-12-04 DIAGNOSIS — M48.062 SPINAL STENOSIS OF LUMBAR REGION WITH NEUROGENIC CLAUDICATION: Primary | Chronic | ICD-10-CM

## 2024-12-04 LAB — GLUCOSE BLD-MCNC: 107 MG/DL (ref 65–105)

## 2024-12-04 PROCEDURE — 6360000002 HC RX W HCPCS: Performed by: ANESTHESIOLOGY

## 2024-12-04 PROCEDURE — 6360000004 HC RX CONTRAST MEDICATION: Performed by: ANESTHESIOLOGY

## 2024-12-04 PROCEDURE — 62323 NJX INTERLAMINAR LMBR/SAC: CPT

## 2024-12-04 PROCEDURE — 2580000003 HC RX 258: Performed by: ANESTHESIOLOGY

## 2024-12-04 PROCEDURE — 82947 ASSAY GLUCOSE BLOOD QUANT: CPT

## 2024-12-04 PROCEDURE — 62323 NJX INTERLAMINAR LMBR/SAC: CPT | Performed by: ANESTHESIOLOGY

## 2024-12-04 RX ORDER — MIDAZOLAM HYDROCHLORIDE 2 MG/2ML
INJECTION, SOLUTION INTRAMUSCULAR; INTRAVENOUS
Status: COMPLETED | OUTPATIENT
Start: 2024-12-04 | End: 2024-12-04

## 2024-12-04 RX ORDER — FENTANYL CITRATE 50 UG/ML
INJECTION, SOLUTION INTRAMUSCULAR; INTRAVENOUS
Status: COMPLETED | OUTPATIENT
Start: 2024-12-04 | End: 2024-12-04

## 2024-12-04 RX ORDER — LIDOCAINE HYDROCHLORIDE 10 MG/ML
INJECTION, SOLUTION EPIDURAL; INFILTRATION; INTRACAUDAL; PERINEURAL
Status: COMPLETED | OUTPATIENT
Start: 2024-12-04 | End: 2024-12-04

## 2024-12-04 RX ORDER — SODIUM CHLORIDE 0.9 % (FLUSH) 0.9 %
SYRINGE (ML) INJECTION
Status: COMPLETED | OUTPATIENT
Start: 2024-12-04 | End: 2024-12-04

## 2024-12-04 RX ORDER — DEXAMETHASONE SODIUM PHOSPHATE 10 MG/ML
INJECTION, SOLUTION INTRAMUSCULAR; INTRAVENOUS
Status: COMPLETED | OUTPATIENT
Start: 2024-12-04 | End: 2024-12-04

## 2024-12-04 RX ADMIN — MIDAZOLAM HYDROCHLORIDE 1 MG: 1 INJECTION, SOLUTION INTRAMUSCULAR; INTRAVENOUS at 10:14

## 2024-12-04 RX ADMIN — Medication 5 ML: at 10:16

## 2024-12-04 RX ADMIN — DEXAMETHASONE SODIUM PHOSPHATE 10 MG: 10 INJECTION, SOLUTION INTRAMUSCULAR; INTRAVENOUS at 10:16

## 2024-12-04 RX ADMIN — IOHEXOL 3 ML: 180 INJECTION INTRAVENOUS at 10:15

## 2024-12-04 RX ADMIN — LIDOCAINE HYDROCHLORIDE 5 ML: 10 INJECTION, SOLUTION EPIDURAL; INFILTRATION; INTRACAUDAL at 10:14

## 2024-12-04 RX ADMIN — FENTANYL CITRATE 50 MCG: 50 INJECTION, SOLUTION INTRAMUSCULAR; INTRAVENOUS at 10:13

## 2024-12-04 ASSESSMENT — PAIN DESCRIPTION - DIRECTION: RADIATING_TOWARDS: BILAT LEGS

## 2024-12-04 ASSESSMENT — PAIN DESCRIPTION - ORIENTATION: ORIENTATION: RIGHT;LEFT;LOWER

## 2024-12-04 ASSESSMENT — PAIN DESCRIPTION - ONSET: ONSET: ON-GOING

## 2024-12-04 ASSESSMENT — PAIN DESCRIPTION - PAIN TYPE: TYPE: CHRONIC PAIN

## 2024-12-04 ASSESSMENT — PAIN DESCRIPTION - FREQUENCY: FREQUENCY: CONTINUOUS

## 2024-12-04 ASSESSMENT — PAIN - FUNCTIONAL ASSESSMENT
PAIN_FUNCTIONAL_ASSESSMENT: PREVENTS OR INTERFERES WITH ALL ACTIVE AND SOME PASSIVE ACTIVITIES
PAIN_FUNCTIONAL_ASSESSMENT: NONE - DENIES PAIN

## 2024-12-04 ASSESSMENT — PAIN DESCRIPTION - DESCRIPTORS: DESCRIPTORS: ACHING;SHOOTING;SHARP

## 2024-12-04 ASSESSMENT — PAIN DESCRIPTION - LOCATION: LOCATION: BACK

## 2024-12-04 ASSESSMENT — PAIN SCALES - GENERAL: PAINLEVEL_OUTOF10: 10

## 2024-12-04 NOTE — DISCHARGE INSTRUCTIONS

## 2024-12-04 NOTE — OP NOTE
Patient Name: Alicia Reid   YOB: 1940  Room/Bed: Room/bed info not found  Medical Record Number: [id number]  Date: 12/4/2024       Sedation/ Anesthesia Plan:   intravenous sedation   as needed.    Medications Planned:   midazolam (Versed) / Fentanyl  Intravenously  as needed.    Preoperative Diagnosis:    1. Spinal stenosis of lumbar region with neurogenic claudication        Postoperative Diagnosis:    1. Spinal stenosis of lumbar region with neurogenic claudication        Procedure Performed:  Lumbar epidural steroid injection under fluoroscopy guidance    Blood Loss: None    Procedure:      The Patient was seen in the preop area, chart was reviewed, informed consent was obtained. Patient was taken to procedure room and was placed in prone position. Vital signs were monitored through out the  Procedure. A time out was completed.  The skin over the back was prepped and draped in sterile manner.     The target point was marked at The interlaminar space at L4/5 . Skin and deep tissues were anesthetized with 1 % lidocaine.A 20-gauge Tuohy epidural needlele was advanced  under fluoroscopy guidance in AP view. Epidural space was identified using RAIMUNDO technique. Position ws confirmed in Lateral view.   Then after negative aspiration contrast dye Omnipaque-180 was injected with live fluoroscopy in AP views that showed  spread of the contrast in the epidural space  and no vascular runoff or intrathecal spread. Finally 5 ml of treatment solution containing 4 ml of PF NS and 1 ml of DEXAMETHASONE 10 mg / ml was injected.  The needle was removed and a Band-Aid was placed over the needle  insertion site.  The patient's vital signs remained stable and the patient tolerated the procedure well.      Electronically signed by Jericho Earl MD on 12/4/2024 at 10:19 AM

## 2024-12-04 NOTE — H&P
Pain Pre-Op H&P Note    Jericho Earl MD    HPI: Alicia Reid  presents with     Index pain today is lower back pain located in the lower lumbar area with radiation down both legs left more than right over hip down the leg up to the ankle  Aggravates with standing and walking alleviates with rest  Associated with numbness and paresthesia  No changes in bladder or bowel control  Have done multiple courses of physical therapy  Had patient recent MRI  Report was reviewed  Images were reviewed independently  Finding discussed with patient her L4-L5 level posterior disc bulge with foraminal narrowing left more than right      Past Medical History:   Diagnosis Date    Acid reflux     Anesthesia complication     after gallbladder surgery- pt c/o dizziness and trouble waking up    Anxiety 06/30/2014    ALICIA-7   09/09/19 : 4    Bilateral tinnitus 12/10/2019    Bronchitis, mucopurulent recurrent (HCC) 07/23/2020    Diverticular disease 06/30/2014    Enthesopathy of hip region 12/10/2019    GERD (gastroesophageal reflux disease)     History of Dyllan-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     Meniere disease     right ear    Meniere's disease, right ear 12/10/2019    Morbidly obese 07/23/2020    Neuropathy     Osteopenia 06/30/2014    Postmenopausal state 12/10/2019    Postnasal drip 07/11/2020    Prediabetes     RAD (reactive airway disease) 06/30/2014    Thyroid nodule 03/01/2016    Tremor     r hand    Vitamin D deficiency 06/30/2014       Past Surgical History:   Procedure Laterality Date    CHOLECYSTECTOMY      COLONOSCOPY      ENDOSCOPY, COLON, DIAGNOSTIC      ERCP  03/21/2014    EXCISION / BIOPSY SKIN LESION OF TRUNK Left 11/08/2017    EXCISION MASS LEFT POSTERIOR SHOULDER, LEFT ANTERIOR ARM performed by George

## 2024-12-09 SDOH — ECONOMIC STABILITY: INCOME INSECURITY: HOW HARD IS IT FOR YOU TO PAY FOR THE VERY BASICS LIKE FOOD, HOUSING, MEDICAL CARE, AND HEATING?: NOT HARD AT ALL

## 2024-12-09 SDOH — ECONOMIC STABILITY: FOOD INSECURITY: WITHIN THE PAST 12 MONTHS, THE FOOD YOU BOUGHT JUST DIDN'T LAST AND YOU DIDN'T HAVE MONEY TO GET MORE.: NEVER TRUE

## 2024-12-09 SDOH — ECONOMIC STABILITY: FOOD INSECURITY: WITHIN THE PAST 12 MONTHS, YOU WORRIED THAT YOUR FOOD WOULD RUN OUT BEFORE YOU GOT MONEY TO BUY MORE.: NEVER TRUE

## 2024-12-09 SDOH — HEALTH STABILITY: PHYSICAL HEALTH: ON AVERAGE, HOW MANY DAYS PER WEEK DO YOU ENGAGE IN MODERATE TO STRENUOUS EXERCISE (LIKE A BRISK WALK)?: 1 DAY

## 2024-12-09 SDOH — HEALTH STABILITY: PHYSICAL HEALTH: ON AVERAGE, HOW MANY MINUTES DO YOU ENGAGE IN EXERCISE AT THIS LEVEL?: 20 MIN

## 2024-12-09 SDOH — ECONOMIC STABILITY: TRANSPORTATION INSECURITY
IN THE PAST 12 MONTHS, HAS LACK OF TRANSPORTATION KEPT YOU FROM MEETINGS, WORK, OR FROM GETTING THINGS NEEDED FOR DAILY LIVING?: NO

## 2024-12-09 ASSESSMENT — LIFESTYLE VARIABLES
HOW MANY STANDARD DRINKS CONTAINING ALCOHOL DO YOU HAVE ON A TYPICAL DAY: 0
HOW OFTEN DO YOU HAVE A DRINK CONTAINING ALCOHOL: 1
HOW MANY STANDARD DRINKS CONTAINING ALCOHOL DO YOU HAVE ON A TYPICAL DAY: PATIENT DOES NOT DRINK
HOW OFTEN DO YOU HAVE A DRINK CONTAINING ALCOHOL: NEVER
HOW OFTEN DO YOU HAVE SIX OR MORE DRINKS ON ONE OCCASION: 1

## 2024-12-09 ASSESSMENT — PATIENT HEALTH QUESTIONNAIRE - PHQ9
SUM OF ALL RESPONSES TO PHQ QUESTIONS 1-9: 0
2. FEELING DOWN, DEPRESSED OR HOPELESS: NOT AT ALL
1. LITTLE INTEREST OR PLEASURE IN DOING THINGS: NOT AT ALL
SUM OF ALL RESPONSES TO PHQ QUESTIONS 1-9: 0
SUM OF ALL RESPONSES TO PHQ9 QUESTIONS 1 & 2: 0

## 2024-12-10 ENCOUNTER — OFFICE VISIT (OUTPATIENT)
Dept: FAMILY MEDICINE CLINIC | Age: 84
End: 2024-12-10

## 2024-12-10 VITALS
WEIGHT: 206 LBS | HEIGHT: 65 IN | OXYGEN SATURATION: 98 % | TEMPERATURE: 97 F | SYSTOLIC BLOOD PRESSURE: 135 MMHG | DIASTOLIC BLOOD PRESSURE: 75 MMHG | HEART RATE: 71 BPM | BODY MASS INDEX: 34.32 KG/M2

## 2024-12-10 DIAGNOSIS — G93.32 CHRONIC FATIGUE SYNDROME: ICD-10-CM

## 2024-12-10 DIAGNOSIS — Z00.00 MEDICARE ANNUAL WELLNESS VISIT, SUBSEQUENT: Primary | ICD-10-CM

## 2024-12-10 RX ORDER — METHYLPHENIDATE HYDROCHLORIDE 10 MG/1
10 CAPSULE, EXTENDED RELEASE ORAL EVERY MORNING
Qty: 14 CAPSULE | Refills: 0 | Status: SHIPPED | OUTPATIENT
Start: 2024-12-10 | End: 2024-12-24

## 2024-12-10 RX ORDER — GABAPENTIN 100 MG/1
100 CAPSULE ORAL 2 TIMES DAILY
COMMUNITY

## 2024-12-10 NOTE — PROGRESS NOTES
14 days. Max Daily Amount: 10 mg Yes Arianne Perez MD   ondansetron (ZOFRAN-ODT) 4 MG disintegrating tablet Take 1 tablet by mouth every 8 hours as needed for Nausea Yes Harmony Medina MD   Evolocumab (REPATHA SURECLICK) 140 MG/ML SOAJ INJECT ONE PEN UNDER THE SKIN EVERY FOUR TO SIX WEEKS AS DIRECTED Yes Karin Leon MD   dilTIAZem (CARDIZEM CD) 120 MG extended release capsule TAKE ONE CAPSULE BY MOUTH EVERY EVENING Yes Karin Leon MD   ezetimibe (ZETIA) 10 MG tablet TAKE 1 TABLET BY MOUTH DAILY Yes Karin Leon MD   metFORMIN (GLUCOPHAGE) 500 MG tablet Take 1 tablet by mouth daily Yes Arianne Perez MD   levothyroxine (SYNTHROID) 88 MCG tablet  Yes Harmony Medina MD   metoprolol tartrate (LOPRESSOR) 25 MG tablet TAKE 1/2 TABLET BY MOUTH TWICE A DAY Yes Karin Leon MD   acetaminophen (TYLENOL) 500 MG tablet Take 2 tablets by mouth every 6 hours as needed for Pain Yes Harmony Medina MD   midodrine (PROAMATINE) 5 MG tablet TAKE 1 TABLET BY MOUTH TWICE A DAY Yes Karin Leon MD   Magnesium 500 MG CAPS Take 500 mg by mouth at bedtime Yes Harmony Medina MD   IBSRELA 50 MG TABS Take 50 mg by mouth as needed (for IBS- constipation) Yes Harmony Medina MD   diclofenac sodium (VOLTAREN) 1 % GEL Apply 4 g topically 4 times daily Yes Arianne Perez MD   NONFORMULARY Indications: Dietary fiber 1 tsp a day  Yes Harmony Medina MD   Cholecalciferol (VITAMIN D3) 2000 units CAPS Take 1 capsule by mouth 2 times daily Yes Radha Levine MD   esomeprazole Magnesium (NEXIUM) 40 MG PACK Take 1 packet by mouth daily Yes Harmony Medina MD   NONFORMULARY Take 1 tablet by mouth daily Super b complex Yes Harmony Medina MD   ascorbic acid (VITAMIN C) 500 MG tablet Take 1 tablet by mouth daily Yes Harmony Medina MD   estrogens conjugated (PREMARIN) 0.625 MG/GM CREA vaginal cream Place 0.5 g vaginally daily Place daily for 2 weeks. Then use 2 times a

## 2024-12-10 NOTE — PATIENT INSTRUCTIONS
cholesterol is less than 100. Over 130 starts to get risky for heart disease.   High-density lipoprotein (HDL) cholesterol  Also known as good cholesterol, this type of cholesterol actually carries cholesterol away from your arteries and may, therefore, help lower your risk of having a heart attack. You want this level to be high (ideally greater than 60). It is a risk to have a level less than 40. You can raise this good cholesterol by eating olive oil, canola oil, avocados, or nuts. Exercise raises this level, too.   Fat    Fat is calorie dense and packs a lot of calories into a small amount of food. Even though fats should be limited due to their high calorie content, not all fats are bad. In fact, some fats are quite healthful. Fat can be broken down into four main types.   The good-for-you fats are:   Monounsaturated fat  found in oils such as olive and canola, avocados, and nuts and natural nut butters; can decrease cholesterol levels, while keeping levels of HDL cholesterol high   Polyunsaturated fat  found in oils such as safflower, sunflower, soybean, corn, and sesame; can decrease total cholesterol and LDL cholesterol   Omega-3 fatty acids  particularly those found in fatty fish (such as salmon, trout, tuna, mackerel, herring, and sardines); can decrease risk of arrhythmias, decrease triglyceride levels, and slightly lower blood pressure   The fats that you want to limit are:   Saturated fat  found in animal products, many fast foods, and a few vegetables; increases total blood cholesterol, including LDL levels   Animal fats that are saturated include: butter, lard, whole-milk dairy products, meat fat, and poultry skin   Vegetable fats that are saturated include: hydrogenated shortening, palm oil, coconut oil, cocoa butter   Hydrogenated or trans fat  found in margarine and vegetable shortening, most shelf stable snack foods, and fried foods; increases LDL and decreases HDL     It is generally recommended

## 2024-12-12 ENCOUNTER — PATIENT MESSAGE (OUTPATIENT)
Dept: FAMILY MEDICINE CLINIC | Age: 84
End: 2024-12-12

## 2024-12-20 ENCOUNTER — HOSPITAL ENCOUNTER (OUTPATIENT)
Age: 84
Setting detail: SPECIMEN
Discharge: HOME | End: 2024-12-20

## 2024-12-20 DIAGNOSIS — R68.83 CHILLS (WITHOUT FEVER): ICD-10-CM

## 2024-12-20 LAB
BASOPHILS # BLD: 0.08 K/UL (ref 0–0.2)
BASOPHILS NFR BLD: 1 % (ref 0–2)
EOSINOPHIL # BLD: 0.13 K/UL (ref 0–0.44)
EOSINOPHILS RELATIVE PERCENT: 2 % (ref 1–4)
ERYTHROCYTE [DISTWIDTH] IN BLOOD BY AUTOMATED COUNT: 12.1 % (ref 11.8–14.4)
HCT VFR BLD AUTO: 40.3 % (ref 36.3–47.1)
HGB BLD-MCNC: 12.9 G/DL (ref 11.9–15.1)
IMM GRANULOCYTES # BLD AUTO: <0.03 K/UL (ref 0–0.3)
IMM GRANULOCYTES NFR BLD: 0 %
LYMPHOCYTES NFR BLD: 2.25 K/UL (ref 1.1–3.7)
LYMPHOCYTES RELATIVE PERCENT: 32 % (ref 24–43)
MCH RBC QN AUTO: 30.9 PG (ref 25.2–33.5)
MCHC RBC AUTO-ENTMCNC: 32 G/DL (ref 28.4–34.8)
MCV RBC AUTO: 96.4 FL (ref 82.6–102.9)
MONOCYTES NFR BLD: 0.48 K/UL (ref 0.1–1.2)
MONOCYTES NFR BLD: 7 % (ref 3–12)
NEUTROPHILS NFR BLD: 58 % (ref 36–65)
NEUTS SEG NFR BLD: 4.04 K/UL (ref 1.5–8.1)
NRBC BLD-RTO: 0 PER 100 WBC
PLATELET # BLD AUTO: 267 K/UL (ref 138–453)
PMV BLD AUTO: 10.5 FL (ref 8.1–13.5)
RBC # BLD AUTO: 4.18 M/UL (ref 3.95–5.11)
WBC OTHER # BLD: 7 K/UL (ref 3.5–11.3)

## 2024-12-30 ENCOUNTER — HOSPITAL ENCOUNTER (OUTPATIENT)
Dept: MAMMOGRAPHY | Facility: CLINIC | Age: 84
Discharge: HOME | End: 2025-01-01
Payer: MEDICARE

## 2024-12-30 DIAGNOSIS — M85.88 OSTEOPENIA OF LUMBAR SPINE: ICD-10-CM

## 2024-12-30 PROCEDURE — 77080 DXA BONE DENSITY AXIAL: CPT

## 2024-12-31 ENCOUNTER — OFFICE VISIT (OUTPATIENT)
Dept: PAIN MANAGEMENT | Age: 84
End: 2024-12-31
Payer: MEDICARE

## 2024-12-31 VITALS — WEIGHT: 206 LBS | HEIGHT: 65 IN | BODY MASS INDEX: 34.32 KG/M2

## 2024-12-31 DIAGNOSIS — M47.817 LUMBOSACRAL SPONDYLOSIS WITHOUT MYELOPATHY: Primary | ICD-10-CM

## 2024-12-31 PROCEDURE — G8427 DOCREV CUR MEDS BY ELIG CLIN: HCPCS | Performed by: ANESTHESIOLOGY

## 2024-12-31 PROCEDURE — 1123F ACP DISCUSS/DSCN MKR DOCD: CPT | Performed by: ANESTHESIOLOGY

## 2024-12-31 PROCEDURE — G8417 CALC BMI ABV UP PARAM F/U: HCPCS | Performed by: ANESTHESIOLOGY

## 2024-12-31 PROCEDURE — G8399 PT W/DXA RESULTS DOCUMENT: HCPCS | Performed by: ANESTHESIOLOGY

## 2024-12-31 PROCEDURE — 1159F MED LIST DOCD IN RCRD: CPT | Performed by: ANESTHESIOLOGY

## 2024-12-31 PROCEDURE — 1160F RVW MEDS BY RX/DR IN RCRD: CPT | Performed by: ANESTHESIOLOGY

## 2024-12-31 PROCEDURE — 1125F AMNT PAIN NOTED PAIN PRSNT: CPT | Performed by: ANESTHESIOLOGY

## 2024-12-31 PROCEDURE — 1090F PRES/ABSN URINE INCON ASSESS: CPT | Performed by: ANESTHESIOLOGY

## 2024-12-31 PROCEDURE — 99214 OFFICE O/P EST MOD 30 MIN: CPT | Performed by: ANESTHESIOLOGY

## 2024-12-31 PROCEDURE — 1036F TOBACCO NON-USER: CPT | Performed by: ANESTHESIOLOGY

## 2024-12-31 PROCEDURE — G8484 FLU IMMUNIZE NO ADMIN: HCPCS | Performed by: ANESTHESIOLOGY

## 2024-12-31 RX ORDER — DANTROLENE SODIUM 25 MG/1
25 CAPSULE ORAL 2 TIMES DAILY
Qty: 20 CAPSULE | Refills: 0 | Status: SHIPPED | OUTPATIENT
Start: 2024-12-31 | End: 2025-01-10

## 2024-12-31 ASSESSMENT — ENCOUNTER SYMPTOMS
GASTROINTESTINAL NEGATIVE: 1
RESPIRATORY NEGATIVE: 1
BACK PAIN: 1

## 2024-12-31 NOTE — PROGRESS NOTES
(METADATE CD) 10 MG extended release capsule Take 1 capsule by mouth every morning for 14 days. Max Daily Amount: 10 mg 14 capsule 0    estrogens conjugated (PREMARIN) 0.625 MG/GM CREA vaginal cream Place 0.5 g vaginally daily Place daily for 2 weeks. Then use 2 times a week. (Patient not taking: Reported on 12/4/2024) 60 g 0    amitriptyline (ELAVIL) 10 MG tablet TAKE ONE TABLET BY MOUTH DAILY AS NEEDED FOR SLEEP AND LEG PAIN FOR UP TO 30 DAYS (Patient not taking: Reported on 11/18/2024) 90 tablet 2    chlorpheniramine (CHLOR-TRIMETON) 4 MG tablet Take 3 tablets by mouth daily (Patient not taking: Reported on 12/10/2024)       No current facility-administered medications for this visit.     Facility-Administered Medications Ordered in Other Visits   Medication Dose Route Frequency Provider Last Rate Last Admin    0.9 % sodium chloride infusion   IntraVENous Continuous Orlando Lipscomb  mL/hr at 02/19/14 0827 New Bag at 02/19/14 0827       Review of Systems   Constitutional: Negative.  Negative for fever.   HENT: Negative.     Respiratory: Negative.     Cardiovascular: Negative.    Gastrointestinal: Negative.    Musculoskeletal:  Positive for back pain. Negative for gait problem and joint swelling.   Neurological:  Positive for weakness. Negative for numbness.         Objective:  General Appearance:  Well-appearing, in no acute distress, uncomfortable and in pain.    Vital signs: (most recent): Height 1.651 m (5' 5\"), weight 93.4 kg (206 lb), not currently breastfeeding.  Vital signs are normal.  No fever.    Output: Producing urine and producing stool.    HEENT: Normal HEENT exam.    Lungs:  Normal effort and normal respiratory rate.  She is not in respiratory distress.    Heart: Normal rate.    Extremities: Normal range of motion.  There is no deformity.    Neurological: Patient is alert and oriented to person, place and time.  Normal strength.  Patient has normal coordination.    Pupils:  Pupils are equal,

## 2025-01-02 DIAGNOSIS — M70.62 GREATER TROCHANTERIC BURSITIS OF LEFT HIP: Primary | ICD-10-CM

## 2025-01-02 NOTE — PATIENT INSTRUCTIONS
PATIENTIQ:  PatientIQ helps [facility] stay in touch with you to know how you're feeling, and provides education and care instructions to you at various time points.   Your answers help your care team track your progress to provide the best care possible. PatientIQ will contact you pre-op and post-op via email or text with:  Educational Videos and Care Instructions  Questionnaires About How You're Feeling    Your participation provides you valuable education and helps [facility] continue to provide quality care to all patients. Thank you    CORTISONE INJECTION CARE    The injection site should never get red, hot, or swollen and if it does the patient will contact our office right away. The patient may experience a increase in soreness the first 24-48 hours due to a cortisone flair and can take anti-inflammatories for a short period of time to reduce that soreness. The patient should not submerge the injection site in water for a minimum of 24 hours to avoid infection. This means no lakes, pools, ponds, or hot tubs for 24 hours. If the patient is diabetic the injection may increase their blood sugar for up to one week. The patient can do this cortisone injection once every 4 months as needed.

## 2025-01-03 RX ORDER — GABAPENTIN 100 MG/1
100 CAPSULE ORAL 2 TIMES DAILY
Qty: 180 CAPSULE | Refills: 0 | Status: SHIPPED | OUTPATIENT
Start: 2025-01-03 | End: 2025-04-03

## 2025-01-03 NOTE — TELEPHONE ENCOUNTER
Alicia called the office asking for a refill on her Gabapentin. I called and spoke with Allie at Dr. Earl's office as the medication was discontinued on 12/4/2024. She stated that it was an accident that the medication was discontinued and that Alicia is able to take the Gabapentin.    Please review and send a new prescription, thank you.       Pharmacy requesting refill of gabapentin (NEURONTIN) 100 MG capsule.      Medication active on med list yes      Date of last Rx: 11/20/2024 with 3 refills          verified by LISBETH RIVAS      Date of last appointment 09/25/2024    Next Visit Date:  2/4/2025      Please review as Dr. Dodge is currently out of the office. Thank you.

## 2025-01-06 ENCOUNTER — PATIENT MESSAGE (OUTPATIENT)
Dept: FAMILY MEDICINE CLINIC | Age: 85
End: 2025-01-06

## 2025-01-07 ENCOUNTER — OFFICE VISIT (OUTPATIENT)
Dept: ORTHOPEDIC SURGERY | Age: 85
End: 2025-01-07
Payer: MEDICARE

## 2025-01-07 VITALS — OXYGEN SATURATION: 98 % | RESPIRATION RATE: 18 BRPM | WEIGHT: 206 LBS | BODY MASS INDEX: 34.32 KG/M2 | HEIGHT: 65 IN

## 2025-01-07 DIAGNOSIS — M70.62 GREATER TROCHANTERIC BURSITIS OF LEFT HIP: Primary | ICD-10-CM

## 2025-01-07 DIAGNOSIS — M25.562 ACUTE PAIN OF LEFT KNEE: ICD-10-CM

## 2025-01-07 PROCEDURE — 99214 OFFICE O/P EST MOD 30 MIN: CPT | Performed by: PHYSICIAN ASSISTANT

## 2025-01-07 PROCEDURE — 1123F ACP DISCUSS/DSCN MKR DOCD: CPT | Performed by: PHYSICIAN ASSISTANT

## 2025-01-07 PROCEDURE — 1159F MED LIST DOCD IN RCRD: CPT | Performed by: PHYSICIAN ASSISTANT

## 2025-01-07 PROCEDURE — G8417 CALC BMI ABV UP PARAM F/U: HCPCS | Performed by: PHYSICIAN ASSISTANT

## 2025-01-07 PROCEDURE — 1036F TOBACCO NON-USER: CPT | Performed by: PHYSICIAN ASSISTANT

## 2025-01-07 PROCEDURE — M1308 PR FLU IMMUNIZE NO ADMIN: HCPCS | Performed by: PHYSICIAN ASSISTANT

## 2025-01-07 PROCEDURE — 20611 DRAIN/INJ JOINT/BURSA W/US: CPT | Performed by: PHYSICIAN ASSISTANT

## 2025-01-07 PROCEDURE — 1090F PRES/ABSN URINE INCON ASSESS: CPT | Performed by: PHYSICIAN ASSISTANT

## 2025-01-07 PROCEDURE — G8399 PT W/DXA RESULTS DOCUMENT: HCPCS | Performed by: PHYSICIAN ASSISTANT

## 2025-01-07 PROCEDURE — 1125F AMNT PAIN NOTED PAIN PRSNT: CPT | Performed by: PHYSICIAN ASSISTANT

## 2025-01-07 PROCEDURE — G8427 DOCREV CUR MEDS BY ELIG CLIN: HCPCS | Performed by: PHYSICIAN ASSISTANT

## 2025-01-07 RX ORDER — METHYLPREDNISOLONE ACETATE 80 MG/ML
80 INJECTION, SUSPENSION INTRA-ARTICULAR; INTRALESIONAL; INTRAMUSCULAR; SOFT TISSUE ONCE
Status: COMPLETED | OUTPATIENT
Start: 2025-01-07 | End: 2025-01-07

## 2025-01-07 RX ORDER — LIDOCAINE HYDROCHLORIDE 10 MG/ML
2 INJECTION, SOLUTION INFILTRATION; PERINEURAL ONCE
Status: COMPLETED | OUTPATIENT
Start: 2025-01-07 | End: 2025-01-07

## 2025-01-07 RX ADMIN — LIDOCAINE HYDROCHLORIDE 2 ML: 10 INJECTION, SOLUTION INFILTRATION; PERINEURAL at 15:18

## 2025-01-07 RX ADMIN — METHYLPREDNISOLONE ACETATE 80 MG: 80 INJECTION, SUSPENSION INTRA-ARTICULAR; INTRALESIONAL; INTRAMUSCULAR; SOFT TISSUE at 15:18

## 2025-01-07 ASSESSMENT — ENCOUNTER SYMPTOMS
APNEA: 0
NAUSEA: 0
ABDOMINAL DISTENTION: 0
DIARRHEA: 0
COUGH: 0
COLOR CHANGE: 0
CONSTIPATION: 0
SHORTNESS OF BREATH: 0
CHEST TIGHTNESS: 0
GASTROINTESTINAL NEGATIVE: 1
ABDOMINAL PAIN: 0
RESPIRATORY NEGATIVE: 1
VOMITING: 0

## 2025-01-07 NOTE — PROGRESS NOTES
pain    Findings:   HIP/PELVIS X-RAY    Standing AP of the pelvis and frog-leg lateral of the left hip.  Radiographs were obtained today and demonstrate joint spacing is slightly narrowed and visualized articular surfaces are intact.  There is no evidence of fracture, dislocation or other significant abnormality.  No radiopaque foreign body/tumors.  No significant Change since previous x-ray on 7/3/2023.    Impression: Spurring of the greater trochanter of the left hip      Procedure:     Greater Trochanteric Bursa Injection     Procedure: Alicia TOM Franklin agreed today for a Corticosteroid injection into the left greater trochanteric bursa. The patient was placed in the lateral position with the affected side up.  The point of the maximum tenderness was marked with the closed end of a click type pen. The skin was prepped with betadine in a sterile fashion. Utilizing a [facility] [employer] [facility] ultrasound unit with a variable frequency linear transducer and sterile technique a 3 cc solution containing 2cc of 1% lidocaine  and 1 cc containing 80 mg of Depo-Medrol was injected into the greater trochanteric bursa with a 22 gauge spinal needle. The wound was cleansed and a Band-Aid was placed. The patient tolerated the procedure without difficulty. Adverse reactions of the injection was discussed with the patient including signs of infection (increasing pain, redness, swelling) and the patient was instructed to call immediately with any of these symptoms. The images are stored on SD card in the machine until downloaded to the patient's chart.       Assessment:      1. Greater trochanteric bursitis of left hip    2. Acute pain of left knee       Plan:     Assessment & Plan  1. Left greater trochanteric bursitis of her hip.  The patient reports numbness in the left hip, diagnosed as meralgia paresthetica, and pain exacerbated by walking. Recent x-rays show mild arthritic changes of the left hip and spurring at the greater trochanter, but the

## 2025-01-09 ENCOUNTER — PATIENT MESSAGE (OUTPATIENT)
Dept: FAMILY MEDICINE CLINIC | Age: 85
End: 2025-01-09

## 2025-01-09 ENCOUNTER — TELEPHONE (OUTPATIENT)
Dept: FAMILY MEDICINE CLINIC | Age: 85
End: 2025-01-09

## 2025-01-09 DIAGNOSIS — G93.32 CHRONIC FATIGUE SYNDROME: Primary | ICD-10-CM

## 2025-01-10 RX ORDER — DEXTROAMPHETAMINE SACCHARATE, AMPHETAMINE ASPARTATE, DEXTROAMPHETAMINE SULFATE AND AMPHETAMINE SULFATE 2.5; 2.5; 2.5; 2.5 MG/1; MG/1; MG/1; MG/1
10 TABLET ORAL DAILY
Qty: 30 TABLET | Refills: 0 | Status: SHIPPED | OUTPATIENT
Start: 2025-02-09 | End: 2025-03-11

## 2025-01-10 RX ORDER — DEXTROAMPHETAMINE SACCHARATE, AMPHETAMINE ASPARTATE, DEXTROAMPHETAMINE SULFATE AND AMPHETAMINE SULFATE 2.5; 2.5; 2.5; 2.5 MG/1; MG/1; MG/1; MG/1
10 TABLET ORAL DAILY
Qty: 30 TABLET | Refills: 0 | Status: SHIPPED | OUTPATIENT
Start: 2025-01-10 | End: 2025-02-09

## 2025-01-10 RX ORDER — DEXTROAMPHETAMINE SACCHARATE, AMPHETAMINE ASPARTATE, DEXTROAMPHETAMINE SULFATE AND AMPHETAMINE SULFATE 2.5; 2.5; 2.5; 2.5 MG/1; MG/1; MG/1; MG/1
10 TABLET ORAL DAILY
Qty: 30 TABLET | Refills: 0 | Status: SHIPPED | OUTPATIENT
Start: 2025-03-11 | End: 2025-04-10

## 2025-01-13 DIAGNOSIS — M81.0 OSTEOPOROSIS, UNSPECIFIED OSTEOPOROSIS TYPE, UNSPECIFIED PATHOLOGICAL FRACTURE PRESENCE: Primary | ICD-10-CM

## 2025-01-13 RX ORDER — SODIUM CHLORIDE 9 MG/ML
5-250 INJECTION, SOLUTION INTRAVENOUS PRN
OUTPATIENT
Start: 2025-01-14

## 2025-01-13 RX ORDER — ZOLEDRONIC ACID 0.05 MG/ML
5 INJECTION, SOLUTION INTRAVENOUS ONCE
OUTPATIENT
Start: 2025-01-14 | End: 2025-01-14

## 2025-01-15 ENCOUNTER — TELEPHONE (OUTPATIENT)
Dept: ONCOLOGY | Age: 85
End: 2025-01-15

## 2025-01-15 ENCOUNTER — TELEPHONE (OUTPATIENT)
Dept: INFUSION THERAPY | Facility: MEDICAL CENTER | Age: 85
End: 2025-01-15

## 2025-01-15 NOTE — TELEPHONE ENCOUNTER
I TRIED TO CALL JAMEL TO SCHEDULE HER RECLAST INFUSION & LAB WORK AND HAD TO  LEAVE A MESSAGE TO CALL THE OFFICE TO SCHEDULE.

## 2025-01-15 NOTE — TELEPHONE ENCOUNTER
New order 1/13/25  Dr. Mariia Casanova  Reclast 5 mg/100 ml IV once.  Obtain serum creatinine and calcium level before each dose.  Order noted and chart to front office for processing.

## 2025-01-16 ENCOUNTER — HOSPITAL ENCOUNTER (OUTPATIENT)
Facility: MEDICAL CENTER | Age: 85
End: 2025-01-16

## 2025-01-21 ENCOUNTER — TELEPHONE (OUTPATIENT)
Dept: ONCOLOGY | Age: 85
End: 2025-01-21

## 2025-01-21 NOTE — TELEPHONE ENCOUNTER
WRITER SPOKE TO  PT JAMEL ABOUT CANCELING HER APPT ON 1/22/25 @ 1PM SHE ALSO STATED THAT SHE WANTS TO HOLD OFF ON HER RECLAST INJECTION UNTIL SHE SEES THE DENTIST OR DR IN FEBRUARY SHE WILL CALL OUR OFFICE TO SCHEDULE AN APPT IF NEEDED PT APPT WAS CANCELED FOR 1/22/25 WAITING ON A CALL BACK.

## 2025-01-24 ENCOUNTER — HOSPITAL ENCOUNTER (OUTPATIENT)
Dept: PAIN MANAGEMENT | Facility: CLINIC | Age: 85
Discharge: HOME | End: 2025-01-24

## 2025-01-24 ENCOUNTER — TELEPHONE (OUTPATIENT)
Dept: PAIN MANAGEMENT | Facility: CLINIC | Age: 85
End: 2025-01-24

## 2025-01-24 NOTE — TELEPHONE ENCOUNTER
Pt called in to state that she has to cancel her procedure today due to IBS and Diarrhea this morning. I did advise the pt to call the office so she can get r/s.

## 2025-01-24 NOTE — FLOWSHEET NOTE
Pt called the office to state that she has to cancel her appt today due to her IBS issues. I stated to the pt that she will need to call the office to r/s.

## 2025-01-24 NOTE — DISCHARGE INSTRUCTIONS
Discharge Instructions following Sedation or Anesthesia:  You have  received  a sedative/anesthetic therefore, you should not consume any alcoholic beverages for minimum of 12 hours.  Do not drive or operate machinery for 24 hours.  Do not sign legal documents for 24 hours.  Dizziness, drowsiness, and unsteadiness may occur.  Rest when need to.  Increase diet as tolerated.  Keep up on fluids if diet allows.      General Instructions:  Do not take a tub bath for 72 hours after procedure (this includes hot tubs and swimming pools).  You may shower, but avoid hot water to injection site.   Avoid strenuous activity TODAY especially if you experience dizziness.   Remove band-aid the next day.  Wash off any residual iodine   Do not use heat, heating pad, or any other heating device over the injection site for 3 days after the procedure.  If you experience pain after your procedure, you may continue with your current pain medication as prescribed.  (DO NOT INCREASE YOUR PAIN MEDICATION WITHOUT TALKING TO DOCTOR)  Soreness and pain at injection site is common, may use ice to reduce soreness.    Call [facility] at [phone] if you experience:   Fever, chills or temperature over 100    Vomiting, Headache, persistent stiff neck, nausea, blurred vision   Difficulty in urinating or unable to urinate with 8 hours   Increase in weakness, numbness or loss of function   Increased redness, swelling or drainage at the injection site

## 2025-01-30 ENCOUNTER — TELEPHONE (OUTPATIENT)
Dept: FAMILY MEDICINE CLINIC | Age: 85
End: 2025-01-30

## 2025-01-30 NOTE — TELEPHONE ENCOUNTER
Pt called back stated is on Adderall 10 mg for Chronic fatigue syndrome.  The pt has noticed the the medication worked for a while as expected but on 1.15.25 and 1.22.25 has to rest.     Also, noticed her IBS was much better for 10 days and did not have to use her medication at all, had normal bowel movements.    Also, going to Neuro on 2.4.25, will talk to them about it as well.  Has Support group and they also had someone with this medication and it was not working for them as well after sometime on it.    Asking if this is the best medication for her current diagnosis on the market?    Pharmacy    [facility] [id number] - [place] - [address] - P [phone] - F [phone] [address] [place] [address]  Phone: [phone]  Fax: [phone]

## 2025-01-31 NOTE — TELEPHONE ENCOUNTER
If the patient feels medication is working for fatigue she should continue as there are many of similar medication/stimulants out there.  If she does not have any benefit at all from medication we will change it.

## 2025-02-04 ENCOUNTER — OFFICE VISIT (OUTPATIENT)
Dept: NEUROLOGY | Age: 85
End: 2025-02-04
Payer: MEDICARE

## 2025-02-04 VITALS
BODY MASS INDEX: 34.09 KG/M2 | DIASTOLIC BLOOD PRESSURE: 84 MMHG | HEART RATE: 60 BPM | WEIGHT: 204.6 LBS | SYSTOLIC BLOOD PRESSURE: 144 MMHG | HEIGHT: 65 IN

## 2025-02-04 DIAGNOSIS — R20.2 PARESTHESIAS: Primary | ICD-10-CM

## 2025-02-04 PROCEDURE — 1123F ACP DISCUSS/DSCN MKR DOCD: CPT | Performed by: PSYCHIATRY & NEUROLOGY

## 2025-02-04 PROCEDURE — 1159F MED LIST DOCD IN RCRD: CPT | Performed by: PSYCHIATRY & NEUROLOGY

## 2025-02-04 PROCEDURE — G8399 PT W/DXA RESULTS DOCUMENT: HCPCS | Performed by: PSYCHIATRY & NEUROLOGY

## 2025-02-04 PROCEDURE — G8417 CALC BMI ABV UP PARAM F/U: HCPCS | Performed by: PSYCHIATRY & NEUROLOGY

## 2025-02-04 PROCEDURE — 1036F TOBACCO NON-USER: CPT | Performed by: PSYCHIATRY & NEUROLOGY

## 2025-02-04 PROCEDURE — 1090F PRES/ABSN URINE INCON ASSESS: CPT | Performed by: PSYCHIATRY & NEUROLOGY

## 2025-02-04 PROCEDURE — 99214 OFFICE O/P EST MOD 30 MIN: CPT | Performed by: PSYCHIATRY & NEUROLOGY

## 2025-02-04 PROCEDURE — G8427 DOCREV CUR MEDS BY ELIG CLIN: HCPCS | Performed by: PSYCHIATRY & NEUROLOGY

## 2025-02-04 RX ORDER — VENLAFAXINE HYDROCHLORIDE 37.5 MG/1
37.5 CAPSULE, EXTENDED RELEASE ORAL DAILY
Qty: 30 CAPSULE | Refills: 3 | Status: SHIPPED | OUTPATIENT
Start: 2025-02-04

## 2025-02-04 NOTE — PROGRESS NOTES
[facility] [place]  [address], [address]  [place] [address]  Ph: [phone] or [phone]  FAX: [phone]      Reason for Consult: Numbness, Tremors, Chronic Fatigue Syndrome, and Meralgia Paresthetica    I had the pleasure of seeing your patient, Alicia Reid, an 84-year-old female, in neurology consultation for evaluation of persistent tremors, chronic fatigue syndrome, and meralgia paresthetica.    As you would recall, her tremors began in 2010 and were initially evaluated by Dr. Lezama in 2017, with an impression of essential tremor given her familial history. She trialed propranolol and primidone, both of which were ineffective or intolerable. Over time, her tremor became more prominent at rest, prompting a DaTscan in 2019, which was normal, ruling out Parkinson's disease. Her tremor has remained stable but continues to impact fine motor tasks such as handwriting. 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 She previously trialed Sinemet with minimal benefit and discontinued it.    She also reports worsening left meralgia paresthetica, originally diagnosed over 20 years ago. The pain has recently intensified, particularly in the mornings, but improves with movement. She has been managed with gabapentin, which has provided some relief for pain and sleep but has caused occasional dizziness when getting up at night. She previously trialed amitriptyline, which was effective but caused excessive drowsiness. She has undergone basivertebral nerve ablation at L4, L5, S1, which has provided some relief.    She was recently diagnosed with chronic fatigue syndrome by her primary care provider in December 2024 after experiencing persistent fatigue since February 2023, which she attributes to a prior Dyllan-Barr virus infection (December 2021). She trialed Adderall 10 mg, which

## 2025-02-05 ENCOUNTER — PATIENT MESSAGE (OUTPATIENT)
Dept: FAMILY MEDICINE CLINIC | Age: 85
End: 2025-02-05

## 2025-02-14 PROBLEM — I50.32 CHRONIC DIASTOLIC HEART FAILURE (HCC): Status: ACTIVE | Noted: 2022-08-30

## 2025-02-24 ENCOUNTER — TELEPHONE (OUTPATIENT)
Dept: FAMILY MEDICINE CLINIC | Age: 85
End: 2025-02-24

## 2025-02-24 DIAGNOSIS — H91.90 DECREASED HEARING, UNSPECIFIED LATERALITY: Primary | ICD-10-CM

## 2025-02-24 NOTE — TELEPHONE ENCOUNTER
[facility] called is needing a referral for patient has appointment on Wednesday, is being seen for hearing evaluation and treatment. Please advise. Pended needs dx.

## 2025-02-25 ENCOUNTER — TELEPHONE (OUTPATIENT)
Dept: PAIN MANAGEMENT | Age: 85
End: 2025-02-25

## 2025-02-25 NOTE — TELEPHONE ENCOUNTER
Pt states she no longer has pain and wanted to cancel her procedure on 3/5/25 and her f/u also.  Will call back if needed.

## 2025-03-18 ENCOUNTER — PATIENT MESSAGE (OUTPATIENT)
Dept: FAMILY MEDICINE CLINIC | Age: 85
End: 2025-03-18

## 2025-03-18 DIAGNOSIS — G93.32 CHRONIC FATIGUE SYNDROME: Primary | ICD-10-CM

## 2025-03-21 ENCOUNTER — HOSPITAL ENCOUNTER (OUTPATIENT)
Age: 85
Setting detail: SPECIMEN
Discharge: HOME | End: 2025-03-21

## 2025-03-21 ENCOUNTER — RESULTS FOLLOW-UP (OUTPATIENT)
Dept: FAMILY MEDICINE CLINIC | Age: 85
End: 2025-03-21

## 2025-03-21 DIAGNOSIS — M79.10 MYALGIA: ICD-10-CM

## 2025-03-21 DIAGNOSIS — I50.32 CHRONIC HEART FAILURE WITH PRESERVED EJECTION FRACTION (HCC): ICD-10-CM

## 2025-03-21 LAB
25(OH)D3 SERPL-MCNC: 45.8 NG/ML (ref 30–100)
CHOLEST SERPL-MCNC: 173 MG/DL (ref 0–199)
CHOLESTEROL/HDL RATIO: 3.6
CRP SERPL HS-MCNC: <3 MG/L (ref 0–5)
ERYTHROCYTE [SEDIMENTATION RATE] IN BLOOD BY PHOTOMETRIC METHOD: 24 MM/HR (ref 0–30)
HDLC SERPL-MCNC: 48 MG/DL
LDLC SERPL CALC-MCNC: 98 MG/DL (ref 0–100)
PTH-INTACT SERPL-MCNC: 52 PG/ML (ref 15–65)
TRIGL SERPL-MCNC: 137 MG/DL (ref 0–149)
VLDLC SERPL CALC-MCNC: 27 MG/DL (ref 1–30)

## 2025-03-26 ENCOUNTER — TELEPHONE (OUTPATIENT)
Dept: NEUROLOGY | Age: 85
End: 2025-03-26

## 2025-03-26 NOTE — TELEPHONE ENCOUNTER
Pt called in stating that she has been taking the Effexor for approximately 60 days for the chronic fatigue and has not noticed any change. She is wondering what the next steps would be?

## 2025-04-03 NOTE — TELEPHONE ENCOUNTER
Called and left a message that Dr. Dodge would like patient to follow up with her PCP regarding the fatigue.

## 2025-04-04 ENCOUNTER — PATIENT MESSAGE (OUTPATIENT)
Dept: FAMILY MEDICINE CLINIC | Age: 85
End: 2025-04-04

## 2025-04-04 NOTE — TELEPHONE ENCOUNTER
Pt called back in. She understands about needing to see PCP for the fatigue but she needs to know how to come off of the Effexor. She takes 37.5 mg daily. Please advise, thanks.

## 2025-04-07 NOTE — TELEPHONE ENCOUNTER
Pharmacy requesting refill of Gabapentin 100 MG.      Medication active on med list yes      Date of last Rx: 01/03/2025 #180 with 0 refills          verified by JACQUELINE GUTIERREZ      Date of last appointment 02/04/2025    Next Visit Date:  5/6/2025

## 2025-04-08 RX ORDER — GABAPENTIN 100 MG/1
100 CAPSULE ORAL 2 TIMES DAILY
Qty: 180 CAPSULE | Refills: 0 | Status: SHIPPED | OUTPATIENT
Start: 2025-04-08 | End: 2025-07-07

## 2025-04-30 ENCOUNTER — RESULTS FOLLOW-UP (OUTPATIENT)
Dept: INFECTIOUS DISEASES | Age: 85
End: 2025-04-30

## 2025-04-30 ENCOUNTER — OFFICE VISIT (OUTPATIENT)
Dept: INFECTIOUS DISEASES | Age: 85
End: 2025-04-30
Payer: MEDICARE

## 2025-04-30 ENCOUNTER — HOSPITAL ENCOUNTER (OUTPATIENT)
Age: 85
Setting detail: SPECIMEN
Discharge: HOME | End: 2025-04-30

## 2025-04-30 VITALS
DIASTOLIC BLOOD PRESSURE: 86 MMHG | WEIGHT: 208 LBS | HEART RATE: 80 BPM | OXYGEN SATURATION: 98 % | RESPIRATION RATE: 16 BRPM | BODY MASS INDEX: 34.66 KG/M2 | HEIGHT: 65 IN | SYSTOLIC BLOOD PRESSURE: 136 MMHG

## 2025-04-30 DIAGNOSIS — R30.0 DYSURIA: ICD-10-CM

## 2025-04-30 DIAGNOSIS — G93.32 CHRONIC FATIGUE SYNDROME: Primary | ICD-10-CM

## 2025-04-30 DIAGNOSIS — E55.9 HYPOVITAMINOSIS D: ICD-10-CM

## 2025-04-30 LAB
BACTERIA URNS QL MICRO: NORMAL
BILIRUB UR QL STRIP: NEGATIVE
CASTS #/AREA URNS LPF: NORMAL /LPF (ref 0–8)
CLARITY UR: CLEAR
COLOR UR: YELLOW
EPI CELLS #/AREA URNS HPF: NORMAL /HPF (ref 0–5)
GLUCOSE UR STRIP-MCNC: NEGATIVE MG/DL
HGB UR QL STRIP.AUTO: NEGATIVE
KETONES UR STRIP-MCNC: NEGATIVE MG/DL
LEUKOCYTE ESTERASE UR QL STRIP: ABNORMAL
NITRITE UR QL STRIP: NEGATIVE
PH UR STRIP: 5.5 [PH] (ref 5–8)
PROT UR STRIP-MCNC: NEGATIVE MG/DL
RBC #/AREA URNS HPF: NORMAL /HPF (ref 0–4)
SP GR UR STRIP: 1.01 (ref 1–1.03)
UROBILINOGEN UR STRIP-ACNC: NORMAL EU/DL (ref 0–1)
WBC #/AREA URNS HPF: NORMAL /HPF (ref 0–5)

## 2025-04-30 PROCEDURE — G8399 PT W/DXA RESULTS DOCUMENT: HCPCS | Performed by: INTERNAL MEDICINE

## 2025-04-30 PROCEDURE — 1159F MED LIST DOCD IN RCRD: CPT | Performed by: INTERNAL MEDICINE

## 2025-04-30 PROCEDURE — 1123F ACP DISCUSS/DSCN MKR DOCD: CPT | Performed by: INTERNAL MEDICINE

## 2025-04-30 PROCEDURE — G8427 DOCREV CUR MEDS BY ELIG CLIN: HCPCS | Performed by: INTERNAL MEDICINE

## 2025-04-30 PROCEDURE — 1090F PRES/ABSN URINE INCON ASSESS: CPT | Performed by: INTERNAL MEDICINE

## 2025-04-30 PROCEDURE — G8417 CALC BMI ABV UP PARAM F/U: HCPCS | Performed by: INTERNAL MEDICINE

## 2025-04-30 PROCEDURE — 1036F TOBACCO NON-USER: CPT | Performed by: INTERNAL MEDICINE

## 2025-04-30 PROCEDURE — 99214 OFFICE O/P EST MOD 30 MIN: CPT | Performed by: INTERNAL MEDICINE

## 2025-04-30 ASSESSMENT — ENCOUNTER SYMPTOMS
PHOTOPHOBIA: 0
EYE DISCHARGE: 0
BACK PAIN: 1
ABDOMINAL DISTENTION: 0
COLOR CHANGE: 0
CONSTIPATION: 1
EYE REDNESS: 0
APNEA: 0

## 2025-04-30 NOTE — PROGRESS NOTES
[facility] - Initial Consult Note  Today's Date: 4/30/2025    Impression :   Seen at our office since 2022 for chronic fatigue syndrome-  Prediabetic on metformin and A1C <6  Hypothyroid - on pills   Meniere disease  Osteoarthritis of the spine - spinal pain shot to the back 7/2022 and 8/2022  Chronic fatigue worse since 12/2021  VPBs - metoprolol  Elevated EBV titers, looks like old possibly chronic illness,  B12 low and replaced, corrected  Weight stable, added lately a little  Thyroid nodule - past 3  biopsy neg - size stable -FU w endocrine  Islam nun- also on B complex vitamin,  folate normal  Multiple colonoscopies are all neg ( father had colon cancer)  hypercalcemia  Takes vitamin D   Hyper calcemia  CaIonised-  5.46 ( 2014) -1.35 ( 8/8/2022)  LAZ[place]O 8/8/22 labs:  PTH I 36 NL- Phosphorus normal  Vitamin D 75 - 75  - 49  Lyme neg  - ESR 17 - beta 2 microglobin 2.5 -kappa lambda neg -  TSH FT3 and FT4 neg  ESR < 10  EBV PCR neg  HHV 7 and 6 PCR neg  Mycoplasma IGM 0.99 on 12/2022  Hep C neg -  hep B S AB + and C AB neg - past vaccine effect  Recommendations     1/2023 - No clear cause for the chronic fatigue at this point - AND see me PRN  4/30/25 -   chronic fatigue still  - seems worse x 1 year and she has to take naps now - given aderral and helped her energy but stopped due to increased extrasystoles-  New is constipation as IBS since 2024- diarrhea only after she takes laxatives-  Seeing Dar , started antidepressant and did not help but increased hand tremor -  Eats yogurt and no diarrhea but constipation -    plan  Get CMV PCR -   get Vitamin D ( trying to increase level again to 75 for better immunity)  BEE - celiac AB - IBD AB - CEA AFP -  -    If all is neg, pls consider a [facility] or [facility] rheumatology  She feels she has a UTI - get UA reflex  See me  in a month      Diagnosis Orders   1. Chronic fatigue syndrome  Cytomegalovirus (CMV) By PCR    Celiac

## 2025-04-30 NOTE — PATIENT INSTRUCTIONS
HAVE PCP ORDER RECOMMENDED LABS THAT DR MISHRA CIRCLED ON ORDER.     Call office to schedule your follow up appt.

## 2025-05-01 ENCOUNTER — PATIENT MESSAGE (OUTPATIENT)
Dept: FAMILY MEDICINE CLINIC | Age: 85
End: 2025-05-01

## 2025-05-01 DIAGNOSIS — G93.32 CHRONIC FATIGUE SYNDROME: Primary | ICD-10-CM

## 2025-05-01 DIAGNOSIS — M79.10 MYALGIA: ICD-10-CM

## 2025-05-01 DIAGNOSIS — R68.83 CHILLS (WITHOUT FEVER): ICD-10-CM

## 2025-05-05 ENCOUNTER — OFFICE VISIT (OUTPATIENT)
Dept: OBGYN CLINIC | Age: 85
End: 2025-05-05
Payer: MEDICARE

## 2025-05-05 ENCOUNTER — HOSPITAL ENCOUNTER (OUTPATIENT)
Age: 85
Setting detail: SPECIMEN
Discharge: HOME | End: 2025-05-05

## 2025-05-05 VITALS
HEIGHT: 65 IN | BODY MASS INDEX: 34.66 KG/M2 | SYSTOLIC BLOOD PRESSURE: 134 MMHG | DIASTOLIC BLOOD PRESSURE: 69 MMHG | WEIGHT: 208 LBS

## 2025-05-05 DIAGNOSIS — N89.8 VAGINAL ITCHING: ICD-10-CM

## 2025-05-05 DIAGNOSIS — N89.8 VAGINAL ITCHING: Primary | ICD-10-CM

## 2025-05-05 PROCEDURE — G8417 CALC BMI ABV UP PARAM F/U: HCPCS | Performed by: NURSE PRACTITIONER

## 2025-05-05 PROCEDURE — 1123F ACP DISCUSS/DSCN MKR DOCD: CPT | Performed by: NURSE PRACTITIONER

## 2025-05-05 PROCEDURE — G8399 PT W/DXA RESULTS DOCUMENT: HCPCS | Performed by: NURSE PRACTITIONER

## 2025-05-05 PROCEDURE — 99203 OFFICE O/P NEW LOW 30 MIN: CPT | Performed by: NURSE PRACTITIONER

## 2025-05-05 PROCEDURE — 1159F MED LIST DOCD IN RCRD: CPT | Performed by: NURSE PRACTITIONER

## 2025-05-05 PROCEDURE — G8427 DOCREV CUR MEDS BY ELIG CLIN: HCPCS | Performed by: NURSE PRACTITIONER

## 2025-05-05 PROCEDURE — 1090F PRES/ABSN URINE INCON ASSESS: CPT | Performed by: NURSE PRACTITIONER

## 2025-05-05 PROCEDURE — 1036F TOBACCO NON-USER: CPT | Performed by: NURSE PRACTITIONER

## 2025-05-05 PROCEDURE — 1160F RVW MEDS BY RX/DR IN RCRD: CPT | Performed by: NURSE PRACTITIONER

## 2025-05-05 RX ORDER — CLOBETASOL PROPIONATE 0.5 MG/G
OINTMENT TOPICAL
Qty: 45 G | Refills: 1 | Status: SHIPPED | OUTPATIENT
Start: 2025-05-05

## 2025-05-05 ASSESSMENT — ENCOUNTER SYMPTOMS
RESPIRATORY NEGATIVE: 1
CONSTIPATION: 1
DIARRHEA: 1

## 2025-05-05 NOTE — PROGRESS NOTES
[facility] - [place]  [address] [place], [place] [address]  [address]  [facility] [address]  Dept: [phone]  Dept Fax: [phone]         2025  Alicia Reid       Chief Complaint  Chief Complaint   Patient presents with    [facility]     HYST     Vaginal Itching    Vaginal Pain     Internally     .    Blood pressure 134/69, height 1.651 m (5' 5\"), weight 94.3 kg (208 lb), not currently breastfeeding.    HPI:     Alicia Reid  1940 is a 84 y.o.  here today due to \"pinching in the vagina\"  she has had a prevous hysterectomy with BSO  She says it's been going on for a couple weeks.  It is external itching with a pinching sensation at the vaginal opening.  In December her pcp prescribed PVC but her insurance didn't cover it        OB History    Para Term  AB Living   0 0 0 0 0 0   SAB IAB Ectopic Molar Multiple Live Births   0 0 0 0 0 0       Past Medical History:   Diagnosis Date    Acid reflux     Anesthesia complication     after gallbladder surgery- pt c/o dizziness and trouble waking up    Anxiety 2014    ALICIA-7   19 : 4    Bilateral tinnitus 12/10/2019    Bronchitis, mucopurulent recurrent (HCC) 2020    Chronic fatigue syndrome 2024    Diverticular disease 2014    Enthesopathy of hip region 12/10/2019    GERD (gastroesophageal reflux disease)     History of Dyllan-Barr virus infection 2021    fatigue, chills, sore throat, heart palpitations    Hypercholesteremia 2014    Hypothyroid 2014    Laryngopharyngeal reflux 12/10/2019    Lateral femoral cutaneous neuropathy 2016    Lung nodule     Meniere disease     right ear    Meniere's disease, right ear 12/10/2019    Morbidly obese (HCC) 2020    Neuropathy     Osteopenia 2014    Postmenopausal state 12/10/2019    Postnasal drip 2020    Prediabetes     RAD (reactive airway disease) 2014    Thyroid

## 2025-05-06 ENCOUNTER — TELEPHONE (OUTPATIENT)
Dept: FAMILY MEDICINE CLINIC | Age: 85
End: 2025-05-06

## 2025-05-06 DIAGNOSIS — G93.32 CHRONIC FATIGUE SYNDROME: Primary | ICD-10-CM

## 2025-05-06 DIAGNOSIS — M79.10 MYALGIA: ICD-10-CM

## 2025-05-06 DIAGNOSIS — R68.83 CHILLS (WITHOUT FEVER): ICD-10-CM

## 2025-05-06 LAB
CANDIDA SPECIES: NEGATIVE
GARDNERELLA VAGINALIS: NEGATIVE
SOURCE: NORMAL
TRICHOMONAS: NEGATIVE

## 2025-05-06 NOTE — TELEPHONE ENCOUNTER
Patient called is requesting a referral to rheumatologist, for chronic fatigue  syndrome and pain in legs and arms. caleb new pended patients preference needing dx. Please advise.

## 2025-05-07 ENCOUNTER — RESULTS FOLLOW-UP (OUTPATIENT)
Dept: OBGYN CLINIC | Age: 85
End: 2025-05-07

## 2025-05-09 ENCOUNTER — HOSPITAL ENCOUNTER (OUTPATIENT)
Facility: CLINIC | Age: 85
Discharge: HOME | End: 2025-05-09
Payer: MEDICARE

## 2025-05-09 DIAGNOSIS — E55.9 HYPOVITAMINOSIS D: ICD-10-CM

## 2025-05-09 DIAGNOSIS — G93.32 CHRONIC FATIGUE SYNDROME: ICD-10-CM

## 2025-05-09 LAB
25(OH)D3 SERPL-MCNC: 48 NG/ML (ref 30–100)
ALBUMIN SERPL-MCNC: 4.6 G/DL (ref 3.5–5.2)
ALBUMIN/GLOB SERPL: 1.5 {RATIO} (ref 1–2.5)
ALP SERPL-CCNC: 92 U/L (ref 35–104)
ALT SERPL-CCNC: 26 U/L (ref 10–35)
AST SERPL-CCNC: 25 U/L (ref 10–35)
BILIRUB DIRECT SERPL-MCNC: 0.1 MG/DL (ref 0–0.2)
BILIRUB INDIRECT SERPL-MCNC: 0.2 MG/DL (ref 0–1)
BILIRUB SERPL-MCNC: 0.3 MG/DL (ref 0–1.2)
GLIADIN IGA SER IA-ACNC: NORMAL U/ML
GLIADIN IGG SER IA-ACNC: NORMAL U/ML
GLOBULIN SER CALC-MCNC: 3 G/DL
IGA SERPL-MCNC: 382 MG/DL (ref 70–400)
PROT SERPL-MCNC: 7.6 G/DL (ref 6.6–8.7)
TTG IGA SER IA-ACNC: NORMAL U/ML

## 2025-05-09 PROCEDURE — 86618 LYME DISEASE ANTIBODY: CPT

## 2025-05-09 PROCEDURE — 82330 ASSAY OF CALCIUM: CPT

## 2025-05-09 PROCEDURE — 86225 DNA ANTIBODY NATIVE: CPT

## 2025-05-09 PROCEDURE — 82306 VITAMIN D 25 HYDROXY: CPT

## 2025-05-09 PROCEDURE — 83516 IMMUNOASSAY NONANTIBODY: CPT

## 2025-05-09 PROCEDURE — 88346 IMFLUOR 1ST 1ANTB STAIN PX: CPT

## 2025-05-09 PROCEDURE — 83520 IMMUNOASSAY QUANT NOS NONAB: CPT

## 2025-05-09 PROCEDURE — 80076 HEPATIC FUNCTION PANEL: CPT

## 2025-05-09 PROCEDURE — 36415 COLL VENOUS BLD VENIPUNCTURE: CPT

## 2025-05-09 PROCEDURE — 82784 ASSAY IGA/IGD/IGG/IGM EACH: CPT

## 2025-05-09 PROCEDURE — 82310 ASSAY OF CALCIUM: CPT

## 2025-05-09 PROCEDURE — 86140 C-REACTIVE PROTEIN: CPT

## 2025-05-09 PROCEDURE — 81479 UNLISTED MOLECULAR PATHOLOGY: CPT

## 2025-05-09 PROCEDURE — 88350 IMFLUOR EA ADDL 1ANTB STN PX: CPT

## 2025-05-09 PROCEDURE — 86038 ANTINUCLEAR ANTIBODIES: CPT

## 2025-05-09 PROCEDURE — 82397 CHEMILUMINESCENT ASSAY: CPT

## 2025-05-09 PROCEDURE — 86645 CMV ANTIBODY IGM: CPT

## 2025-05-09 PROCEDURE — 86644 CMV ANTIBODY: CPT

## 2025-05-09 NOTE — PATIENT INSTRUCTIONS
PATIENTIQ:  PatientIQ helps [facility] stay in touch with you to know how you're feeling, and provides education and care instructions to you at various time points.   Your answers help your care team track your progress to provide the best care possible. PatientIQ will contact you pre-op and post-op via email or text with:  Educational Videos and Care Instructions  Questionnaires About How You're Feeling    Your participation provides you valuable education and helps [facility] continue to provide quality care to all patients. Thank you

## 2025-05-10 LAB
CA-I BLD-SCNC: NORMAL MMOL/L (ref 1.13–1.33)
CALCIUM SERPL-MCNC: 10.4 MG/DL (ref 8.6–10.4)

## 2025-05-11 LAB
CMV IGG SERPL QL IA: 301
CMV IGM SERPL QL IA: 0.5

## 2025-05-12 ENCOUNTER — HOSPITAL ENCOUNTER (OUTPATIENT)
Facility: CLINIC | Age: 85
Setting detail: SPECIMEN
Discharge: HOME | End: 2025-05-12
Payer: MEDICARE

## 2025-05-12 ENCOUNTER — TELEPHONE (OUTPATIENT)
Dept: INFECTIOUS DISEASES | Age: 85
End: 2025-05-12

## 2025-05-12 DIAGNOSIS — M25.461 KNEE EFFUSION, RIGHT: ICD-10-CM

## 2025-05-12 DIAGNOSIS — M22.41 CHONDROMALACIA, PATELLA, RIGHT: Primary | ICD-10-CM

## 2025-05-12 LAB
ANA SER QL IA: NEGATIVE
CA-I BLD-SCNC: 1.28 MMOL/L (ref 1.13–1.33)
DSDNA IGG SER QL IA: <0.5 IU/ML
GLIADIN IGA SER IA-ACNC: 2.5 U/ML
GLIADIN IGG SER IA-ACNC: <0.4 U/ML
IGA SERPL-MCNC: 382 MG/DL (ref 70–400)
NUCLEAR IGG SER IA-RTO: 0.2 U/ML
TTG IGA SER IA-ACNC: 0.9 U/ML

## 2025-05-12 PROCEDURE — 82330 ASSAY OF CALCIUM: CPT

## 2025-05-12 PROCEDURE — 88350 IMFLUOR EA ADDL 1ANTB STN PX: CPT

## 2025-05-12 PROCEDURE — 86140 C-REACTIVE PROTEIN: CPT

## 2025-05-12 PROCEDURE — 81479 UNLISTED MOLECULAR PATHOLOGY: CPT

## 2025-05-12 PROCEDURE — 88346 IMFLUOR 1ST 1ANTB STAIN PX: CPT

## 2025-05-12 PROCEDURE — 82397 CHEMILUMINESCENT ASSAY: CPT

## 2025-05-12 PROCEDURE — 83520 IMMUNOASSAY QUANT NOS NONAB: CPT

## 2025-05-12 NOTE — TELEPHONE ENCOUNTER
Patient called requesting the results of he labs.  Once final I will rout to Dr Keenan for review

## 2025-05-12 NOTE — TELEPHONE ENCOUNTER
Patient called back for the testing pended, she would like them signed, she is needing these test done Please advise.

## 2025-05-12 NOTE — TELEPHONE ENCOUNTER
Dr Carias, patient went to the lab to get her CMV by PCR test done. They didn't do it because they said it it was the wrong test.     She went back today, they still wont do it stating its the wrong test. They said that test checks for bones.     I dont know what is going on. If you want to call the lab, #[phone] [facility] and speak to Daisy who draws the blood, who checked with their lab and was told this, please do. We'll let the patient know to go back and get this exact test done that you ordered.     Looks like they repeated the Calcium, Ionized today.

## 2025-05-12 NOTE — TELEPHONE ENCOUNTER
Please make sure the patient knows that her insurance might not cover these tests which I did order even previously.  She needs to make sure that her additional supplemental insurance will cover.

## 2025-05-13 LAB — LYME ANTIBODY: 0.28

## 2025-05-15 ENCOUNTER — OFFICE VISIT (OUTPATIENT)
Dept: ORTHOPEDIC SURGERY | Age: 85
End: 2025-05-15
Payer: MEDICARE

## 2025-05-15 VITALS — BODY MASS INDEX: 34.66 KG/M2 | HEIGHT: 65 IN | WEIGHT: 208 LBS | OXYGEN SATURATION: 98 % | RESPIRATION RATE: 16 BRPM

## 2025-05-15 DIAGNOSIS — M25.561 MEDIAL KNEE PAIN, RIGHT: Primary | ICD-10-CM

## 2025-05-15 PROCEDURE — 1090F PRES/ABSN URINE INCON ASSESS: CPT | Performed by: PHYSICIAN ASSISTANT

## 2025-05-15 PROCEDURE — G8427 DOCREV CUR MEDS BY ELIG CLIN: HCPCS | Performed by: PHYSICIAN ASSISTANT

## 2025-05-15 PROCEDURE — 1159F MED LIST DOCD IN RCRD: CPT | Performed by: PHYSICIAN ASSISTANT

## 2025-05-15 PROCEDURE — G8417 CALC BMI ABV UP PARAM F/U: HCPCS | Performed by: PHYSICIAN ASSISTANT

## 2025-05-15 PROCEDURE — 1036F TOBACCO NON-USER: CPT | Performed by: PHYSICIAN ASSISTANT

## 2025-05-15 PROCEDURE — 1125F AMNT PAIN NOTED PAIN PRSNT: CPT | Performed by: PHYSICIAN ASSISTANT

## 2025-05-15 PROCEDURE — 99213 OFFICE O/P EST LOW 20 MIN: CPT | Performed by: PHYSICIAN ASSISTANT

## 2025-05-15 PROCEDURE — 1123F ACP DISCUSS/DSCN MKR DOCD: CPT | Performed by: PHYSICIAN ASSISTANT

## 2025-05-15 PROCEDURE — G8399 PT W/DXA RESULTS DOCUMENT: HCPCS | Performed by: PHYSICIAN ASSISTANT

## 2025-05-15 ASSESSMENT — ENCOUNTER SYMPTOMS
COUGH: 0
SHORTNESS OF BREATH: 0
VOMITING: 0
COLOR CHANGE: 0

## 2025-05-15 NOTE — TELEPHONE ENCOUNTER
Spoke with patient and informed Dr Keenan spoke with lab and she can go back free of charge to have lab drawn

## 2025-05-16 ENCOUNTER — TELEPHONE (OUTPATIENT)
Dept: FAMILY MEDICINE CLINIC | Age: 85
End: 2025-05-16

## 2025-05-16 ENCOUNTER — HOSPITAL ENCOUNTER (OUTPATIENT)
Facility: CLINIC | Age: 85
Discharge: HOME | End: 2025-05-16
Payer: MEDICARE

## 2025-05-16 DIAGNOSIS — G93.32 CHRONIC FATIGUE SYNDROME: ICD-10-CM

## 2025-05-16 DIAGNOSIS — M79.10 MYALGIA: ICD-10-CM

## 2025-05-16 DIAGNOSIS — R68.83 CHILLS (WITHOUT FEVER): ICD-10-CM

## 2025-05-16 LAB
CANCER AG125 SERPL-ACNC: 11 U/ML (ref 0–38)
CEA SERPL-MCNC: 1.6 NG/ML (ref 0–3.8)

## 2025-05-16 PROCEDURE — 82378 CARCINOEMBRYONIC ANTIGEN: CPT

## 2025-05-16 PROCEDURE — 36415 COLL VENOUS BLD VENIPUNCTURE: CPT

## 2025-05-16 PROCEDURE — 86304 IMMUNOASSAY TUMOR CA 125: CPT

## 2025-05-16 NOTE — TELEPHONE ENCOUNTER
Lab called stating that the diagnosis for Cancer antigen and AFP tumor marker are not covered. Please advise.

## 2025-05-16 NOTE — TELEPHONE ENCOUNTER
This blood work usually is used to follow cancer treatments.  I did try the diagnosis from her problem list but there is nothing else I can do.  She could call her insurance and ask how to get it covered.  Thank you.

## 2025-05-17 ENCOUNTER — RESULTS FOLLOW-UP (OUTPATIENT)
Dept: FAMILY MEDICINE CLINIC | Age: 85
End: 2025-05-17

## 2025-05-17 LAB — IBD PANEL: NORMAL

## 2025-05-19 LAB
CMV QNT BY NAAT, PLASMA INTERP: NOT DETECTED
CMV QNT BY NAAT, PLASMA IU/ML: NOT DETECTED IU/ML
CMV QNT BY NAAT, PLASMA LOG IU/ML: NOT DETECTED LOG IU/ML

## 2025-05-27 ENCOUNTER — APPOINTMENT (OUTPATIENT)
Dept: CT IMAGING | Age: 85
End: 2025-05-27
Payer: MEDICARE

## 2025-05-27 ENCOUNTER — HOSPITAL ENCOUNTER (EMERGENCY)
Age: 85
Discharge: HOME | End: 2025-05-27
Attending: EMERGENCY MEDICINE
Payer: MEDICARE

## 2025-05-27 VITALS
TEMPERATURE: 99.1 F | BODY MASS INDEX: 34.66 KG/M2 | WEIGHT: 208 LBS | HEIGHT: 65 IN | HEART RATE: 59 BPM | SYSTOLIC BLOOD PRESSURE: 137 MMHG | RESPIRATION RATE: 20 BRPM | OXYGEN SATURATION: 94 % | DIASTOLIC BLOOD PRESSURE: 68 MMHG

## 2025-05-27 DIAGNOSIS — R09.1 PLEURISY: Primary | ICD-10-CM

## 2025-05-27 LAB
ANION GAP SERPL CALCULATED.3IONS-SCNC: 11 MMOL/L (ref 9–16)
BASOPHILS # BLD: 0.08 K/UL (ref 0–0.2)
BASOPHILS NFR BLD: 1 % (ref 0–2)
BUN SERPL-MCNC: 19 MG/DL (ref 8–23)
CALCIUM SERPL-MCNC: 9.8 MG/DL (ref 8.8–10.2)
CHLORIDE SERPL-SCNC: 105 MMOL/L (ref 98–107)
CO2 SERPL-SCNC: 22 MMOL/L (ref 20–31)
CREAT SERPL-MCNC: 0.8 MG/DL (ref 0.5–0.9)
EOSINOPHIL # BLD: 0.12 K/UL (ref 0–0.44)
EOSINOPHILS RELATIVE PERCENT: 2 % (ref 1–4)
ERYTHROCYTE [DISTWIDTH] IN BLOOD BY AUTOMATED COUNT: 11.9 % (ref 11.8–14.4)
GFR, ESTIMATED: 70 ML/MIN/1.73M2
GLUCOSE SERPL-MCNC: 100 MG/DL (ref 82–115)
HCT VFR BLD AUTO: 37.6 % (ref 36.3–47.1)
HGB BLD-MCNC: 12.6 G/DL (ref 11.9–15.1)
IMM GRANULOCYTES # BLD AUTO: 0.02 K/UL (ref 0–0.3)
IMM GRANULOCYTES NFR BLD: 0 %
LYMPHOCYTES NFR BLD: 2.24 K/UL (ref 1.1–3.7)
LYMPHOCYTES RELATIVE PERCENT: 32 % (ref 24–43)
MCH RBC QN AUTO: 31.4 PG (ref 25.2–33.5)
MCHC RBC AUTO-ENTMCNC: 33.5 G/DL (ref 28.4–34.8)
MCV RBC AUTO: 93.8 FL (ref 82.6–102.9)
MONOCYTES NFR BLD: 0.51 K/UL (ref 0.1–1.2)
MONOCYTES NFR BLD: 7 % (ref 3–12)
NEUTROPHILS NFR BLD: 58 % (ref 36–65)
NEUTS SEG NFR BLD: 4.15 K/UL (ref 1.5–8.1)
NRBC BLD-RTO: 0 PER 100 WBC
PLATELET # BLD AUTO: 242 K/UL (ref 138–453)
PMV BLD AUTO: 9.8 FL (ref 8.1–13.5)
POTASSIUM SERPL-SCNC: 4.7 MMOL/L (ref 3.7–5.3)
RBC # BLD AUTO: 4.01 M/UL (ref 3.95–5.11)
SODIUM SERPL-SCNC: 139 MMOL/L (ref 136–145)
TROPONIN I SERPL HS-MCNC: 11 NG/L (ref 0–14)
TROPONIN I SERPL HS-MCNC: 12 NG/L (ref 0–14)
WBC OTHER # BLD: 7.1 K/UL (ref 3.5–11.3)

## 2025-05-27 PROCEDURE — 93005 ELECTROCARDIOGRAM TRACING: CPT | Performed by: EMERGENCY MEDICINE

## 2025-05-27 PROCEDURE — 6360000004 HC RX CONTRAST MEDICATION: Performed by: EMERGENCY MEDICINE

## 2025-05-27 PROCEDURE — 80048 BASIC METABOLIC PNL TOTAL CA: CPT

## 2025-05-27 PROCEDURE — 85025 COMPLETE CBC W/AUTO DIFF WBC: CPT

## 2025-05-27 PROCEDURE — 84484 ASSAY OF TROPONIN QUANT: CPT

## 2025-05-27 PROCEDURE — 96372 THER/PROPH/DIAG INJ SC/IM: CPT

## 2025-05-27 PROCEDURE — 99285 EMERGENCY DEPT VISIT HI MDM: CPT

## 2025-05-27 PROCEDURE — 71260 CT THORAX DX C+: CPT

## 2025-05-27 PROCEDURE — 2500000003 HC RX 250 WO HCPCS: Performed by: EMERGENCY MEDICINE

## 2025-05-27 PROCEDURE — 36415 COLL VENOUS BLD VENIPUNCTURE: CPT

## 2025-05-27 PROCEDURE — 2580000003 HC RX 258: Performed by: EMERGENCY MEDICINE

## 2025-05-27 PROCEDURE — 6360000002 HC RX W HCPCS: Performed by: EMERGENCY MEDICINE

## 2025-05-27 RX ORDER — KETOROLAC TROMETHAMINE 30 MG/ML
30 INJECTION, SOLUTION INTRAMUSCULAR; INTRAVENOUS ONCE
Status: COMPLETED | OUTPATIENT
Start: 2025-05-27 | End: 2025-05-27

## 2025-05-27 RX ORDER — IOPAMIDOL 755 MG/ML
75 INJECTION, SOLUTION INTRAVASCULAR
Status: COMPLETED | OUTPATIENT
Start: 2025-05-27 | End: 2025-05-27

## 2025-05-27 RX ORDER — TIZANIDINE 2 MG/1
2 TABLET ORAL NIGHTLY
COMMUNITY
Start: 2025-05-21

## 2025-05-27 RX ORDER — AMITRIPTYLINE HYDROCHLORIDE 10 MG/1
10 TABLET ORAL NIGHTLY
COMMUNITY
Start: 2025-05-21

## 2025-05-27 RX ORDER — PLECANATIDE 3 MG/1
3 TABLET ORAL DAILY
COMMUNITY

## 2025-05-27 RX ORDER — 0.9 % SODIUM CHLORIDE 0.9 %
80 INTRAVENOUS SOLUTION INTRAVENOUS ONCE
Status: COMPLETED | OUTPATIENT
Start: 2025-05-27 | End: 2025-05-27

## 2025-05-27 RX ORDER — SODIUM CHLORIDE 0.9 % (FLUSH) 0.9 %
10 SYRINGE (ML) INJECTION PRN
Status: DISCONTINUED | OUTPATIENT
Start: 2025-05-27 | End: 2025-05-27 | Stop reason: HOSPADM

## 2025-05-27 RX ADMIN — SODIUM CHLORIDE 80 ML: 9 INJECTION, SOLUTION INTRAVENOUS at 11:46

## 2025-05-27 RX ADMIN — KETOROLAC TROMETHAMINE 30 MG: 30 INJECTION, SOLUTION INTRAMUSCULAR at 13:48

## 2025-05-27 RX ADMIN — SODIUM CHLORIDE, PRESERVATIVE FREE 10 ML: 5 INJECTION INTRAVENOUS at 11:46

## 2025-05-27 RX ADMIN — IOPAMIDOL 75 ML: 755 INJECTION, SOLUTION INTRAVENOUS at 11:45

## 2025-05-27 ASSESSMENT — HEART SCORE: ECG: NORMAL

## 2025-05-27 ASSESSMENT — PAIN - FUNCTIONAL ASSESSMENT: PAIN_FUNCTIONAL_ASSESSMENT: NONE - DENIES PAIN

## 2025-05-27 NOTE — ED TRIAGE NOTES
Mode of arrival (squad #, walk in, police, etc) : walk in        Chief complaint(s): chest tightness        Arrival Note (brief scenario, treatment PTA, etc).: pt states she was having some chest tightness during breakfast this am, started around 0700. Denies pain or pressure

## 2025-05-27 NOTE — ED PROVIDER NOTES
EMERGENCY DEPARTMENT ENCOUNTER    Pt Name: Alicia Reid  MRN: [id number]  Birthdate 1940  Date of evaluation: 5/27/25  CHIEF COMPLAINT       Chief Complaint   Patient presents with    Chest Pain     Chest tightness--Started this am at 0700 while eating breakfast     HISTORY OF PRESENT ILLNESS   The history is provided by the patient and medical records.    The is an 84-year-old female with history of hypothyroidism, hyperlipidemia, anxiety, GERD and neuropathy who presents to the ED for chest pain.  Chest pain started this morning while eating breakfast.  Pain is located left chest wall.  Pain described as non-exertional, non-positional, that is worse with deep inspiration.    REVIEW OF SYSTEMS     Review of Systems  All other systems reviewed and are negative.    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

## 2025-05-28 LAB
EKG ATRIAL RATE: 54 BPM
EKG P AXIS: 56 DEGREES
EKG P-R INTERVAL: 170 MS
EKG Q-T INTERVAL: 424 MS
EKG QRS DURATION: 84 MS
EKG QTC CALCULATION (BAZETT): 402 MS
EKG R AXIS: -22 DEGREES
EKG T AXIS: 29 DEGREES
EKG VENTRICULAR RATE: 54 BPM

## 2025-05-29 ENCOUNTER — TELEPHONE (OUTPATIENT)
Dept: FAMILY MEDICINE CLINIC | Age: 85
End: 2025-05-29

## 2025-05-29 NOTE — TELEPHONE ENCOUNTER
----- Message from [employer] NINI sent at 5/27/2025  3:50 PM EDT -----  Regarding: ECC Appointment Request  ECC Appointment Request    Patient needs appointment for ECC Appointment Type: ED Follow-Up.    Patient Requested Dates(s):May 30, 2025  Patient Requested Time:11;00AM  Provider Name:Arianne Perez MD    Reason for Appointment Request: Established Patient - Available appointments did not meet patient need  --------------------------------------------------------------------------------------------------------------------------    Relationship to Patient: Self     Call Back Information: OK to leave message on voicemail  Preferred Call Back Number: Phone [phone]

## 2025-06-03 ENCOUNTER — TELEPHONE (OUTPATIENT)
Dept: FAMILY MEDICINE CLINIC | Age: 85
End: 2025-06-03

## 2025-06-03 RX ORDER — MECLIZINE HYDROCHLORIDE 25 MG/1
25 TABLET ORAL 3 TIMES DAILY PRN
Qty: 15 TABLET | Refills: 2 | Status: SHIPPED | OUTPATIENT
Start: 2025-06-03 | End: 2025-06-13

## 2025-06-03 NOTE — TELEPHONE ENCOUNTER
Patient had to reschedule but is requesting medication for dizziness. Please advise.         [facility] [id number] - [place], [place] - [address] - [place] [phone] - F [phone]

## 2025-06-04 ENCOUNTER — TELEPHONE (OUTPATIENT)
Dept: FAMILY MEDICINE CLINIC | Age: 85
End: 2025-06-04

## 2025-06-04 DIAGNOSIS — H81.10 BENIGN PAROXYSMAL POSITIONAL VERTIGO, UNSPECIFIED LATERALITY: Primary | ICD-10-CM

## 2025-06-04 NOTE — TELEPHONE ENCOUNTER
Patient called in regards to physical  therapy referral. For epley maneuver  for vertigo. Will call office back with PT information. Please advise.

## 2025-06-04 NOTE — TELEPHONE ENCOUNTER
Patient called back she states she will go to [facility] Out[facility] re[facility] and therapy. Her appointment is this Friday. Notify patient once complete please.

## 2025-06-04 NOTE — TELEPHONE ENCOUNTER
Patient called back because she wants a general referral as she is waiting for a call back from both places she would like to go to. She is requesting for a referral to go to [facility] out patient and [facility]. The fax for [facility] is [phone]

## 2025-06-06 ENCOUNTER — HOSPITAL ENCOUNTER (OUTPATIENT)
Dept: PHYSICAL THERAPY | Facility: CLINIC | Age: 85
Setting detail: THERAPIES SERIES
Discharge: HOME | End: 2025-06-06
Payer: MEDICARE

## 2025-06-06 PROCEDURE — 95992 CANALITH REPOSITIONING PROC: CPT

## 2025-06-06 PROCEDURE — 97161 PT EVAL LOW COMPLEX 20 MIN: CPT

## 2025-06-06 NOTE — CONSULTS
[facility]  Outpatient Rehabilitation &  Therapy  [address]  [phone]  F: [phone] [x] [facility]  Outpatient Rehabilitation &  Therapy  [address]   [address]  P: [phone]  F: [phone] [] [facility]  Outpatient Rehabilitation &  Therapy  [address]  P: [phone]  F: [phone] [] [facility]  Outpatient Rehabilitation &  Therapy  [address]  P: [phone]  F: [phone] [] [facility] [place]  Outpatient Rehabilitation &  Therapy  [address]   P: [phone]  F: [phone]       Physical Therapy Vestibular Rehab Evaluation    Date:  2025  Patient: Alicia Reid  : 1940  MRN: [id number]  Physician: Arianne Perez MD    [facility]  Medical Diagnosis: H81.10 (ICD-10-CM) - Benign paroxysmal positional vertigo, unspecified laterality   Rehab Codes: R42  Date of symptom onset: 25  Next 's appt.: 25    Subjective:   Chief Complaint: Vertigo  Pt is a 84 year old female arriving with BPPV. Pt has had vertigo for about 20 years now. Pt states about every 2-4 years she will have to go to physical therapy for a few sessions to do the Epley maneuver. She states she always gets a lot of relief from this. Pt states her dizziness started again after taking a muscle relaxer. She describes vertigo as a spinning sensation with positional changes. This lasts for a couple of minutes and then resolves. Pt was recently diagnosed with chronic fatigue syndrome in December. Due to this some days she has difficulty doing anything. Pt is currently taking Meclizine as needed.  Patient Goals: Decrease dizziness   Pertinent medical history: Chronic fatigue syndrome, Meniere's disease   Previous therapy?   []No       [x]Yes, Has had vestibular therapy multiple times now    []In hospital:                      []In rehab:

## 2025-06-17 ENCOUNTER — OFFICE VISIT (OUTPATIENT)
Dept: FAMILY MEDICINE CLINIC | Age: 85
End: 2025-06-17

## 2025-06-17 VITALS
SYSTOLIC BLOOD PRESSURE: 135 MMHG | HEART RATE: 58 BPM | TEMPERATURE: 97 F | DIASTOLIC BLOOD PRESSURE: 69 MMHG | WEIGHT: 208 LBS | BODY MASS INDEX: 34.66 KG/M2 | HEIGHT: 65 IN | OXYGEN SATURATION: 97 %

## 2025-06-17 DIAGNOSIS — Z00.00 MEDICARE ANNUAL WELLNESS VISIT, SUBSEQUENT: Primary | ICD-10-CM

## 2025-06-17 DIAGNOSIS — G93.32 CHRONIC FATIGUE SYNDROME: ICD-10-CM

## 2025-06-17 ASSESSMENT — PATIENT HEALTH QUESTIONNAIRE - PHQ9
1. LITTLE INTEREST OR PLEASURE IN DOING THINGS: NOT AT ALL
SUM OF ALL RESPONSES TO PHQ QUESTIONS 1-9: 0
2. FEELING DOWN, DEPRESSED OR HOPELESS: NOT AT ALL
SUM OF ALL RESPONSES TO PHQ QUESTIONS 1-9: 0

## 2025-06-17 ASSESSMENT — LIFESTYLE VARIABLES
HOW OFTEN DO YOU HAVE A DRINK CONTAINING ALCOHOL: NEVER
HOW MANY STANDARD DRINKS CONTAINING ALCOHOL DO YOU HAVE ON A TYPICAL DAY: PATIENT DOES NOT DRINK

## 2025-06-17 NOTE — PROGRESS NOTES
tablet Take 1 tablet by mouth nightly Yes Harmony Medina MD   clobetasol (TEMOVATE) 0.05 % ointment Apply once daily x 2 weeks then decrease to twice weekly Yes Alexandra Miller APRN - CNP   gabapentin (NEURONTIN) 100 MG capsule Take 1 capsule by mouth 2 times daily for 90 days.  Patient taking differently: Take 2 capsules by mouth at bedtime. Yes Julia Dodge MD   albuterol sulfate HFA (PROVENTIL HFA) 108 (90 Base) MCG/ACT inhaler Inhale 2 puffs into the lungs every 6 hours as needed for Wheezing Yes Arianne Perez MD   diclofenac sodium (VOLTAREN) 1 % GEL Apply 4 g topically 4 times daily Yes Ginger Bob PA-C   midodrine (PROAMATINE) 5 MG tablet TAKE 1 TABLET BY MOUTH 2 TIMES A DAY Yes Augustina Wells APRN - CNP   ondansetron (ZOFRAN-ODT) 4 MG disintegrating tablet Take 1 tablet by mouth every 8 hours as needed for Nausea Yes Harmony Medina MD   Evolocumab (REPATHA SURECLICK) 140 MG/ML SOAJ INJECT ONE PEN UNDER THE SKIN EVERY FOUR TO SIX WEEKS AS DIRECTED Yes Karin Leon MD   dilTIAZem (CARDIZEM CD) 120 MG extended release capsule TAKE ONE CAPSULE BY MOUTH EVERY EVENING Yes Karin Leon MD   ezetimibe (ZETIA) 10 MG tablet TAKE 1 TABLET BY MOUTH DAILY Yes Karin Leon MD   metFORMIN (GLUCOPHAGE) 500 MG tablet Take 1 tablet by mouth daily Yes Arianne Perez MD   levothyroxine (SYNTHROID) 88 MCG tablet  Yes Harmony Medina MD   metoprolol tartrate (LOPRESSOR) 25 MG tablet TAKE 1/2 TABLET BY MOUTH TWICE A DAY Yes Karin Leon MD   acetaminophen (TYLENOL) 500 MG tablet Take 2 tablets by mouth every 6 hours as needed for Pain Yes Harmony Medina MD   Magnesium 500 MG CAPS Take 500 mg by mouth at bedtime Yes Harmony Medina MD   NONFORMULARY Indications: Dietary fiber 1 tsp a day  Yes Harmony Medina MD   Cholecalciferol (VITAMIN D3) 2000 units CAPS Take 1 capsule by mouth 2 times daily Yes Radha Levine MD   esomeprazole Magnesium

## 2025-06-17 NOTE — PATIENT INSTRUCTIONS
day.   It is normal and important to have some cholesterol in your bloodstream. But too much cholesterol can cause plaque to build up within your arteries, which can eventually lead to a heart attack or stroke.   The two types of cholesterol that are most commonly referred to are:   Low-density lipoprotein (LDL) cholesterol  Also known as bad cholesterol, this is the cholesterol that tends to build up along your arteries. Bad cholesterol levels are increased by eating fats that are saturated or hydrogenated. Optimal level of this cholesterol is less than 100. Over 130 starts to get risky for heart disease.   High-density lipoprotein (HDL) cholesterol  Also known as good cholesterol, this type of cholesterol actually carries cholesterol away from your arteries and may, therefore, help lower your risk of having a heart attack. You want this level to be high (ideally greater than 60). It is a risk to have a level less than 40. You can raise this good cholesterol by eating olive oil, canola oil, avocados, or nuts. Exercise raises this level, too.   Fat    Fat is calorie dense and packs a lot of calories into a small amount of food. Even though fats should be limited due to their high calorie content, not all fats are bad. In fact, some fats are quite healthful. Fat can be broken down into four main types.   The good-for-you fats are:   Monounsaturated fat  found in oils such as olive and canola, avocados, and nuts and natural nut butters; can decrease cholesterol levels, while keeping levels of HDL cholesterol high   Polyunsaturated fat  found in oils such as safflower, sunflower, soybean, corn, and sesame; can decrease total cholesterol and LDL cholesterol   Omega-3 fatty acids  particularly those found in fatty fish (such as salmon, trout, tuna, mackerel, herring, and sardines); can decrease risk of arrhythmias, decrease triglyceride levels, and slightly lower blood pressure   The fats that you want to limit are:

## 2025-06-18 ENCOUNTER — HOSPITAL ENCOUNTER (OUTPATIENT)
Dept: PHYSICAL THERAPY | Facility: CLINIC | Age: 85
Setting detail: THERAPIES SERIES
Discharge: HOME | End: 2025-06-18
Payer: MEDICARE

## 2025-06-18 PROCEDURE — 95992 CANALITH REPOSITIONING PROC: CPT

## 2025-06-18 NOTE — FLOWSHEET NOTE
[] [facility]  Outpatient Rehabilitation &  Therapy  [address]  [phone]  [phone] [x] [facility]  Outpatient Rehabilitation &  Therapy  [address]  P: [phone]  F: [phone] [] [facility]  Outpatient Rehabilitation &  Therapy  [address]  P: [phone]  F: [phone] [] [facility]  Outpatient Rehabilitation &  Therapy  [address]  [phone]  [phone] [] [facility]  Outpatient Rehabilitation &  Therapy  [address]   P: [phone]  F: [phone]  [] [facility]  Outpatient Rehabilitation &  Therapy  [address]  P: [phone]  F: [phone] [] [facility]  Outpatient Rehabilitation &  Therapy  [address]  P: [phone]  F: [phone] [] [facility]  Outpatient Rehabilitation &  Therapy  [address]  P: [phone]  F: [phone] [] [facility]  Outpatient Rehabilitation &  Therapy  [address]  P: [phone]  F: [phone]  [] [facility] Outpatient Rehabilitation &  Therapy  [address]  P: [phone]  F: [phone]     Physical Therapy Daily Treatment Note    Date:  2025  Patient Name:  Alicia Reid    :  1940  MRN: [id number]  Physician: Arianne Perez MD                               Insurance: MEDICARE/[facility] SUPPLEMENT (BMN)  Medical Diagnosis: H81.10 (ICD-10-CM) - Benign paroxysmal positional vertigo, unspecified laterality              Rehab Codes: R42  Date of symptom onset: 25                   Next 's appt.: 25  Visit# / total visits: ; Progress note for Medicare patient due at visit 8     Cancels/No Shows: 0/0    Subjective:    Pain:  [] Yes  [x] No Location:  N/A Pain Rating: (0-10 scale) -/10  Pain altered

## 2025-06-23 ENCOUNTER — HOSPITAL ENCOUNTER (OUTPATIENT)
Dept: PHYSICAL THERAPY | Facility: CLINIC | Age: 85
Setting detail: THERAPIES SERIES
Discharge: HOME | End: 2025-06-23
Payer: MEDICARE

## 2025-06-23 PROCEDURE — 97530 THERAPEUTIC ACTIVITIES: CPT

## 2025-06-23 PROCEDURE — 95992 CANALITH REPOSITIONING PROC: CPT

## 2025-06-23 NOTE — FLOWSHEET NOTE
Epley at home, however, pt did not feel comfortable   Method of Education: [x] Verbal  [x] Demo  [] Written  Comprehension of Education:  [x] Verbalizes understanding.  [x] Demonstrates understanding.  [x] Needs Review.  [] Demonstrates/verbalizes understanding of HEP/Ed previously given.    Plan: [x] Continue current frequency toward long and short term goals.    [x] Specific Instructions for subsequent treatments: See above       Time In:3:03 pm            Time Out: 3:41 pm    Electronically signed by:  Arline Hart PT

## 2025-06-27 ENCOUNTER — HOSPITAL ENCOUNTER (OUTPATIENT)
Dept: PHYSICAL THERAPY | Facility: CLINIC | Age: 85
Setting detail: THERAPIES SERIES
Discharge: HOME | End: 2025-06-27
Payer: MEDICARE

## 2025-06-27 PROCEDURE — 97530 THERAPEUTIC ACTIVITIES: CPT | Performed by: PHYSICAL THERAPIST

## 2025-06-27 PROCEDURE — 95992 CANALITH REPOSITIONING PROC: CPT | Performed by: PHYSICAL THERAPIST

## 2025-06-27 NOTE — FLOWSHEET NOTE
between each trial to resolve symptoms. No complaints of nausea during treatment session. Educated patient on doing Epley at home; however, she states she is fearful of exacerbation and feels more comfortable completing in clinic. Did provide patient education handouts on BPPV to better explain the physiology of symptoms.    [] No change.     [] Other:  [x] Patient would continue to benefit from skilled physical therapy services in order to: demonstrate negative positional maneuvers, decrease dizziness, and improve overall functional mobility, progress independence and safety, and maximize level of function as close to PLOF as possible.       Problems:                                                                                     [x]  1.  Vertigo  []  2.  Difficulty walking a straightcourse                                     [x]  3.  Positive Hanson Hallpike                                               []  4.  Static balance deficit: mCTSIB  []  5.  Dynamic balance deficit: German Balance Scale (BBS), Dynamic Gait Index (DGI), Functional Gait Assessment (FGA)  [x]  6.  Increased Dizziness Handicap Inventory (DHI)   []  7.  Increased Post Concussion Symptom Survey (PCSS)  [x]  8.  Decreased Activities Balance Confidence Scale (ABC)          Assessed:   Short Term Goals: Meet in 8 treatments Met Progressing No change Regressing   Dizziness: Patient will rate dizziness 2/10 to demonstrate improved quality of life.   []  []  []  []    BPPV: Patient will present with a negative Hanson Zuleta pike.  []  []  []  []    Functional Outcome Measure: Patient will improve DHI by 4 points to demonstrate improved quality of life.  []  []  []  []    Functional Outcome Measure: Patient will improve ABC by 11% to demonstrate an improvement in upright confidence to decrease the risk of falls.  []  []  []  []    Pt will demonstrate cervical ROM WNL to improve overall quality of life and function.  []  []  []  []    Home Exercise Program:

## 2025-07-02 ENCOUNTER — TELEPHONE (OUTPATIENT)
Dept: NEUROLOGY | Age: 85
End: 2025-07-02

## 2025-07-02 ENCOUNTER — HOSPITAL ENCOUNTER (OUTPATIENT)
Dept: PHYSICAL THERAPY | Facility: CLINIC | Age: 85
Setting detail: THERAPIES SERIES
Discharge: HOME | End: 2025-07-02
Payer: MEDICARE

## 2025-07-02 PROCEDURE — 97530 THERAPEUTIC ACTIVITIES: CPT

## 2025-07-02 PROCEDURE — 95992 CANALITH REPOSITIONING PROC: CPT

## 2025-07-02 NOTE — TELEPHONE ENCOUNTER
Message to nurse . Please increase neurontin to have her go up on neurontin 100 mg to two po bid for one week then 300 mg po bid . Martina send script neurontin 300 mg # 60 take 1 po bid 2 RF

## 2025-07-02 NOTE — FLOWSHEET NOTE
[] [facility]  Outpatient Rehabilitation &  Therapy  [address]  [phone]  [phone] [x] [facility]  Outpatient Rehabilitation &  Therapy  [address]  P: [phone]  F: [phone] [] [facility]  Outpatient Rehabilitation &  Therapy  [address]  P: [phone]  F: [phone] [] [facility]  Outpatient Rehabilitation &  Therapy  [address]  [phone]  [phone] [] [facility]  Outpatient Rehabilitation &  Therapy  [address]   P: [phone]  F: [phone]  [] [facility]  Outpatient Rehabilitation &  Therapy  [address]  P: [phone]  F: [phone] [] [facility]  Outpatient Rehabilitation &  Therapy  [address]  P: [phone]  F: [phone] [] [facility]  Outpatient Rehabilitation &  Therapy  [address]  P: [phone]  F: [phone] [] [facility]  Outpatient Rehabilitation &  Therapy  [address]  P: [phone]  F: [phone]  [] [facility] Outpatient Rehabilitation &  Therapy  [address]  P: [phone]  F: [phone]     Physical Therapy Daily Treatment Note    Date:  2025  Patient Name:  Alicia Reid    :  1940  MRN: [id number]  Physician: Arianne Perez MD                               Insurance: MEDICARE/[employer]ARP [facility] SUPPLEMENT (BMN)  Medical Diagnosis: H81.10 (ICD-10-CM) - Benign paroxysmal positional vertigo, unspecified laterality              Rehab Codes: R42  Date of symptom onset: 25                   Next 's appt.: 25  Visit# / total visits: ; Progress note for Medicare patient due at visit 8     Cancels/No Shows: 0/0    Subjective:    Pain:  [] Yes  [x] No Location:  N/A Pain Rating: (0-10 scale) -/10  Pain altered Tx:

## 2025-07-02 NOTE — TELEPHONE ENCOUNTER
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 She said that her new PCP also has given her Elavil 10 mg HS. She said that it is not really working and she tried calling their office and he is out of office until August.    She is wondering if there is something that we could do to help her pain that she has been having? She said that she really doesn't want any further medication, she was wondering about shots or something?    Can you please advise as Dr Dodge is out of office, thanks.

## 2025-07-03 NOTE — TELEPHONE ENCOUNTER
Call placed to the patient and this information was given.  Patient stated that she is worried that the increased dose will make her \"loopy\".  She will think about this over the holiday weekend and call the office next week with what she decided to do.

## 2025-07-07 ENCOUNTER — HOSPITAL ENCOUNTER (OUTPATIENT)
Dept: PHYSICAL THERAPY | Facility: CLINIC | Age: 85
Setting detail: THERAPIES SERIES
Discharge: HOME | End: 2025-07-07
Payer: MEDICARE

## 2025-07-07 PROCEDURE — 95992 CANALITH REPOSITIONING PROC: CPT

## 2025-07-07 PROCEDURE — 97530 THERAPEUTIC ACTIVITIES: CPT

## 2025-07-07 NOTE — FLOWSHEET NOTE
[] [facility]  Outpatient Rehabilitation &  Therapy  [address]  [phone]  [phone] [x] [facility]  Outpatient Rehabilitation &  Therapy  [address]  P: [phone]  F: [phone] [] [facility]  Outpatient Rehabilitation &  Therapy  [address]  P: [phone]  F: [phone] [] [facility]  Outpatient Rehabilitation &  Therapy  [address]  [phone]  [phone] [] [facility]  Outpatient Rehabilitation &  Therapy  [address]   P: [phone]  F: [phone]  [] [facility]  Outpatient Rehabilitation &  Therapy  [address]  P: [phone]  F: [phone] [] [facility]  Outpatient Rehabilitation &  Therapy  [address]  P: [phone]  F: [phone] [] [facility]  Outpatient Rehabilitation &  Therapy  [address]  P: [phone]  F: [phone] [] [facility]  Outpatient Rehabilitation &  Therapy  [address]  P: [phone]  F: [phone]  [] [facility] Outpatient Rehabilitation &  Therapy  [address]  P: [phone]  F: [phone]     Physical Therapy Daily Treatment Note    Date:  2025  Patient Name:  Alicia Reid    :  1940  MRN: [id number]  Physician: Arianne Perez MD                               Insurance: MEDICARE/[facility]ARP [facility] SUPPLEMENT (BMN)  Medical Diagnosis: H81.10 (ICD-10-CM) - Benign paroxysmal positional vertigo, unspecified laterality              Rehab Codes: R42  Date of symptom onset: 25                   Next 's appt.: 25  Visit# / total visits: ; Progress note for Medicare patient due at visit 8     Cancels/No Shows: 0/0    Subjective:    Pain:  [] Yes  [x] No Location:  N/A Pain Rating: (0-10 scale) -/10  Pain altered Tx:

## 2025-07-09 ENCOUNTER — HOSPITAL ENCOUNTER (OUTPATIENT)
Dept: PHYSICAL THERAPY | Facility: CLINIC | Age: 85
Setting detail: THERAPIES SERIES
Discharge: HOME | End: 2025-07-09
Payer: MEDICARE

## 2025-07-09 RX ORDER — GABAPENTIN 100 MG/1
200 CAPSULE ORAL NIGHTLY
Qty: 60 CAPSULE | Refills: 0 | Status: SHIPPED | OUTPATIENT
Start: 2025-07-09 | End: 2025-08-08

## 2025-07-09 NOTE — TELEPHONE ENCOUNTER
Please review for Dr. Dodge    See previous telephone encounter-    \"Patient called informs she does not want to take the 300 mg of Gabapentin until she talks to Dr. Dodge at upcoming office visit. She did request prescription be sent in for 100 mg. Information was given to nurse to refill 100 mg gabapentin.\"     Patient calling for refill of Gabapentin 100mg.      Medication active on med list yes      Date of last fill: 4/8/25 #180 R-0  verified on 7/9/2025   verified by VS LPN      Date of last appointment 2/4/25    Next Visit Date:  7/21/2025    Writer unable to review OARRS.

## 2025-07-12 ENCOUNTER — PATIENT MESSAGE (OUTPATIENT)
Dept: FAMILY MEDICINE CLINIC | Age: 85
End: 2025-07-12

## 2025-07-14 RX ORDER — ALCLOMETASONE DIPROPIONATE 0.5 MG/G
CREAM TOPICAL 2 TIMES DAILY
Qty: 45 G | Refills: 0 | Status: SHIPPED | OUTPATIENT
Start: 2025-07-14

## 2025-07-16 ENCOUNTER — HOSPITAL ENCOUNTER (OUTPATIENT)
Dept: PHYSICAL THERAPY | Facility: CLINIC | Age: 85
Setting detail: THERAPIES SERIES
Discharge: HOME | End: 2025-07-16
Payer: MEDICARE

## 2025-07-16 PROCEDURE — 95992 CANALITH REPOSITIONING PROC: CPT

## 2025-07-16 NOTE — FLOWSHEET NOTE
to PLOF as possible.       Problems:                                                                                     [x]  1.  Vertigo  []  2.  Difficulty walking a straightcourse                                     [x]  3.  Positive [place] Hallpike                                               []  4.  Static balance deficit: mCTSIB  []  5.  Dynamic balance deficit: German Balance Scale (BBS), Dynamic Gait Index (DGI), Functional Gait Assessment (FGA)  [x]  6.  Increased Dizziness Handicap Inventory (DHI)   []  7.  Increased Post Concussion Symptom Survey (PCSS)  [x]  8.  Decreased Activities Balance Confidence Scale (ABC)          Assessed:   Short Term Goals: Meet in 8 treatments Met Progressing No change Regressing   Dizziness: Patient will rate dizziness 2/10 to demonstrate improved quality of life.   [x]  []  []  []    BPPV: Patient will present with a negative [place] pike.  [x]  []  []  []    Functional Outcome Measure: Patient will improve DHI by 4 points to demonstrate improved quality of life.  []  []  []  []    Functional Outcome Measure: Patient will improve ABC by 11% to demonstrate an improvement in upright confidence to decrease the risk of falls.  []  []  []  []    Pt will demonstrate cervical ROM WNL to improve overall quality of life and function.  [x]  []  []  []    Home Exercise Program: Patient to be independent with home exercise program as demonstrated by performance with correct form without cues. [x]  []  []  []    Demonstrates knowledge of fall prevention [x]         Pt. Education:  [x] Yes  [] No  [] Reviewed Prior HEP/Ed  Method of Education: [x] Verbal  [] Demo  [x] Written    Access Code: [email or web address]  URL: [email or web address]  Date: 06/27/2025  Prepared by: Augustina Luna    Patient Education  - BPPV  - What Is BPPV?  - BPPV    Comprehension of Education:  [x] Verbalizes understanding.  [] Demonstrates understanding.  [] Needs Review.  [] Demonstrates/verbalizes understanding of

## 2025-07-18 ENCOUNTER — HOSPITAL ENCOUNTER (OUTPATIENT)
Dept: PHYSICAL THERAPY | Facility: CLINIC | Age: 85
Setting detail: THERAPIES SERIES
Discharge: HOME | End: 2025-07-18
Payer: MEDICARE

## 2025-07-18 NOTE — FLOWSHEET NOTE
[] [facility]  Outpatient Rehabilitation &  Therapy  [address]  [phone]  [phone] [x] [facility]  Outpatient Rehabilitation &  Therapy  [address]  P: [phone]  F: [phone] [] [facility]  Outpatient Rehabilitation &  Therapy  [address]  P: [phone]  F: [phone] [] [facility]  Outpatient Rehabilitation &  Therapy  [address]  [phone]  [phone] [] [facility]  Outpatient Rehabilitation &  Therapy  [address]   P: [phone]  F: [phone]  [] [facility]  Outpatient Rehabilitation &  Therapy  [address]  P: [phone]  F: [phone] [] [facility]  Outpatient Rehabilitation &  Therapy  [address]  P: [phone]  F: [phone] [] [facility]  Outpatient Rehabilitation &  Therapy  [address]  P: [phone]  F: [phone] [] [facility]  Outpatient Rehabilitation &  Therapy  [address]  P: [phone]  F: [phone]  [] [facility] Outpatient Rehabilitation &  Therapy  [address]  P: [phone]  F: [phone] [] [facility] Pel[facility] Outpatient Rehabilitation &  Therapy  [address]  P: [phone]   F: [phone]      Therapy Cancel/No Show note    Date: 2025  Patient: Alicia Reid  : 1940  MRN: [id number]    Cancels/No Shows to date: 0    For today's appointment patient:    [x]  Cancelled    [] Rescheduled appointment    [] No-show     Reason given by patient:    []  Patient ill    []  Conflicting appointment    [] No transportation      [] Conflict with work    [] No reason given    [] Weather related    [] COVID-19    [] Other:      Comments:  Pt feels completely better, she wishes to be put on

## 2025-07-21 ENCOUNTER — OFFICE VISIT (OUTPATIENT)
Dept: NEUROLOGY | Age: 85
End: 2025-07-21
Payer: MEDICARE

## 2025-07-21 VITALS
WEIGHT: 207 LBS | BODY MASS INDEX: 34.49 KG/M2 | HEART RATE: 69 BPM | SYSTOLIC BLOOD PRESSURE: 114 MMHG | HEIGHT: 65 IN | DIASTOLIC BLOOD PRESSURE: 78 MMHG

## 2025-07-21 DIAGNOSIS — G57.12 NEUROPATHY OF LEFT LATERAL FEMORAL CUTANEOUS NERVE: ICD-10-CM

## 2025-07-21 DIAGNOSIS — R20.2 PARESTHESIAS: Primary | ICD-10-CM

## 2025-07-21 PROCEDURE — 1090F PRES/ABSN URINE INCON ASSESS: CPT | Performed by: PSYCHIATRY & NEUROLOGY

## 2025-07-21 PROCEDURE — 99214 OFFICE O/P EST MOD 30 MIN: CPT | Performed by: PSYCHIATRY & NEUROLOGY

## 2025-07-21 PROCEDURE — G8399 PT W/DXA RESULTS DOCUMENT: HCPCS | Performed by: PSYCHIATRY & NEUROLOGY

## 2025-07-21 PROCEDURE — 1159F MED LIST DOCD IN RCRD: CPT | Performed by: PSYCHIATRY & NEUROLOGY

## 2025-07-21 PROCEDURE — 1036F TOBACCO NON-USER: CPT | Performed by: PSYCHIATRY & NEUROLOGY

## 2025-07-21 PROCEDURE — G8427 DOCREV CUR MEDS BY ELIG CLIN: HCPCS | Performed by: PSYCHIATRY & NEUROLOGY

## 2025-07-21 PROCEDURE — 1123F ACP DISCUSS/DSCN MKR DOCD: CPT | Performed by: PSYCHIATRY & NEUROLOGY

## 2025-07-21 PROCEDURE — G8417 CALC BMI ABV UP PARAM F/U: HCPCS | Performed by: PSYCHIATRY & NEUROLOGY

## 2025-07-21 RX ORDER — MECLIZINE HYDROCHLORIDE 25 MG/1
TABLET ORAL
COMMUNITY
Start: 2025-06-23

## 2025-07-21 RX ORDER — FLUTICASONE PROPIONATE 50 MCG
1 SPRAY, SUSPENSION (ML) NASAL DAILY PRN
COMMUNITY

## 2025-07-21 RX ORDER — HYDROXYZINE HYDROCHLORIDE 10 MG/5ML
4 SYRUP ORAL NIGHTLY
COMMUNITY

## 2025-07-21 RX ORDER — VENLAFAXINE HYDROCHLORIDE 37.5 MG/1
CAPSULE, EXTENDED RELEASE ORAL
COMMUNITY
Start: 2025-02-04

## 2025-07-21 RX ORDER — LIDOCAINE 50 MG/G
OINTMENT TOPICAL
Qty: 1 EACH | Refills: 2 | Status: SHIPPED | OUTPATIENT
Start: 2025-07-21

## 2025-07-21 RX ORDER — ESOMEPRAZOLE MAGNESIUM 40 MG/1
CAPSULE, DELAYED RELEASE ORAL
COMMUNITY
Start: 2025-06-03

## 2025-07-21 NOTE — PROGRESS NOTES
[facility] [place]  [address]  [place] [address]  Ph: [phone] or [phone]  FAX: [phone]      Reason for consult: Tremors and neuralgia  I had the pleasure of seeing your patient in neurology consultation for her symptoms. As you would recall, Alicia Reid is an 84-year-old female who presented with a long-standing history of tremor, progressive left-sided meralgia paresthetica, chronic fatigue syndrome, and lumbar radiculopathy. Her neurological symptoms have evolved over more than a decade, necessitating longitudinal assessment across multiple specialties.  Her tremor began in 2010 and was first evaluated by Dr. Lezama in 2017. At that time, the semiology was primarily exertional and postural, and she was given a clinical diagnosis of essential tremor, supported by a positive family history. Notably, her brother carried a diagnosis of Parkinson's disease and underwent deep brain stimulation. Alicia trialed both propranolol and primidone without meaningful benefit. By 2019, the tremor began to develop a subtle rest component, raising diagnostic uncertainty. A DaTscan obtained in September 2019 was normal, helping to rule out presynaptic dopaminergic degeneration. Given continued symptoms and concern for parkinsonism, she was trialed on carbidopa-levodopa, which yielded minimal symptomatic relief and was subsequently discontinued. A repeat DaTscan in December 2024 again showed no dopaminergic deficit, reinforcing the prior diagnosis of essential tremor.  She continued to endorse significant functional limitations, particularly with fine motor activities such as handwriting. Additionally, she described morning jerking movements of the upper extremities while supine, suggesting a possible functional overlay. Effexor, prescribed for fatigue, was recently discontinued after it was noted to exacerbate her tremors. Currently, her tremors remain manageable

## 2025-07-23 ENCOUNTER — APPOINTMENT (OUTPATIENT)
Dept: PHYSICAL THERAPY | Facility: CLINIC | Age: 85
End: 2025-07-23
Payer: MEDICARE

## 2025-07-25 ENCOUNTER — APPOINTMENT (OUTPATIENT)
Dept: PHYSICAL THERAPY | Facility: CLINIC | Age: 85
End: 2025-07-25
Payer: MEDICARE

## 2025-08-05 ENCOUNTER — OFFICE VISIT (OUTPATIENT)
Dept: ORTHOPEDIC SURGERY | Age: 85
End: 2025-08-05
Payer: MEDICARE

## 2025-08-05 VITALS — BODY MASS INDEX: 34.49 KG/M2 | WEIGHT: 207 LBS | HEIGHT: 65 IN

## 2025-08-05 DIAGNOSIS — M70.62 GREATER TROCHANTERIC BURSITIS OF LEFT HIP: Primary | ICD-10-CM

## 2025-08-05 DIAGNOSIS — M25.552 LATERAL PAIN OF LEFT HIP: ICD-10-CM

## 2025-08-05 PROCEDURE — G8427 DOCREV CUR MEDS BY ELIG CLIN: HCPCS | Performed by: PHYSICIAN ASSISTANT

## 2025-08-05 PROCEDURE — 1159F MED LIST DOCD IN RCRD: CPT | Performed by: PHYSICIAN ASSISTANT

## 2025-08-05 PROCEDURE — G8399 PT W/DXA RESULTS DOCUMENT: HCPCS | Performed by: PHYSICIAN ASSISTANT

## 2025-08-05 PROCEDURE — 99214 OFFICE O/P EST MOD 30 MIN: CPT | Performed by: PHYSICIAN ASSISTANT

## 2025-08-05 PROCEDURE — 1125F AMNT PAIN NOTED PAIN PRSNT: CPT | Performed by: PHYSICIAN ASSISTANT

## 2025-08-05 PROCEDURE — G8417 CALC BMI ABV UP PARAM F/U: HCPCS | Performed by: PHYSICIAN ASSISTANT

## 2025-08-05 PROCEDURE — 20610 DRAIN/INJ JOINT/BURSA W/O US: CPT | Performed by: PHYSICIAN ASSISTANT

## 2025-08-05 PROCEDURE — 1036F TOBACCO NON-USER: CPT | Performed by: PHYSICIAN ASSISTANT

## 2025-08-05 PROCEDURE — 1123F ACP DISCUSS/DSCN MKR DOCD: CPT | Performed by: PHYSICIAN ASSISTANT

## 2025-08-05 PROCEDURE — 1090F PRES/ABSN URINE INCON ASSESS: CPT | Performed by: PHYSICIAN ASSISTANT

## 2025-08-05 RX ORDER — BUPIVACAINE HYDROCHLORIDE 2.5 MG/ML
2 INJECTION, SOLUTION INFILTRATION; PERINEURAL ONCE
Status: COMPLETED | OUTPATIENT
Start: 2025-08-05 | End: 2025-08-05

## 2025-08-05 RX ORDER — GABAPENTIN 100 MG/1
200 CAPSULE ORAL NIGHTLY
Qty: 60 CAPSULE | Refills: 2 | Status: SHIPPED | OUTPATIENT
Start: 2025-08-05 | End: 2025-11-03

## 2025-08-05 RX ORDER — METHYLPREDNISOLONE ACETATE 80 MG/ML
80 INJECTION, SUSPENSION INTRA-ARTICULAR; INTRALESIONAL; INTRAMUSCULAR; SOFT TISSUE ONCE
Status: COMPLETED | OUTPATIENT
Start: 2025-08-05 | End: 2025-08-05

## 2025-08-05 RX ADMIN — METHYLPREDNISOLONE ACETATE 80 MG: 80 INJECTION, SUSPENSION INTRA-ARTICULAR; INTRALESIONAL; INTRAMUSCULAR; SOFT TISSUE at 14:55

## 2025-08-05 RX ADMIN — BUPIVACAINE HYDROCHLORIDE 5 MG: 2.5 INJECTION, SOLUTION INFILTRATION; PERINEURAL at 14:56

## 2025-08-05 ASSESSMENT — ENCOUNTER SYMPTOMS
SHORTNESS OF BREATH: 0
VOMITING: 0
COLOR CHANGE: 0
COUGH: 0

## 2025-08-13 ENCOUNTER — APPOINTMENT (OUTPATIENT)
Dept: CT IMAGING | Facility: CLINIC | Age: 85
End: 2025-08-13
Payer: MEDICARE

## 2025-08-13 ENCOUNTER — HOSPITAL ENCOUNTER (EMERGENCY)
Facility: CLINIC | Age: 85
Discharge: HOME | End: 2025-08-13
Attending: EMERGENCY MEDICINE
Payer: MEDICARE

## 2025-08-13 VITALS
DIASTOLIC BLOOD PRESSURE: 92 MMHG | HEART RATE: 64 BPM | TEMPERATURE: 97.8 F | WEIGHT: 207 LBS | SYSTOLIC BLOOD PRESSURE: 156 MMHG | HEIGHT: 65 IN | BODY MASS INDEX: 34.49 KG/M2 | RESPIRATION RATE: 15 BRPM | OXYGEN SATURATION: 96 %

## 2025-08-13 DIAGNOSIS — R20.2 PARESTHESIA OF BOTH HANDS: Primary | ICD-10-CM

## 2025-08-13 DIAGNOSIS — M50.30 DEGENERATIVE DISC DISEASE, CERVICAL: ICD-10-CM

## 2025-08-13 LAB
BASOPHILS # BLD: 0.1 K/UL (ref 0–0.2)
BASOPHILS NFR BLD: 1 % (ref 0–2)
BUN BLD-MCNC: 22 MG/DL (ref 8–26)
CA-I BLD-SCNC: 1.3 MMOL/L (ref 1.15–1.33)
CHLORIDE BLD-SCNC: 105 MMOL/L (ref 98–107)
CO2 BLD CALC-SCNC: 26 MMOL/L (ref 22–30)
EGFR, POC: 64 ML/MIN/1.73M2
EKG ATRIAL RATE: 76 BPM
EKG P AXIS: 67 DEGREES
EKG P-R INTERVAL: 168 MS
EKG Q-T INTERVAL: 350 MS
EKG QRS DURATION: 84 MS
EKG QTC CALCULATION (BAZETT): 393 MS
EKG R AXIS: -20 DEGREES
EKG T AXIS: 78 DEGREES
EKG VENTRICULAR RATE: 76 BPM
EOSINOPHIL # BLD: 0.2 K/UL (ref 0–0.4)
EOSINOPHILS RELATIVE PERCENT: 2 % (ref 1–4)
ERYTHROCYTE [DISTWIDTH] IN BLOOD BY AUTOMATED COUNT: 13.2 % (ref 12.5–15.4)
GLUCOSE BLD-MCNC: 104 MG/DL (ref 74–100)
HCT VFR BLD AUTO: 39.1 % (ref 36–46)
HCT VFR BLD AUTO: 40 % (ref 36–46)
HGB BLD-MCNC: 13.1 G/DL (ref 12–16)
LYMPHOCYTES NFR BLD: 2.9 K/UL (ref 1–4.8)
LYMPHOCYTES RELATIVE PERCENT: 35 % (ref 24–44)
MCH RBC QN AUTO: 31.3 PG (ref 26–34)
MCHC RBC AUTO-ENTMCNC: 33.6 G/DL (ref 31–37)
MCV RBC AUTO: 93 FL (ref 80–100)
MONOCYTES NFR BLD: 0.6 K/UL (ref 0.1–1.2)
MONOCYTES NFR BLD: 7 % (ref 2–11)
NEUTROPHILS NFR BLD: 55 % (ref 36–66)
NEUTS SEG NFR BLD: 4.5 K/UL (ref 1.8–7.7)
PLATELET # BLD AUTO: 273 K/UL (ref 140–450)
PMV BLD AUTO: 8 FL (ref 6–12)
POC ANION GAP: 11 MMOL/L (ref 7–16)
POC CREATININE: 0.9 MG/DL (ref 0.51–1.19)
POC HEMOGLOBIN (CALC): 13.5 G/DL (ref 12–16)
POTASSIUM BLD-SCNC: 4 MMOL/L (ref 3.5–4.5)
RBC # BLD AUTO: 4.21 M/UL (ref 4–5.2)
SODIUM BLD-SCNC: 141 MMOL/L (ref 138–146)
TROPONIN I SERPL HS-MCNC: 11 NG/L (ref 0–14)
TROPONIN I SERPL HS-MCNC: 13 NG/L (ref 0–14)
WBC OTHER # BLD: 8.2 K/UL (ref 3.5–11)

## 2025-08-13 PROCEDURE — 2580000003 HC RX 258: Performed by: EMERGENCY MEDICINE

## 2025-08-13 PROCEDURE — 84484 ASSAY OF TROPONIN QUANT: CPT

## 2025-08-13 PROCEDURE — 82565 ASSAY OF CREATININE: CPT

## 2025-08-13 PROCEDURE — 84520 ASSAY OF UREA NITROGEN: CPT

## 2025-08-13 PROCEDURE — 2500000003 HC RX 250 WO HCPCS: Performed by: EMERGENCY MEDICINE

## 2025-08-13 PROCEDURE — 93005 ELECTROCARDIOGRAM TRACING: CPT | Performed by: EMERGENCY MEDICINE

## 2025-08-13 PROCEDURE — 82947 ASSAY GLUCOSE BLOOD QUANT: CPT

## 2025-08-13 PROCEDURE — 82330 ASSAY OF CALCIUM: CPT

## 2025-08-13 PROCEDURE — 85014 HEMATOCRIT: CPT

## 2025-08-13 PROCEDURE — 80051 ELECTROLYTE PANEL: CPT

## 2025-08-13 PROCEDURE — 36415 COLL VENOUS BLD VENIPUNCTURE: CPT

## 2025-08-13 PROCEDURE — 70498 CT ANGIOGRAPHY NECK: CPT

## 2025-08-13 PROCEDURE — 72125 CT NECK SPINE W/O DYE: CPT

## 2025-08-13 PROCEDURE — 6360000004 HC RX CONTRAST MEDICATION: Performed by: EMERGENCY MEDICINE

## 2025-08-13 PROCEDURE — 70450 CT HEAD/BRAIN W/O DYE: CPT

## 2025-08-13 PROCEDURE — 85025 COMPLETE CBC W/AUTO DIFF WBC: CPT

## 2025-08-13 PROCEDURE — 99285 EMERGENCY DEPT VISIT HI MDM: CPT

## 2025-08-13 PROCEDURE — 6370000000 HC RX 637 (ALT 250 FOR IP)

## 2025-08-13 RX ORDER — ACETAMINOPHEN 500 MG
1000 TABLET ORAL ONCE
Status: COMPLETED | OUTPATIENT
Start: 2025-08-13 | End: 2025-08-13

## 2025-08-13 RX ORDER — SODIUM CHLORIDE 0.9 % (FLUSH) 0.9 %
10 SYRINGE (ML) INJECTION ONCE
Status: COMPLETED | OUTPATIENT
Start: 2025-08-13 | End: 2025-08-13

## 2025-08-13 RX ORDER — 0.9 % SODIUM CHLORIDE 0.9 %
80 INTRAVENOUS SOLUTION INTRAVENOUS ONCE
Status: COMPLETED | OUTPATIENT
Start: 2025-08-13 | End: 2025-08-13

## 2025-08-13 RX ORDER — IOPAMIDOL 755 MG/ML
75 INJECTION, SOLUTION INTRAVASCULAR
Status: COMPLETED | OUTPATIENT
Start: 2025-08-13 | End: 2025-08-13

## 2025-08-13 RX ORDER — ACETAMINOPHEN 500 MG
TABLET ORAL
Status: COMPLETED
Start: 2025-08-13 | End: 2025-08-13

## 2025-08-13 RX ADMIN — SODIUM CHLORIDE 80 ML: 9 INJECTION, SOLUTION INTRAVENOUS at 02:25

## 2025-08-13 RX ADMIN — IOPAMIDOL 75 ML: 755 INJECTION, SOLUTION INTRAVENOUS at 02:25

## 2025-08-13 RX ADMIN — Medication 1000 MG: at 05:55

## 2025-08-13 RX ADMIN — SODIUM CHLORIDE, PRESERVATIVE FREE 10 ML: 5 INJECTION INTRAVENOUS at 02:24

## 2025-08-13 RX ADMIN — ACETAMINOPHEN 1000 MG: 500 TABLET ORAL at 05:55

## 2025-08-13 ASSESSMENT — PAIN SCALES - GENERAL
PAINLEVEL_OUTOF10: 0
PAINLEVEL_OUTOF10: 4

## 2025-08-13 ASSESSMENT — PAIN DESCRIPTION - LOCATION: LOCATION: HEAD

## 2025-08-13 ASSESSMENT — PAIN DESCRIPTION - DESCRIPTORS: DESCRIPTORS: ACHING

## 2025-08-13 ASSESSMENT — PAIN DESCRIPTION - PAIN TYPE: TYPE: ACUTE PAIN

## 2025-08-13 ASSESSMENT — PAIN DESCRIPTION - FREQUENCY: FREQUENCY: CONTINUOUS

## 2025-08-13 ASSESSMENT — PAIN - FUNCTIONAL ASSESSMENT
PAIN_FUNCTIONAL_ASSESSMENT: 0-10
PAIN_FUNCTIONAL_ASSESSMENT: 0-10

## 2025-08-13 ASSESSMENT — PAIN DESCRIPTION - ORIENTATION: ORIENTATION: ANTERIOR

## 2025-08-14 ENCOUNTER — TELEPHONE (OUTPATIENT)
Dept: NEUROLOGY | Age: 85
End: 2025-08-14

## (undated) DEVICE — BLADE,CARBON-STEEL,10,STRL,DISPOSABLE,TB: Brand: MEDLINE

## (undated) DEVICE — INTRACEPT ACCESS INSTRUMENTS - ADDITIONAL VB: Brand: INTRACEPT

## (undated) DEVICE — COVER LT HNDL BLU PLAS

## (undated) DEVICE — SOLUTION IV IRRIG 500ML 0.9% SODIUM CHL 2F7123

## (undated) DEVICE — GAUZE,SPONGE,4"X4",16PLY,XRAY,STRL,LF: Brand: MEDLINE

## (undated) DEVICE — SHEET,DRAPE,53X77,STERILE: Brand: MEDLINE

## (undated) DEVICE — STERILE LATEX POWDER-FREE SURGICAL GLOVESWITH NITRILE COATING: Brand: PROTEXIS

## (undated) DEVICE — STANDARD HYPODERMIC NEEDLE,POLYPROPYLENE HUB: Brand: MONOJECT

## (undated) DEVICE — NEEDLE HYPO 25GA L1.5IN BLU POLYPR HUB S STL REG BVL STR

## (undated) DEVICE — STERILE POLYISOPRENE POWDER-FREE SURGICAL GLOVES WITH EMOLLIENT COATING: Brand: PROTEXIS

## (undated) DEVICE — APPLICATOR MEDICATED 26 CC SOLUTION HI LT ORNG CHLORAPREP

## (undated) DEVICE — GOWN,AURORA,NONREINFORCED,LARGE: Brand: MEDLINE

## (undated) DEVICE — COVER,TABLE,60X90,STERILE: Brand: MEDLINE

## (undated) DEVICE — STRIP,CLOSURE,WOUND,MEDI-STRIP,1/2X4: Brand: MEDLINE

## (undated) DEVICE — INTRACEPT ACCESS INSTRUMENTS: Brand: INTRACEPT

## (undated) DEVICE — SYRINGE MED 10ML LUERLOCK TIP W/O SFTY DISP

## (undated) DEVICE — SHEET, T, LAPAROTOMY, STERILE: Brand: MEDLINE

## (undated) DEVICE — SNAP KAP: Brand: UNBRANDED

## (undated) DEVICE — SUTURE MCRYL SZ 2-0 L27IN ABSRB UD CP-1 1 L36MM 1/2 CIR REV Y266H

## (undated) DEVICE — ARROWHEAD LOCAL PACK: Brand: MEDLINE INDUSTRIES, INC.

## (undated) DEVICE — DISCONTINUED USE 393278 SYRINGE 10 ML HYPO W/O NDL LL TP PLSTC ST

## (undated) DEVICE — NEEDLE, QUINCKE, 22GX3.5": Brand: MEDLINE

## (undated) DEVICE — MARKER,SKIN,WI/RULER AND LABELS: Brand: MEDLINE

## (undated) DEVICE — GLOVE SURG SZ 8 THK118MIL BLK LTX FREE POLYISOPRENE BEAD CUF

## (undated) DEVICE — MERCY HEALTH ST CHARLES: Brand: MEDLINE INDUSTRIES, INC.

## (undated) DEVICE — 3M™ STERI-STRIP™ REINFORCED ADHESIVE SKIN CLOSURES, R1547, 1/2 IN X 4 IN (12 MM X 100 MM), 6 STRIPS/ENVELOPE: Brand: 3M™ STERI-STRIP™

## (undated) DEVICE — TOWEL,OR,DSP,ST,BLUE,STD,6/PK,12PK/CS: Brand: MEDLINE

## (undated) DEVICE — 1010 S-DRAPE TOWEL DRAPE 10/BX: Brand: STERI-DRAPE™

## (undated) DEVICE — COVER,MAYO STAND,STERILE: Brand: MEDLINE

## (undated) DEVICE — INTRACEPT ACCESS INSTRUMENTS 2 VB: Brand: INTRACEPT

## (undated) DEVICE — LABEL MED DRUG DESCRIPTION MP STL

## (undated) DEVICE — BLANKET WRM W29.9XL79.1IN UP BODY FORC AIR MISTRAL-AIR

## (undated) DEVICE — COUNTER NDL 10 COUNT HLD 20 FOAM BLK SGL MAG

## (undated) DEVICE — SOLUTION IRRIG 1000ML 0.9% SOD CHL USP POUR PLAS BTL

## (undated) DEVICE — LIQUIBAND RAPID ADHESIVE 36/CS 0.8ML: Brand: MEDLINE

## (undated) DEVICE — INTRACEPT RF PROBE: Brand: INTRACEPT